# Patient Record
Sex: FEMALE | Race: BLACK OR AFRICAN AMERICAN | Employment: UNEMPLOYED
[De-identification: names, ages, dates, MRNs, and addresses within clinical notes are randomized per-mention and may not be internally consistent; named-entity substitution may affect disease eponyms.]

---

## 2017-01-11 ENCOUNTER — TELEPHONE (OUTPATIENT)
Dept: INTERNAL MEDICINE | Facility: CLINIC | Age: 57
End: 2017-01-11

## 2017-01-12 DIAGNOSIS — E11.42 TYPE 2 DIABETES MELLITUS WITH DIABETIC POLYNEUROPATHY, WITHOUT LONG-TERM CURRENT USE OF INSULIN (HCC): Primary | ICD-10-CM

## 2017-01-12 RX ORDER — ALOGLIPTIN 25 MG/1
25 TABLET, FILM COATED ORAL DAILY
Qty: 30 TABLET | Refills: 2 | Status: SHIPPED | OUTPATIENT
Start: 2017-01-12 | End: 2017-04-24

## 2017-01-18 ENCOUNTER — OFFICE VISIT (OUTPATIENT)
Dept: PODIATRY | Facility: CLINIC | Age: 57
End: 2017-01-18

## 2017-01-18 VITALS
WEIGHT: 198.4 LBS | SYSTOLIC BLOOD PRESSURE: 142 MMHG | BODY MASS INDEX: 31.88 KG/M2 | DIASTOLIC BLOOD PRESSURE: 86 MMHG | HEART RATE: 98 BPM | TEMPERATURE: 98.2 F | HEIGHT: 66 IN

## 2017-01-18 DIAGNOSIS — G62.9 NEUROPATHY: ICD-10-CM

## 2017-01-18 DIAGNOSIS — M21.41 PES PLANUS OF BOTH FEET: ICD-10-CM

## 2017-01-18 DIAGNOSIS — E11.42 TYPE 2 DIABETES MELLITUS WITH DIABETIC POLYNEUROPATHY, UNSPECIFIED LONG TERM INSULIN USE STATUS: ICD-10-CM

## 2017-01-18 DIAGNOSIS — M21.42 PES PLANUS OF BOTH FEET: ICD-10-CM

## 2017-01-18 DIAGNOSIS — B35.1 ONYCHOMYCOSIS: Primary | ICD-10-CM

## 2017-01-18 PROCEDURE — 99203 OFFICE O/P NEW LOW 30 MIN: CPT | Performed by: PODIATRIST

## 2017-01-18 PROCEDURE — 11721 DEBRIDE NAIL 6 OR MORE: CPT | Performed by: PODIATRIST

## 2017-01-26 DIAGNOSIS — I10 ESSENTIAL HYPERTENSION: ICD-10-CM

## 2017-01-26 DIAGNOSIS — B37.2 SKIN YEAST INFECTION: ICD-10-CM

## 2017-01-26 RX ORDER — NYSTATIN 100000 [USP'U]/G
POWDER TOPICAL
Qty: 15 G | Refills: 3 | Status: SHIPPED | OUTPATIENT
Start: 2017-01-26 | End: 2017-04-24 | Stop reason: SDUPTHER

## 2017-01-26 RX ORDER — LISINOPRIL 30 MG/1
TABLET ORAL
Qty: 30 TABLET | Refills: 3 | Status: SHIPPED | OUTPATIENT
Start: 2017-01-26 | End: 2017-03-30 | Stop reason: SINTOL

## 2017-03-30 ENCOUNTER — OFFICE VISIT (OUTPATIENT)
Dept: INTERNAL MEDICINE | Age: 57
End: 2017-03-30
Payer: MEDICAID

## 2017-03-30 VITALS
DIASTOLIC BLOOD PRESSURE: 88 MMHG | HEART RATE: 88 BPM | SYSTOLIC BLOOD PRESSURE: 146 MMHG | WEIGHT: 188 LBS | HEIGHT: 65 IN | BODY MASS INDEX: 31.32 KG/M2

## 2017-03-30 DIAGNOSIS — Z12.31 ENCOUNTER FOR SCREENING MAMMOGRAM FOR BREAST CANCER: ICD-10-CM

## 2017-03-30 DIAGNOSIS — I10 ESSENTIAL HYPERTENSION: ICD-10-CM

## 2017-03-30 DIAGNOSIS — E11.42 TYPE 2 DIABETES MELLITUS WITH DIABETIC POLYNEUROPATHY, WITHOUT LONG-TERM CURRENT USE OF INSULIN (HCC): ICD-10-CM

## 2017-03-30 DIAGNOSIS — Z23 NEED FOR TDAP VACCINATION: ICD-10-CM

## 2017-03-30 DIAGNOSIS — T46.4X5A: ICD-10-CM

## 2017-03-30 DIAGNOSIS — N10 ACUTE PYELONEPHRITIS: Primary | ICD-10-CM

## 2017-03-30 DIAGNOSIS — R39.9 UTI SYMPTOMS: ICD-10-CM

## 2017-03-30 LAB
BILIRUBIN, POC: ABNORMAL
BLOOD URINE, POC: ABNORMAL
CLARITY, POC: ABNORMAL
COLOR, POC: YELLOW
GLUCOSE URINE, POC: ABNORMAL
HBA1C MFR BLD: 8.8 %
KETONES, POC: ABNORMAL
LEUKOCYTE EST, POC: ABNORMAL
NITRITE, POC: POSITIVE
PH, POC: 5.5
PROTEIN, POC: 100
SPECIFIC GRAVITY, POC: 1.02
UROBILINOGEN, POC: 0.2

## 2017-03-30 PROCEDURE — 81002 URINALYSIS NONAUTO W/O SCOPE: CPT | Performed by: INTERNAL MEDICINE

## 2017-03-30 PROCEDURE — 90471 IMMUNIZATION ADMIN: CPT | Performed by: INTERNAL MEDICINE

## 2017-03-30 PROCEDURE — 83036 HEMOGLOBIN GLYCOSYLATED A1C: CPT | Performed by: INTERNAL MEDICINE

## 2017-03-30 PROCEDURE — 99214 OFFICE O/P EST MOD 30 MIN: CPT | Performed by: INTERNAL MEDICINE

## 2017-03-30 PROCEDURE — 90715 TDAP VACCINE 7 YRS/> IM: CPT | Performed by: INTERNAL MEDICINE

## 2017-03-30 RX ORDER — FLUCONAZOLE 150 MG/1
150 TABLET ORAL DAILY
Qty: 3 TABLET | Refills: 0 | Status: SHIPPED | OUTPATIENT
Start: 2017-03-30 | End: 2017-04-02

## 2017-03-30 RX ORDER — LOSARTAN POTASSIUM 50 MG/1
50 TABLET ORAL DAILY
Qty: 30 TABLET | Refills: 3 | Status: SHIPPED | OUTPATIENT
Start: 2017-03-30 | End: 2017-11-10

## 2017-03-30 RX ORDER — CIPROFLOXACIN 500 MG/1
500 TABLET, FILM COATED ORAL 2 TIMES DAILY
Qty: 14 TABLET | Refills: 0 | Status: SHIPPED | OUTPATIENT
Start: 2017-03-30 | End: 2017-04-06

## 2017-03-30 ASSESSMENT — PATIENT HEALTH QUESTIONNAIRE - PHQ9
2. FEELING DOWN, DEPRESSED OR HOPELESS: 3
4. FEELING TIRED OR HAVING LITTLE ENERGY: 3
3. TROUBLE FALLING OR STAYING ASLEEP: 0
SUM OF ALL RESPONSES TO PHQ QUESTIONS 1-9: 19
5. POOR APPETITE OR OVEREATING: 3
1. LITTLE INTEREST OR PLEASURE IN DOING THINGS: 2
8. MOVING OR SPEAKING SO SLOWLY THAT OTHER PEOPLE COULD HAVE NOTICED. OR THE OPPOSITE, BEING SO FIGETY OR RESTLESS THAT YOU HAVE BEEN MOVING AROUND A LOT MORE THAN USUAL: 2
9. THOUGHTS THAT YOU WOULD BE BETTER OFF DEAD, OR OF HURTING YOURSELF: 0
7. TROUBLE CONCENTRATING ON THINGS, SUCH AS READING THE NEWSPAPER OR WATCHING TELEVISION: 3
10. IF YOU CHECKED OFF ANY PROBLEMS, HOW DIFFICULT HAVE THESE PROBLEMS MADE IT FOR YOU TO DO YOUR WORK, TAKE CARE OF THINGS AT HOME, OR GET ALONG WITH OTHER PEOPLE: 0
SUM OF ALL RESPONSES TO PHQ9 QUESTIONS 1 & 2: 5
6. FEELING BAD ABOUT YOURSELF - OR THAT YOU ARE A FAILURE OR HAVE LET YOURSELF OR YOUR FAMILY DOWN: 3

## 2017-04-24 ENCOUNTER — HOSPITAL ENCOUNTER (OUTPATIENT)
Age: 57
Setting detail: SPECIMEN
Discharge: HOME | End: 2017-04-24
Payer: MEDICAID

## 2017-04-24 ENCOUNTER — OFFICE VISIT (OUTPATIENT)
Dept: INTERNAL MEDICINE | Age: 57
End: 2017-04-24
Payer: MEDICAID

## 2017-04-24 VITALS
DIASTOLIC BLOOD PRESSURE: 85 MMHG | BODY MASS INDEX: 31.18 KG/M2 | HEART RATE: 109 BPM | WEIGHT: 194 LBS | SYSTOLIC BLOOD PRESSURE: 156 MMHG | HEIGHT: 66 IN

## 2017-04-24 DIAGNOSIS — M25.511 CHRONIC RIGHT SHOULDER PAIN: ICD-10-CM

## 2017-04-24 DIAGNOSIS — B37.31 VAGINAL YEAST INFECTION: ICD-10-CM

## 2017-04-24 DIAGNOSIS — G89.29 CHRONIC RIGHT SHOULDER PAIN: ICD-10-CM

## 2017-04-24 DIAGNOSIS — Z12.11 COLON CANCER SCREENING: ICD-10-CM

## 2017-04-24 DIAGNOSIS — R45.89 DEPRESSED MOOD: ICD-10-CM

## 2017-04-24 DIAGNOSIS — G56.03 BILATERAL CARPAL TUNNEL SYNDROME: ICD-10-CM

## 2017-04-24 DIAGNOSIS — E11.42 DIABETIC POLYNEUROPATHY ASSOCIATED WITH TYPE 2 DIABETES MELLITUS (HCC): ICD-10-CM

## 2017-04-24 DIAGNOSIS — I10 ESSENTIAL HYPERTENSION: ICD-10-CM

## 2017-04-24 DIAGNOSIS — N30.00 ACUTE CYSTITIS WITHOUT HEMATURIA: Primary | ICD-10-CM

## 2017-04-24 DIAGNOSIS — E11.42 TYPE 2 DIABETES MELLITUS WITH DIABETIC POLYNEUROPATHY, WITHOUT LONG-TERM CURRENT USE OF INSULIN (HCC): ICD-10-CM

## 2017-04-24 PROBLEM — G56.01 CARPAL TUNNEL SYNDROME OF RIGHT WRIST: Status: ACTIVE | Noted: 2017-04-24

## 2017-04-24 LAB
BILIRUBIN, POC: ABNORMAL
BLOOD URINE, POC: ABNORMAL
CLARITY, POC: CLEAR
COLOR, POC: ABNORMAL
GLUCOSE URINE, POC: ABNORMAL
KETONES, POC: ABNORMAL
LEUKOCYTE EST, POC: ABNORMAL
NITRITE, POC: ABNORMAL
PH, POC: 5
PROTEIN, POC: ABNORMAL
SPECIFIC GRAVITY, POC: 1.01
UROBILINOGEN, POC: ABNORMAL

## 2017-04-24 PROCEDURE — 99214 OFFICE O/P EST MOD 30 MIN: CPT | Performed by: INTERNAL MEDICINE

## 2017-04-24 PROCEDURE — 81002 URINALYSIS NONAUTO W/O SCOPE: CPT | Performed by: INTERNAL MEDICINE

## 2017-04-24 RX ORDER — PRAVASTATIN SODIUM 40 MG
40 TABLET ORAL DAILY
Qty: 30 TABLET | Refills: 2 | Status: SHIPPED | OUTPATIENT
Start: 2017-04-24 | End: 2017-07-20 | Stop reason: SDUPTHER

## 2017-04-24 RX ORDER — ASPIRIN 81 MG/1
81 TABLET ORAL DAILY
Qty: 30 TABLET | Refills: 2 | Status: SHIPPED | OUTPATIENT
Start: 2017-04-24 | End: 2017-11-27

## 2017-04-24 RX ORDER — GABAPENTIN 800 MG/1
800 TABLET ORAL 3 TIMES DAILY
COMMUNITY
End: 2017-04-24

## 2017-04-24 RX ORDER — GABAPENTIN 600 MG/1
600 TABLET ORAL 3 TIMES DAILY
Qty: 90 TABLET | Refills: 2 | Status: SHIPPED | OUTPATIENT
Start: 2017-04-24 | End: 2017-07-20 | Stop reason: SDUPTHER

## 2017-04-24 RX ORDER — GLIPIZIDE 10 MG/1
10 TABLET ORAL
Qty: 60 TABLET | Refills: 2 | Status: SHIPPED | OUTPATIENT
Start: 2017-04-24 | End: 2017-07-20 | Stop reason: SDUPTHER

## 2017-04-24 RX ORDER — FLUCONAZOLE 150 MG/1
150 TABLET ORAL ONCE
Qty: 2 TABLET | Refills: 0 | Status: SHIPPED | OUTPATIENT
Start: 2017-04-24 | End: 2017-04-24

## 2017-04-24 RX ORDER — SENNOSIDES 8.6 MG
650 CAPSULE ORAL 2 TIMES DAILY PRN
Qty: 60 TABLET | Refills: 2 | Status: SHIPPED | OUTPATIENT
Start: 2017-04-24 | End: 2017-11-10

## 2017-04-24 RX ORDER — ALOGLIPTIN 25 MG/1
25 TABLET, FILM COATED ORAL DAILY
Qty: 30 TABLET | Refills: 2 | Status: SHIPPED | OUTPATIENT
Start: 2017-04-24 | End: 2017-11-10 | Stop reason: SDUPTHER

## 2017-04-24 RX ORDER — ATENOLOL 25 MG/1
25 TABLET ORAL DAILY
Qty: 30 TABLET | Refills: 2 | Status: SHIPPED | OUTPATIENT
Start: 2017-04-24 | End: 2017-07-20 | Stop reason: SDUPTHER

## 2017-04-24 RX ORDER — NITROFURANTOIN 25; 75 MG/1; MG/1
100 CAPSULE ORAL 2 TIMES DAILY
Qty: 6 CAPSULE | Refills: 0 | Status: SHIPPED | OUTPATIENT
Start: 2017-04-24 | End: 2017-04-27

## 2017-04-25 LAB
CREATININE URINE: 67.5 MG/DL (ref 28–217)
MICROALBUMIN/CREAT 24H UR: 526 MG/L
MICROALBUMIN/CREAT UR-RTO: 779 MCG/MG CREAT

## 2017-04-28 ENCOUNTER — TELEPHONE (OUTPATIENT)
Dept: BEHAVIORAL/MENTAL HEALTH CLINIC | Age: 57
End: 2017-04-28

## 2017-05-09 RX ORDER — LANCETS 33 GAUGE
EACH MISCELLANEOUS
Qty: 100 EACH | Refills: 5 | Status: SHIPPED | OUTPATIENT
Start: 2017-05-09

## 2017-05-19 DIAGNOSIS — E11.42 TYPE 2 DIABETES MELLITUS WITH DIABETIC POLYNEUROPATHY, WITHOUT LONG-TERM CURRENT USE OF INSULIN (HCC): ICD-10-CM

## 2017-05-19 RX ORDER — GLIPIZIDE 10 MG/1
TABLET ORAL
Qty: 60 TABLET | Refills: 0 | Status: SHIPPED | OUTPATIENT
Start: 2017-05-19 | End: 2017-11-10

## 2017-05-24 ENCOUNTER — OFFICE VISIT (OUTPATIENT)
Dept: PODIATRY | Age: 57
End: 2017-05-24
Payer: MEDICAID

## 2017-05-24 VITALS
DIASTOLIC BLOOD PRESSURE: 90 MMHG | SYSTOLIC BLOOD PRESSURE: 153 MMHG | HEART RATE: 79 BPM | BODY MASS INDEX: 31.82 KG/M2 | HEIGHT: 66 IN | WEIGHT: 198 LBS | TEMPERATURE: 97.8 F

## 2017-05-24 DIAGNOSIS — B35.1 ONYCHOMYCOSIS: Primary | ICD-10-CM

## 2017-05-24 DIAGNOSIS — M21.41 PES PLANUS OF BOTH FEET: ICD-10-CM

## 2017-05-24 DIAGNOSIS — E11.42 TYPE 2 DIABETES MELLITUS WITH DIABETIC POLYNEUROPATHY, UNSPECIFIED LONG TERM INSULIN USE STATUS: ICD-10-CM

## 2017-05-24 DIAGNOSIS — G62.9 NEUROPATHY: ICD-10-CM

## 2017-05-24 DIAGNOSIS — M21.42 PES PLANUS OF BOTH FEET: ICD-10-CM

## 2017-05-24 PROCEDURE — 11721 DEBRIDE NAIL 6 OR MORE: CPT | Performed by: PODIATRIST

## 2017-05-24 PROCEDURE — 99213 OFFICE O/P EST LOW 20 MIN: CPT | Performed by: PODIATRIST

## 2017-06-06 DIAGNOSIS — E11.42 TYPE 2 DIABETES MELLITUS WITH DIABETIC POLYNEUROPATHY, WITHOUT LONG-TERM CURRENT USE OF INSULIN (HCC): ICD-10-CM

## 2017-06-06 DIAGNOSIS — B37.2 SKIN YEAST INFECTION: ICD-10-CM

## 2017-06-07 RX ORDER — LISINOPRIL 30 MG/1
TABLET ORAL
Qty: 30 TABLET | Refills: 3 | Status: SHIPPED | OUTPATIENT
Start: 2017-06-07 | End: 2017-11-27

## 2017-06-07 RX ORDER — ASPIRIN 81 MG
TABLET, DELAYED RELEASE (ENTERIC COATED) ORAL
Qty: 30 TABLET | Refills: 3 | Status: SHIPPED | OUTPATIENT
Start: 2017-06-07 | End: 2017-11-27

## 2017-06-07 RX ORDER — NYSTATIN 100000 [USP'U]/G
POWDER TOPICAL
Qty: 15 G | Refills: 3 | Status: SHIPPED | OUTPATIENT
Start: 2017-06-07 | End: 2017-11-10

## 2017-06-24 ENCOUNTER — HOSPITAL ENCOUNTER (EMERGENCY)
Age: 57
Discharge: HOME | End: 2017-06-24
Attending: EMERGENCY MEDICINE
Payer: MEDICAID

## 2017-06-24 VITALS
BODY MASS INDEX: 30.37 KG/M2 | TEMPERATURE: 98.6 F | RESPIRATION RATE: 15 BRPM | OXYGEN SATURATION: 99 % | SYSTOLIC BLOOD PRESSURE: 157 MMHG | DIASTOLIC BLOOD PRESSURE: 77 MMHG | HEIGHT: 66 IN | HEART RATE: 99 BPM | WEIGHT: 189 LBS

## 2017-06-24 DIAGNOSIS — H10.023 MUCOPURULENT CONJUNCTIVITIS, BILATERAL: Primary | ICD-10-CM

## 2017-06-24 PROCEDURE — 96372 THER/PROPH/DIAG INJ SC/IM: CPT

## 2017-06-24 PROCEDURE — 6370000000 HC RX 637 (ALT 250 FOR IP): Performed by: PHYSICIAN ASSISTANT

## 2017-06-24 PROCEDURE — 6360000002 HC RX W HCPCS: Performed by: PHYSICIAN ASSISTANT

## 2017-06-24 PROCEDURE — 99283 EMERGENCY DEPT VISIT LOW MDM: CPT

## 2017-06-24 RX ORDER — KETOROLAC TROMETHAMINE 30 MG/ML
30 INJECTION, SOLUTION INTRAMUSCULAR; INTRAVENOUS ONCE
Status: COMPLETED | OUTPATIENT
Start: 2017-06-24 | End: 2017-06-24

## 2017-06-24 RX ORDER — ERYTHROMYCIN 5 MG/G
OINTMENT OPHTHALMIC
Qty: 1 TUBE | Refills: 0 | Status: SHIPPED | OUTPATIENT
Start: 2017-06-24 | End: 2017-07-04

## 2017-06-24 RX ORDER — TETRACAINE HYDROCHLORIDE 5 MG/ML
1 SOLUTION OPHTHALMIC ONCE
Status: COMPLETED | OUTPATIENT
Start: 2017-06-24 | End: 2017-06-24

## 2017-06-24 RX ADMIN — FLUORESCEIN SODIUM 2 MG: 1 STRIP OPHTHALMIC at 11:28

## 2017-06-24 RX ADMIN — TETRACAINE HYDROCHLORIDE 1 DROP: 5 SOLUTION OPHTHALMIC at 11:29

## 2017-06-24 RX ADMIN — KETOROLAC TROMETHAMINE 30 MG: 30 INJECTION INTRAMUSCULAR; INTRAVENOUS at 11:45

## 2017-06-24 ASSESSMENT — PAIN DESCRIPTION - FREQUENCY: FREQUENCY: CONTINUOUS

## 2017-06-24 ASSESSMENT — VISUAL ACUITY
OU: 20/40
OS: 20/40
OD: 20/40

## 2017-06-24 ASSESSMENT — PAIN DESCRIPTION - DESCRIPTORS: DESCRIPTORS: ACHING

## 2017-06-24 ASSESSMENT — ENCOUNTER SYMPTOMS
PHOTOPHOBIA: 0
EYE REDNESS: 1
VOMITING: 0
EYE ITCHING: 0
EYE PAIN: 0
COUGH: 0
EYE INFLAMMATION: 1
PERI-ORBITAL EDEMA: 0
STRIDOR: 0
EYE DISCHARGE: 1
CRUSTING: 1
WHEEZING: 0

## 2017-06-24 ASSESSMENT — PAIN DESCRIPTION - PAIN TYPE: TYPE: ACUTE PAIN

## 2017-06-24 ASSESSMENT — PAIN DESCRIPTION - LOCATION: LOCATION: BACK

## 2017-06-24 ASSESSMENT — PAIN SCALES - GENERAL: PAINLEVEL_OUTOF10: 6

## 2017-06-30 ENCOUNTER — TELEPHONE (OUTPATIENT)
Dept: INTERNAL MEDICINE | Age: 57
End: 2017-06-30

## 2017-07-20 DIAGNOSIS — E11.42 TYPE 2 DIABETES MELLITUS WITH DIABETIC POLYNEUROPATHY, WITHOUT LONG-TERM CURRENT USE OF INSULIN (HCC): ICD-10-CM

## 2017-07-20 DIAGNOSIS — I10 ESSENTIAL HYPERTENSION: ICD-10-CM

## 2017-07-20 DIAGNOSIS — E11.42 DIABETIC POLYNEUROPATHY ASSOCIATED WITH TYPE 2 DIABETES MELLITUS (HCC): ICD-10-CM

## 2017-07-21 RX ORDER — PRAVASTATIN SODIUM 40 MG
TABLET ORAL
Qty: 30 TABLET | Refills: 1 | Status: SHIPPED | OUTPATIENT
Start: 2017-07-21 | End: 2017-11-27

## 2017-07-21 RX ORDER — ATENOLOL 25 MG/1
TABLET ORAL
Qty: 30 TABLET | Refills: 1 | Status: SHIPPED | OUTPATIENT
Start: 2017-07-21 | End: 2017-11-27

## 2017-07-21 RX ORDER — GLIPIZIDE 10 MG/1
TABLET ORAL
Qty: 60 TABLET | Refills: 1 | Status: SHIPPED | OUTPATIENT
Start: 2017-07-21 | End: 2017-11-27

## 2017-07-21 RX ORDER — GABAPENTIN 600 MG/1
TABLET ORAL
Qty: 90 TABLET | Refills: 1 | Status: SHIPPED | OUTPATIENT
Start: 2017-07-21 | End: 2017-11-27

## 2017-09-27 ENCOUNTER — TELEPHONE (OUTPATIENT)
Dept: INTERNAL MEDICINE | Age: 57
End: 2017-09-27

## 2017-10-16 ENCOUNTER — TELEPHONE (OUTPATIENT)
Dept: INTERNAL MEDICINE | Age: 57
End: 2017-10-16

## 2017-10-16 NOTE — LETTER
[address]  [address] [facility] 29 [facility]  Phone: [phone]  Fax: [phone]    Dheeraj Metcalf MD        October 16, 2017    Rashad Pelayo  [address]  [place] [address]      Dear Marilu Vasquez: We are sending this letter because your PCP ordered [facility] for you to have done at your last visit here and they have not yet been completed. If you can please come to our office on the 2nd floor to  your orders and [facility]. If you do not have a follow-up appointment scheduled you can either contact the office to make an appointment with us or you can make one when you come in to pick-up your orders. If you have any questions or concerns, please don't hesitate to call.     Sincerely,        Dheeraj Metcalf MD

## 2017-11-10 ENCOUNTER — TELEPHONE (OUTPATIENT)
Dept: INTERNAL MEDICINE | Age: 57
End: 2017-11-10

## 2017-11-10 DIAGNOSIS — I10 ESSENTIAL HYPERTENSION: ICD-10-CM

## 2017-11-10 DIAGNOSIS — Z12.39 SCREENING FOR BREAST CANCER: ICD-10-CM

## 2017-11-10 DIAGNOSIS — E78.00 PURE HYPERCHOLESTEROLEMIA: Primary | ICD-10-CM

## 2017-11-10 DIAGNOSIS — E11.42 TYPE 2 DIABETES MELLITUS WITH DIABETIC POLYNEUROPATHY, WITHOUT LONG-TERM CURRENT USE OF INSULIN (HCC): ICD-10-CM

## 2017-11-10 RX ORDER — BLOOD-GLUCOSE METER
KIT MISCELLANEOUS
Qty: 1 KIT | Refills: 0 | Status: SHIPPED | OUTPATIENT
Start: 2017-11-10

## 2017-11-10 RX ORDER — ALOGLIPTIN 25 MG/1
25 TABLET, FILM COATED ORAL DAILY
Qty: 30 TABLET | Refills: 0 | Status: SHIPPED | OUTPATIENT
Start: 2017-11-10 | End: 2017-11-27

## 2017-11-10 RX ORDER — LISINOPRIL 30 MG/1
30 TABLET ORAL DAILY
Qty: 30 TABLET | Refills: 0 | Status: SHIPPED | OUTPATIENT
Start: 2017-11-10 | End: 2017-11-27

## 2017-11-10 RX ORDER — PRAVASTATIN SODIUM 40 MG
40 TABLET ORAL DAILY
Qty: 30 TABLET | Refills: 0 | Status: SHIPPED | OUTPATIENT
Start: 2017-11-10 | End: 2017-11-27

## 2017-11-10 RX ORDER — GLIPIZIDE 10 MG/1
10 TABLET ORAL DAILY
Qty: 60 TABLET | Refills: 0 | Status: SHIPPED | OUTPATIENT
Start: 2017-11-10 | End: 2017-11-27

## 2017-11-10 RX ORDER — LANCETS 30 GAUGE
EACH MISCELLANEOUS
Qty: 100 EACH | Refills: 10 | Status: SHIPPED | OUTPATIENT
Start: 2017-11-10 | End: 2020-07-16 | Stop reason: SDUPTHER

## 2017-11-10 RX ORDER — ATENOLOL 25 MG/1
25 TABLET ORAL DAILY
Qty: 30 TABLET | Refills: 0 | Status: SHIPPED | OUTPATIENT
Start: 2017-11-10 | End: 2017-11-27

## 2017-11-10 RX ORDER — GLUCOSAMINE HCL/CHONDROITIN SU 500-400 MG
CAPSULE ORAL
Qty: 100 STRIP | Refills: 10 | Status: SHIPPED | OUTPATIENT
Start: 2017-11-10 | End: 2020-07-16 | Stop reason: SDUPTHER

## 2017-11-10 NOTE — TELEPHONE ENCOUNTER
PC to patient regarding missed appointment 09/22/17 and need to reschedule. Patient states she was not able to make it due to work. States she has been out of her medications so she does need to make another appointment. Patient scheduled for 11/27/17 with Dr. Vada Cooks.

## 2017-11-10 NOTE — TELEPHONE ENCOUNTER
Scripts for glucometer, strips, and lancets faxed to [facility].   PC to patient, updated regarding refills for medications, need for labs, and mammogram.

## 2017-11-27 ENCOUNTER — OFFICE VISIT (OUTPATIENT)
Dept: INTERNAL MEDICINE | Age: 57
End: 2017-11-27
Payer: MEDICAID

## 2017-11-27 ENCOUNTER — HOSPITAL ENCOUNTER (OUTPATIENT)
Age: 57
Setting detail: SPECIMEN
Discharge: HOME | End: 2017-11-27
Payer: MEDICAID

## 2017-11-27 VITALS
BODY MASS INDEX: 30.63 KG/M2 | SYSTOLIC BLOOD PRESSURE: 138 MMHG | DIASTOLIC BLOOD PRESSURE: 80 MMHG | HEIGHT: 66 IN | WEIGHT: 190.6 LBS | HEART RATE: 89 BPM

## 2017-11-27 DIAGNOSIS — E11.42 TYPE 2 DIABETES MELLITUS WITH DIABETIC POLYNEUROPATHY, WITHOUT LONG-TERM CURRENT USE OF INSULIN (HCC): ICD-10-CM

## 2017-11-27 DIAGNOSIS — R05.9 COUGH: ICD-10-CM

## 2017-11-27 DIAGNOSIS — N30.00 ACUTE CYSTITIS WITHOUT HEMATURIA: ICD-10-CM

## 2017-11-27 DIAGNOSIS — B37.2 SKIN YEAST INFECTION: ICD-10-CM

## 2017-11-27 DIAGNOSIS — I10 ESSENTIAL HYPERTENSION: ICD-10-CM

## 2017-11-27 DIAGNOSIS — E78.00 PURE HYPERCHOLESTEROLEMIA: ICD-10-CM

## 2017-11-27 DIAGNOSIS — E11.42 TYPE 2 DIABETES MELLITUS WITH DIABETIC POLYNEUROPATHY, WITHOUT LONG-TERM CURRENT USE OF INSULIN (HCC): Primary | ICD-10-CM

## 2017-11-27 DIAGNOSIS — E11.42 DIABETIC POLYNEUROPATHY ASSOCIATED WITH TYPE 2 DIABETES MELLITUS (HCC): ICD-10-CM

## 2017-11-27 LAB
BILIRUBIN, POC: ABNORMAL
BLOOD URINE, POC: ABNORMAL
CLARITY, POC: ABNORMAL
COLOR, POC: ABNORMAL
CREATININE URINE: 34.2 MG/DL (ref 28–217)
GLUCOSE URINE, POC: ABNORMAL
HBA1C MFR BLD: 8.6 %
KETONES, POC: ABNORMAL
LEUKOCYTE EST, POC: ABNORMAL
MICROALBUMIN/CREAT 24H UR: 235 MG/L
MICROALBUMIN/CREAT UR-RTO: 687 MCG/MG CREAT
NITRITE, POC: ABNORMAL
PH, POC: 6.5
PROTEIN, POC: ABNORMAL
SPECIFIC GRAVITY, POC: 1.01
UROBILINOGEN, POC: 0.2

## 2017-11-27 PROCEDURE — G8484 FLU IMMUNIZE NO ADMIN: HCPCS | Performed by: INTERNAL MEDICINE

## 2017-11-27 PROCEDURE — 3017F COLORECTAL CA SCREEN DOC REV: CPT | Performed by: INTERNAL MEDICINE

## 2017-11-27 PROCEDURE — 83036 HEMOGLOBIN GLYCOSYLATED A1C: CPT | Performed by: INTERNAL MEDICINE

## 2017-11-27 PROCEDURE — 81003 URINALYSIS AUTO W/O SCOPE: CPT | Performed by: INTERNAL MEDICINE

## 2017-11-27 PROCEDURE — 99214 OFFICE O/P EST MOD 30 MIN: CPT | Performed by: INTERNAL MEDICINE

## 2017-11-27 PROCEDURE — 4004F PT TOBACCO SCREEN RCVD TLK: CPT | Performed by: INTERNAL MEDICINE

## 2017-11-27 PROCEDURE — G8417 CALC BMI ABV UP PARAM F/U: HCPCS | Performed by: INTERNAL MEDICINE

## 2017-11-27 PROCEDURE — 3045F PR MOST RECENT HEMOGLOBIN A1C LEVEL 7.0-9.0%: CPT | Performed by: INTERNAL MEDICINE

## 2017-11-27 PROCEDURE — 3014F SCREEN MAMMO DOC REV: CPT | Performed by: INTERNAL MEDICINE

## 2017-11-27 PROCEDURE — G8427 DOCREV CUR MEDS BY ELIG CLIN: HCPCS | Performed by: INTERNAL MEDICINE

## 2017-11-27 RX ORDER — ASPIRIN 81 MG/1
81 TABLET ORAL DAILY
Qty: 30 TABLET | Refills: 2 | Status: SHIPPED | OUTPATIENT
Start: 2017-11-27 | End: 2017-11-27 | Stop reason: SDUPTHER

## 2017-11-27 RX ORDER — LISINOPRIL 30 MG/1
30 TABLET ORAL DAILY
Qty: 30 TABLET | Refills: 2 | Status: SHIPPED | OUTPATIENT
Start: 2017-11-27 | End: 2018-02-01 | Stop reason: SDUPTHER

## 2017-11-27 RX ORDER — PRAVASTATIN SODIUM 40 MG
40 TABLET ORAL DAILY
Qty: 30 TABLET | Refills: 2 | Status: SHIPPED | OUTPATIENT
Start: 2017-11-27 | End: 2017-11-27 | Stop reason: SDUPTHER

## 2017-11-27 RX ORDER — NYSTATIN 100000 [USP'U]/G
POWDER TOPICAL
Qty: 15 G | Refills: 3 | Status: SHIPPED | OUTPATIENT
Start: 2017-11-27 | End: 2017-11-27 | Stop reason: SDUPTHER

## 2017-11-27 RX ORDER — GABAPENTIN 600 MG/1
600 TABLET ORAL 3 TIMES DAILY
Qty: 90 TABLET | Refills: 2 | Status: SHIPPED | OUTPATIENT
Start: 2017-11-27 | End: 2017-11-27 | Stop reason: SDUPTHER

## 2017-11-27 RX ORDER — ASPIRIN 81 MG/1
81 TABLET ORAL DAILY
Qty: 30 TABLET | Refills: 2 | Status: SHIPPED | OUTPATIENT
Start: 2017-11-27 | End: 2018-02-16

## 2017-11-27 RX ORDER — GLIPIZIDE 10 MG/1
10 TABLET ORAL DAILY
Qty: 60 TABLET | Refills: 2 | Status: SHIPPED | OUTPATIENT
Start: 2017-11-27 | End: 2018-02-16

## 2017-11-27 RX ORDER — LISINOPRIL 30 MG/1
30 TABLET ORAL DAILY
Qty: 30 TABLET | Refills: 2 | Status: SHIPPED | OUTPATIENT
Start: 2017-11-27 | End: 2017-11-27 | Stop reason: SDUPTHER

## 2017-11-27 RX ORDER — ALOGLIPTIN 25 MG/1
25 TABLET, FILM COATED ORAL DAILY
Qty: 30 TABLET | Refills: 2 | Status: SHIPPED | OUTPATIENT
Start: 2017-11-27 | End: 2018-02-01 | Stop reason: SDUPTHER

## 2017-11-27 RX ORDER — NYSTATIN 100000 [USP'U]/G
POWDER TOPICAL
Qty: 15 G | Refills: 0 | Status: SHIPPED | OUTPATIENT
Start: 2017-11-27 | End: 2018-02-16

## 2017-11-27 RX ORDER — FLUCONAZOLE 150 MG/1
150 TABLET ORAL ONCE
Qty: 2 TABLET | Refills: 0 | Status: SHIPPED | OUTPATIENT
Start: 2017-11-27 | End: 2017-11-27

## 2017-11-27 RX ORDER — GLIPIZIDE 10 MG/1
10 TABLET ORAL DAILY
Qty: 60 TABLET | Refills: 2 | Status: SHIPPED | OUTPATIENT
Start: 2017-11-27 | End: 2017-11-27 | Stop reason: SDUPTHER

## 2017-11-27 RX ORDER — NYSTATIN 100000 [USP'U]/G
POWDER TOPICAL
Qty: 15 G | Refills: 0 | Status: SHIPPED | OUTPATIENT
Start: 2017-11-27 | End: 2017-11-27 | Stop reason: SDUPTHER

## 2017-11-27 RX ORDER — NITROFURANTOIN 25; 75 MG/1; MG/1
100 CAPSULE ORAL 2 TIMES DAILY
Qty: 6 CAPSULE | Refills: 0 | Status: SHIPPED | OUTPATIENT
Start: 2017-11-27 | End: 2017-11-27 | Stop reason: SDUPTHER

## 2017-11-27 RX ORDER — GUAIFENESIN 600 MG/1
600 TABLET, EXTENDED RELEASE ORAL 2 TIMES DAILY
Qty: 20 TABLET | Refills: 0 | Status: SHIPPED | OUTPATIENT
Start: 2017-11-27 | End: 2018-02-16

## 2017-11-27 RX ORDER — GUAIFENESIN 600 MG/1
600 TABLET, EXTENDED RELEASE ORAL 2 TIMES DAILY
Qty: 20 TABLET | Refills: 0 | Status: SHIPPED | OUTPATIENT
Start: 2017-11-27 | End: 2017-11-27 | Stop reason: SDUPTHER

## 2017-11-27 RX ORDER — ALOGLIPTIN 25 MG/1
25 TABLET, FILM COATED ORAL DAILY
Qty: 30 TABLET | Refills: 2 | Status: SHIPPED | OUTPATIENT
Start: 2017-11-27 | End: 2017-11-27 | Stop reason: SDUPTHER

## 2017-11-27 RX ORDER — PRAVASTATIN SODIUM 40 MG
40 TABLET ORAL DAILY
Qty: 30 TABLET | Refills: 2 | Status: SHIPPED | OUTPATIENT
Start: 2017-11-27 | End: 2018-02-01 | Stop reason: SDUPTHER

## 2017-11-27 RX ORDER — ATENOLOL 25 MG/1
25 TABLET ORAL DAILY
Qty: 30 TABLET | Refills: 2 | Status: SHIPPED | OUTPATIENT
Start: 2017-11-27 | End: 2018-02-01 | Stop reason: SDUPTHER

## 2017-11-27 RX ORDER — ATENOLOL 25 MG/1
25 TABLET ORAL DAILY
Qty: 30 TABLET | Refills: 2 | Status: SHIPPED | OUTPATIENT
Start: 2017-11-27 | End: 2017-11-27 | Stop reason: SDUPTHER

## 2017-11-27 RX ORDER — GABAPENTIN 600 MG/1
600 TABLET ORAL 3 TIMES DAILY
Qty: 90 TABLET | Refills: 2 | Status: SHIPPED | OUTPATIENT
Start: 2017-11-27 | End: 2018-02-16

## 2017-11-27 RX ORDER — NITROFURANTOIN 25; 75 MG/1; MG/1
100 CAPSULE ORAL 2 TIMES DAILY
Qty: 6 CAPSULE | Refills: 0 | Status: SHIPPED | OUTPATIENT
Start: 2017-11-27 | End: 2017-11-30

## 2017-11-27 ASSESSMENT — ENCOUNTER SYMPTOMS
ABDOMINAL PAIN: 0
HEARTBURN: 0
NAUSEA: 0
COUGH: 0
HEMOPTYSIS: 0
BLURRED VISION: 0
DOUBLE VISION: 0

## 2017-11-27 NOTE — PROGRESS NOTES
retinal exam  05/03/1971    Cervical cancer screen  05/03/1982    Breast cancer screen  05/03/2011    Colon cancer screen colonoscopy  05/03/2011    Lipid screen  04/25/2015    Flu vaccine (1) 09/01/2017       Allergies   Allergen Reactions    Ibuprofen          Current Outpatient Prescriptions   Medication Sig Dispense Refill    alogliptin (NESINA) 25 MG TABS tablet Take 1 tablet by mouth daily 30 tablet 2    glipiZIDE (GLUCOTROL) 10 MG tablet Take 1 tablet by mouth daily 60 tablet 2    pravastatin (PRAVACHOL) 40 MG tablet Take 1 tablet by mouth daily 30 tablet 2    atenolol (TENORMIN) 25 MG tablet Take 1 tablet by mouth daily 30 tablet 2    lisinopril (ZESTRIL) 30 MG tablet Take 1 tablet by mouth daily 30 tablet 2    gabapentin (NEURONTIN) 600 MG tablet Take 1 tablet by mouth 3 times daily 90 tablet 2    aspirin EC 81 MG EC tablet Take 1 tablet by mouth daily 30 tablet 2    nystatin (NYSTATIN) 851932 UNIT/GM powder APPLY TOPICALLY FOUR TIMES DAILY 15 g 0    guaiFENesin (MUCINEX) 600 MG extended release tablet Take 1 tablet by mouth 2 times daily 20 tablet 0    nitrofurantoin, macrocrystal-monohydrate, (MACROBID) 100 MG capsule Take 1 capsule by mouth 2 times daily for 3 days 6 capsule 0    fluconazole (DIFLUCAN) 150 MG tablet Take 1 tablet by mouth once for 1 dose May repeat in 3-5 days, if symptoms persist or recur. 2 tablet 0    glucose monitoring kit (FREESTYLE) monitoring kit Diagnosis: Diabetes type 2, insulin-dependent. Use 3-4 times daily. Ok to give insurance brand 1 kit 0    Lancets MISC Use as directed, Dx Insulin dependent  each 10    Glucose Blood (BLOOD GLUCOSE TEST STRIPS) STRP Use to check Blood sugar 3 times/day , Dx: Insulin dependent DM2 Please substitute for brand compatible with patients glucometer 100 strip 10    ONETOUCH DELICA LANCETS 11U MISC CHECK BLOOD SUGAR ONCE DAILY 100 each 5     No current facility-administered medications for this visit.         Social History   Substance Use Topics    Smoking status: Current Every Day Smoker     Packs/day: 0.25     Years: 30.00     Types: Cigarettes    Smokeless tobacco: Never Used      Comment: 5-6 cigarettes a day     Alcohol use 0.0 oz/week      Comment: 1 x mon       History reviewed. No pertinent family history. REVIEW OF SYSTEMS:  Review of Systems   Constitutional: Negative for chills and fever. HENT: Negative for congestion, ear discharge and nosebleeds. Eyes: Negative for blurred vision and double vision. Respiratory: Negative for cough and hemoptysis. Cardiovascular: Negative for chest pain and palpitations. Gastrointestinal: Negative for abdominal pain, heartburn and nausea. Genitourinary: Negative for dysuria and urgency. Musculoskeletal: Negative for myalgias and neck pain. Neurological: Negative for dizziness and headaches. Endo/Heme/Allergies: Does not bruise/bleed easily. Psychiatric/Behavioral: Negative for depression and suicidal ideas. PHYSICAL EXAM:  Vitals:    11/27/17 0815 11/27/17 0914   BP: (!) 166/96 138/80   Site: Right Arm    Position: Sitting    Cuff Size: Medium Adult    Pulse: 89    Weight: 190 lb 9.6 oz (86.5 kg)    Height: 5' 6\" (1.676 m)      BP Readings from Last 3 Encounters:   11/27/17 138/80   06/24/17 (!) 157/77   05/24/17 (!) 153/90        Physical Exam   Constitutional: She is oriented to person, place, and time and well-developed, well-nourished, and in no distress. No distress. HENT:   Mouth/Throat: No oropharyngeal exudate. Neck: Normal range of motion. Neck supple. No thyromegaly present. Cardiovascular: Normal rate, regular rhythm, normal heart sounds and intact distal pulses. Pulmonary/Chest: Effort normal and breath sounds normal. No respiratory distress. She has no wheezes. Abdominal: Soft. Bowel sounds are normal. She exhibits no distension and no mass. There is no tenderness. Musculoskeletal: Normal range of motion.  She exhibits no edema or tenderness. Neurological: She is alert and oriented to person, place, and time. No cranial nerve deficit. Skin: Skin is warm. No rash noted. She is not diaphoretic. No erythema. Psychiatric: Mood, memory, affect and judgment normal.         DIAGNOSTIC FINDINGS:  CBC:  Lab Results   Component Value Date    WBC 9.0 07/20/2015    HGB 11.8 07/20/2015     07/20/2015     10/05/2011       BMP:    Lab Results   Component Value Date     07/20/2015    K 3.6 07/20/2015     07/20/2015    CO2 26 07/20/2015    BUN 12 07/20/2015    CREATININE 0.64 07/20/2015    GLUCOSE 77 07/20/2015    GLUCOSE 89 09/09/2011       HEMOGLOBIN A1C:   Lab Results   Component Value Date    LABA1C 8.6 11/27/2017       FASTING LIPID PANEL:  Lab Results   Component Value Date    CHOL 225 (H) 04/25/2014    HDL 41 04/25/2014    TRIG 215 (H) 04/25/2014       ASSESSMENT AND PLAN:  Albertina Ma was seen today for diabetes, cough, urinary frequency, health maintenance and other. Diagnoses and all orders for this visit:    Type 2 diabetes mellitus with diabetic polyneuropathy, without long-term current use of insulin (HCC)  -     POCT glycosylated hemoglobin (Hb A1C)  -     Discontinue: alogliptin (NESINA) 25 MG TABS tablet; Take 1 tablet by mouth daily  -     Discontinue: glipiZIDE (GLUCOTROL) 10 MG tablet; Take 1 tablet by mouth daily  -     Discontinue: aspirin EC 81 MG EC tablet; Take 1 tablet by mouth daily  -     alogliptin (NESINA) 25 MG TABS tablet; Take 1 tablet by mouth daily  -     glipiZIDE (GLUCOTROL) 10 MG tablet; Take 1 tablet by mouth daily  -     aspirin EC 81 MG EC tablet; Take 1 tablet by mouth daily    Pure hypercholesterolemia  -     Discontinue: pravastatin (PRAVACHOL) 40 MG tablet; Take 1 tablet by mouth daily  -     pravastatin (PRAVACHOL) 40 MG tablet; Take 1 tablet by mouth daily    Essential hypertension  -     Discontinue: atenolol (TENORMIN) 25 MG tablet;  Take 1 tablet by mouth daily  - Discontinue: lisinopril (ZESTRIL) 30 MG tablet; Take 1 tablet by mouth daily  -     atenolol (TENORMIN) 25 MG tablet; Take 1 tablet by mouth daily  -     lisinopril (ZESTRIL) 30 MG tablet; Take 1 tablet by mouth daily    Diabetic polyneuropathy associated with type 2 diabetes mellitus (HCC)  -     Discontinue: gabapentin (NEURONTIN) 600 MG tablet; Take 1 tablet by mouth 3 times daily  -     gabapentin (NEURONTIN) 600 MG tablet; Take 1 tablet by mouth 3 times daily    Acute cystitis without hematuria  -     Discontinue: nitrofurantoin, macrocrystal-monohydrate, (MACROBID) 100 MG capsule; Take 1 capsule by mouth 2 times daily for 3 days  -     Discontinue: nitrofurantoin, macrocrystal-monohydrate, (MACROBID) 100 MG capsule; Take 1 capsule by mouth 2 times daily for 3 days  -     nitrofurantoin, macrocrystal-monohydrate, (MACROBID) 100 MG capsule; Take 1 capsule by mouth 2 times daily for 3 days  -     fluconazole (DIFLUCAN) 150 MG tablet; Take 1 tablet by mouth once for 1 dose May repeat in 3-5 days, if symptoms persist or recur. Cough    Skin yeast infection  -     Discontinue: nystatin (NYSTATIN) 667741 UNIT/GM powder; APPLY TOPICALLY FOUR TIMES DAILY  -     Discontinue: nystatin (NYSTATIN) 509950 UNIT/GM powder; APPLY TOPICALLY FOUR TIMES DAILY  -     nystatin (NYSTATIN) 365235 UNIT/GM powder; APPLY TOPICALLY FOUR TIMES DAILY    Other orders  -     POCT Urinalysis No Micro (Auto)  -     Discontinue: guaiFENesin (MUCINEX) 600 MG extended release tablet; Take 1 tablet by mouth 2 times daily  -     Discontinue: guaiFENesin (MUCINEX) 600 MG extended release tablet; Take 1 tablet by mouth 2 times daily  -     guaiFENesin (MUCINEX) 600 MG extended release tablet;  Take 1 tablet by mouth 2 times daily      FOLLOW UP AND INSTRUCTIONS:  · Return in about 3 months (around 2/27/2018) for HTN, DM-2.    · Shauna received counseling on the following healthy behaviors: nutrition and exercise    · Discussed use, benefit, and side effects of prescribed medications. Barriers to medication compliance addressed. All patient questions answered. Pt voiced understanding. · Patient given educational materials - see patient instructions    Dheeraj Adam MD, HERBERT, [address] [place] [address] Internal Medicine Associate  11/27/2017, 12:42 PM    This note is created with the assistance of a speech-recognition program. While intending to generate a document that actually reflects the content of the visit, the document can still have some mistakes which may not have been identified and corrected by editing.

## 2017-12-08 DIAGNOSIS — Z12.11 ENCOUNTER FOR SCREENING COLONOSCOPY: Primary | ICD-10-CM

## 2017-12-08 LAB
CONTROL: ABNORMAL
HEMOCCULT STL QL: POSITIVE

## 2017-12-08 PROCEDURE — 82274 ASSAY TEST FOR BLOOD FECAL: CPT | Performed by: INTERNAL MEDICINE

## 2018-01-25 ENCOUNTER — HOSPITAL ENCOUNTER (EMERGENCY)
Age: 58
Discharge: HOME | End: 2018-01-25
Attending: EMERGENCY MEDICINE
Payer: MEDICAID

## 2018-01-25 ENCOUNTER — APPOINTMENT (OUTPATIENT)
Dept: GENERAL RADIOLOGY | Age: 58
End: 2018-01-25
Payer: MEDICAID

## 2018-01-25 VITALS
DIASTOLIC BLOOD PRESSURE: 74 MMHG | TEMPERATURE: 98.2 F | OXYGEN SATURATION: 100 % | WEIGHT: 198 LBS | HEART RATE: 74 BPM | SYSTOLIC BLOOD PRESSURE: 180 MMHG | HEIGHT: 66 IN | RESPIRATION RATE: 16 BRPM | BODY MASS INDEX: 31.82 KG/M2

## 2018-01-25 DIAGNOSIS — S29.019A THORACIC MYOFASCIAL STRAIN, INITIAL ENCOUNTER: ICD-10-CM

## 2018-01-25 DIAGNOSIS — J18.9 PNEUMONIA DUE TO ORGANISM: Primary | ICD-10-CM

## 2018-01-25 LAB
ABSOLUTE EOS #: 0.6 K/UL (ref 0–0.4)
ABSOLUTE IMMATURE GRANULOCYTE: ABNORMAL K/UL (ref 0–0.3)
ABSOLUTE LYMPH #: 2.9 K/UL (ref 1–4.8)
ABSOLUTE MONO #: 0.4 K/UL (ref 0.1–1.3)
ANION GAP SERPL CALCULATED.3IONS-SCNC: 10 MMOL/L (ref 9–17)
BASOPHILS # BLD: 1 % (ref 0–2)
BASOPHILS ABSOLUTE: 0.1 K/UL (ref 0–0.2)
BUN BLDV-MCNC: 11 MG/DL (ref 6–20)
BUN/CREAT BLD: ABNORMAL (ref 9–20)
CALCIUM SERPL-MCNC: 10.7 MG/DL (ref 8.6–10.4)
CHLORIDE BLD-SCNC: 104 MMOL/L (ref 98–107)
CO2: 27 MMOL/L (ref 20–31)
CREAT SERPL-MCNC: 0.81 MG/DL (ref 0.5–0.9)
DIFFERENTIAL TYPE: ABNORMAL
EOSINOPHILS RELATIVE PERCENT: 7 % (ref 0–4)
GFR AFRICAN AMERICAN: >60 ML/MIN
GFR NON-AFRICAN AMERICAN: >60 ML/MIN
GFR SERPL CREATININE-BSD FRML MDRD: ABNORMAL ML/MIN/{1.73_M2}
GFR SERPL CREATININE-BSD FRML MDRD: ABNORMAL ML/MIN/{1.73_M2}
GLUCOSE BLD-MCNC: 104 MG/DL (ref 65–105)
GLUCOSE BLD-MCNC: 113 MG/DL (ref 70–99)
HCT VFR BLD CALC: 33.3 % (ref 36–46)
HEMOGLOBIN: 11.4 G/DL (ref 12–16)
IMMATURE GRANULOCYTES: ABNORMAL %
LACTIC ACID: 0.4 MMOL/L (ref 0.5–2.2)
LYMPHOCYTES # BLD: 31 % (ref 24–44)
MCH RBC QN AUTO: 32 PG (ref 26–34)
MCHC RBC AUTO-ENTMCNC: 34.2 G/DL (ref 31–37)
MCV RBC AUTO: 93.7 FL (ref 80–100)
MONOCYTES # BLD: 4 % (ref 1–7)
NRBC AUTOMATED: ABNORMAL PER 100 WBC
PDW BLD-RTO: 14.4 % (ref 11.5–14.9)
PLATELET # BLD: 356 K/UL (ref 150–450)
PLATELET ESTIMATE: ABNORMAL
PMV BLD AUTO: 7.8 FL (ref 6–12)
POTASSIUM SERPL-SCNC: 3.8 MMOL/L (ref 3.7–5.3)
RBC # BLD: 3.55 M/UL (ref 4–5.2)
RBC # BLD: ABNORMAL 10*6/UL
SEG NEUTROPHILS: 57 % (ref 36–66)
SEGMENTED NEUTROPHILS ABSOLUTE COUNT: 5.4 K/UL (ref 1.3–9.1)
SODIUM BLD-SCNC: 141 MMOL/L (ref 135–144)
WBC # BLD: 9.5 K/UL (ref 3.5–11)
WBC # BLD: ABNORMAL 10*3/UL

## 2018-01-25 PROCEDURE — 80048 BASIC METABOLIC PNL TOTAL CA: CPT

## 2018-01-25 PROCEDURE — 85025 COMPLETE CBC W/AUTO DIFF WBC: CPT

## 2018-01-25 PROCEDURE — 83605 ASSAY OF LACTIC ACID: CPT

## 2018-01-25 PROCEDURE — 6360000002 HC RX W HCPCS: Performed by: PHYSICIAN ASSISTANT

## 2018-01-25 PROCEDURE — 36415 COLL VENOUS BLD VENIPUNCTURE: CPT

## 2018-01-25 PROCEDURE — 94664 DEMO&/EVAL PT USE INHALER: CPT

## 2018-01-25 PROCEDURE — 71046 X-RAY EXAM CHEST 2 VIEWS: CPT

## 2018-01-25 PROCEDURE — 94640 AIRWAY INHALATION TREATMENT: CPT

## 2018-01-25 PROCEDURE — 6370000000 HC RX 637 (ALT 250 FOR IP): Performed by: PHYSICIAN ASSISTANT

## 2018-01-25 PROCEDURE — 82947 ASSAY GLUCOSE BLOOD QUANT: CPT

## 2018-01-25 PROCEDURE — 94760 N-INVAS EAR/PLS OXIMETRY 1: CPT

## 2018-01-25 PROCEDURE — 99284 EMERGENCY DEPT VISIT MOD MDM: CPT

## 2018-01-25 RX ORDER — PREDNISONE 20 MG/1
40 TABLET ORAL DAILY
Qty: 10 TABLET | Refills: 0 | Status: SHIPPED | OUTPATIENT
Start: 2018-01-25 | End: 2018-01-30

## 2018-01-25 RX ORDER — ONDANSETRON 4 MG/1
4 TABLET, ORALLY DISINTEGRATING ORAL ONCE
Status: DISCONTINUED | OUTPATIENT
Start: 2018-01-25 | End: 2018-01-25

## 2018-01-25 RX ORDER — PREDNISONE 20 MG/1
40 TABLET ORAL ONCE
Status: COMPLETED | OUTPATIENT
Start: 2018-01-25 | End: 2018-01-25

## 2018-01-25 RX ORDER — DOXYCYCLINE 100 MG/1
100 TABLET ORAL 2 TIMES DAILY
Qty: 20 TABLET | Refills: 0 | Status: SHIPPED | OUTPATIENT
Start: 2018-01-25 | End: 2018-02-04

## 2018-01-25 RX ORDER — METHYLPREDNISOLONE SODIUM SUCCINATE 125 MG/2ML
125 INJECTION, POWDER, LYOPHILIZED, FOR SOLUTION INTRAMUSCULAR; INTRAVENOUS ONCE
Status: DISCONTINUED | OUTPATIENT
Start: 2018-01-25 | End: 2018-01-25

## 2018-01-25 RX ORDER — ACETAMINOPHEN AND CODEINE PHOSPHATE 300; 30 MG/1; MG/1
1 TABLET ORAL ONCE
Status: COMPLETED | OUTPATIENT
Start: 2018-01-25 | End: 2018-01-25

## 2018-01-25 RX ORDER — ALBUTEROL SULFATE 2.5 MG/3ML
2.5 SOLUTION RESPIRATORY (INHALATION)
Status: DISCONTINUED | OUTPATIENT
Start: 2018-01-25 | End: 2018-01-25 | Stop reason: HOSPADM

## 2018-01-25 RX ORDER — MORPHINE SULFATE 2 MG/ML
4 INJECTION, SOLUTION INTRAMUSCULAR; INTRAVENOUS ONCE
Status: DISCONTINUED | OUTPATIENT
Start: 2018-01-25 | End: 2018-01-25

## 2018-01-25 RX ORDER — ALBUTEROL SULFATE 90 UG/1
2 AEROSOL, METERED RESPIRATORY (INHALATION) EVERY 6 HOURS PRN
Qty: 1 INHALER | Refills: 0 | Status: SHIPPED | OUTPATIENT
Start: 2018-01-25 | End: 2018-02-16

## 2018-01-25 RX ORDER — SODIUM CHLORIDE 9 MG/ML
INJECTION, SOLUTION INTRAVENOUS CONTINUOUS
Status: DISCONTINUED | OUTPATIENT
Start: 2018-01-25 | End: 2018-01-25

## 2018-01-25 RX ORDER — MORPHINE SULFATE 2 MG/ML
2 INJECTION, SOLUTION INTRAMUSCULAR; INTRAVENOUS ONCE
Status: DISCONTINUED | OUTPATIENT
Start: 2018-01-25 | End: 2018-01-25

## 2018-01-25 RX ORDER — DEXAMETHASONE SODIUM PHOSPHATE 4 MG/ML
10 INJECTION, SOLUTION INTRA-ARTICULAR; INTRALESIONAL; INTRAMUSCULAR; INTRAVENOUS; SOFT TISSUE ONCE
Status: DISCONTINUED | OUTPATIENT
Start: 2018-01-25 | End: 2018-01-25

## 2018-01-25 RX ADMIN — ACETAMINOPHEN AND CODEINE PHOSPHATE 1 TABLET: 300; 30 TABLET ORAL at 12:26

## 2018-01-25 RX ADMIN — PREDNISONE 40 MG: 20 TABLET ORAL at 12:26

## 2018-01-25 RX ADMIN — ALBUTEROL SULFATE 2.5 MG: 2.5 SOLUTION RESPIRATORY (INHALATION) at 13:14

## 2018-01-25 ASSESSMENT — PAIN DESCRIPTION - FREQUENCY: FREQUENCY: CONTINUOUS

## 2018-01-25 ASSESSMENT — ENCOUNTER SYMPTOMS
STRIDOR: 0
WHEEZING: 0
BACK PAIN: 1

## 2018-01-25 ASSESSMENT — PAIN SCALES - GENERAL
PAINLEVEL_OUTOF10: 8
PAINLEVEL_OUTOF10: 8

## 2018-01-25 ASSESSMENT — PAIN DESCRIPTION - PAIN TYPE: TYPE: ACUTE PAIN

## 2018-01-25 ASSESSMENT — PAIN DESCRIPTION - LOCATION: LOCATION: BACK

## 2018-01-25 ASSESSMENT — PAIN DESCRIPTION - DESCRIPTORS: DESCRIPTORS: ACHING

## 2018-01-25 NOTE — ED PROVIDER NOTES
[address] ED  eMERGENCY dEPARTMENT eNCOUnter      Pt Name: Bebeto Meraz  MRN: [id number]  [place] 1960  Date of evaluation: 1/25/18      CHIEF COMPLAINT       Chief Complaint   Patient presents with    Back Pain         HISTORY OF PRESENT ILLNESS    Bebeto Meraz is a 65 y.o. female who presents complaining of right side back pain  The history is provided by the patient. Back Pain   Pain location: Right-sided back pain worse with cough and deep breath and movement onset approximately 3 days ago she denies any fever chills   Quality:  Aching  Pain severity:  Moderate  Pain is:  Same all the time  Onset quality:  Sudden  Associated symptoms: no chest pain        REVIEW OF SYSTEMS       Review of Systems   Respiratory: Negative for wheezing and stridor. Cardiovascular: Negative for chest pain, palpitations and leg swelling. Musculoskeletal: Positive for back pain. All other systems reviewed and are negative.       PAST MEDICAL HISTORY     Past Medical History:   Diagnosis Date    Anemia     Chronic back pain     Hyperlipidemia     Hypertension     Type 2 diabetes mellitus with diabetic polyneuropathy, without long-term current use of insulin ([facility] [address] 11/10/2016    Type II or unspecified type diabetes mellitus without mention of complication, not stated as uncontrolled        SURGICAL HISTORY       Past Surgical History:   Procedure Laterality Date    EYE SURGERY      THYROID SURGERY         CURRENT MEDICATIONS       Discharge Medication List as of 1/25/2018  1:31 PM      CONTINUE these medications which have NOT CHANGED    Details   alogliptin (NESINA) 25 MG TABS tablet Take 1 tablet by mouth daily, Disp-30 tablet, R-2Normal      glipiZIDE (GLUCOTROL) 10 MG tablet Take 1 tablet by mouth daily, Disp-60 tablet, R-2Normal      pravastatin (PRAVACHOL) 40 MG tablet Take 1 tablet by mouth daily, Disp-30 tablet, R-2Normal      atenolol (TENORMIN) 25 MG tablet Take 1 tablet by mouth daily, Sig: Take 1 tablet by mouth 2 times daily for 10 days     Dispense:  20 tablet     Refill:  0    predniSONE (DELTASONE) 20 MG tablet     Sig: Take 2 tablets by mouth daily for 5 days     Dispense:  10 tablet     Refill:  0    albuterol sulfate HFA (PROVENTIL HFA) 108 (90 Base) MCG/ACT inhaler     Sig: Inhale 2 puffs into the lungs every 6 hours as needed for Wheezing or Shortness of Breath     Dispense:  1 Inhaler     Refill:  0    traMADol-acetaminophen (ULTRACET) 37.5-325 MG per tablet     Sig: Take 1 tablet by mouth every 8 hours as needed for Pain for up to 2 days. Dispense:  6 tablet     Refill:  0       -------------------------      CRITICAL CARE:    CONSULTS:  None    PROCEDURES:  Procedures    FINAL IMPRESSION      1. Pneumonia due to organism    2.  Thoracic myofascial strain, initial encounter          DISPOSITION/PLAN   DISPOSITION Decision To Discharge 01/25/2018 01:05:27 PM      PATIENT REFERRED TO:  Dheeraj Fox MD  [address] [id number]    Schedule an appointment as soon as possible for a visit in 1 day      [facility] ED  [facility] [address]  [address] [address]  [phone]    If symptoms worsen      DISCHARGE MEDICATIONS:  Discharge Medication List as of 1/25/2018  1:31 PM      START taking these medications    Details   doxycycline monohydrate (ADOXA) 100 MG tablet Take 1 tablet by mouth 2 times daily for 10 days, Disp-20 tablet, R-0Print      predniSONE (DELTASONE) 20 MG tablet Take 2 tablets by mouth daily for 5 days, Disp-10 tablet, R-0Print      albuterol sulfate HFA (PROVENTIL HFA) 108 (90 Base) MCG/ACT inhaler Inhale 2 puffs into the lungs every 6 hours as needed for Wheezing or Shortness of Breath, Disp-1 Inhaler, R-0Print             (Please note that portions of this note were completed with a voice recognition program.  Efforts were made to edit the dictations but occasionally words are mis-transcribed.)    Jose J Vergara,

## 2018-02-01 DIAGNOSIS — I10 ESSENTIAL HYPERTENSION: ICD-10-CM

## 2018-02-01 DIAGNOSIS — E78.00 PURE HYPERCHOLESTEROLEMIA: ICD-10-CM

## 2018-02-01 DIAGNOSIS — E11.42 TYPE 2 DIABETES MELLITUS WITH DIABETIC POLYNEUROPATHY, WITHOUT LONG-TERM CURRENT USE OF INSULIN (HCC): ICD-10-CM

## 2018-02-01 NOTE — TELEPHONE ENCOUNTER
Refill on Lisinopril, Atenolol, Pravastatin, and Alogliptin. Last seen on 11/27/17, medications pending. Next Visit Date:  Future Appointments  Date Time Provider Nida Ayoubi   3/5/2018 8:15 AM Dheeraj Atkinson MD [facility] IM [address] Maintenance   Topic Date Due    Diabetic retinal exam  05/03/1971    Cervical cancer screen  05/03/1982    Breast cancer screen  05/03/2011    Lipid screen  04/25/2015    Flu vaccine (1) 09/01/2017    Diabetic foot exam  03/30/2018    A1C test (Diabetic or Prediabetic)  11/27/2018    Diabetic microalbuminuria test  11/27/2018    Colon Cancer Screen FIT/FOBT  12/08/2018    Potassium monitoring  01/25/2019    Creatinine monitoring  01/25/2019    DTaP/Tdap/Td vaccine (2 - Td) 03/30/2027    Pneumococcal med risk  Completed    Hepatitis C screen  Completed    HIV screen  Completed       Hemoglobin A1C (%)   Date Value   11/27/2017 8.6   03/30/2017 8.8   11/10/2016 9.2             ( goal A1C is < 7)   Microalb/Crt.  Ratio (mcg/mg creat)   Date Value   11/27/2017 687 (H)     LDL Cholesterol (mg/dL)   Date Value   04/25/2014 141 (H)       (goal LDL is <100)   AST (U/L)   Date Value   11/21/2014 12     ALT (U/L)   Date Value   11/21/2014 11     BUN (mg/dL)   Date Value   01/25/2018 11     BP Readings from Last 3 Encounters:   01/25/18 (!) 180/74   11/27/17 138/80   06/24/17 (!) 157/77          (goal 120/80)    All Future Testing planned in [facility]  Lab Frequency Next Occurrence   JACOBO DIGITAL SCREEN W OR WO CAD BILATERAL Once 02/10/2018   Lipid Panel Once 17/39/7734   Basic Metabolic Panel Once 45/32/6253               Patient Active Problem List:     Anemia     Chronic back pain     Essential hypertension     Type 2 diabetes mellitus with diabetic polyneuropathy, without long-term current use of insulin (HCC)     Cigarette nicotine dependence without complication     Astigmatism     Cortical cataract     Eyelid retraction     Nuclear sclerotic cataract     Carpal tunnel syndrome of right wrist

## 2018-02-02 RX ORDER — LISINOPRIL 30 MG/1
TABLET ORAL
Qty: 30 TABLET | Refills: 11 | Status: SHIPPED | OUTPATIENT
Start: 2018-02-02 | End: 2019-01-31 | Stop reason: SDUPTHER

## 2018-02-02 RX ORDER — ALOGLIPTIN 25 MG/1
TABLET, FILM COATED ORAL
Qty: 30 TABLET | Refills: 11 | Status: SHIPPED | OUTPATIENT
Start: 2018-02-02 | End: 2019-01-31 | Stop reason: SDUPTHER

## 2018-02-02 RX ORDER — PRAVASTATIN SODIUM 40 MG
TABLET ORAL
Qty: 30 TABLET | Refills: 11 | Status: SHIPPED | OUTPATIENT
Start: 2018-02-02 | End: 2019-01-31 | Stop reason: SDUPTHER

## 2018-02-02 RX ORDER — ATENOLOL 50 MG/1
TABLET ORAL
Qty: 15 TABLET | Refills: 11 | Status: SHIPPED | OUTPATIENT
Start: 2018-02-02 | End: 2019-01-31 | Stop reason: SDUPTHER

## 2018-02-07 ENCOUNTER — OFFICE VISIT (OUTPATIENT)
Dept: INTERNAL MEDICINE | Age: 58
End: 2018-02-07
Payer: MEDICAID

## 2018-02-07 VITALS
BODY MASS INDEX: 30.53 KG/M2 | WEIGHT: 190 LBS | HEART RATE: 98 BPM | DIASTOLIC BLOOD PRESSURE: 92 MMHG | HEIGHT: 66 IN | SYSTOLIC BLOOD PRESSURE: 180 MMHG

## 2018-02-07 DIAGNOSIS — M54.50 CHRONIC MIDLINE LOW BACK PAIN WITHOUT SCIATICA: Primary | ICD-10-CM

## 2018-02-07 DIAGNOSIS — G89.29 CHRONIC MIDLINE LOW BACK PAIN WITHOUT SCIATICA: Primary | ICD-10-CM

## 2018-02-07 RX ORDER — CITALOPRAM 20 MG/1
TABLET ORAL
COMMUNITY
Start: 2016-05-23 | End: 2019-09-04

## 2018-02-07 RX ORDER — DICYCLOMINE HCL 20 MG
20 TABLET ORAL
Status: ON HOLD | COMMUNITY
Start: 2018-02-04 | End: 2019-12-11

## 2018-02-07 RX ORDER — GABAPENTIN 800 MG/1
600 TABLET ORAL
COMMUNITY
Start: 2016-01-19 | End: 2019-09-04

## 2018-02-07 RX ORDER — GINSENG 100 MG
CAPSULE ORAL
COMMUNITY
Start: 2016-04-22 | End: 2019-09-04

## 2018-02-07 RX ORDER — CYCLOBENZAPRINE HCL 10 MG
10 TABLET ORAL
COMMUNITY
Start: 2018-02-04 | End: 2018-02-07

## 2018-02-07 RX ORDER — GLIPIZIDE 10 MG/1
10 TABLET ORAL
COMMUNITY
Start: 2016-03-23 | End: 2018-02-07 | Stop reason: SDUPTHER

## 2018-02-07 RX ORDER — ACETAMINOPHEN AND CODEINE PHOSPHATE 300; 30 MG/1; MG/1
1-2 TABLET ORAL
COMMUNITY
Start: 2018-02-04 | End: 2018-02-11

## 2018-02-07 NOTE — PROGRESS NOTES
Attending Physician Statement GC  I have discussed the care of Nuria Cobb, including pertinent history and exam findings with the resident. I have reviewed the key elements of all parts of the encounter with the resident. I have seen and examined the patient with the resident and the key elements of all parts of the encounter have been performed by me. At the beginning of the visit, while I was doing review of her records for her last ER visits  Patient said none of the medications that were given to her by the ER work, including muscle relaxants  Patient says she should have gone to the ER and coming here was a waste of time  Hence, we decided not to continue the visit.         Samaria Villalba MD

## 2018-02-16 ENCOUNTER — OFFICE VISIT (OUTPATIENT)
Dept: INTERNAL MEDICINE | Age: 58
End: 2018-02-16
Payer: MEDICAID

## 2018-02-16 VITALS
HEART RATE: 92 BPM | WEIGHT: 193.8 LBS | SYSTOLIC BLOOD PRESSURE: 194 MMHG | DIASTOLIC BLOOD PRESSURE: 86 MMHG | BODY MASS INDEX: 31.15 KG/M2 | HEIGHT: 66 IN

## 2018-02-16 DIAGNOSIS — Z12.31 ENCOUNTER FOR SCREENING MAMMOGRAM FOR BREAST CANCER: ICD-10-CM

## 2018-02-16 DIAGNOSIS — Z13.220 SCREENING FOR HYPERLIPIDEMIA: ICD-10-CM

## 2018-02-16 DIAGNOSIS — E11.42 DIABETIC POLYNEUROPATHY ASSOCIATED WITH TYPE 2 DIABETES MELLITUS (HCC): ICD-10-CM

## 2018-02-16 DIAGNOSIS — E11.42 TYPE 2 DIABETES MELLITUS WITH DIABETIC POLYNEUROPATHY, WITHOUT LONG-TERM CURRENT USE OF INSULIN (HCC): Primary | ICD-10-CM

## 2018-02-16 LAB — HBA1C MFR BLD: 8.6 %

## 2018-02-16 PROCEDURE — 3017F COLORECTAL CA SCREEN DOC REV: CPT | Performed by: INTERNAL MEDICINE

## 2018-02-16 PROCEDURE — 83036 HEMOGLOBIN GLYCOSYLATED A1C: CPT | Performed by: INTERNAL MEDICINE

## 2018-02-16 PROCEDURE — 99214 OFFICE O/P EST MOD 30 MIN: CPT | Performed by: INTERNAL MEDICINE

## 2018-02-16 PROCEDURE — 3045F PR MOST RECENT HEMOGLOBIN A1C LEVEL 7.0-9.0%: CPT | Performed by: INTERNAL MEDICINE

## 2018-02-16 PROCEDURE — G8417 CALC BMI ABV UP PARAM F/U: HCPCS | Performed by: INTERNAL MEDICINE

## 2018-02-16 PROCEDURE — 3014F SCREEN MAMMO DOC REV: CPT | Performed by: INTERNAL MEDICINE

## 2018-02-16 PROCEDURE — 4004F PT TOBACCO SCREEN RCVD TLK: CPT | Performed by: INTERNAL MEDICINE

## 2018-02-16 PROCEDURE — G8427 DOCREV CUR MEDS BY ELIG CLIN: HCPCS | Performed by: INTERNAL MEDICINE

## 2018-02-16 PROCEDURE — G8484 FLU IMMUNIZE NO ADMIN: HCPCS | Performed by: INTERNAL MEDICINE

## 2018-02-16 RX ORDER — ASPIRIN 81 MG/1
81 TABLET ORAL DAILY
Qty: 30 TABLET | Refills: 2 | Status: SHIPPED | OUTPATIENT
Start: 2018-02-16 | End: 2022-10-05 | Stop reason: SDUPTHER

## 2018-02-16 RX ORDER — GLIPIZIDE 10 MG/1
10 TABLET ORAL DAILY
Qty: 60 TABLET | Refills: 2 | Status: SHIPPED | OUTPATIENT
Start: 2018-02-16 | End: 2020-01-29

## 2018-02-16 RX ORDER — GABAPENTIN 600 MG/1
600 TABLET ORAL 3 TIMES DAILY
Qty: 90 TABLET | Refills: 2 | Status: SHIPPED | OUTPATIENT
Start: 2018-02-16 | End: 2019-09-04 | Stop reason: SDUPTHER

## 2018-02-19 ASSESSMENT — ENCOUNTER SYMPTOMS
BACK PAIN: 1
COUGH: 0
ABDOMINAL PAIN: 0
NAUSEA: 0
HEARTBURN: 0
HEMOPTYSIS: 0
BLURRED VISION: 0
DOUBLE VISION: 0

## 2018-02-19 NOTE — PROGRESS NOTES
[facility]  [facility] [address] [facility]  [address] [facility] [address]  Dept: [phone]  Dept Fax: [phone]    Office Progress/Follow Up Note  Date of patient's visit: 2/19/2018  Patient's Name:  Marcos Cerda YOB: 1960            Patient Care Team:  Dheeraj Atkinson MD as PCP - General (Internal Medicine)  Demarco Azevedo MD as Consulting Physician (Obstetrics & Gynecology)  ================================================================    REASON FOR VISIT/CHIEF COMPLAINT:  Follow-Up from Hospital (Pt was seen at [facility] 02/04--back pain, [facility] 02/07--Pneumonia ); Back Pain; and Forms (Disability for Critical access hospital, [facility] Exemption forms--on exam counter )    HISTORY OF PRESENTING ILLNESS:  History was obtained from: patient. Marcos Cerda is a 65 y.o. is here for a follow-up visit for recent hospitalization. Patient was admitted because of pneumonia. She was discharged home on by mouth antibiotics. Today she is doing well. She denies any cough or shortness of breath. She doesn't have any fever. She has history of chronic back pain without any radiculopathy. She brought in a disability form for me to complete for her. She has diabetes which is controlled. BP is not controlled. Patient did not take her BP medications today. BP is not controlled. Problem list, medications and blood work reviewed.        Patient Active Problem List   Diagnosis    Chronic bilateral low back pain without sciatica    Essential hypertension    Type 2 diabetes mellitus with diabetic polyneuropathy, without long-term current use of insulin (HCC)    Cigarette nicotine dependence without complication    Carpal tunnel syndrome of right wrist       Health Maintenance Due   Topic Date Due    Diabetic retinal exam  05/03/1971    Cervical cancer screen  05/03/1982    Breast cancer screen  05/03/2011    Lipid screen  04/25/2015    Flu vaccine chills and fever. HENT: Negative for congestion, ear discharge and nosebleeds. Eyes: Negative for blurred vision and double vision. Respiratory: Negative for cough and hemoptysis. Cardiovascular: Negative for chest pain and palpitations. Gastrointestinal: Negative for abdominal pain, heartburn and nausea. Genitourinary: Negative for dysuria and urgency. Musculoskeletal: Positive for back pain. Negative for myalgias and neck pain. Neurological: Negative for dizziness and headaches. Endo/Heme/Allergies: Does not bruise/bleed easily. Psychiatric/Behavioral: Negative for depression and suicidal ideas. PHYSICAL EXAM:  Vitals:    02/16/18 0952 02/16/18 1033   BP: (!) 195/106 (!) 194/86   Site: Right Arm Right Arm   Position: Sitting Sitting   Cuff Size: Large Adult Medium Adult   Pulse: 92    Weight: 193 lb 12.8 oz (87.9 kg)    Height: 5' 6\" (1.676 m)      BP Readings from Last 3 Encounters:   02/16/18 (!) 194/86   02/07/18 (!) 180/92   01/25/18 (!) 180/74        Physical Exam   Constitutional: She is oriented to person, place, and time and well-developed, well-nourished, and in no distress. No distress. HENT:   Mouth/Throat: No oropharyngeal exudate. Neck: Normal range of motion. Neck supple. No thyromegaly present. Cardiovascular: Normal rate, regular rhythm, normal heart sounds and intact distal pulses. Pulmonary/Chest: Effort normal and breath sounds normal. No respiratory distress. She has no wheezes. Abdominal: Soft. Bowel sounds are normal. She exhibits no distension and no mass. There is no tenderness. Musculoskeletal: Normal range of motion. She exhibits no edema or tenderness. Neurological: She is alert and oriented to person, place, and time. No cranial nerve deficit. Skin: Skin is warm. No rash noted. She is not diaphoretic. No erythema.    Psychiatric: Mood, memory, affect and judgment normal.         DIAGNOSTIC FINDINGS:  CBC:  Lab Results   Component Value Date WBC 9.5 01/25/2018    HGB 11.4 01/25/2018     01/25/2018     10/05/2011       BMP:    Lab Results   Component Value Date     01/25/2018    K 3.8 01/25/2018     01/25/2018    CO2 27 01/25/2018    BUN 11 01/25/2018    CREATININE 0.81 01/25/2018    GLUCOSE 113 01/25/2018    GLUCOSE 89 09/09/2011       HEMOGLOBIN A1C:   Lab Results   Component Value Date    LABA1C 8.6 02/16/2018       FASTING LIPID PANEL:  Lab Results   Component Value Date    CHOL 225 (H) 04/25/2014    HDL 41 04/25/2014    TRIG 215 (H) 04/25/2014       ASSESSMENT AND PLAN:  Debby Mccain was seen today for follow-up from hospital, back pain and forms. Diagnoses and all orders for this visit:    Type 2 diabetes mellitus with diabetic polyneuropathy, without long-term current use of insulin (HCC)  -     POCT glycosylated hemoglobin (Hb A1C)  -     glipiZIDE (GLUCOTROL) 10 MG tablet; Take 1 tablet by mouth daily  -     aspirin EC 81 MG EC tablet; Take 1 tablet by mouth daily    Essential HTN. -     Continue Atenolol 50 mg Lisinopril 30 mg         -     Advised adherence    Screening for hyperlipidemia  -     Lipid Panel; Future    Diabetic polyneuropathy associated with type 2 diabetes mellitus (HCC)  -     gabapentin (NEURONTIN) 600 MG tablet; Take 1 tablet by mouth 3 times daily for 30 days. FOLLOW UP AND INSTRUCTIONS:  · Return in about 2 weeks (around 3/2/2018) for HTN, DM-2.    · Shauna received counseling on the following healthy behaviors: nutrition and exercise    · Discussed use, benefit, and side effects of prescribed medications. Barriers to medication compliance addressed. All patient questions answered. Pt voiced understanding.      · Patient given educational materials - see patient instructions    Dheeraj Parra MD, HERBERT, [address] [facility] Internal Medicine Associate  2/19/2018, 7:32 AM    This note is created with the assistance of a speech-recognition

## 2018-04-03 DIAGNOSIS — E11.42 TYPE 2 DIABETES MELLITUS WITH DIABETIC POLYNEUROPATHY, WITHOUT LONG-TERM CURRENT USE OF INSULIN (HCC): ICD-10-CM

## 2018-04-03 DIAGNOSIS — E11.42 DIABETIC POLYNEUROPATHY ASSOCIATED WITH TYPE 2 DIABETES MELLITUS (HCC): ICD-10-CM

## 2018-04-03 RX ORDER — ASPIRIN 81 MG/1
81 TABLET ORAL DAILY
Qty: 30 TABLET | Refills: 2 | Status: SHIPPED | OUTPATIENT
Start: 2018-04-03 | End: 2018-06-04 | Stop reason: SDUPTHER

## 2018-04-03 RX ORDER — GABAPENTIN 600 MG/1
TABLET ORAL
Qty: 90 TABLET | Refills: 2 | Status: SHIPPED | OUTPATIENT
Start: 2018-04-03 | End: 2018-06-04 | Stop reason: SDUPTHER

## 2018-04-03 RX ORDER — NYSTATIN 100000 [USP'U]/G
POWDER TOPICAL
Qty: 15 G | Refills: 0 | Status: SHIPPED | OUTPATIENT
Start: 2018-04-03 | End: 2018-04-06 | Stop reason: SDUPTHER

## 2018-04-09 RX ORDER — NYSTATIN 100000 [USP'U]/G
POWDER TOPICAL
Qty: 15 G | Refills: 11 | Status: SHIPPED | OUTPATIENT
Start: 2018-04-09 | End: 2019-04-15 | Stop reason: SDUPTHER

## 2018-05-24 ENCOUNTER — TELEPHONE (OUTPATIENT)
Dept: INTERNAL MEDICINE | Age: 58
End: 2018-05-24

## 2018-06-04 DIAGNOSIS — E11.42 TYPE 2 DIABETES MELLITUS WITH DIABETIC POLYNEUROPATHY, WITHOUT LONG-TERM CURRENT USE OF INSULIN (HCC): ICD-10-CM

## 2018-06-05 RX ORDER — GLIPIZIDE 10 MG/1
TABLET ORAL
Qty: 60 TABLET | Refills: 2 | Status: SHIPPED | OUTPATIENT
Start: 2018-06-05 | End: 2018-11-28 | Stop reason: SDUPTHER

## 2018-06-05 RX ORDER — GLIPIZIDE 10 MG/1
TABLET ORAL
Qty: 60 TABLET | Refills: 11 | Status: SHIPPED | OUTPATIENT
Start: 2018-06-05 | End: 2019-09-04 | Stop reason: SDUPTHER

## 2018-06-11 ENCOUNTER — TELEPHONE (OUTPATIENT)
Dept: INTERNAL MEDICINE | Age: 58
End: 2018-06-11

## 2018-06-29 DIAGNOSIS — E11.42 DIABETIC POLYNEUROPATHY ASSOCIATED WITH TYPE 2 DIABETES MELLITUS (HCC): ICD-10-CM

## 2018-06-29 RX ORDER — GABAPENTIN 600 MG/1
TABLET ORAL
Qty: 90 TABLET | Refills: 11 | Status: ON HOLD | OUTPATIENT
Start: 2018-06-29 | End: 2019-12-11 | Stop reason: SDUPTHER

## 2018-07-02 ENCOUNTER — TELEPHONE (OUTPATIENT)
Dept: INTERNAL MEDICINE | Age: 58
End: 2018-07-02

## 2018-07-02 NOTE — LETTER
[address]  [address] [facility] 29 [facility]  Phone: [phone]  Fax: [phone]    Dheeraj Plascencia MD        July 2, 2018    Yamile Abdul  [address] [place] [id number]      Dear Omaira Aguilar: We are sending this letter because your PCP ordered [facility] for you to have done at your last visit here and they have not yet been completed. If you can please come to our office on the 2nd floor to  your orders to have them compelted. If you do not have a follow-up appointment scheduled you can either contact the office to make an appointment with us or you can make one when you come in to pick-up your orders. If you have any questions or concerns, please don't hesitate to call.     Sincerely,        Dheeraj Plascencia MD

## 2018-10-14 ENCOUNTER — HOSPITAL ENCOUNTER (EMERGENCY)
Age: 58
Discharge: HOME | End: 2018-10-14
Attending: EMERGENCY MEDICINE
Payer: MEDICAID

## 2018-10-14 VITALS
WEIGHT: 189 LBS | BODY MASS INDEX: 30.37 KG/M2 | HEIGHT: 66 IN | RESPIRATION RATE: 18 BRPM | HEART RATE: 94 BPM | OXYGEN SATURATION: 99 % | SYSTOLIC BLOOD PRESSURE: 164 MMHG | DIASTOLIC BLOOD PRESSURE: 92 MMHG | TEMPERATURE: 98.1 F

## 2018-10-14 DIAGNOSIS — L02.91 ABSCESS: Primary | ICD-10-CM

## 2018-10-14 PROCEDURE — 99282 EMERGENCY DEPT VISIT SF MDM: CPT

## 2018-10-14 RX ORDER — CEPHALEXIN 500 MG/1
500 CAPSULE ORAL 4 TIMES DAILY
Qty: 28 CAPSULE | Refills: 0 | Status: SHIPPED | OUTPATIENT
Start: 2018-10-14 | End: 2018-10-21

## 2018-10-14 ASSESSMENT — PAIN DESCRIPTION - LOCATION: LOCATION: GROIN

## 2018-10-14 ASSESSMENT — PAIN SCALES - GENERAL: PAINLEVEL_OUTOF10: 10

## 2018-10-14 ASSESSMENT — PAIN DESCRIPTION - DESCRIPTORS: DESCRIPTORS: BURNING

## 2018-10-14 NOTE — ED PROVIDER NOTES
BMI 30.51 kg/m²   Constitutional:  Well developed   Eyes:  Pupils equal and readily reactive to light  HENT:  Atraumatic, external ears normal, nose normal, oropharynx moist. Neck- supple   Respiratory:  Clear to auscultation bilaterally with good air exchange, no W/R/R  Cardiovascular:  RRR with normal S1 and S2  Gastrointestinal/Abdomen:  Soft, NT.  BS present. Musculoskeletal:  No edema, no tenderness, no deformities. Back:  No CVA tenderness. Normal to inspection. Integument:  Small folliculitis versus abscess formation to the mid pubic area. Chaperoned by [facility] PCT  Neurologic:  Alert & oriented x 3, no focal deficits noted       DIAGNOSTIC RESULTS     EKG: All EKG's are interpreted by the Emergency Department Physician who either signs or Co-signs this chart in the absence of a cardiologist.  Not indicated    RADIOLOGY:   Reviewed the radiologist:  No orders to display     Not indicated      LABS:  Labs Reviewed - No data to display      EMERGENCY DEPARTMENT COURSE:   -------------------------  Did the area require incision and drainage? Yes    Area was cleansed alcohol pad, 18-gauge needle was used to drain the abscess, about 1 mL (the fluid was drained. No complications        Patient was given oral antibiotics for bacterial coverage and both verbal and written instructions to follow up to ED or Family Doctor in two days for recheck and/or packing change. Was instructed to return for any worsening of the abscess or surrounding cellulitis. Orders Placed This Encounter   Medications    cephALEXin (KEFLEX) 500 MG capsule     Sig: Take 1 capsule by mouth 4 times daily for 7 days     Dispense:  28 capsule     Refill:  0       CONSULTS:  None      FINAL IMPRESSION      1.  Abscess          DISPOSITION/PLAN:  DISPOSITION          PATIENT REFERRED TO:  Dheeraj Leslie MD  [address]  [address] [facility] [address] [phone]    Schedule an appointment as soon as possible for a visit

## 2018-11-28 DIAGNOSIS — E11.42 TYPE 2 DIABETES MELLITUS WITH DIABETIC POLYNEUROPATHY, WITHOUT LONG-TERM CURRENT USE OF INSULIN (HCC): ICD-10-CM

## 2018-11-28 RX ORDER — GLIPIZIDE 10 MG/1
TABLET ORAL
Qty: 60 TABLET | Refills: 11 | Status: SHIPPED | OUTPATIENT
Start: 2018-11-28 | End: 2019-09-04 | Stop reason: SDUPTHER

## 2018-11-28 NOTE — TELEPHONE ENCOUNTER
escrib request for GLIPIZIDE 10 MG TAB 10 TAB patient does not have scheduled apt contacted pt left message for pt to contact to schedule. Health Maintenance   Topic Date Due    Diabetic retinal exam  05/03/1971    Cervical cancer screen  05/03/1982    Breast cancer screen  05/03/2011    Shingles Vaccine (1 of 2 - 2 Dose Series) 05/03/2011    Lipid screen  04/25/2015    Diabetic foot exam  03/30/2018    Flu vaccine (1) 09/01/2018    Diabetic microalbuminuria test  11/27/2018    Colon Cancer Screen FIT/FOBT  12/08/2018    Potassium monitoring  01/25/2019    Creatinine monitoring  01/25/2019    A1C test (Diabetic or Prediabetic)  02/16/2019    DTaP/Tdap/Td vaccine (2 - Td) 03/30/2027    Pneumococcal med risk  Completed    Hepatitis C screen  Completed    HIV screen  Completed             (applicable per patient's age: Cancer Screenings, Depression Screening, Fall Risk Screening, Immunizations)    Hemoglobin A1C (%)   Date Value   02/16/2018 8.6   11/27/2017 8.6   03/30/2017 8.8     Microalb/Crt. Ratio (mcg/mg creat)   Date Value   11/27/2017 687 (H)     LDL Cholesterol (mg/dL)   Date Value   04/25/2014 141 (H)     AST (U/L)   Date Value   11/21/2014 12     ALT (U/L)   Date Value   11/21/2014 11     BUN (mg/dL)   Date Value   01/25/2018 11      (goal A1C is < 7)   (goal LDL is <100) need 30-50% reduction from baseline     BP Readings from Last 3 Encounters:   10/14/18 (!) 164/92   02/16/18 (!) 194/86   02/07/18 (!) 180/92    (goal /80)      All Future Testing planned in [facility]:  Lab Frequency Next Occurrence       Next Visit Date:  No future appointments.          Patient Active Problem List:     Chronic bilateral low back pain without sciatica     Essential hypertension     Type 2 diabetes mellitus with diabetic polyneuropathy, without long-term current use of insulin (HCC)     Cigarette nicotine dependence without complication     Carpal tunnel syndrome of right wrist

## 2019-01-31 DIAGNOSIS — I10 ESSENTIAL HYPERTENSION: ICD-10-CM

## 2019-01-31 DIAGNOSIS — E78.00 PURE HYPERCHOLESTEROLEMIA: ICD-10-CM

## 2019-01-31 DIAGNOSIS — E11.42 TYPE 2 DIABETES MELLITUS WITH DIABETIC POLYNEUROPATHY, WITHOUT LONG-TERM CURRENT USE OF INSULIN (HCC): ICD-10-CM

## 2019-01-31 RX ORDER — ALOGLIPTIN 25 MG/1
TABLET, FILM COATED ORAL
Qty: 30 TABLET | Refills: 11 | Status: SHIPPED | OUTPATIENT
Start: 2019-01-31 | End: 2019-09-04

## 2019-01-31 RX ORDER — ATENOLOL 50 MG/1
TABLET ORAL
Qty: 15 TABLET | Refills: 11 | Status: SHIPPED | OUTPATIENT
Start: 2019-01-31 | End: 2020-07-16 | Stop reason: SDUPTHER

## 2019-01-31 RX ORDER — LISINOPRIL 30 MG/1
TABLET ORAL
Qty: 30 TABLET | Refills: 11 | Status: SHIPPED | OUTPATIENT
Start: 2019-01-31 | End: 2020-07-16 | Stop reason: SDUPTHER

## 2019-01-31 RX ORDER — PRAVASTATIN SODIUM 40 MG
TABLET ORAL
Qty: 30 TABLET | Refills: 11 | Status: SHIPPED | OUTPATIENT
Start: 2019-01-31 | End: 2020-07-16 | Stop reason: SDUPTHER

## 2019-04-06 ENCOUNTER — APPOINTMENT (OUTPATIENT)
Dept: CT IMAGING | Age: 59
End: 2019-04-06
Payer: MEDICAID

## 2019-04-06 ENCOUNTER — HOSPITAL ENCOUNTER (EMERGENCY)
Age: 59
Discharge: HOME | End: 2019-04-06
Attending: EMERGENCY MEDICINE
Payer: MEDICAID

## 2019-04-06 ENCOUNTER — APPOINTMENT (OUTPATIENT)
Dept: GENERAL RADIOLOGY | Age: 59
End: 2019-04-06
Payer: MEDICAID

## 2019-04-06 VITALS
DIASTOLIC BLOOD PRESSURE: 88 MMHG | RESPIRATION RATE: 16 BRPM | WEIGHT: 189 LBS | OXYGEN SATURATION: 99 % | BODY MASS INDEX: 30.37 KG/M2 | SYSTOLIC BLOOD PRESSURE: 164 MMHG | HEART RATE: 58 BPM | HEIGHT: 66 IN | TEMPERATURE: 98.4 F

## 2019-04-06 DIAGNOSIS — R07.9 CHEST PAIN, UNSPECIFIED TYPE: Primary | ICD-10-CM

## 2019-04-06 DIAGNOSIS — M54.6 ACUTE BILATERAL THORACIC BACK PAIN: ICD-10-CM

## 2019-04-06 LAB
ABSOLUTE EOS #: 0.1 K/UL (ref 0–0.4)
ABSOLUTE IMMATURE GRANULOCYTE: ABNORMAL K/UL (ref 0–0.3)
ABSOLUTE LYMPH #: 3.1 K/UL (ref 1–4.8)
ABSOLUTE MONO #: 0.5 K/UL (ref 0.1–1.3)
ANION GAP SERPL CALCULATED.3IONS-SCNC: 13 MMOL/L (ref 9–17)
BASOPHILS # BLD: 1 % (ref 0–2)
BASOPHILS ABSOLUTE: 0.1 K/UL (ref 0–0.2)
BUN BLDV-MCNC: 15 MG/DL (ref 6–20)
BUN/CREAT BLD: ABNORMAL (ref 9–20)
CALCIUM SERPL-MCNC: 10.1 MG/DL (ref 8.6–10.4)
CHLORIDE BLD-SCNC: 102 MMOL/L (ref 98–107)
CO2: 24 MMOL/L (ref 20–31)
CREAT SERPL-MCNC: 1.01 MG/DL (ref 0.5–0.9)
D-DIMER QUANTITATIVE: 1.21 MG/L FEU (ref 0–0.59)
DIFFERENTIAL TYPE: ABNORMAL
EOSINOPHILS RELATIVE PERCENT: 1 % (ref 0–4)
GFR AFRICAN AMERICAN: >60 ML/MIN
GFR NON-AFRICAN AMERICAN: 56 ML/MIN
GFR SERPL CREATININE-BSD FRML MDRD: ABNORMAL ML/MIN/{1.73_M2}
GFR SERPL CREATININE-BSD FRML MDRD: ABNORMAL ML/MIN/{1.73_M2}
GLUCOSE BLD-MCNC: 240 MG/DL (ref 70–99)
HCT VFR BLD CALC: 36.1 % (ref 36–46)
HEMOGLOBIN: 11.8 G/DL (ref 12–16)
IMMATURE GRANULOCYTES: ABNORMAL %
LYMPHOCYTES # BLD: 31 % (ref 24–44)
MCH RBC QN AUTO: 29.8 PG (ref 26–34)
MCHC RBC AUTO-ENTMCNC: 32.6 G/DL (ref 31–37)
MCV RBC AUTO: 91.4 FL (ref 80–100)
MONOCYTES # BLD: 5 % (ref 1–7)
MYOGLOBIN: 23 NG/ML (ref 25–58)
NRBC AUTOMATED: ABNORMAL PER 100 WBC
PDW BLD-RTO: 14.7 % (ref 11.5–14.9)
PLATELET # BLD: 368 K/UL (ref 150–450)
PLATELET ESTIMATE: ABNORMAL
PMV BLD AUTO: 7.8 FL (ref 6–12)
POTASSIUM SERPL-SCNC: 3.7 MMOL/L (ref 3.7–5.3)
RBC # BLD: 3.94 M/UL (ref 4–5.2)
RBC # BLD: ABNORMAL 10*6/UL
SEG NEUTROPHILS: 62 % (ref 36–66)
SEGMENTED NEUTROPHILS ABSOLUTE COUNT: 6.2 K/UL (ref 1.3–9.1)
SODIUM BLD-SCNC: 139 MMOL/L (ref 135–144)
TROPONIN INTERP: ABNORMAL
TROPONIN T: ABNORMAL NG/ML
TROPONIN, HIGH SENSITIVITY: 8 NG/L (ref 0–14)
WBC # BLD: 10 K/UL (ref 3.5–11)
WBC # BLD: ABNORMAL 10*3/UL

## 2019-04-06 PROCEDURE — 85025 COMPLETE CBC W/AUTO DIFF WBC: CPT

## 2019-04-06 PROCEDURE — 6360000002 HC RX W HCPCS: Performed by: EMERGENCY MEDICINE

## 2019-04-06 PROCEDURE — 83874 ASSAY OF MYOGLOBIN: CPT

## 2019-04-06 PROCEDURE — 80048 BASIC METABOLIC PNL TOTAL CA: CPT

## 2019-04-06 PROCEDURE — 96374 THER/PROPH/DIAG INJ IV PUSH: CPT

## 2019-04-06 PROCEDURE — 96375 TX/PRO/DX INJ NEW DRUG ADDON: CPT

## 2019-04-06 PROCEDURE — 84484 ASSAY OF TROPONIN QUANT: CPT

## 2019-04-06 PROCEDURE — 93005 ELECTROCARDIOGRAM TRACING: CPT

## 2019-04-06 PROCEDURE — 71046 X-RAY EXAM CHEST 2 VIEWS: CPT

## 2019-04-06 PROCEDURE — 36415 COLL VENOUS BLD VENIPUNCTURE: CPT

## 2019-04-06 PROCEDURE — 85379 FIBRIN DEGRADATION QUANT: CPT

## 2019-04-06 PROCEDURE — 99284 EMERGENCY DEPT VISIT MOD MDM: CPT

## 2019-04-06 PROCEDURE — 6360000004 HC RX CONTRAST MEDICATION: Performed by: EMERGENCY MEDICINE

## 2019-04-06 PROCEDURE — 2580000003 HC RX 258: Performed by: EMERGENCY MEDICINE

## 2019-04-06 PROCEDURE — 71275 CT ANGIOGRAPHY CHEST: CPT

## 2019-04-06 RX ORDER — CYCLOBENZAPRINE HCL 10 MG
10 TABLET ORAL 3 TIMES DAILY PRN
Qty: 20 TABLET | Refills: 0 | Status: SHIPPED | OUTPATIENT
Start: 2019-04-06 | End: 2019-04-16

## 2019-04-06 RX ORDER — TRAMADOL HYDROCHLORIDE 50 MG/1
50 TABLET ORAL EVERY 6 HOURS PRN
Qty: 10 TABLET | Refills: 0 | Status: SHIPPED | OUTPATIENT
Start: 2019-04-06 | End: 2019-04-09

## 2019-04-06 RX ORDER — 0.9 % SODIUM CHLORIDE 0.9 %
80 INTRAVENOUS SOLUTION INTRAVENOUS ONCE
Status: COMPLETED | OUTPATIENT
Start: 2019-04-06 | End: 2019-04-06

## 2019-04-06 RX ORDER — SODIUM CHLORIDE 0.9 % (FLUSH) 0.9 %
10 SYRINGE (ML) INJECTION PRN
Status: DISCONTINUED | OUTPATIENT
Start: 2019-04-06 | End: 2019-04-06 | Stop reason: HOSPADM

## 2019-04-06 RX ORDER — ORPHENADRINE CITRATE 30 MG/ML
60 INJECTION INTRAMUSCULAR; INTRAVENOUS ONCE
Status: COMPLETED | OUTPATIENT
Start: 2019-04-06 | End: 2019-04-06

## 2019-04-06 RX ORDER — MORPHINE SULFATE 4 MG/ML
4 INJECTION, SOLUTION INTRAMUSCULAR; INTRAVENOUS ONCE
Status: COMPLETED | OUTPATIENT
Start: 2019-04-06 | End: 2019-04-06

## 2019-04-06 RX ADMIN — MORPHINE SULFATE 4 MG: 4 INJECTION INTRAVENOUS at 14:47

## 2019-04-06 RX ADMIN — SODIUM CHLORIDE 80 ML: 9 INJECTION, SOLUTION INTRAVENOUS at 15:38

## 2019-04-06 RX ADMIN — IOVERSOL 75 ML: 741 INJECTION INTRA-ARTERIAL; INTRAVENOUS at 15:38

## 2019-04-06 RX ADMIN — Medication 10 ML: at 15:38

## 2019-04-06 RX ADMIN — ORPHENADRINE CITRATE 60 MG: 30 INJECTION INTRAMUSCULAR; INTRAVENOUS at 14:49

## 2019-04-06 ASSESSMENT — ENCOUNTER SYMPTOMS
VOMITING: 1
COUGH: 1
RHINORRHEA: 1
WHEEZING: 0
FACIAL SWELLING: 0
EYE REDNESS: 0
BACK PAIN: 1
SORE THROAT: 0
CONSTIPATION: 0
DIARRHEA: 1
SINUS PRESSURE: 0
EYE PAIN: 0
ABDOMINAL PAIN: 0
EYE DISCHARGE: 0
TROUBLE SWALLOWING: 0
CHEST TIGHTNESS: 0
BLOOD IN STOOL: 0
SHORTNESS OF BREATH: 0
COLOR CHANGE: 0
NAUSEA: 1

## 2019-04-06 ASSESSMENT — PAIN SCALES - GENERAL: PAINLEVEL_OUTOF10: 10

## 2019-04-06 NOTE — ED PROVIDER NOTES
swelling, myalgias and neck pain. Skin: Negative for color change, pallor, rash and wound. Neurological: Negative for dizziness, tremors, seizures, syncope, speech difficulty, weakness, numbness and headaches. Psychiatric/Behavioral: Negative for confusion, decreased concentration, hallucinations, self-injury, sleep disturbance and suicidal ideas. PAST MEDICAL HISTORY     Past Medical History:   Diagnosis Date    Anemia     Chronic back pain     Hyperlipidemia     Hypertension     Type 2 diabetes mellitus with diabetic polyneuropathy, without long-term current use of insulin ([facility] 11/10/2016    Type II or unspecified type diabetes mellitus without mention of complication, not stated as uncontrolled        SURGICAL HISTORY       Past Surgical History:   Procedure Laterality Date    EYE SURGERY      THYROID SURGERY         CURRENT MEDICATIONS       Previous Medications    ALOGLIPTIN (NESINA) 25 MG TABS TABLET    TAKE 1 TABLET BY MOUTH ONCE DAILY    ASPIRIN 81 MG EC TABLET    TAKE 1 TABLET BY MOUTH DAILY    ASPIRIN EC 81 MG EC TABLET    Take 1 tablet by mouth daily    ATENOLOL (TENORMIN) 50 MG TABLET    TAKE ONE-HALF TABLET BY MOUTH DAILY    BACITRACIN 500 UNIT/GM OINTMENT    APPLY TOPICALLY 2 TIMES DAILY. CITALOPRAM (CELEXA) 20 MG TABLET    TAKE 1 TAB BY MOUTH DAILY    DICYCLOMINE (BENTYL) 20 MG TABLET    Take 20 mg by mouth    GABAPENTIN (NEURONTIN) 600 MG TABLET    Take 1 tablet by mouth 3 times daily for 30 days. GABAPENTIN (NEURONTIN) 600 MG TABLET    TAKE ONE (1) TABLET BY MOUTH THREE TIMES DAILY    GABAPENTIN (NEURONTIN) 800 MG TABLET    600 mg. GLIPIZIDE (GLUCOTROL) 10 MG TABLET    Take 1 tablet by mouth daily    GLIPIZIDE (GLUCOTROL) 10 MG TABLET    TAKE (1) TABLET BY MOUTH DAILY    GLIPIZIDE (GLUCOTROL) 10 MG TABLET    TAKE (1) TABLET BY MOUTH DAILY    GLUCOSE BLOOD (BLOOD GLUCOSE TEST STRIPS) STRP    Use to check Blood sugar 3 times/day , Dx:   Insulin dependent DM2 Please substitute for brand compatible with patients glucometer    GLUCOSE MONITORING KIT (FREESTYLE) MONITORING KIT    Diagnosis: Diabetes type 2, insulin-dependent. Use 3-4 times daily. Ok to give insurance brand    LANCETS MISC    Use as directed, Dx Insulin dependent DM    LISINOPRIL (PRINIVIL;ZESTRIL) 30 MG TABLET    TAKE 1 TABLET BY MOUTH ONCE DAILY    NYAMYC 821083 UNIT/GM POWDER    APPLY TOPICALLY TO AFFECTED AREA FOUR TIMES A DAY    ONETOUCH DELICA LANCETS 23H MISC    CHECK BLOOD SUGAR ONCE DAILY    PRAVASTATIN (PRAVACHOL) 40 MG TABLET    TAKE 1 TABLET BY MOUTH ONCE DAILY       ALLERGIES     is allergic to ibuprofen. FAMILY HISTORY     [unfilled]     SOCIAL HISTORY      reports that she has been smoking cigarettes. She has a 7.50 pack-year smoking history. She has never used smokeless tobacco. She reports that she does not drink alcohol or use drugs. PHYSICAL EXAM     INITIAL VITALS: BP (!) 167/87   Pulse 86   Temp 98.4 °F (36.9 °C) (Oral)   Resp 18   Ht 5' 6\" (1.676 m)   Wt 189 lb (85.7 kg)   SpO2 99%   BMI 30.51 kg/m²      Physical Exam   Constitutional: She is oriented to person, place, and time. She appears well-developed and well-nourished. No distress. HENT:   Head: Normocephalic and atraumatic. Eyes: Pupils are equal, round, and reactive to light. Conjunctivae and EOM are normal. Right eye exhibits no discharge. Left eye exhibits no discharge. No scleral icterus. Cardiovascular: Normal rate, regular rhythm and normal heart sounds. Exam reveals no gallop and no friction rub. No murmur heard. Pulmonary/Chest: Effort normal and breath sounds normal. No stridor. No respiratory distress. She has no wheezes. She has no rales. She exhibits tenderness. Abdominal: Soft. Bowel sounds are normal. She exhibits no distension and no mass. There is no tenderness. There is no rebound and no guarding. Musculoskeletal: Normal range of motion.  She exhibits tenderness (Mid lower thoracic bilateral musculature no midline). She exhibits no edema. Neurological: She is alert and oriented to person, place, and time. She displays normal reflexes. No cranial nerve deficit or sensory deficit. She exhibits normal muscle tone. Coordination normal.   Skin: Skin is warm and dry. No rash noted. She is not diaphoretic. No erythema. No pallor. Psychiatric: She has a normal mood and affect. Her behavior is normal. Judgment and thought content normal.   Nursing note and vitals reviewed. MEDICAL DECISION MAKING:     Patient is having severe pain that is reproducible on palpation and movement back and into the chest.  He does have tenderness to palpation last bite of the chest.  I think in the end this patient is a musculoskeletal pain but I will do a full cardiac workup including a d-dimer to help rule out PE or dissection. DIAGNOSTIC RESULTS     EKG: All EKG's are interpreted by the Emergency Department Physician who either signs or Co-signs this chart in the absence of a cardiologist.    EKG Interpretation    Interpreted by me    Rhythm: normal sinus   Rate: normal  Axis: normal  Ectopy: none  Conduction: normal  ST Segments: no acute change  T Waves: no acute change  Q Waves: none    Clinical Impression: no acute changes and normal EKG    RADIOLOGY:All plain film, CT, MRI, and formal ultrasound images (except ED bedside ultrasound)are read by the radiologist and interpretations are directly viewed by the emergency physician. Xr Chest Standard (2 Vw)    Result Date: 4/6/2019  EXAMINATION: TWO VIEWS OF THE CHEST 4/6/2019 3:00 pm COMPARISON: Chest radiograph from January 25, 2018 HISTORY: ORDERING SYSTEM PROVIDED HISTORY: Chest Pain TECHNOLOGIST PROVIDED HISTORY: Chest Pain Ordering Physician Provided Reason for Exam: right and left side chest and upper back pain Acuity: Acute Type of Exam: Initial FINDINGS: Shallow inspiration. Heart is enlarged. Streaky bibasilar opacities may represent atelectasis.   Early the following components:       Result Value    Glucose 240 (*)     CREATININE 1.01 (*)     GFR Non- 56 (*)     All other components within normal limits   CBC WITH AUTO DIFFERENTIAL - Abnormal; Notable for the following components:    RBC 3.94 (*)     Hemoglobin 11.8 (*)     All other components within normal limits   D-DIMER, QUANTITATIVE - Abnormal; Notable for the following components:    D-Dimer, Quant 1.21 (*)     All other components within normal limits   TROP/MYOGLOBIN - Abnormal; Notable for the following components:    Myoglobin 23 (*)     All other components within normal limits   RAPID INFLUENZA A/B ANTIGENS   TROP/MYOGLOBIN         EMERGENCY DEPARTMENT COURSE:   Vitals:    Vitals:    04/06/19 1332   BP: (!) 167/87   Pulse: 86   Resp: 18   Temp: 98.4 °F (36.9 °C)   TempSrc: Oral   SpO2: 99%   Weight: 189 lb (85.7 kg)   Height: 5' 6\" (1.676 m)       The patient was given the following medications while in the emergency department:     Orders Placed This Encounter   Medications    morphine sulfate (PF) injection 4 mg    orphenadrine (NORFLEX) injection 60 mg    ioversol (OPTIRAY) 74 % injection 75 mL    0.9 % sodium chloride bolus    sodium chloride flush 0.9 % injection 10 mL    cyclobenzaprine (FLEXERIL) 10 MG tablet     Sig: Take 1 tablet by mouth 3 times daily as needed for Muscle spasms     Dispense:  20 tablet     Refill:  0    traMADol (ULTRAM) 50 MG tablet     Sig: Take 1 tablet by mouth every 6 hours as needed for Pain for up to 3 days. Dispense:  10 tablet     Refill:  0       -------------------------  4:36 PM  Patient was updated with the results. Patient is feeling better at this time. I believe patient's symptoms is all musculoskeletal therefore comfortable discharging patient home at this time without any further workup to follow-up with PCP. CRITICAL CARE:   None    CONSULTS:  None    PROCEDURES:  None    FINAL IMPRESSION      1.  Chest pain, unspecified type    2. Acute bilateral thoracic back pain          DISPOSITION/PLAN   DISPOSITION Decision To Discharge 04/06/2019 04:35:12 PM      PATIENT REFERRED TO:  Dheeraj Wilson MD  [address] [id number]    In 1 week      [facility] ED  [facility] [address]  [phone]    If symptoms worsen      DISCHARGE MEDICATIONS:  New Prescriptions    CYCLOBENZAPRINE (FLEXERIL) 10 MG TABLET    Take 1 tablet by mouth 3 times daily as needed for Muscle spasms    TRAMADOL (ULTRAM) 50 MG TABLET    Take 1 tablet by mouth every 6 hours as needed for Pain for up to 3 days.        (Please note that portions of this note were completed with a voice recognition program.  Efforts were made to edit the dictations but occasionally words are mis-transcribed.)    Florinda Rios MD  Attending Emery Ervin MD  04/06/19 [id number]

## 2019-04-15 RX ORDER — NYSTATIN 100000 [USP'U]/G
POWDER TOPICAL
Qty: 15 G | Refills: 11 | Status: SHIPPED | OUTPATIENT
Start: 2019-04-15 | End: 2020-08-19 | Stop reason: SDUPTHER

## 2019-04-26 DIAGNOSIS — E11.9 DM (DIABETES MELLITUS) (HCC): ICD-10-CM

## 2019-05-14 LAB
EKG ATRIAL RATE: 87 BPM
EKG P AXIS: 50 DEGREES
EKG P-R INTERVAL: 136 MS
EKG Q-T INTERVAL: 386 MS
EKG QRS DURATION: 84 MS
EKG QTC CALCULATION (BAZETT): 464 MS
EKG R AXIS: 3 DEGREES
EKG T AXIS: 70 DEGREES
EKG VENTRICULAR RATE: 87 BPM

## 2019-05-30 DIAGNOSIS — E11.42 TYPE 2 DIABETES MELLITUS WITH DIABETIC POLYNEUROPATHY, WITHOUT LONG-TERM CURRENT USE OF INSULIN (HCC): ICD-10-CM

## 2019-05-30 RX ORDER — ASPIRIN 81 MG/1
TABLET, COATED ORAL
Qty: 30 TABLET | Refills: 10 | Status: SHIPPED | OUTPATIENT
Start: 2019-05-30 | End: 2019-09-04

## 2019-05-30 NOTE — TELEPHONE ENCOUNTER
E-scribe request for Aspirin . Please review and e-scribe if applicable. Next Visit Date:  6/18/2019    Health Maintenance   Topic Date Due    Diabetic retinal exam  05/03/1971    Hepatitis B Vaccine (1 of 3 - Risk 3-dose series) 05/03/1980    Cervical cancer screen  05/03/1982    Breast cancer screen  05/03/2011    Shingles Vaccine (1 of 2) 05/03/2011    Lipid screen  04/25/2015    Diabetic foot exam  03/30/2018    Diabetic microalbuminuria test  11/27/2018    Colon Cancer Screen FIT/FOBT  12/08/2018    A1C test (Diabetic or Prediabetic)  02/16/2019    Flu vaccine (Season Ended) 09/01/2019    Potassium monitoring  04/06/2020    Creatinine monitoring  04/06/2020    DTaP/Tdap/Td vaccine (2 - Td) 03/30/2027    Pneumococcal 0-64 years Vaccine  Completed    Hepatitis C screen  Completed    HIV screen  Completed             (applicable per patient's age: Cancer Screenings, Depression Screening, Fall Risk Screening, Immunizations)    Hemoglobin A1C (%)   Date Value   02/16/2018 8.6   11/27/2017 8.6   03/30/2017 8.8     Microalb/Crt.  Ratio (mcg/mg creat)   Date Value   11/27/2017 687 (H)     LDL Cholesterol (mg/dL)   Date Value   04/25/2014 141 (H)     AST (U/L)   Date Value   11/21/2014 12     ALT (U/L)   Date Value   11/21/2014 11     BUN (mg/dL)   Date Value   04/06/2019 15      (goal A1C is < 7)   (goal LDL is <100) need 30-50% reduction from baseline     BP Readings from Last 3 Encounters:   04/06/19 (!) 164/88   10/14/18 (!) 164/92   02/16/18 (!) 194/86    (goal /80)      All Future Testing planned in [facility]:  Lab Frequency Next Occurrence   Hemoglobin A1c Once 07/26/2019       Next Visit Date:  Future Appointments   Date Time Provider Nida Avalos   6/18/2019 10:30 AM Dheeraj Schuster MD [id number]            Patient Active Problem List:     Chronic bilateral low back pain without sciatica     Essential hypertension     Type 2 diabetes mellitus with diabetic polyneuropathy,

## 2019-09-03 ENCOUNTER — TELEPHONE (OUTPATIENT)
Dept: PRIMARY CARE CLINIC | Age: 59
End: 2019-09-03

## 2019-09-04 ENCOUNTER — OFFICE VISIT (OUTPATIENT)
Dept: PRIMARY CARE CLINIC | Age: 59
End: 2019-09-04
Payer: MEDICAID

## 2019-09-04 VITALS
DIASTOLIC BLOOD PRESSURE: 71 MMHG | BODY MASS INDEX: 29.21 KG/M2 | OXYGEN SATURATION: 97 % | SYSTOLIC BLOOD PRESSURE: 114 MMHG | WEIGHT: 181 LBS | HEART RATE: 83 BPM

## 2019-09-04 DIAGNOSIS — H91.90 HEARING LOSS, UNSPECIFIED HEARING LOSS TYPE, UNSPECIFIED LATERALITY: ICD-10-CM

## 2019-09-04 DIAGNOSIS — Z12.11 SCREENING FOR COLON CANCER: ICD-10-CM

## 2019-09-04 DIAGNOSIS — E11.42 DIABETIC POLYNEUROPATHY ASSOCIATED WITH TYPE 2 DIABETES MELLITUS (HCC): Primary | ICD-10-CM

## 2019-09-04 DIAGNOSIS — Z13.220 SCREENING FOR HYPERLIPIDEMIA: ICD-10-CM

## 2019-09-04 DIAGNOSIS — Z12.31 ENCOUNTER FOR SCREENING MAMMOGRAM FOR BREAST CANCER: ICD-10-CM

## 2019-09-04 DIAGNOSIS — N63.10 LUMP OF RIGHT BREAST: ICD-10-CM

## 2019-09-04 DIAGNOSIS — Z13.31 POSITIVE DEPRESSION SCREENING: ICD-10-CM

## 2019-09-04 LAB — HBA1C MFR BLD: 7.6 %

## 2019-09-04 PROCEDURE — 2022F DILAT RTA XM EVC RTNOPTHY: CPT | Performed by: NURSE PRACTITIONER

## 2019-09-04 PROCEDURE — 99214 OFFICE O/P EST MOD 30 MIN: CPT | Performed by: NURSE PRACTITIONER

## 2019-09-04 PROCEDURE — 4004F PT TOBACCO SCREEN RCVD TLK: CPT | Performed by: NURSE PRACTITIONER

## 2019-09-04 PROCEDURE — 3045F PR MOST RECENT HEMOGLOBIN A1C LEVEL 7.0-9.0%: CPT | Performed by: NURSE PRACTITIONER

## 2019-09-04 PROCEDURE — G8419 CALC BMI OUT NRM PARAM NOF/U: HCPCS | Performed by: NURSE PRACTITIONER

## 2019-09-04 PROCEDURE — 3017F COLORECTAL CA SCREEN DOC REV: CPT | Performed by: NURSE PRACTITIONER

## 2019-09-04 PROCEDURE — 83036 HEMOGLOBIN GLYCOSYLATED A1C: CPT | Performed by: NURSE PRACTITIONER

## 2019-09-04 PROCEDURE — G8431 POS CLIN DEPRES SCRN F/U DOC: HCPCS | Performed by: NURSE PRACTITIONER

## 2019-09-04 PROCEDURE — G8427 DOCREV CUR MEDS BY ELIG CLIN: HCPCS | Performed by: NURSE PRACTITIONER

## 2019-09-04 PROCEDURE — 96160 PT-FOCUSED HLTH RISK ASSMT: CPT | Performed by: NURSE PRACTITIONER

## 2019-09-04 RX ORDER — GUAIFENESIN 600 MG/1
600 TABLET, EXTENDED RELEASE ORAL 2 TIMES DAILY
Qty: 30 TABLET | Refills: 0 | Status: SHIPPED | OUTPATIENT
Start: 2019-09-04 | End: 2019-09-19

## 2019-09-04 RX ORDER — GABAPENTIN 600 MG/1
600 TABLET ORAL 3 TIMES DAILY
Qty: 90 TABLET | Refills: 2 | Status: SHIPPED | OUTPATIENT
Start: 2019-09-04 | End: 2019-11-06 | Stop reason: SDUPTHER

## 2019-09-04 ASSESSMENT — PATIENT HEALTH QUESTIONNAIRE - PHQ9
6. FEELING BAD ABOUT YOURSELF - OR THAT YOU ARE A FAILURE OR HAVE LET YOURSELF OR YOUR FAMILY DOWN: 3
1. LITTLE INTEREST OR PLEASURE IN DOING THINGS: 3
7. TROUBLE CONCENTRATING ON THINGS, SUCH AS READING THE NEWSPAPER OR WATCHING TELEVISION: 1
SUM OF ALL RESPONSES TO PHQ QUESTIONS 1-9: 21
SUM OF ALL RESPONSES TO PHQ9 QUESTIONS 1 & 2: 6
4. FEELING TIRED OR HAVING LITTLE ENERGY: 3
3. TROUBLE FALLING OR STAYING ASLEEP: 3
5. POOR APPETITE OR OVEREATING: 2
SUM OF ALL RESPONSES TO PHQ QUESTIONS 1-9: 21
2. FEELING DOWN, DEPRESSED OR HOPELESS: 3
8. MOVING OR SPEAKING SO SLOWLY THAT OTHER PEOPLE COULD HAVE NOTICED. OR THE OPPOSITE, BEING SO FIGETY OR RESTLESS THAT YOU HAVE BEEN MOVING AROUND A LOT MORE THAN USUAL: 3
10. IF YOU CHECKED OFF ANY PROBLEMS, HOW DIFFICULT HAVE THESE PROBLEMS MADE IT FOR YOU TO DO YOUR WORK, TAKE CARE OF THINGS AT HOME, OR GET ALONG WITH OTHER PEOPLE: 2
9. THOUGHTS THAT YOU WOULD BE BETTER OFF DEAD, OR OF HURTING YOURSELF: 0

## 2019-09-04 ASSESSMENT — ENCOUNTER SYMPTOMS
DIARRHEA: 0
SINUS PRESSURE: 0
EYE DISCHARGE: 0
CHEST TIGHTNESS: 0
RHINORRHEA: 0
CONSTIPATION: 0
ABDOMINAL PAIN: 0
BLOOD IN STOOL: 0
SHORTNESS OF BREATH: 0
COUGH: 0

## 2019-09-17 ENCOUNTER — TELEPHONE (OUTPATIENT)
Dept: PRIMARY CARE CLINIC | Age: 59
End: 2019-09-17

## 2019-09-30 ENCOUNTER — TELEPHONE (OUTPATIENT)
Dept: PODIATRY | Age: 59
End: 2019-09-30

## 2019-10-09 ENCOUNTER — TELEPHONE (OUTPATIENT)
Dept: PRIMARY CARE CLINIC | Age: 59
End: 2019-10-09

## 2019-10-23 ENCOUNTER — HOSPITAL ENCOUNTER (OUTPATIENT)
Dept: WOMENS IMAGING | Age: 59
Discharge: HOME | End: 2019-10-25
Payer: MEDICAID

## 2019-10-23 DIAGNOSIS — N63.0 LUMP IN FEMALE BREAST: ICD-10-CM

## 2019-10-23 DIAGNOSIS — N63.10 LUMP OF RIGHT BREAST: ICD-10-CM

## 2019-10-23 PROCEDURE — 76642 ULTRASOUND BREAST LIMITED: CPT

## 2019-10-23 PROCEDURE — G0279 TOMOSYNTHESIS, MAMMO: HCPCS

## 2019-10-29 DIAGNOSIS — E11.42 DIABETIC POLYNEUROPATHY ASSOCIATED WITH TYPE 2 DIABETES MELLITUS (HCC): ICD-10-CM

## 2019-11-06 RX ORDER — GABAPENTIN 600 MG/1
600 TABLET ORAL 3 TIMES DAILY
Qty: 90 TABLET | Refills: 2 | Status: SHIPPED | OUTPATIENT
Start: 2019-11-06 | End: 2020-08-19 | Stop reason: SDUPTHER

## 2019-12-11 ENCOUNTER — HOSPITAL ENCOUNTER (INPATIENT)
Age: 59
LOS: 2 days | Discharge: HOME | DRG: 720 | End: 2019-12-13
Attending: EMERGENCY MEDICINE | Admitting: INTERNAL MEDICINE
Payer: MEDICAID

## 2019-12-11 ENCOUNTER — APPOINTMENT (OUTPATIENT)
Dept: GENERAL RADIOLOGY | Age: 59
DRG: 720 | End: 2019-12-11
Payer: MEDICAID

## 2019-12-11 DIAGNOSIS — R19.7 NAUSEA VOMITING AND DIARRHEA: ICD-10-CM

## 2019-12-11 DIAGNOSIS — J06.9 UPPER RESPIRATORY TRACT INFECTION, UNSPECIFIED TYPE: ICD-10-CM

## 2019-12-11 DIAGNOSIS — N10 ACUTE PYELONEPHRITIS: Primary | ICD-10-CM

## 2019-12-11 DIAGNOSIS — R11.2 NAUSEA VOMITING AND DIARRHEA: ICD-10-CM

## 2019-12-11 PROBLEM — N30.00 ACUTE CYSTITIS: Status: ACTIVE | Noted: 2019-12-11

## 2019-12-11 PROBLEM — N39.0 UTI (URINARY TRACT INFECTION): Status: ACTIVE | Noted: 2019-12-11

## 2019-12-11 LAB
-: ABNORMAL
ABSOLUTE EOS #: 0 K/UL (ref 0–0.4)
ABSOLUTE IMMATURE GRANULOCYTE: ABNORMAL K/UL (ref 0–0.3)
ABSOLUTE LYMPH #: 2.9 K/UL (ref 1–4.8)
ABSOLUTE MONO #: 0.8 K/UL (ref 0.1–1.3)
ALBUMIN SERPL-MCNC: 4.1 G/DL (ref 3.5–5.2)
ALBUMIN/GLOBULIN RATIO: ABNORMAL (ref 1–2.5)
ALP BLD-CCNC: 112 U/L (ref 35–104)
ALT SERPL-CCNC: 7 U/L (ref 5–33)
AMORPHOUS: ABNORMAL
ANION GAP SERPL CALCULATED.3IONS-SCNC: 13 MMOL/L (ref 9–17)
AST SERPL-CCNC: 8 U/L
BACTERIA: ABNORMAL
BASOPHILS # BLD: 1 % (ref 0–2)
BASOPHILS ABSOLUTE: 0.1 K/UL (ref 0–0.2)
BILIRUB SERPL-MCNC: 0.29 MG/DL (ref 0.3–1.2)
BILIRUBIN URINE: NEGATIVE
BUN BLDV-MCNC: 17 MG/DL (ref 6–20)
BUN/CREAT BLD: ABNORMAL (ref 9–20)
CALCIUM SERPL-MCNC: 10.5 MG/DL (ref 8.6–10.4)
CASTS UA: ABNORMAL /LPF
CHLORIDE BLD-SCNC: 104 MMOL/L (ref 98–107)
CO2: 21 MMOL/L (ref 20–31)
COLOR: YELLOW
COMMENT UA: ABNORMAL
CREAT SERPL-MCNC: 1.18 MG/DL (ref 0.5–0.9)
CRYSTALS, UA: ABNORMAL /HPF
DIFFERENTIAL TYPE: ABNORMAL
DIRECT EXAM: NORMAL
EOSINOPHILS RELATIVE PERCENT: 0 % (ref 0–4)
EPITHELIAL CELLS UA: ABNORMAL /HPF
GFR AFRICAN AMERICAN: 57 ML/MIN
GFR NON-AFRICAN AMERICAN: 47 ML/MIN
GFR SERPL CREATININE-BSD FRML MDRD: ABNORMAL ML/MIN/{1.73_M2}
GFR SERPL CREATININE-BSD FRML MDRD: ABNORMAL ML/MIN/{1.73_M2}
GLUCOSE BLD-MCNC: 191 MG/DL (ref 70–99)
GLUCOSE BLD-MCNC: 225 MG/DL (ref 65–105)
GLUCOSE URINE: ABNORMAL
HCT VFR BLD CALC: 35 % (ref 36–46)
HEMOGLOBIN: 11.6 G/DL (ref 12–16)
IMMATURE GRANULOCYTES: ABNORMAL %
INR BLD: 1.1
KETONES, URINE: NEGATIVE
LACTIC ACID, SEPSIS WHOLE BLOOD: NORMAL MMOL/L (ref 0.5–1.9)
LACTIC ACID, SEPSIS: 0.7 MMOL/L (ref 0.5–1.9)
LEUKOCYTE ESTERASE, URINE: NEGATIVE
LIPASE: 24 U/L (ref 13–60)
LYMPHOCYTES # BLD: 21 % (ref 24–44)
Lab: NORMAL
MCH RBC QN AUTO: 30 PG (ref 26–34)
MCHC RBC AUTO-ENTMCNC: 33.2 G/DL (ref 31–37)
MCV RBC AUTO: 90.4 FL (ref 80–100)
MONOCYTES # BLD: 6 % (ref 1–7)
MUCUS: ABNORMAL
NITRITE, URINE: POSITIVE
NRBC AUTOMATED: ABNORMAL PER 100 WBC
OTHER OBSERVATIONS UA: ABNORMAL
PARTIAL THROMBOPLASTIN TIME: 37.9 SEC (ref 24–36)
PDW BLD-RTO: 14.5 % (ref 11.5–14.9)
PH UA: 5.5 (ref 5–8)
PLATELET # BLD: 336 K/UL (ref 150–450)
PLATELET ESTIMATE: ABNORMAL
PMV BLD AUTO: 7.9 FL (ref 6–12)
POTASSIUM SERPL-SCNC: 3.6 MMOL/L (ref 3.7–5.3)
PROTEIN UA: ABNORMAL
PROTHROMBIN TIME: 14.2 SEC (ref 11.8–14.6)
RBC # BLD: 3.87 M/UL (ref 4–5.2)
RBC # BLD: ABNORMAL 10*6/UL
RBC UA: ABNORMAL /HPF
RENAL EPITHELIAL, UA: ABNORMAL /HPF
SEG NEUTROPHILS: 72 % (ref 36–66)
SEGMENTED NEUTROPHILS ABSOLUTE COUNT: 9.8 K/UL (ref 1.3–9.1)
SODIUM BLD-SCNC: 138 MMOL/L (ref 135–144)
SPECIFIC GRAVITY UA: 1.02 (ref 1–1.03)
SPECIMEN DESCRIPTION: NORMAL
TOTAL PROTEIN: 8.4 G/DL (ref 6.4–8.3)
TRICHOMONAS: ABNORMAL
TURBIDITY: ABNORMAL
URINE HGB: ABNORMAL
UROBILINOGEN, URINE: NORMAL
WBC # BLD: 13.6 K/UL (ref 3.5–11)
WBC # BLD: ABNORMAL 10*3/UL
WBC UA: ABNORMAL /HPF
YEAST: ABNORMAL

## 2019-12-11 PROCEDURE — 85730 THROMBOPLASTIN TIME PARTIAL: CPT

## 2019-12-11 PROCEDURE — 81001 URINALYSIS AUTO W/SCOPE: CPT

## 2019-12-11 PROCEDURE — 83690 ASSAY OF LIPASE: CPT

## 2019-12-11 PROCEDURE — 1200000000 HC SEMI PRIVATE

## 2019-12-11 PROCEDURE — 87804 INFLUENZA ASSAY W/OPTIC: CPT

## 2019-12-11 PROCEDURE — 36415 COLL VENOUS BLD VENIPUNCTURE: CPT

## 2019-12-11 PROCEDURE — 96361 HYDRATE IV INFUSION ADD-ON: CPT

## 2019-12-11 PROCEDURE — 87186 SC STD MICRODIL/AGAR DIL: CPT

## 2019-12-11 PROCEDURE — 80053 COMPREHEN METABOLIC PANEL: CPT

## 2019-12-11 PROCEDURE — 82947 ASSAY GLUCOSE BLOOD QUANT: CPT

## 2019-12-11 PROCEDURE — 99285 EMERGENCY DEPT VISIT HI MDM: CPT

## 2019-12-11 PROCEDURE — 6360000002 HC RX W HCPCS: Performed by: EMERGENCY MEDICINE

## 2019-12-11 PROCEDURE — G0378 HOSPITAL OBSERVATION PER HR: HCPCS

## 2019-12-11 PROCEDURE — 96365 THER/PROPH/DIAG IV INF INIT: CPT

## 2019-12-11 PROCEDURE — 6370000000 HC RX 637 (ALT 250 FOR IP): Performed by: STUDENT IN AN ORGANIZED HEALTH CARE EDUCATION/TRAINING PROGRAM

## 2019-12-11 PROCEDURE — 85610 PROTHROMBIN TIME: CPT

## 2019-12-11 PROCEDURE — 85025 COMPLETE CBC W/AUTO DIFF WBC: CPT

## 2019-12-11 PROCEDURE — 99223 1ST HOSP IP/OBS HIGH 75: CPT | Performed by: INTERNAL MEDICINE

## 2019-12-11 PROCEDURE — 83605 ASSAY OF LACTIC ACID: CPT

## 2019-12-11 PROCEDURE — 2580000003 HC RX 258: Performed by: STUDENT IN AN ORGANIZED HEALTH CARE EDUCATION/TRAINING PROGRAM

## 2019-12-11 PROCEDURE — 87086 URINE CULTURE/COLONY COUNT: CPT

## 2019-12-11 PROCEDURE — 87088 URINE BACTERIA CULTURE: CPT

## 2019-12-11 PROCEDURE — 87040 BLOOD CULTURE FOR BACTERIA: CPT

## 2019-12-11 PROCEDURE — 6370000000 HC RX 637 (ALT 250 FOR IP): Performed by: EMERGENCY MEDICINE

## 2019-12-11 PROCEDURE — 71045 X-RAY EXAM CHEST 1 VIEW: CPT

## 2019-12-11 PROCEDURE — 2580000003 HC RX 258: Performed by: EMERGENCY MEDICINE

## 2019-12-11 RX ORDER — FAMOTIDINE 20 MG/1
20 TABLET, FILM COATED ORAL 2 TIMES DAILY
Status: DISCONTINUED | OUTPATIENT
Start: 2019-12-11 | End: 2019-12-13 | Stop reason: HOSPADM

## 2019-12-11 RX ORDER — ASPIRIN 81 MG/1
81 TABLET ORAL DAILY
Status: DISCONTINUED | OUTPATIENT
Start: 2019-12-11 | End: 2019-12-13 | Stop reason: HOSPADM

## 2019-12-11 RX ORDER — ACETAMINOPHEN 325 MG/1
650 TABLET ORAL EVERY 4 HOURS PRN
Status: DISCONTINUED | OUTPATIENT
Start: 2019-12-11 | End: 2019-12-13 | Stop reason: HOSPADM

## 2019-12-11 RX ORDER — DICYCLOMINE HYDROCHLORIDE 10 MG/1
10 CAPSULE ORAL EVERY 6 HOURS PRN
Qty: 20 CAPSULE | Refills: 0 | Status: SHIPPED | OUTPATIENT
Start: 2019-12-11 | End: 2019-12-13 | Stop reason: SDUPTHER

## 2019-12-11 RX ORDER — POTASSIUM CHLORIDE 20 MEQ/1
40 TABLET, EXTENDED RELEASE ORAL PRN
Status: DISCONTINUED | OUTPATIENT
Start: 2019-12-11 | End: 2019-12-13 | Stop reason: HOSPADM

## 2019-12-11 RX ORDER — ONDANSETRON 2 MG/ML
4 INJECTION INTRAMUSCULAR; INTRAVENOUS EVERY 6 HOURS PRN
Status: DISCONTINUED | OUTPATIENT
Start: 2019-12-11 | End: 2019-12-13 | Stop reason: HOSPADM

## 2019-12-11 RX ORDER — ACETAMINOPHEN 500 MG
1000 TABLET ORAL ONCE
Status: COMPLETED | OUTPATIENT
Start: 2019-12-11 | End: 2019-12-11

## 2019-12-11 RX ORDER — PRAVASTATIN SODIUM 40 MG
40 TABLET ORAL DAILY
Status: DISCONTINUED | OUTPATIENT
Start: 2019-12-11 | End: 2019-12-13

## 2019-12-11 RX ORDER — SODIUM CHLORIDE 0.9 % (FLUSH) 0.9 %
10 SYRINGE (ML) INJECTION EVERY 12 HOURS SCHEDULED
Status: DISCONTINUED | OUTPATIENT
Start: 2019-12-11 | End: 2019-12-13 | Stop reason: HOSPADM

## 2019-12-11 RX ORDER — POTASSIUM CHLORIDE 7.45 MG/ML
10 INJECTION INTRAVENOUS PRN
Status: DISCONTINUED | OUTPATIENT
Start: 2019-12-11 | End: 2019-12-13 | Stop reason: HOSPADM

## 2019-12-11 RX ORDER — ALOGLIPTIN 25 MG/1
25 TABLET, FILM COATED ORAL DAILY
COMMUNITY
End: 2020-11-18 | Stop reason: SDUPTHER

## 2019-12-11 RX ORDER — CEPHALEXIN 500 MG/1
500 CAPSULE ORAL 2 TIMES DAILY
Qty: 14 CAPSULE | Refills: 0 | Status: SHIPPED | OUTPATIENT
Start: 2019-12-11 | End: 2019-12-13 | Stop reason: HOSPADM

## 2019-12-11 RX ORDER — SODIUM CHLORIDE 0.9 % (FLUSH) 0.9 %
10 SYRINGE (ML) INJECTION EVERY 12 HOURS SCHEDULED
Status: DISCONTINUED | OUTPATIENT
Start: 2019-12-11 | End: 2019-12-11 | Stop reason: SDUPTHER

## 2019-12-11 RX ORDER — SODIUM CHLORIDE 0.9 % (FLUSH) 0.9 %
10 SYRINGE (ML) INJECTION PRN
Status: DISCONTINUED | OUTPATIENT
Start: 2019-12-11 | End: 2019-12-11 | Stop reason: SDUPTHER

## 2019-12-11 RX ORDER — GABAPENTIN 600 MG/1
600 TABLET ORAL 3 TIMES DAILY
Status: DISCONTINUED | OUTPATIENT
Start: 2019-12-11 | End: 2019-12-13 | Stop reason: HOSPADM

## 2019-12-11 RX ORDER — ONDANSETRON 4 MG/1
4 TABLET, ORALLY DISINTEGRATING ORAL EVERY 8 HOURS PRN
Qty: 10 TABLET | Refills: 0 | Status: SHIPPED | OUTPATIENT
Start: 2019-12-11 | End: 2019-12-13 | Stop reason: SDUPTHER

## 2019-12-11 RX ORDER — SODIUM CHLORIDE 0.9 % (FLUSH) 0.9 %
10 SYRINGE (ML) INJECTION PRN
Status: DISCONTINUED | OUTPATIENT
Start: 2019-12-11 | End: 2019-12-13 | Stop reason: HOSPADM

## 2019-12-11 RX ORDER — SODIUM CHLORIDE 9 MG/ML
INJECTION, SOLUTION INTRAVENOUS CONTINUOUS
Status: DISCONTINUED | OUTPATIENT
Start: 2019-12-11 | End: 2019-12-13 | Stop reason: HOSPADM

## 2019-12-11 RX ORDER — ATENOLOL 25 MG/1
25 TABLET ORAL DAILY
Status: DISCONTINUED | OUTPATIENT
Start: 2019-12-11 | End: 2019-12-13 | Stop reason: HOSPADM

## 2019-12-11 RX ORDER — DICYCLOMINE HCL 20 MG
20 TABLET ORAL 3 TIMES DAILY PRN
Status: DISCONTINUED | OUTPATIENT
Start: 2019-12-11 | End: 2019-12-13 | Stop reason: HOSPADM

## 2019-12-11 RX ORDER — 0.9 % SODIUM CHLORIDE 0.9 %
30 INTRAVENOUS SOLUTION INTRAVENOUS ONCE
Status: COMPLETED | OUTPATIENT
Start: 2019-12-11 | End: 2019-12-11

## 2019-12-11 RX ORDER — GABAPENTIN 100 MG/1
200 CAPSULE ORAL 3 TIMES DAILY
Status: CANCELLED | OUTPATIENT
Start: 2019-12-11

## 2019-12-11 RX ADMIN — GABAPENTIN 600 MG: 600 TABLET, FILM COATED ORAL at 19:38

## 2019-12-11 RX ADMIN — SODIUM CHLORIDE 2571 ML: 9 INJECTION, SOLUTION INTRAVENOUS at 11:19

## 2019-12-11 RX ADMIN — INSULIN LISPRO 2 UNITS: 100 INJECTION, SOLUTION INTRAVENOUS; SUBCUTANEOUS at 21:35

## 2019-12-11 RX ADMIN — FAMOTIDINE 20 MG: 20 TABLET ORAL at 19:39

## 2019-12-11 RX ADMIN — ACETAMINOPHEN 1000 MG: 500 TABLET, FILM COATED ORAL at 11:20

## 2019-12-11 RX ADMIN — CEFTRIAXONE SODIUM 1 G: 1 INJECTION, POWDER, FOR SOLUTION INTRAMUSCULAR; INTRAVENOUS at 13:35

## 2019-12-11 RX ADMIN — DICYCLOMINE HYDROCHLORIDE 20 MG: 20 TABLET ORAL at 19:38

## 2019-12-11 RX ADMIN — SODIUM CHLORIDE: 9 INJECTION, SOLUTION INTRAVENOUS at 18:02

## 2019-12-11 ASSESSMENT — ENCOUNTER SYMPTOMS
EYE PAIN: 0
ABDOMINAL PAIN: 0
DIARRHEA: 1
ABDOMINAL PAIN: 1
EYE DISCHARGE: 0
NAUSEA: 1
EYE REDNESS: 0
COUGH: 0
BACK PAIN: 0
SHORTNESS OF BREATH: 0
ABDOMINAL DISTENTION: 0
WHEEZING: 0
FACIAL SWELLING: 0
RHINORRHEA: 1
SORE THROAT: 0
TROUBLE SWALLOWING: 0
BLOOD IN STOOL: 0
COLOR CHANGE: 0
CHEST TIGHTNESS: 0
COUGH: 1
SINUS PRESSURE: 0
CONSTIPATION: 0
VOMITING: 1

## 2019-12-11 ASSESSMENT — PAIN SCALES - GENERAL
PAINLEVEL_OUTOF10: 10
PAINLEVEL_OUTOF10: 10

## 2019-12-11 ASSESSMENT — PAIN DESCRIPTION - PAIN TYPE: TYPE: ACUTE PAIN

## 2019-12-12 LAB
ABSOLUTE EOS #: 0 K/UL (ref 0–0.4)
ABSOLUTE IMMATURE GRANULOCYTE: ABNORMAL K/UL (ref 0–0.3)
ABSOLUTE LYMPH #: 2.8 K/UL (ref 1–4.8)
ABSOLUTE MONO #: 0.5 K/UL (ref 0.1–1.3)
ANION GAP SERPL CALCULATED.3IONS-SCNC: 9 MMOL/L (ref 9–17)
BASOPHILS # BLD: 0 % (ref 0–2)
BASOPHILS ABSOLUTE: 0 K/UL (ref 0–0.2)
BUN BLDV-MCNC: 14 MG/DL (ref 6–20)
BUN/CREAT BLD: ABNORMAL (ref 9–20)
CALCIUM SERPL-MCNC: 9.7 MG/DL (ref 8.6–10.4)
CHLORIDE BLD-SCNC: 106 MMOL/L (ref 98–107)
CO2: 22 MMOL/L (ref 20–31)
CREAT SERPL-MCNC: 1.1 MG/DL (ref 0.5–0.9)
DIFFERENTIAL TYPE: ABNORMAL
EOSINOPHILS RELATIVE PERCENT: 0 % (ref 0–4)
GFR AFRICAN AMERICAN: >60 ML/MIN
GFR NON-AFRICAN AMERICAN: 51 ML/MIN
GFR SERPL CREATININE-BSD FRML MDRD: ABNORMAL ML/MIN/{1.73_M2}
GFR SERPL CREATININE-BSD FRML MDRD: ABNORMAL ML/MIN/{1.73_M2}
GLUCOSE BLD-MCNC: 171 MG/DL (ref 65–105)
GLUCOSE BLD-MCNC: 181 MG/DL (ref 70–99)
GLUCOSE BLD-MCNC: 204 MG/DL (ref 65–105)
GLUCOSE BLD-MCNC: 212 MG/DL (ref 65–105)
HCT VFR BLD CALC: 32.2 % (ref 36–46)
HEMOGLOBIN: 10.7 G/DL (ref 12–16)
IMMATURE GRANULOCYTES: ABNORMAL %
LYMPHOCYTES # BLD: 31 % (ref 24–44)
MAGNESIUM: 1.8 MG/DL (ref 1.6–2.6)
MCH RBC QN AUTO: 30.4 PG (ref 26–34)
MCHC RBC AUTO-ENTMCNC: 33.3 G/DL (ref 31–37)
MCV RBC AUTO: 91.1 FL (ref 80–100)
MONOCYTES # BLD: 6 % (ref 1–7)
NRBC AUTOMATED: ABNORMAL PER 100 WBC
PDW BLD-RTO: 14.5 % (ref 11.5–14.9)
PLATELET # BLD: 307 K/UL (ref 150–450)
PLATELET ESTIMATE: ABNORMAL
PMV BLD AUTO: 8.3 FL (ref 6–12)
POTASSIUM SERPL-SCNC: 3.4 MMOL/L (ref 3.7–5.3)
RBC # BLD: 3.53 M/UL (ref 4–5.2)
RBC # BLD: ABNORMAL 10*6/UL
SEG NEUTROPHILS: 63 % (ref 36–66)
SEGMENTED NEUTROPHILS ABSOLUTE COUNT: 5.8 K/UL (ref 1.3–9.1)
SODIUM BLD-SCNC: 137 MMOL/L (ref 135–144)
WBC # BLD: 9.1 K/UL (ref 3.5–11)
WBC # BLD: ABNORMAL 10*3/UL

## 2019-12-12 PROCEDURE — 6370000000 HC RX 637 (ALT 250 FOR IP): Performed by: STUDENT IN AN ORGANIZED HEALTH CARE EDUCATION/TRAINING PROGRAM

## 2019-12-12 PROCEDURE — 2580000003 HC RX 258: Performed by: NURSE PRACTITIONER

## 2019-12-12 PROCEDURE — 83735 ASSAY OF MAGNESIUM: CPT

## 2019-12-12 PROCEDURE — 2580000003 HC RX 258: Performed by: STUDENT IN AN ORGANIZED HEALTH CARE EDUCATION/TRAINING PROGRAM

## 2019-12-12 PROCEDURE — 96376 TX/PRO/DX INJ SAME DRUG ADON: CPT

## 2019-12-12 PROCEDURE — G0378 HOSPITAL OBSERVATION PER HR: HCPCS

## 2019-12-12 PROCEDURE — 6360000002 HC RX W HCPCS: Performed by: STUDENT IN AN ORGANIZED HEALTH CARE EDUCATION/TRAINING PROGRAM

## 2019-12-12 PROCEDURE — 82947 ASSAY GLUCOSE BLOOD QUANT: CPT

## 2019-12-12 PROCEDURE — 99233 SBSQ HOSP IP/OBS HIGH 50: CPT | Performed by: INTERNAL MEDICINE

## 2019-12-12 PROCEDURE — 80048 BASIC METABOLIC PNL TOTAL CA: CPT

## 2019-12-12 PROCEDURE — 36415 COLL VENOUS BLD VENIPUNCTURE: CPT

## 2019-12-12 PROCEDURE — 96372 THER/PROPH/DIAG INJ SC/IM: CPT

## 2019-12-12 PROCEDURE — 1200000000 HC SEMI PRIVATE

## 2019-12-12 PROCEDURE — 96361 HYDRATE IV INFUSION ADD-ON: CPT

## 2019-12-12 PROCEDURE — 85025 COMPLETE CBC W/AUTO DIFF WBC: CPT

## 2019-12-12 RX ORDER — DEXTROSE MONOHYDRATE 25 G/50ML
12.5 INJECTION, SOLUTION INTRAVENOUS PRN
Status: DISCONTINUED | OUTPATIENT
Start: 2019-12-12 | End: 2019-12-13 | Stop reason: HOSPADM

## 2019-12-12 RX ORDER — 0.9 % SODIUM CHLORIDE 0.9 %
500 INTRAVENOUS SOLUTION INTRAVENOUS ONCE
Status: COMPLETED | OUTPATIENT
Start: 2019-12-12 | End: 2019-12-12

## 2019-12-12 RX ORDER — NICOTINE POLACRILEX 4 MG
15 LOZENGE BUCCAL PRN
Status: DISCONTINUED | OUTPATIENT
Start: 2019-12-12 | End: 2019-12-13 | Stop reason: HOSPADM

## 2019-12-12 RX ORDER — DEXTROSE MONOHYDRATE 50 MG/ML
100 INJECTION, SOLUTION INTRAVENOUS PRN
Status: DISCONTINUED | OUTPATIENT
Start: 2019-12-12 | End: 2019-12-13 | Stop reason: HOSPADM

## 2019-12-12 RX ADMIN — ATENOLOL 25 MG: 25 TABLET ORAL at 08:47

## 2019-12-12 RX ADMIN — ASPIRIN 81 MG: 81 TABLET, COATED ORAL at 08:47

## 2019-12-12 RX ADMIN — ACETAMINOPHEN 650 MG: 325 TABLET, FILM COATED ORAL at 08:47

## 2019-12-12 RX ADMIN — INSULIN LISPRO 4 UNITS: 100 INJECTION, SOLUTION INTRAVENOUS; SUBCUTANEOUS at 12:35

## 2019-12-12 RX ADMIN — SODIUM CHLORIDE: 9 INJECTION, SOLUTION INTRAVENOUS at 04:00

## 2019-12-12 RX ADMIN — PRAVASTATIN SODIUM 40 MG: 40 TABLET ORAL at 08:47

## 2019-12-12 RX ADMIN — ACETAMINOPHEN 650 MG: 325 TABLET, FILM COATED ORAL at 01:41

## 2019-12-12 RX ADMIN — GABAPENTIN 600 MG: 600 TABLET, FILM COATED ORAL at 08:47

## 2019-12-12 RX ADMIN — INSULIN LISPRO 2 UNITS: 100 INJECTION, SOLUTION INTRAVENOUS; SUBCUTANEOUS at 20:40

## 2019-12-12 RX ADMIN — SODIUM CHLORIDE 500 ML: 0.9 INJECTION, SOLUTION INTRAVENOUS at 01:58

## 2019-12-12 RX ADMIN — CEFTRIAXONE SODIUM 1 G: 1 INJECTION, POWDER, FOR SOLUTION INTRAMUSCULAR; INTRAVENOUS at 12:34

## 2019-12-12 RX ADMIN — GABAPENTIN 600 MG: 600 TABLET, FILM COATED ORAL at 12:34

## 2019-12-12 RX ADMIN — GABAPENTIN 600 MG: 600 TABLET, FILM COATED ORAL at 20:40

## 2019-12-12 RX ADMIN — ENOXAPARIN SODIUM 40 MG: 40 INJECTION, SOLUTION INTRAVENOUS; SUBCUTANEOUS at 08:50

## 2019-12-12 RX ADMIN — DICYCLOMINE HYDROCHLORIDE 20 MG: 20 TABLET ORAL at 08:47

## 2019-12-12 ASSESSMENT — ENCOUNTER SYMPTOMS
DIARRHEA: 0
CONSTIPATION: 0
VOMITING: 0
NAUSEA: 1
CHEST TIGHTNESS: 0
ABDOMINAL PAIN: 0
SHORTNESS OF BREATH: 0
WHEEZING: 0
BACK PAIN: 1
COUGH: 1
TROUBLE SWALLOWING: 0

## 2019-12-12 ASSESSMENT — PAIN SCALES - GENERAL
PAINLEVEL_OUTOF10: 0
PAINLEVEL_OUTOF10: 0
PAINLEVEL_OUTOF10: 9

## 2019-12-13 VITALS
BODY MASS INDEX: 31.25 KG/M2 | HEART RATE: 75 BPM | DIASTOLIC BLOOD PRESSURE: 63 MMHG | TEMPERATURE: 98.4 F | OXYGEN SATURATION: 99 % | SYSTOLIC BLOOD PRESSURE: 145 MMHG | RESPIRATION RATE: 22 BRPM | WEIGHT: 194.45 LBS | HEIGHT: 66 IN

## 2019-12-13 LAB
ABSOLUTE BANDS #: 0.06 K/UL (ref 0–1)
ABSOLUTE EOS #: 0.18 K/UL (ref 0–0.4)
ABSOLUTE IMMATURE GRANULOCYTE: ABNORMAL K/UL (ref 0–0.3)
ABSOLUTE LYMPH #: 3.06 K/UL (ref 1–4.8)
ABSOLUTE MONO #: 0.24 K/UL (ref 0.1–1.3)
ANION GAP SERPL CALCULATED.3IONS-SCNC: 9 MMOL/L (ref 9–17)
BANDS: 1 % (ref 0–10)
BASOPHILS # BLD: 0 % (ref 0–2)
BASOPHILS ABSOLUTE: 0 K/UL (ref 0–0.2)
BUN BLDV-MCNC: 12 MG/DL (ref 6–20)
BUN/CREAT BLD: ABNORMAL (ref 9–20)
CALCIUM SERPL-MCNC: 10.1 MG/DL (ref 8.6–10.4)
CHLORIDE BLD-SCNC: 107 MMOL/L (ref 98–107)
CO2: 23 MMOL/L (ref 20–31)
CREAT SERPL-MCNC: 0.97 MG/DL (ref 0.5–0.9)
DIFFERENTIAL TYPE: ABNORMAL
EOSINOPHILS RELATIVE PERCENT: 3 % (ref 0–4)
GFR AFRICAN AMERICAN: >60 ML/MIN
GFR NON-AFRICAN AMERICAN: 59 ML/MIN
GFR SERPL CREATININE-BSD FRML MDRD: ABNORMAL ML/MIN/{1.73_M2}
GFR SERPL CREATININE-BSD FRML MDRD: ABNORMAL ML/MIN/{1.73_M2}
GLUCOSE BLD-MCNC: 104 MG/DL (ref 65–105)
GLUCOSE BLD-MCNC: 106 MG/DL (ref 70–99)
GLUCOSE BLD-MCNC: 172 MG/DL (ref 65–105)
HCT VFR BLD CALC: 31 % (ref 36–46)
HEMOGLOBIN: 10.2 G/DL (ref 12–16)
IMMATURE GRANULOCYTES: ABNORMAL %
LYMPHOCYTES # BLD: 50 % (ref 24–44)
MCH RBC QN AUTO: 30 PG (ref 26–34)
MCHC RBC AUTO-ENTMCNC: 32.8 G/DL (ref 31–37)
MCV RBC AUTO: 91.5 FL (ref 80–100)
MONOCYTES # BLD: 4 % (ref 1–7)
MORPHOLOGY: ABNORMAL
MORPHOLOGY: ABNORMAL
NRBC AUTOMATED: ABNORMAL PER 100 WBC
PDW BLD-RTO: 14.7 % (ref 11.5–14.9)
PLATELET # BLD: 316 K/UL (ref 150–450)
PLATELET ESTIMATE: ABNORMAL
PMV BLD AUTO: 8.2 FL (ref 6–12)
POTASSIUM SERPL-SCNC: 4 MMOL/L (ref 3.7–5.3)
RBC # BLD: 3.39 M/UL (ref 4–5.2)
RBC # BLD: ABNORMAL 10*6/UL
SEG NEUTROPHILS: 42 % (ref 36–66)
SEGMENTED NEUTROPHILS ABSOLUTE COUNT: 2.56 K/UL (ref 1.3–9.1)
SODIUM BLD-SCNC: 139 MMOL/L (ref 135–144)
WBC # BLD: 6.1 K/UL (ref 3.5–11)
WBC # BLD: ABNORMAL 10*3/UL

## 2019-12-13 PROCEDURE — 80048 BASIC METABOLIC PNL TOTAL CA: CPT

## 2019-12-13 PROCEDURE — 85025 COMPLETE CBC W/AUTO DIFF WBC: CPT

## 2019-12-13 PROCEDURE — G0378 HOSPITAL OBSERVATION PER HR: HCPCS

## 2019-12-13 PROCEDURE — 99239 HOSP IP/OBS DSCHRG MGMT >30: CPT | Performed by: INTERNAL MEDICINE

## 2019-12-13 PROCEDURE — 36415 COLL VENOUS BLD VENIPUNCTURE: CPT

## 2019-12-13 PROCEDURE — 6370000000 HC RX 637 (ALT 250 FOR IP): Performed by: STUDENT IN AN ORGANIZED HEALTH CARE EDUCATION/TRAINING PROGRAM

## 2019-12-13 PROCEDURE — 82947 ASSAY GLUCOSE BLOOD QUANT: CPT

## 2019-12-13 RX ORDER — GLUCOSAMINE HCL/CHONDROITIN SU 500-400 MG
CAPSULE ORAL
Qty: 120 STRIP | Refills: 3 | Status: SHIPPED | OUTPATIENT
Start: 2019-12-13 | End: 2020-07-20

## 2019-12-13 RX ORDER — ONDANSETRON 4 MG/1
4 TABLET, ORALLY DISINTEGRATING ORAL EVERY 8 HOURS PRN
Qty: 10 TABLET | Refills: 0 | Status: ON HOLD | OUTPATIENT
Start: 2019-12-13 | End: 2021-02-01 | Stop reason: HOSPADM

## 2019-12-13 RX ORDER — FLUCONAZOLE 100 MG/1
150 TABLET ORAL DAILY
Status: DISCONTINUED | OUTPATIENT
Start: 2019-12-13 | End: 2019-12-13 | Stop reason: HOSPADM

## 2019-12-13 RX ORDER — CIPROFLOXACIN 500 MG/1
500 TABLET, FILM COATED ORAL EVERY 12 HOURS SCHEDULED
Qty: 20 TABLET | Refills: 0 | Status: SHIPPED | OUTPATIENT
Start: 2019-12-13 | End: 2019-12-23

## 2019-12-13 RX ORDER — CIPROFLOXACIN 500 MG/1
500 TABLET, FILM COATED ORAL EVERY 12 HOURS SCHEDULED
Status: DISCONTINUED | OUTPATIENT
Start: 2019-12-13 | End: 2019-12-13 | Stop reason: HOSPADM

## 2019-12-13 RX ORDER — PRAVASTATIN SODIUM 40 MG
40 TABLET ORAL NIGHTLY
Status: DISCONTINUED | OUTPATIENT
Start: 2019-12-14 | End: 2019-12-13 | Stop reason: HOSPADM

## 2019-12-13 RX ORDER — DICYCLOMINE HYDROCHLORIDE 10 MG/1
10 CAPSULE ORAL EVERY 6 HOURS PRN
Qty: 20 CAPSULE | Refills: 0 | Status: SHIPPED | OUTPATIENT
Start: 2019-12-13 | End: 2021-05-26

## 2019-12-13 RX ADMIN — DICYCLOMINE HYDROCHLORIDE 20 MG: 20 TABLET ORAL at 08:29

## 2019-12-13 RX ADMIN — GABAPENTIN 600 MG: 600 TABLET, FILM COATED ORAL at 08:26

## 2019-12-13 RX ADMIN — CIPROFLOXACIN HYDROCHLORIDE 500 MG: 500 TABLET, FILM COATED ORAL at 11:32

## 2019-12-13 RX ADMIN — ASPIRIN 81 MG: 81 TABLET, COATED ORAL at 08:26

## 2019-12-13 RX ADMIN — LISINOPRIL 30 MG: 10 TABLET ORAL at 08:33

## 2019-12-13 RX ADMIN — FAMOTIDINE 20 MG: 20 TABLET ORAL at 11:32

## 2019-12-13 RX ADMIN — FLUCONAZOLE 150 MG: 100 TABLET ORAL at 11:32

## 2019-12-13 RX ADMIN — ATENOLOL 25 MG: 25 TABLET ORAL at 08:26

## 2019-12-13 RX ADMIN — PRAVASTATIN SODIUM 40 MG: 40 TABLET ORAL at 08:26

## 2019-12-13 ASSESSMENT — ENCOUNTER SYMPTOMS
WHEEZING: 0
ABDOMINAL PAIN: 0
CHEST TIGHTNESS: 0
TROUBLE SWALLOWING: 0
CONSTIPATION: 0
VOMITING: 0
DIARRHEA: 0
NAUSEA: 0
SHORTNESS OF BREATH: 0
COUGH: 0
BACK PAIN: 1

## 2019-12-14 LAB
CULTURE: ABNORMAL
Lab: ABNORMAL
SPECIMEN DESCRIPTION: ABNORMAL

## 2019-12-17 LAB
CULTURE: NORMAL
CULTURE: NORMAL
Lab: NORMAL
Lab: NORMAL
SPECIMEN DESCRIPTION: NORMAL
SPECIMEN DESCRIPTION: NORMAL

## 2020-01-07 ENCOUNTER — APPOINTMENT (OUTPATIENT)
Dept: CT IMAGING | Age: 60
End: 2020-01-07
Payer: MEDICAID

## 2020-01-07 ENCOUNTER — HOSPITAL ENCOUNTER (EMERGENCY)
Age: 60
Discharge: HOME | End: 2020-01-07
Attending: EMERGENCY MEDICINE
Payer: MEDICAID

## 2020-01-07 VITALS
SYSTOLIC BLOOD PRESSURE: 148 MMHG | BODY MASS INDEX: 30.37 KG/M2 | HEIGHT: 66 IN | OXYGEN SATURATION: 96 % | WEIGHT: 189 LBS | HEART RATE: 79 BPM | DIASTOLIC BLOOD PRESSURE: 70 MMHG | RESPIRATION RATE: 14 BRPM | TEMPERATURE: 98.9 F

## 2020-01-07 PROCEDURE — 2580000003 HC RX 258: Performed by: EMERGENCY MEDICINE

## 2020-01-07 PROCEDURE — 99284 EMERGENCY DEPT VISIT MOD MDM: CPT

## 2020-01-07 PROCEDURE — 6360000002 HC RX W HCPCS: Performed by: EMERGENCY MEDICINE

## 2020-01-07 PROCEDURE — 96374 THER/PROPH/DIAG INJ IV PUSH: CPT

## 2020-01-07 PROCEDURE — 96375 TX/PRO/DX INJ NEW DRUG ADDON: CPT

## 2020-01-07 PROCEDURE — 70450 CT HEAD/BRAIN W/O DYE: CPT

## 2020-01-07 RX ORDER — KETOROLAC TROMETHAMINE 30 MG/ML
30 INJECTION, SOLUTION INTRAMUSCULAR; INTRAVENOUS ONCE
Status: COMPLETED | OUTPATIENT
Start: 2020-01-07 | End: 2020-01-07

## 2020-01-07 RX ORDER — DIPHENHYDRAMINE HCL 25 MG
25 CAPSULE ORAL NIGHTLY PRN
Qty: 10 CAPSULE | Refills: 0 | Status: SHIPPED | OUTPATIENT
Start: 2020-01-07 | End: 2020-01-15 | Stop reason: SDUPTHER

## 2020-01-07 RX ORDER — BUTALBITAL, ASPIRIN, AND CAFFEINE 325; 50; 40 MG/1; MG/1; MG/1
1 CAPSULE ORAL EVERY 4 HOURS PRN
Qty: 15 CAPSULE | Refills: 0 | Status: SHIPPED | OUTPATIENT
Start: 2020-01-07 | End: 2020-01-15 | Stop reason: SDUPTHER

## 2020-01-07 RX ORDER — DIPHENHYDRAMINE HYDROCHLORIDE 50 MG/ML
25 INJECTION INTRAMUSCULAR; INTRAVENOUS ONCE
Status: COMPLETED | OUTPATIENT
Start: 2020-01-07 | End: 2020-01-07

## 2020-01-07 RX ORDER — 0.9 % SODIUM CHLORIDE 0.9 %
500 INTRAVENOUS SOLUTION INTRAVENOUS ONCE
Status: COMPLETED | OUTPATIENT
Start: 2020-01-07 | End: 2020-01-07

## 2020-01-07 RX ADMIN — SODIUM CHLORIDE 500 ML: 9 INJECTION, SOLUTION INTRAVENOUS at 16:54

## 2020-01-07 RX ADMIN — KETOROLAC TROMETHAMINE 30 MG: 30 INJECTION, SOLUTION INTRAMUSCULAR at 16:54

## 2020-01-07 RX ADMIN — DIPHENHYDRAMINE HYDROCHLORIDE 25 MG: 50 INJECTION INTRAMUSCULAR; INTRAVENOUS at 16:55

## 2020-01-07 ASSESSMENT — PAIN DESCRIPTION - ONSET: ONSET: ON-GOING

## 2020-01-07 ASSESSMENT — ENCOUNTER SYMPTOMS
SINUS PAIN: 0
VOMITING: 0
SINUS PRESSURE: 0
COUGH: 0
ABDOMINAL PAIN: 0
NAUSEA: 0

## 2020-01-07 ASSESSMENT — PAIN DESCRIPTION - LOCATION
LOCATION: HEAD
LOCATION: HEAD

## 2020-01-07 ASSESSMENT — PAIN DESCRIPTION - DESCRIPTORS: DESCRIPTORS: ACHING

## 2020-01-07 ASSESSMENT — PAIN DESCRIPTION - PAIN TYPE
TYPE: ACUTE PAIN
TYPE: ACUTE PAIN

## 2020-01-07 ASSESSMENT — PAIN SCALES - GENERAL
PAINLEVEL_OUTOF10: 10
PAINLEVEL_OUTOF10: 4

## 2020-01-07 ASSESSMENT — PAIN DESCRIPTION - FREQUENCY: FREQUENCY: CONTINUOUS

## 2020-01-07 ASSESSMENT — PAIN DESCRIPTION - PROGRESSION: CLINICAL_PROGRESSION: GRADUALLY IMPROVING

## 2020-01-07 NOTE — ED PROVIDER NOTES
[address] ED  eMERGENCY dEPARTMENT eNCOUnter      Pt Name: Hermes Cr  MRN: [id number]  [place] 1960  Date of evaluation: 1/7/20    CHIEF COMPLAINT     Headache  HISTORY OF PRESENT ILLNESS   HPI 63 y.o. female months with complaints of a headache. Patient reports that she has an occipital headache that has been progressively worsening over the last 3-1/2 weeks. She says that she first noted it when she was here in the hospital admitted for a urinary tract infection, and she was discharged on 13 December. She has had severe headaches in the past, but nothing like this in the last year. She cannot get the headache to go away, she has been taking her Neurontin and aspirin without any relief. No vomiting. No weakness. No gait impairment. Headache seems to be worsened when she is lying in bed at night, it is interrupting her sleep, she feels like is better if she is sleeping sitting up more. No vision changes. Headache is rated as severe in intensity, constant in duration, fluctuating in intensity,. REVIEW OF SYSTEMS       Review of Systems   Constitutional: Negative for chills and fever. HENT: Negative for congestion, sinus pressure and sinus pain. Eyes: Negative for visual disturbance. Respiratory: Negative for cough. Cardiovascular: Negative for chest pain. Gastrointestinal: Negative for abdominal pain, nausea and vomiting. Genitourinary: Negative for dysuria. Musculoskeletal: Positive for neck pain. Skin: Negative for rash. Neurological: Positive for headaches. Negative for weakness. Hematological: Negative for adenopathy. Psychiatric/Behavioral: Negative for confusion.        PAST MEDICAL HISTORY     Past Medical History:   Diagnosis Date    Anemia     Chronic back pain     Hyperlipidemia     Hypertension     Type 2 diabetes mellitus with diabetic polyneuropathy, without long-term current use of insulin [facility] 11/10/2016    Type II or unspecified type diabetes mellitus without mention of complication, not stated as uncontrolled        SURGICAL HISTORY       Past Surgical History:   Procedure Laterality Date     SECTION      EXTERNAL EAR SURGERY      EYE SURGERY      THYROID SURGERY         CURRENT MEDICATIONS       Discharge Medication List as of 2020  5:28 PM      CONTINUE these medications which have NOT CHANGED    Details   dicyclomine (BENTYL) 10 MG capsule Take 1 capsule by mouth every 6 hours as needed (cramps), Disp-20 capsule, R-0Print      ondansetron (ZOFRAN ODT) 4 MG disintegrating tablet Take 1 tablet by mouth every 8 hours as needed for Nausea or Vomiting, Disp-10 tablet, R-0Print      blood glucose monitor kit and supplies Test 4 times a day & as needed for symptoms of irregular blood glucose., Disp-1 kit, R-0, Normal      !! blood glucose monitor strips Test 4 times a day & as needed for symptoms of irregular blood glucose., Disp-120 strip, R-3, Normal      alogliptin (NESINA) 25 MG TABS tablet Take 25 mg by mouth dailyHistorical Med      gabapentin (NEURONTIN) 600 MG tablet Take 1 tablet by mouth 3 times daily for 240 days. , Disp-90 tablet, R-2Normal      nystatin (MYCOSTATIN) 000954 UNIT/GM powder APPLY TOPICALLY TO AFFECTED AREAS FOUR TIMES A DAY, Disp-15 g, R-11, Normal      lisinopril (PRINIVIL;ZESTRIL) 30 MG tablet TAKE 1 TABLET BY MOUTH ONCE DAILY, Disp-30 tablet, R-11Normal      atenolol (TENORMIN) 50 MG tablet TAKE ONE-HALF TABLET BY MOUTH DAILY, Disp-15 tablet, R-11Normal      pravastatin (PRAVACHOL) 40 MG tablet TAKE 1 TABLET BY MOUTH ONCE DAILY, Disp-30 tablet, R-11Normal      glipiZIDE (GLUCOTROL) 10 MG tablet Take 1 tablet by mouth daily, Disp-60 tablet, R-2Normal      aspirin EC 81 MG EC tablet Take 1 tablet by mouth daily, Disp-30 tablet, R-2Normal      glucose monitoring kit (FREESTYLE) monitoring kit Disp-1 kit, R-0, PrintDiagnosis: Diabetes type 2, insulin-dependent. Use 3-4 times daily.  Ok to give insurance brand      !! Lancets MISC Disp-100 each, R-10, PrintUse as directed, Dx Insulin dependent DM      ! ! Glucose Blood (BLOOD GLUCOSE TEST STRIPS) STRP Use to check Blood sugar 3 times/day , Dx: Insulin dependent DM2 Please substitute for brand compatible with patients glucometer, Disp-100 strip, R-10Print      !! ONETOUCH DELICA LANCETS 45Q MISC CHECK BLOOD SUGAR ONCE DAILY, Disp-100 each, R-5Normal       !! - Potential duplicate medications found. Please discuss with provider. ALLERGIES     is allergic to ibuprofen. FAMILY HISTORY     She indicated that her mother is alive. She indicated that her father is . SOCIAL HISTORY      reports that she has been smoking cigarettes. She has a 7.50 pack-year smoking history. She has never used smokeless tobacco. She reports previous drug use. Drug: Marijuana. She reports that she does not drink alcohol. PHYSICAL EXAM     INITIAL VITALS: BP (!) 148/70   Pulse 79   Temp 98.9 °F (37.2 °C) (Oral)   Resp 14   Ht 5' 6\" (1.676 m)   Wt 189 lb (85.7 kg)   SpO2 96%   BMI 30.51 kg/m²   General: NAD  Head: Normocephalic, atraumatic  Eye: Pupils equal round reactive to light, no conjunctivitis, no appreciated papilledema  Neck: There is tenderness to bilateral trapezius muscles with gentle palpation. Heart: Regular rate and rhythm no murmurs  Lungs: Clear to auscultation bilaterally, no respiratory distress  Chest wall: No crepitus, no tenderness palpation  Abdomen: Soft, nontender, nondistended, with no peritoneal signs  Neurologic: Patient is alert and oriented x3, motor and sensation is intact in all 4 extremities, cerebellar function is normal to finger-nose testing, there is no nuchal rigidity. Extremities: Full range of motion, no cyanosis, no edema, no signs of trauma, no tenderness to palpation    MEDICAL DECISION MAKING:     MDM  This is a 60-year-old woman presenting with 3 to 4 weeks of a headache.   Will get a CT scan to make sure that there is no brain mass or bleed, though her symptoms are not classic for subarachnoid hemorrhage. There is no sign of meningitis. will provide symptomatic treatment and reassess. Hospital course:  5:26 PM  Pt reassessed, she states that her headache has resolved. CT head shows no bleed or mass. D/w pt the results, treatment plan, warning precautions for prompt ED return and importance of close OP FU, she verbalizes understanding and agrees with the treatment plan. DIAGNOSTIC RESULTS     RADIOLOGY:All plain film, CT, MRI, and formal ultrasound images (except ED bedside ultrasound) are read by the radiologist and the images and interpretations are directly viewed by the emergency physician. CT HEAD WO CONTRAST   Final Result   No acute intracranial abnormality. EMERGENCY DEPARTMENT COURSE:   Vitals:    Vitals:    01/07/20 1625 01/07/20 1716 01/07/20 1812   BP: (!) 155/70 (!) 152/69 (!) 148/70   Pulse: 94 85 79   Resp: 16 16 14   Temp: 98.9 °F (37.2 °C)     TempSrc: Oral     SpO2: 99% 97% 96%   Weight: 189 lb (85.7 kg)     Height: 5' 6\" (1.676 m)         The patient was given the following medications while in the emergency department:  Orders Placed This Encounter   Medications    ketorolac (TORADOL) injection 30 mg    diphenhydrAMINE (BENADRYL) injection 25 mg    0.9 % sodium chloride bolus    butalbital-aspirin-caffeine (FIORINAL) -40 MG capsule     Sig: Take 1 capsule by mouth every 4 hours as needed for Headaches for up to 3 days. Dispense:  15 capsule     Refill:  0    diphenhydrAMINE (BENADRYL) 25 MG capsule     Sig: Take 1 capsule by mouth nightly as needed for Sleep     Dispense:  10 capsule     Refill:  0     -------------------------  CRITICAL CARE:   CONSULTS: None  PROCEDURES: Procedures     FINAL IMPRESSION      1.  Chronic nonintractable headache, unspecified headache type          DISPOSITION/PLAN   DISPOSITION Decision To Discharge 01/07/2020 05:27:22 PM      PATIENT REFERRED TO:  Joselo Young, APRN - CNP  [address]  [address]  [phone]    In 2 days      [facility] ED  [address]  [address] [address]  [phone]    If symptoms worsen      DISCHARGE MEDICATIONS:  Discharge Medication List as of 1/7/2020  5:28 PM      START taking these medications    Details   butalbital-aspirin-caffeine (FIORINAL) -40 MG capsule Take 1 capsule by mouth every 4 hours as needed for Headaches for up to 3 days. , Disp-15 capsule, R-0Print      diphenhydrAMINE (BENADRYL) 25 MG capsule Take 1 capsule by mouth nightly as needed for Sleep, Disp-10 capsule, R-0Print               Ximena Dhillon MD  Attending Emergency Physician                      Ximena Dhillon MD  01/07/20 7964

## 2020-01-09 ENCOUNTER — TELEPHONE (OUTPATIENT)
Dept: PRIMARY CARE CLINIC | Age: 60
End: 2020-01-09

## 2020-01-10 PROBLEM — N39.0 UTI (URINARY TRACT INFECTION): Status: RESOLVED | Noted: 2019-12-11 | Resolved: 2020-01-10

## 2020-01-14 RX ORDER — FLUCONAZOLE 150 MG/1
150 TABLET ORAL ONCE
Qty: 2 TABLET | Refills: 0 | OUTPATIENT
Start: 2020-01-14 | End: 2020-01-14

## 2020-01-14 NOTE — TELEPHONE ENCOUNTER
Pt requesting a rx for diflucan since she has a yeast infection after taking antibiotics. Health Maintenance   Topic Date Due    Diabetic retinal exam  05/03/1971    Hepatitis B vaccine (1 of 3 - Risk 3-dose series) 05/03/1980    Cervical cancer screen  05/03/1982    Shingles Vaccine (1 of 2) 05/03/2011    Lipid screen  04/25/2015    Diabetic foot exam  03/30/2018    Diabetic microalbuminuria test  11/27/2018    Colon Cancer Screen FIT/FOBT  12/08/2018    Flu vaccine (1) 09/01/2019    A1C test (Diabetic or Prediabetic)  09/04/2020    Potassium monitoring  12/13/2020    Creatinine monitoring  12/13/2020    Breast cancer screen  10/23/2021    DTaP/Tdap/Td vaccine (2 - Td) 03/30/2027    Pneumococcal 0-64 years Vaccine  Completed    Hepatitis C screen  Completed    HIV screen  Completed             (applicable per patient's age: Cancer Screenings, Depression Screening, Fall Risk Screening, Immunizations)    Hemoglobin A1C (%)   Date Value   09/04/2019 7.6   02/16/2018 8.6   11/27/2017 8.6     Microalb/Crt.  Ratio (mcg/mg creat)   Date Value   11/27/2017 687 (H)     LDL Cholesterol (mg/dL)   Date Value   04/25/2014 141 (H)     AST (U/L)   Date Value   12/11/2019 8     ALT (U/L)   Date Value   12/11/2019 7     BUN (mg/dL)   Date Value   12/13/2019 12      (goal A1C is < 7)   (goal LDL is <100) need 30-50% reduction from baseline     BP Readings from Last 3 Encounters:   01/07/20 (!) 148/70   12/13/19 (!) 145/63   09/04/19 114/71    (goal /80)      All Future Testing planned in [facility]:  Lab Frequency Next Occurrence   Hemoglobin A1c Once 04/26/2020   POCT FECAL IMMUNOCHEMICAL TEST (FIT) Once 03/01/2020   Microalbumin, Ur Once 01/15/2020       Next Visit Date:  Future Appointments   Date Time Provider Nida Avalos   1/17/2020  9:30 AM CONRADO Vásquez - CNP [facility]            Patient Active Problem List:     Chronic bilateral low back pain without sciatica

## 2020-01-15 ENCOUNTER — HOSPITAL ENCOUNTER (OUTPATIENT)
Age: 60
Setting detail: SPECIMEN
Discharge: HOME | End: 2020-01-15
Payer: MEDICAID

## 2020-01-15 ENCOUNTER — OFFICE VISIT (OUTPATIENT)
Dept: PRIMARY CARE CLINIC | Age: 60
End: 2020-01-15
Payer: MEDICAID

## 2020-01-15 VITALS
WEIGHT: 185 LBS | SYSTOLIC BLOOD PRESSURE: 167 MMHG | DIASTOLIC BLOOD PRESSURE: 93 MMHG | TEMPERATURE: 97.3 F | BODY MASS INDEX: 29.86 KG/M2 | HEART RATE: 93 BPM

## 2020-01-15 LAB
BILIRUBIN, POC: ABNORMAL
BLOOD URINE, POC: ABNORMAL
CLARITY, POC: CLEAR
COLOR, POC: YELLOW
GLUCOSE URINE, POC: ABNORMAL
KETONES, POC: ABNORMAL
LEUKOCYTE EST, POC: ABNORMAL
NITRITE, POC: ABNORMAL
PH, POC: 6
PROTEIN, POC: ABNORMAL
SPECIFIC GRAVITY, POC: 1.02
UROBILINOGEN, POC: ABNORMAL

## 2020-01-15 PROCEDURE — G8419 CALC BMI OUT NRM PARAM NOF/U: HCPCS | Performed by: NURSE PRACTITIONER

## 2020-01-15 PROCEDURE — 99202 OFFICE O/P NEW SF 15 MIN: CPT | Performed by: NURSE PRACTITIONER

## 2020-01-15 PROCEDURE — G8484 FLU IMMUNIZE NO ADMIN: HCPCS | Performed by: NURSE PRACTITIONER

## 2020-01-15 PROCEDURE — 4004F PT TOBACCO SCREEN RCVD TLK: CPT | Performed by: NURSE PRACTITIONER

## 2020-01-15 PROCEDURE — 81002 URINALYSIS NONAUTO W/O SCOPE: CPT | Performed by: NURSE PRACTITIONER

## 2020-01-15 PROCEDURE — G8427 DOCREV CUR MEDS BY ELIG CLIN: HCPCS | Performed by: NURSE PRACTITIONER

## 2020-01-15 PROCEDURE — 3017F COLORECTAL CA SCREEN DOC REV: CPT | Performed by: NURSE PRACTITIONER

## 2020-01-15 RX ORDER — DIPHENHYDRAMINE HCL 25 MG
25 CAPSULE ORAL NIGHTLY PRN
Qty: 10 CAPSULE | Refills: 0 | Status: SHIPPED | OUTPATIENT
Start: 2020-01-15 | End: 2020-11-18 | Stop reason: SDUPTHER

## 2020-01-15 RX ORDER — FLUCONAZOLE 150 MG/1
150 TABLET ORAL ONCE
Qty: 1 TABLET | Refills: 1 | Status: SHIPPED | OUTPATIENT
Start: 2020-01-15 | End: 2020-01-15

## 2020-01-15 RX ORDER — BUTALBITAL, ASPIRIN, AND CAFFEINE 325; 50; 40 MG/1; MG/1; MG/1
1 CAPSULE ORAL EVERY 6 HOURS PRN
Qty: 15 CAPSULE | Refills: 0 | Status: SHIPPED | OUTPATIENT
Start: 2020-01-15 | End: 2020-01-24 | Stop reason: SDUPTHER

## 2020-01-15 RX ORDER — SULFAMETHOXAZOLE AND TRIMETHOPRIM 800; 160 MG/1; MG/1
1 TABLET ORAL 2 TIMES DAILY
Qty: 14 TABLET | Refills: 0 | Status: SHIPPED | OUTPATIENT
Start: 2020-01-15 | End: 2020-01-22

## 2020-01-15 ASSESSMENT — ENCOUNTER SYMPTOMS
VOMITING: 0
CONSTIPATION: 0
DIARRHEA: 0

## 2020-01-15 ASSESSMENT — PATIENT HEALTH QUESTIONNAIRE - PHQ9
SUM OF ALL RESPONSES TO PHQ QUESTIONS 1-9: 0
1. LITTLE INTEREST OR PLEASURE IN DOING THINGS: 0
SUM OF ALL RESPONSES TO PHQ9 QUESTIONS 1 & 2: 0
2. FEELING DOWN, DEPRESSED OR HOPELESS: 0
SUM OF ALL RESPONSES TO PHQ QUESTIONS 1-9: 0

## 2020-01-15 NOTE — PROGRESS NOTES
[address] [facility]  [address] [facility] [address]  Dept: [phone]  Dept Fax: [phone]    Mikhail Molina is a 63 y.o. female who presents to the urgent care today for her medicalconditions/complaints as noted below. Mikhail Molina is c/o of Vaginitis      HPI:         81-year-old female patient presents with multiple complaints. Patient has concerns over vaginal discharge, concern for yeast infection as she has itching, white, thick discharge. Additionally patient has urinary symptoms such as frequency, urgency, dysuria. Denies nausea, vomiting, flank pain, fevers, chills. Additionally patient has concerns or her chronic headaches, treats with Fioricet is out of this medication. Patient patient has a history of insomnia, uses Benadryl as needed for sleep. Is out of this medication      Past Medical History:   Diagnosis Date    Anemia     Chronic back pain     Hyperlipidemia     Hypertension     Type 2 diabetes mellitus with diabetic polyneuropathy, without long-term current use of insulin ([facility] [address] 11/10/2016    Type II or unspecified type diabetes mellitus without mention of complication, not stated as uncontrolled         Current Outpatient Medications   Medication Sig Dispense Refill    fluconazole (DIFLUCAN) 150 MG tablet Take 1 tablet by mouth once for 1 dose 1 tablet 1    sulfamethoxazole-trimethoprim (BACTRIM DS;SEPTRA DS) 800-160 MG per tablet Take 1 tablet by mouth 2 times daily for 7 days 14 tablet 0    clotrimazole (CLOTRIMAZOLE 3) 2 % CREA vaginal cream Place 850 applicators vaginally daily for 5 days 21 g 0    diphenhydrAMINE (BENADRYL) 25 MG capsule Take 1 capsule by mouth nightly as needed for Sleep 10 capsule 0    butalbital-aspirin-caffeine (FIORINAL) -40 MG capsule Take 1 capsule by mouth every 6 hours as needed for Headaches for up to 3 days.  15 capsule 0    dicyclomine (BENTYL) 10 MG capsule Take Absolute Mono # 12/11/2019 0.80     Absolute Eos # 12/11/2019 0.00     Basophils Absolute 12/11/2019 0.10     Absolute Immature Granul* 12/11/2019 NOT REPORTED     WBC Morphology 12/11/2019 NOT REPORTED     RBC Morphology 12/11/2019 NOT REPORTED     Platelet Estimate 77/89/7834 NOT REPORTED     Glucose 12/11/2019 191*    BUN 12/11/2019 17     CREATININE 12/11/2019 1.18*    Bun/Cre Ratio 12/11/2019 NOT REPORTED     Calcium 12/11/2019 10.5*    Sodium 12/11/2019 138     Potassium 12/11/2019 3.6*    Chloride 12/11/2019 104     CO2 12/11/2019 21     Anion Gap 12/11/2019 13     Alkaline Phosphatase 12/11/2019 112*    ALT 12/11/2019 7     AST 12/11/2019 8     Total Bilirubin 12/11/2019 0.29*    Total Protein 12/11/2019 8.4*    Alb 12/11/2019 4.1     Albumin/Globulin Ratio 12/11/2019 NOT REPORTED     GFR Non- 12/11/2019 47*    GFR  12/11/2019 57*    GFR Comment 12/11/2019          GFR Staging 12/11/2019 NOT REPORTED     Color, UA 12/11/2019 YELLOW     Turbidity UA 12/11/2019 CLOUDY*    Glucose, Ur 12/11/2019 3+*    Bilirubin Urine 12/11/2019 NEGATIVE     Ketones, Urine 12/11/2019 NEGATIVE     Specific [place], UA 12/11/2019 1.017     Urine Hgb 12/11/2019 SMALL*    pH, UA 12/11/2019 5.5     Protein, UA 12/11/2019 2+*    Urobilinogen, Urine 12/11/2019 Normal     Nitrite, Urine 12/11/2019 POSITIVE*    Leukocyte Esterase, Urine 12/11/2019 NEGATIVE     Urinalysis Comments 12/11/2019 NOT REPORTED     Specimen Description 12/11/2019 . CLEAN CATCH URINE     Special Requests 12/11/2019 NOT REPORTED     Culture 12/11/2019 ESCHERICHIA COLI >[id number] CFU/ML*    PTT 12/11/2019 37.9*    Protime 12/11/2019 14.2     INR 12/11/2019 1.1     Lipase 12/11/2019 24     Specimen Description 12/11/2019 . NASOPHARYNGEAL SWAB     Special Requests 12/11/2019 NOT REPORTED     Direct Exam 12/11/2019 PRESUMPTIVE NEGATIVE for Influenza A + B antigens. treatment plan. Follow up if symptoms do not improve/worsen. Vaginitis swab obtained, will treat presumptively for yeast with topical clotrimazole, oral Diflucan. Refill provided on patient's Fioricet. Refill provided on patient's Benadryl    Return with any new or worsening symptoms. Follow-up with PCP. Patient given educational materials - see patient instructions. Discussed use, benefit, and side effects of prescribed medications. All patientquestions answered. Pt voiced understanding. This note was transcribed using dictation with Dragon services. Efforts were made to correct any errors but some words may be misinterpreted.      Electronically signed by CONRADO Burns CNP on 1/15/2020at 3:56 PM

## 2020-01-16 LAB
CULTURE: NORMAL
DIRECT EXAM: ABNORMAL
Lab: ABNORMAL
Lab: NORMAL
SPECIMEN DESCRIPTION: ABNORMAL
SPECIMEN DESCRIPTION: NORMAL

## 2020-01-17 RX ORDER — METRONIDAZOLE 500 MG/1
500 TABLET ORAL 2 TIMES DAILY
Qty: 14 TABLET | Refills: 0 | Status: SHIPPED | OUTPATIENT
Start: 2020-01-17 | End: 2020-01-24

## 2020-01-24 ENCOUNTER — HOSPITAL ENCOUNTER (OUTPATIENT)
Age: 60
Discharge: HOME | End: 2020-01-24
Payer: MEDICAID

## 2020-01-24 ENCOUNTER — TELEPHONE (OUTPATIENT)
Dept: PRIMARY CARE CLINIC | Age: 60
End: 2020-01-24

## 2020-01-24 ENCOUNTER — OFFICE VISIT (OUTPATIENT)
Dept: PRIMARY CARE CLINIC | Age: 60
End: 2020-01-24
Payer: MEDICAID

## 2020-01-24 VITALS
TEMPERATURE: 98.4 F | SYSTOLIC BLOOD PRESSURE: 177 MMHG | HEART RATE: 67 BPM | WEIGHT: 193 LBS | BODY MASS INDEX: 31.15 KG/M2 | DIASTOLIC BLOOD PRESSURE: 87 MMHG | OXYGEN SATURATION: 100 %

## 2020-01-24 LAB
ANION GAP SERPL CALCULATED.3IONS-SCNC: 11 MMOL/L (ref 9–17)
BUN BLDV-MCNC: 23 MG/DL (ref 6–20)
BUN/CREAT BLD: ABNORMAL (ref 9–20)
CALCIUM SERPL-MCNC: 10.3 MG/DL (ref 8.6–10.4)
CHLORIDE BLD-SCNC: 108 MMOL/L (ref 98–107)
CHOLESTEROL/HDL RATIO: 2.6
CHOLESTEROL: 159 MG/DL
CO2: 20 MMOL/L (ref 20–31)
CREAT SERPL-MCNC: 0.96 MG/DL (ref 0.5–0.9)
ESTIMATED AVERAGE GLUCOSE: 183 MG/DL
GFR AFRICAN AMERICAN: >60 ML/MIN
GFR NON-AFRICAN AMERICAN: 60 ML/MIN
GFR SERPL CREATININE-BSD FRML MDRD: ABNORMAL ML/MIN/{1.73_M2}
GFR SERPL CREATININE-BSD FRML MDRD: ABNORMAL ML/MIN/{1.73_M2}
GLUCOSE BLD-MCNC: 146 MG/DL (ref 70–99)
HBA1C MFR BLD: 8 % (ref 4–6)
HDLC SERPL-MCNC: 61 MG/DL
LDL CHOLESTEROL: 79 MG/DL (ref 0–130)
POTASSIUM SERPL-SCNC: 4.4 MMOL/L (ref 3.7–5.3)
SODIUM BLD-SCNC: 139 MMOL/L (ref 135–144)
TRIGL SERPL-MCNC: 97 MG/DL
VLDLC SERPL CALC-MCNC: NORMAL MG/DL (ref 1–30)

## 2020-01-24 PROCEDURE — 36415 COLL VENOUS BLD VENIPUNCTURE: CPT

## 2020-01-24 PROCEDURE — G8427 DOCREV CUR MEDS BY ELIG CLIN: HCPCS | Performed by: NURSE PRACTITIONER

## 2020-01-24 PROCEDURE — 83036 HEMOGLOBIN GLYCOSYLATED A1C: CPT

## 2020-01-24 PROCEDURE — 80048 BASIC METABOLIC PNL TOTAL CA: CPT

## 2020-01-24 PROCEDURE — 3017F COLORECTAL CA SCREEN DOC REV: CPT | Performed by: NURSE PRACTITIONER

## 2020-01-24 PROCEDURE — G8417 CALC BMI ABV UP PARAM F/U: HCPCS | Performed by: NURSE PRACTITIONER

## 2020-01-24 PROCEDURE — 2022F DILAT RTA XM EVC RTNOPTHY: CPT | Performed by: NURSE PRACTITIONER

## 2020-01-24 PROCEDURE — 4004F PT TOBACCO SCREEN RCVD TLK: CPT | Performed by: NURSE PRACTITIONER

## 2020-01-24 PROCEDURE — G8484 FLU IMMUNIZE NO ADMIN: HCPCS | Performed by: NURSE PRACTITIONER

## 2020-01-24 PROCEDURE — 99214 OFFICE O/P EST MOD 30 MIN: CPT | Performed by: NURSE PRACTITIONER

## 2020-01-24 PROCEDURE — 80061 LIPID PANEL: CPT

## 2020-01-24 RX ORDER — BUPROPION HYDROCHLORIDE 150 MG/1
150 TABLET ORAL EVERY MORNING
Qty: 30 TABLET | Refills: 3 | Status: SHIPPED | OUTPATIENT
Start: 2020-01-24 | End: 2020-07-16 | Stop reason: SDUPTHER

## 2020-01-24 RX ORDER — ADHESIVE BANDAGE 3/4"
1 BANDAGE TOPICAL ONCE
Qty: 1 EACH | Refills: 0 | Status: SHIPPED | OUTPATIENT
Start: 2020-01-24 | End: 2020-01-27 | Stop reason: SDUPTHER

## 2020-01-24 RX ORDER — BUTALBITAL, ASPIRIN, AND CAFFEINE 325; 50; 40 MG/1; MG/1; MG/1
1 CAPSULE ORAL EVERY 6 HOURS PRN
Qty: 30 CAPSULE | Refills: 0 | Status: SHIPPED | OUTPATIENT
Start: 2020-01-24 | End: 2021-05-26

## 2020-01-24 RX ORDER — FLUCONAZOLE 150 MG/1
150 TABLET ORAL DAILY
Qty: 3 TABLET | Refills: 0 | Status: SHIPPED | OUTPATIENT
Start: 2020-01-24 | End: 2020-01-27

## 2020-01-24 ASSESSMENT — PATIENT HEALTH QUESTIONNAIRE - PHQ9
SUM OF ALL RESPONSES TO PHQ QUESTIONS 1-9: 11
8. MOVING OR SPEAKING SO SLOWLY THAT OTHER PEOPLE COULD HAVE NOTICED. OR THE OPPOSITE, BEING SO FIGETY OR RESTLESS THAT YOU HAVE BEEN MOVING AROUND A LOT MORE THAN USUAL: 0
2. FEELING DOWN, DEPRESSED OR HOPELESS: 1
5. POOR APPETITE OR OVEREATING: 1
SUM OF ALL RESPONSES TO PHQ QUESTIONS 1-9: 11
SUM OF ALL RESPONSES TO PHQ9 QUESTIONS 1 & 2: 4
6. FEELING BAD ABOUT YOURSELF - OR THAT YOU ARE A FAILURE OR HAVE LET YOURSELF OR YOUR FAMILY DOWN: 1
9. THOUGHTS THAT YOU WOULD BE BETTER OFF DEAD, OR OF HURTING YOURSELF: 0
10. IF YOU CHECKED OFF ANY PROBLEMS, HOW DIFFICULT HAVE THESE PROBLEMS MADE IT FOR YOU TO DO YOUR WORK, TAKE CARE OF THINGS AT HOME, OR GET ALONG WITH OTHER PEOPLE: 2
1. LITTLE INTEREST OR PLEASURE IN DOING THINGS: 3
4. FEELING TIRED OR HAVING LITTLE ENERGY: 1
7. TROUBLE CONCENTRATING ON THINGS, SUCH AS READING THE NEWSPAPER OR WATCHING TELEVISION: 2
3. TROUBLE FALLING OR STAYING ASLEEP: 2

## 2020-01-24 NOTE — PROGRESS NOTES
symptoms of irregular blood glucose. 120 strip 3    alogliptin (NESINA) 25 MG TABS tablet Take 25 mg by mouth daily      gabapentin (NEURONTIN) 600 MG tablet Take 1 tablet by mouth 3 times daily for 240 days. 90 tablet 2    nystatin (MYCOSTATIN) 866679 UNIT/GM powder APPLY TOPICALLY TO AFFECTED AREAS FOUR TIMES A DAY 15 g 11    lisinopril (PRINIVIL;ZESTRIL) 30 MG tablet TAKE 1 TABLET BY MOUTH ONCE DAILY 30 tablet 11    atenolol (TENORMIN) 50 MG tablet TAKE ONE-HALF TABLET BY MOUTH DAILY 15 tablet 11    pravastatin (PRAVACHOL) 40 MG tablet TAKE 1 TABLET BY MOUTH ONCE DAILY 30 tablet 11    aspirin EC 81 MG EC tablet Take 1 tablet by mouth daily 30 tablet 2    glucose monitoring kit (FREESTYLE) monitoring kit Diagnosis: Diabetes type 2, insulin-dependent. Use 3-4 times daily. Ok to give insurance brand 1 kit 0    Lancets MISC Use as directed, Dx Insulin dependent  each 10    Glucose Blood (BLOOD GLUCOSE TEST STRIPS) STRP Use to check Blood sugar 3 times/day , Dx: Insulin dependent DM2 Please substitute for brand compatible with patients glucometer 100 strip 10    ONETOUCH DELICA LANCETS 69E MISC CHECK BLOOD SUGAR ONCE DAILY 100 each 5     No current facility-administered medications on file prior to visit. Past Medical History:   Diagnosis Date    Anemia     Chronic back pain     Hyperlipidemia     Hypertension     Type 2 diabetes mellitus with diabetic polyneuropathy, without long-term current use of insulin [facility] 11/10/2016    Type II or unspecified type diabetes mellitus without mention of complication, not stated as uncontrolled       Past Surgical History:   Procedure Laterality Date     SECTION      EXTERNAL EAR SURGERY      EYE SURGERY      THYROID SURGERY       No family history on file.   Social History     Tobacco Use    Smoking status: Current Every Day Smoker     Packs/day: 0.25     Years: 30.00     Pack years: 7.50     Types: Cigarettes    Smokeless tobacco: Never Used    Tobacco comment: 5-6 cigarettes a day    Substance Use Topics    Alcohol use: No     Alcohol/week: 0.0 standard drinks     Comment: 1 x mon      Allergies   Allergen Reactions    Ibuprofen Itching       Subjective:     Review of Systems   Constitutional: Negative for chills and fever. Respiratory: Negative for cough and shortness of breath. Cardiovascular: Negative for chest pain, palpitations and leg swelling. Objective:     BP (!) 177/87 (Site: Right Upper Arm, Position: Sitting, Cuff Size: Medium Adult)   Pulse 67   Temp 98.4 °F (36.9 °C) (Oral)   Wt 193 lb (87.5 kg)   SpO2 100%   BMI 31.15 kg/m²    Physical Exam  Constitutional:       Appearance: She is well-developed. HENT:      Right Ear: External ear normal.      Left Ear: External ear normal.      Nose: Nose normal.   Neck:      Musculoskeletal: Full passive range of motion without pain and normal range of motion. Cardiovascular:      Rate and Rhythm: Normal rate and regular rhythm. Heart sounds: Normal heart sounds, S1 normal and S2 normal.   Pulmonary:      Effort: Pulmonary effort is normal. No respiratory distress. Breath sounds: Normal breath sounds. Musculoskeletal: Normal range of motion. General: No deformity. Skin:     General: Skin is warm and dry. Neurological:      Mental Status: She is alert and oriented to person, place, and time. Assessment/Plan         1. Vaginal discharge    - fluconazole (DIFLUCAN) 150 MG tablet; Take 1 tablet by mouth daily for 3 doses  Dispense: 3 tablet; Refill: 0  - clotrimazole (CLOTRIMAZOLE 3) 2 % CREA vaginal cream; Place 1 Applicatorful vaginally daily for 7 days  Dispense: 1 Tube; Refill: 0    2. Chronic nonintractable headache, unspecified headache type    - butalbital-aspirin-caffeine (FIORINAL) -40 MG capsule; Take 1 capsule by mouth every 6 hours as needed for Headaches for up to 3 days. Dispense: 30 capsule; Refill: 0    3.  Diabetic polyneuropathy associated with type 2 diabetes mellitus ([facility]  Stop steglatro  Check labs  rto in 1 month   - Hemoglobin A1C; Future  - Lipid Panel; Future  - Basic Metabolic Panel; Future    4. Episode of moderate major depression ([facility]  Start wellbutrin  rto in 1 month   - buPROPion (WELLBUTRIN XL) 150 MG extended release tablet; Take 1 tablet by mouth every morning  Dispense: 30 tablet; Refill: 3    RTO if symptoms worsen or fail to improve  Pt agreeable with plan      Patient given educationalmaterials - see patient instructions. Discussed use, benefit, and side effectsof prescribed medications. All patient questions answered. Pt voiced understanding. Reviewed health maintenance. Instructed to continue current medications, diet andexercise. 1.  Shauna received counseling on the following healthy behaviors: nutrition, exercise and medication adherence  2. Patient given educational materials when available - see patient instructionswhen applicable  3. Discussed use, benefit, and side effects of prescribed medications. Barriersto medication compliance addressed. All patient questions answered. Pt voicedunderstanding. 4.  Reviewed prior labs and health maintenance  5. Continuecurrent medications, diet and exercise. Completed Refills   Requested Prescriptions     Signed Prescriptions Disp Refills    fluconazole (DIFLUCAN) 150 MG tablet 3 tablet 0     Sig: Take 1 tablet by mouth daily for 3 doses    butalbital-aspirin-caffeine (FIORINAL) -40 MG capsule 30 capsule 0     Sig: Take 1 capsule by mouth every 6 hours as needed for Headaches for up to 3 days.     buPROPion (WELLBUTRIN XL) 150 MG extended release tablet 30 tablet 3     Sig: Take 1 tablet by mouth every morning    clotrimazole (CLOTRIMAZOLE 3) 2 % CREA vaginal cream 1 Tube 0     Sig: Place 1 Applicatorful vaginally daily for 7 days         Electronically signed by Isaias Zurita CNP on 2/2/2020 at 4:44 PM

## 2020-01-24 NOTE — PROGRESS NOTES
Visit Information    Have you changed or started any medications since your last visit including any over-the-counter medicines, vitamins, or herbal medicines? no   Are you having any side effects from any of your medications? -  no  Have you stopped taking any of your medications? Is so, why? -  no    Have you seen any other physician or provider since your last visit? Yes - Records Requested  Have you had any other diagnostic tests since your last visit? Yes - Records Requested  Have you been seen in the emergency room and/or had an admission to a hospital since we last saw you? Yes - Records Requested  Have you had your routine dental cleaning in the past 6 months? no    Have you activated your [employer] account? If not, what are your barriers?  Yes     Patient Care Team:  CONRADO Larsen CNP as PCP - General (Family Nurse Practitioner)  CONRADO Larsen CNP as PCP - [facility]  Sunny Diamond MD as Consulting Physician (Obstetrics & Gynecology)    Medical History Review  Past Medical, Family, and Social History reviewed and does not contribute to the patient presenting condition    Health Maintenance   Topic Date Due    Diabetic retinal exam  05/03/1971    Hepatitis B vaccine (1 of 3 - Risk 3-dose series) 05/03/1980    Cervical cancer screen  05/03/1982    Shingles Vaccine (1 of 2) 05/03/2011    Lipid screen  04/25/2015    Diabetic foot exam  03/30/2018    Diabetic microalbuminuria test  11/27/2018    Colon Cancer Screen FIT/FOBT  12/08/2018    Flu vaccine (1) 09/01/2019    A1C test (Diabetic or Prediabetic)  09/04/2020    Potassium monitoring  12/13/2020    Creatinine monitoring  12/13/2020    Breast cancer screen  10/23/2021    DTaP/Tdap/Td vaccine (2 - Td) 03/30/2027    Pneumococcal 0-64 years Vaccine  Completed    Hepatitis C screen  Completed    HIV screen  Completed

## 2020-01-24 NOTE — TELEPHONE ENCOUNTER
Pt came in to clinic and stated that an Rx for a blood pressure cuff was written. Pt took it to the pharmacy here at [facility] and they could not fill it and instructed her to come and have the provider re-print the Rx so she is able to take it to [facility]. Can you please re-print the Rx so it can be mailed to pt?

## 2020-01-27 RX ORDER — ADHESIVE BANDAGE 3/4"
1 BANDAGE TOPICAL ONCE
Qty: 1 EACH | Refills: 0 | Status: SHIPPED | OUTPATIENT
Start: 2020-01-27 | End: 2021-05-26 | Stop reason: SDUPTHER

## 2020-01-29 RX ORDER — GLIPIZIDE 10 MG/1
10 TABLET ORAL
Qty: 60 TABLET | Refills: 5 | Status: SHIPPED | OUTPATIENT
Start: 2020-01-29 | End: 2020-07-16 | Stop reason: SDUPTHER

## 2020-02-02 ASSESSMENT — ENCOUNTER SYMPTOMS
COUGH: 0
SHORTNESS OF BREATH: 0

## 2020-07-07 ENCOUNTER — APPOINTMENT (OUTPATIENT)
Dept: GENERAL RADIOLOGY | Age: 60
End: 2020-07-07
Payer: MEDICAID

## 2020-07-07 ENCOUNTER — HOSPITAL ENCOUNTER (EMERGENCY)
Age: 60
Discharge: HOME | End: 2020-07-07
Attending: EMERGENCY MEDICINE
Payer: MEDICAID

## 2020-07-07 VITALS
DIASTOLIC BLOOD PRESSURE: 71 MMHG | HEART RATE: 91 BPM | HEIGHT: 66 IN | TEMPERATURE: 97.5 F | RESPIRATION RATE: 12 BRPM | WEIGHT: 189 LBS | BODY MASS INDEX: 30.37 KG/M2 | OXYGEN SATURATION: 96 % | SYSTOLIC BLOOD PRESSURE: 159 MMHG

## 2020-07-07 LAB
-: ABNORMAL
ABSOLUTE EOS #: 0.2 K/UL (ref 0–0.4)
ABSOLUTE IMMATURE GRANULOCYTE: ABNORMAL K/UL (ref 0–0.3)
ABSOLUTE LYMPH #: 3 K/UL (ref 1–4.8)
ABSOLUTE MONO #: 0.6 K/UL (ref 0.1–1.3)
ALBUMIN SERPL-MCNC: 3.7 G/DL (ref 3.5–5.2)
ALBUMIN/GLOBULIN RATIO: ABNORMAL (ref 1–2.5)
ALP BLD-CCNC: 91 U/L (ref 35–104)
ALT SERPL-CCNC: 6 U/L (ref 5–33)
AMORPHOUS: ABNORMAL
ANION GAP SERPL CALCULATED.3IONS-SCNC: 9 MMOL/L (ref 9–17)
AST SERPL-CCNC: 7 U/L
BACTERIA: ABNORMAL
BASOPHILS # BLD: 1 % (ref 0–2)
BASOPHILS ABSOLUTE: 0.1 K/UL (ref 0–0.2)
BILIRUB SERPL-MCNC: 0.23 MG/DL (ref 0.3–1.2)
BILIRUBIN URINE: NEGATIVE
BUN BLDV-MCNC: 17 MG/DL (ref 6–20)
BUN/CREAT BLD: ABNORMAL (ref 9–20)
CALCIUM SERPL-MCNC: 10.7 MG/DL (ref 8.6–10.4)
CASTS UA: ABNORMAL /LPF
CHLORIDE BLD-SCNC: 107 MMOL/L (ref 98–107)
CO2: 21 MMOL/L (ref 20–31)
COLOR: YELLOW
COMMENT UA: ABNORMAL
CREAT SERPL-MCNC: 1.23 MG/DL (ref 0.5–0.9)
CRYSTALS, UA: ABNORMAL /HPF
DIFFERENTIAL TYPE: ABNORMAL
EOSINOPHILS RELATIVE PERCENT: 2 % (ref 0–4)
EPITHELIAL CELLS UA: ABNORMAL /HPF
GFR AFRICAN AMERICAN: 54 ML/MIN
GFR NON-AFRICAN AMERICAN: 45 ML/MIN
GFR SERPL CREATININE-BSD FRML MDRD: ABNORMAL ML/MIN/{1.73_M2}
GFR SERPL CREATININE-BSD FRML MDRD: ABNORMAL ML/MIN/{1.73_M2}
GLUCOSE BLD-MCNC: 181 MG/DL (ref 70–99)
GLUCOSE URINE: ABNORMAL
HCT VFR BLD CALC: 33.6 % (ref 36–46)
HEMOGLOBIN: 11 G/DL (ref 12–16)
IMMATURE GRANULOCYTES: ABNORMAL %
KETONES, URINE: NEGATIVE
LEUKOCYTE ESTERASE, URINE: NEGATIVE
LIPASE: 34 U/L (ref 13–60)
LYMPHOCYTES # BLD: 31 % (ref 24–44)
MCH RBC QN AUTO: 29.9 PG (ref 26–34)
MCHC RBC AUTO-ENTMCNC: 32.7 G/DL (ref 31–37)
MCV RBC AUTO: 91.6 FL (ref 80–100)
MONOCYTES # BLD: 6 % (ref 1–7)
MUCUS: ABNORMAL
NITRITE, URINE: NEGATIVE
NRBC AUTOMATED: ABNORMAL PER 100 WBC
OTHER OBSERVATIONS UA: ABNORMAL
PDW BLD-RTO: 14.7 % (ref 11.5–14.9)
PH UA: 5.5 (ref 5–8)
PLATELET # BLD: 401 K/UL (ref 150–450)
PLATELET ESTIMATE: ABNORMAL
PMV BLD AUTO: 7.6 FL (ref 6–12)
POTASSIUM SERPL-SCNC: 4.3 MMOL/L (ref 3.7–5.3)
PROTEIN UA: ABNORMAL
RBC # BLD: 3.67 M/UL (ref 4–5.2)
RBC # BLD: ABNORMAL 10*6/UL
RBC UA: ABNORMAL /HPF
RENAL EPITHELIAL, UA: ABNORMAL /HPF
SEG NEUTROPHILS: 60 % (ref 36–66)
SEGMENTED NEUTROPHILS ABSOLUTE COUNT: 5.8 K/UL (ref 1.3–9.1)
SODIUM BLD-SCNC: 137 MMOL/L (ref 135–144)
SPECIFIC GRAVITY UA: 1.01 (ref 1–1.03)
TOTAL PROTEIN: 8.1 G/DL (ref 6.4–8.3)
TRICHOMONAS: ABNORMAL
TURBIDITY: CLEAR
URINE HGB: ABNORMAL
UROBILINOGEN, URINE: NORMAL
WBC # BLD: 9.7 K/UL (ref 3.5–11)
WBC # BLD: ABNORMAL 10*3/UL
WBC UA: ABNORMAL /HPF
YEAST: ABNORMAL

## 2020-07-07 PROCEDURE — 80053 COMPREHEN METABOLIC PANEL: CPT

## 2020-07-07 PROCEDURE — 36415 COLL VENOUS BLD VENIPUNCTURE: CPT

## 2020-07-07 PROCEDURE — 96361 HYDRATE IV INFUSION ADD-ON: CPT

## 2020-07-07 PROCEDURE — 83690 ASSAY OF LIPASE: CPT

## 2020-07-07 PROCEDURE — 72072 X-RAY EXAM THORAC SPINE 3VWS: CPT

## 2020-07-07 PROCEDURE — 96375 TX/PRO/DX INJ NEW DRUG ADDON: CPT

## 2020-07-07 PROCEDURE — 99283 EMERGENCY DEPT VISIT LOW MDM: CPT

## 2020-07-07 PROCEDURE — 81001 URINALYSIS AUTO W/SCOPE: CPT

## 2020-07-07 PROCEDURE — 96374 THER/PROPH/DIAG INJ IV PUSH: CPT

## 2020-07-07 PROCEDURE — 6360000002 HC RX W HCPCS: Performed by: EMERGENCY MEDICINE

## 2020-07-07 PROCEDURE — 85025 COMPLETE CBC W/AUTO DIFF WBC: CPT

## 2020-07-07 PROCEDURE — 2580000003 HC RX 258: Performed by: EMERGENCY MEDICINE

## 2020-07-07 RX ORDER — FENTANYL CITRATE 50 UG/ML
50 INJECTION, SOLUTION INTRAMUSCULAR; INTRAVENOUS ONCE
Status: COMPLETED | OUTPATIENT
Start: 2020-07-07 | End: 2020-07-07

## 2020-07-07 RX ORDER — 0.9 % SODIUM CHLORIDE 0.9 %
1000 INTRAVENOUS SOLUTION INTRAVENOUS ONCE
Status: COMPLETED | OUTPATIENT
Start: 2020-07-07 | End: 2020-07-07

## 2020-07-07 RX ORDER — HYDROCODONE BITARTRATE AND ACETAMINOPHEN 5; 325 MG/1; MG/1
1 TABLET ORAL EVERY 6 HOURS PRN
Qty: 10 TABLET | Refills: 0 | Status: SHIPPED | OUTPATIENT
Start: 2020-07-07 | End: 2020-07-10

## 2020-07-07 RX ORDER — CYCLOBENZAPRINE HCL 10 MG
10 TABLET ORAL 3 TIMES DAILY PRN
Qty: 20 TABLET | Refills: 0 | Status: SHIPPED | OUTPATIENT
Start: 2020-07-07 | End: 2020-07-17

## 2020-07-07 RX ORDER — ORPHENADRINE CITRATE 30 MG/ML
60 INJECTION INTRAMUSCULAR; INTRAVENOUS ONCE
Status: COMPLETED | OUTPATIENT
Start: 2020-07-07 | End: 2020-07-07

## 2020-07-07 RX ADMIN — FENTANYL CITRATE 50 MCG: 50 INJECTION, SOLUTION INTRAMUSCULAR; INTRAVENOUS at 10:15

## 2020-07-07 RX ADMIN — ORPHENADRINE CITRATE 60 MG: 30 INJECTION INTRAMUSCULAR; INTRAVENOUS at 10:16

## 2020-07-07 RX ADMIN — SODIUM CHLORIDE 1000 ML: 9 INJECTION, SOLUTION INTRAVENOUS at 10:15

## 2020-07-07 ASSESSMENT — ENCOUNTER SYMPTOMS
EYE PAIN: 0
COUGH: 0
BACK PAIN: 1
VOMITING: 0
TROUBLE SWALLOWING: 0
CONSTIPATION: 0
EYE REDNESS: 0
SORE THROAT: 0
COLOR CHANGE: 0
SHORTNESS OF BREATH: 0
WHEEZING: 0
FACIAL SWELLING: 0
RHINORRHEA: 0
EYE DISCHARGE: 0
DIARRHEA: 0
NAUSEA: 0
ABDOMINAL PAIN: 0
CHEST TIGHTNESS: 0
BLOOD IN STOOL: 0
SINUS PRESSURE: 0

## 2020-07-07 ASSESSMENT — PAIN SCALES - GENERAL
PAINLEVEL_OUTOF10: 7
PAINLEVEL_OUTOF10: 7

## 2020-07-07 ASSESSMENT — PAIN DESCRIPTION - LOCATION: LOCATION: BACK

## 2020-07-07 ASSESSMENT — PAIN DESCRIPTION - FREQUENCY: FREQUENCY: CONTINUOUS

## 2020-07-07 ASSESSMENT — PAIN DESCRIPTION - PAIN TYPE: TYPE: ACUTE PAIN

## 2020-07-07 ASSESSMENT — PAIN DESCRIPTION - DESCRIPTORS: DESCRIPTORS: ACHING

## 2020-07-07 NOTE — ED PROVIDER NOTES
MONITORING KIT ([employer]) MONITORING KIT    Diagnosis: Diabetes type 2, insulin-dependent. Use 3-4 times daily. Ok to give insurance brand    LANCETS MISC    Use as directed, Dx Insulin dependent DM    LISINOPRIL (PRINIVIL;ZESTRIL) 30 MG TABLET    TAKE 1 TABLET BY MOUTH ONCE DAILY    NYSTATIN (MYCOSTATIN) 996344 UNIT/GM POWDER    APPLY TOPICALLY TO AFFECTED AREAS FOUR TIMES A DAY    ONDANSETRON (ZOFRAN ODT) 4 MG DISINTEGRATING TABLET    Take 1 tablet by mouth every 8 hours as needed for Nausea or Vomiting    ONETOUCH DELICA LANCETS 84F MISC    CHECK BLOOD SUGAR ONCE DAILY    PRAVASTATIN (PRAVACHOL) 40 MG TABLET    TAKE 1 TABLET BY MOUTH ONCE DAILY       ALLERGIES     is allergic to ibuprofen. SOCIAL HISTORY      reports that she has been smoking cigarettes. She has a 7.50 pack-year smoking history. She has never used smokeless tobacco. She reports previous drug use. Drug: Marijuana. She reports that she does not drink alcohol. PHYSICAL EXAM     INITIAL VITALS: BP (!) 159/71   Pulse 91   Temp 97.5 °F (36.4 °C) (Temporal)   Resp 12   Ht 5' 6\" (1.676 m)   Wt 189 lb (85.7 kg)   SpO2 96%   BMI 30.51 kg/m²      Physical Exam  Vitals signs and nursing note reviewed. Constitutional:       General: She is not in acute distress. Appearance: She is well-developed. She is not diaphoretic. HENT:      Head: Normocephalic and atraumatic. Eyes:      General: No scleral icterus. Right eye: No discharge. Left eye: No discharge. Conjunctiva/sclera: Conjunctivae normal.      Pupils: Pupils are equal, round, and reactive to light. Cardiovascular:      Rate and Rhythm: Normal rate and regular rhythm. Heart sounds: Normal heart sounds. No murmur. No friction rub. No gallop. Pulmonary:      Effort: Pulmonary effort is normal. No respiratory distress. Breath sounds: Normal breath sounds. No wheezing or rales. Chest:      Chest wall: No tenderness.    Abdominal:      General: Bowel sounds are normal. There is no distension. Palpations: Abdomen is soft. There is no mass. Tenderness: There is no abdominal tenderness. There is no guarding or rebound. Musculoskeletal: Normal range of motion. General: Tenderness (Diffuse thoracic bilateral) present. Skin:     General: Skin is warm and dry. Coloration: Skin is not pale. Findings: No erythema or rash. Neurological:      Mental Status: She is alert and oriented to person, place, and time. Cranial Nerves: No cranial nerve deficit. Sensory: No sensory deficit. Motor: No abnormal muscle tone. Coordination: Coordination normal.      Deep Tendon Reflexes: Reflexes normal.   Psychiatric:         Behavior: Behavior normal.         Thought Content: Thought content normal.         Judgment: Judgment normal.         DIAGNOSTIC RESULTS     RADIOLOGY:All plain film, CT,MRI, and formal ultrasound images (except ED bedside ultrasound) are read by the radiologist and the interpretations are directly viewed by the emergency physician. Xr Thoracic Spine (3 Views)    Result Date: 7/7/2020  EXAMINATION: THREE XRAY VIEWS OF THE THORACIC SPINE 7/7/2020 10:18 am COMPARISON: CT of the chest 04/06/2019. HISTORY: ORDERING SYSTEM PROVIDED HISTORY: Pain TECHNOLOGIST PROVIDED HISTORY: Pain Reason for Exam: Chronic back pain Acuity: Acute Type of Exam: Ongoing Additional signs and symptoms: Chronic back pain FINDINGS: Bone density is preserved with no acute fracture. Vertebral alignment is maintained. No significant arthritic changes. Mild dextroscoliosis of the lower thoracic and lumbar spine. Paravertebral soft tissues are unremarkable. Linear dependent atelectasis or scarring at the lung bases without significant interval change. No acute osseous abnormality of the thoracic spine. Mild scoliosis. Partially visualized atelectasis or scarring at the lung bases, similar to CT 04/06/2019.          LABS: All lab results were reviewed by myself, and all abnormals are listed below. Labs Reviewed   CBC WITH AUTO DIFFERENTIAL - Abnormal; Notable for the following components:       Result Value    RBC 3.67 (*)     Hemoglobin 11.0 (*)     Hematocrit 33.6 (*)     All other components within normal limits   COMPREHENSIVE METABOLIC PANEL - Abnormal; Notable for the following components:    Glucose 181 (*)     CREATININE 1.23 (*)     Calcium 10.7 (*)     Total Bilirubin 0.23 (*)     GFR Non- 45 (*)     GFR  54 (*)     All other components within normal limits   URINE RT REFLEX TO CULTURE - Abnormal; Notable for the following components:    Glucose, Ur 2+ (*)     Urine Hgb TRACE (*)     Protein, UA 2+ (*)     All other components within normal limits   MICROSCOPIC URINALYSIS - Abnormal; Notable for the following components:    Bacteria, UA FEW (*)     All other components within normal limits   LIPASE         MEDICAL DECISION MAKING:     The patient presents with low back pain without signs of spinal cord compression, cauda equina syndrome, infection, aneurysm or other serious etiology. The patient is neurologically intact. Given the extremely low risk of these diagnoses further testing and evaluation for these possibilities does not appear to be indicated at this time. The patient has been instructed to return if the symptoms worsen or change in any way.             EMERGENCY DEPARTMENT COURSE:   Vitals:    Vitals:    07/07/20 0941   BP: (!) 159/71   Pulse: 91   Resp: 12   Temp: 97.5 °F (36.4 °C)   TempSrc: Temporal   SpO2: 96%   Weight: 189 lb (85.7 kg)   Height: 5' 6\" (1.676 m)       The patient was given the following medications while in the emergency department:  Orders Placed This Encounter   Medications    0.9 % sodium chloride bolus    orphenadrine (NORFLEX) injection 60 mg    fentaNYL (SUBLIMAZE) injection 50 mcg    cyclobenzaprine (FLEXERIL) 10 MG tablet     Sig: Take 1 tablet by mouth 3 times daily as needed for Muscle spasms     Dispense:  20 tablet     Refill:  0    HYDROcodone-acetaminophen (NORCO) 5-325 MG per tablet     Sig: Take 1 tablet by mouth every 6 hours as needed for Pain for up to 3 days. Dispense:  10 tablet     Refill:  0       -------------------------  11:12 AM EDT  Patient was updated on results. We will give her some more fluids and discharge home. CONSULTS:  None    PROCEDURES:  None    FINAL IMPRESSION      1. Contusion of back, unspecified laterality, initial encounter    2. Dehydration          DISPOSITION/PLAN   DISPOSITION Decision To Discharge 07/07/2020 11:11:26 AM      PATIENT REFERREDTO:  Kurt Brown, APRN - CNP  [address]    In 1 week      [facility] ED  [facility] [address]  [phone]    If symptoms worsen      DISCHARGEMEDICATIONS:  New Prescriptions    CYCLOBENZAPRINE (FLEXERIL) 10 MG TABLET    Take 1 tablet by mouth 3 times daily as needed for Muscle spasms    HYDROCODONE-ACETAMINOPHEN (NORCO) 5-325 MG PER TABLET    Take 1 tablet by mouth every 6 hours as needed for Pain for up to 3 days.        (Please note that portions of this note were completed with a voice recognition program.  Efforts were made to edit thedictations but occasionally words are mis-transcribed.)    Khadijah Beach MD  Attending Emergency Physician                        Khadijah Beach MD  07/07/20 [id number]

## 2020-08-17 RX ORDER — PRAVASTATIN SODIUM 40 MG
TABLET ORAL
Qty: 30 TABLET | Refills: 0 | OUTPATIENT
Start: 2020-08-17

## 2020-08-17 RX ORDER — BUPROPION HYDROCHLORIDE 150 MG/1
150 TABLET ORAL EVERY MORNING
Qty: 30 TABLET | Refills: 0 | OUTPATIENT
Start: 2020-08-17

## 2020-08-17 RX ORDER — ATENOLOL 50 MG/1
TABLET ORAL
Qty: 15 TABLET | Refills: 0 | OUTPATIENT
Start: 2020-08-17

## 2020-08-17 RX ORDER — GLIPIZIDE 10 MG/1
10 TABLET ORAL
Qty: 60 TABLET | Refills: 0 | OUTPATIENT
Start: 2020-08-17

## 2020-08-19 ENCOUNTER — OFFICE VISIT (OUTPATIENT)
Dept: PRIMARY CARE CLINIC | Age: 60
End: 2020-08-19
Payer: MEDICAID

## 2020-08-19 ENCOUNTER — HOSPITAL ENCOUNTER (OUTPATIENT)
Age: 60
Setting detail: SPECIMEN
Discharge: HOME | End: 2020-08-19
Payer: MEDICAID

## 2020-08-19 VITALS
SYSTOLIC BLOOD PRESSURE: 137 MMHG | TEMPERATURE: 97.3 F | BODY MASS INDEX: 29.86 KG/M2 | DIASTOLIC BLOOD PRESSURE: 77 MMHG | HEART RATE: 82 BPM | OXYGEN SATURATION: 100 % | WEIGHT: 185 LBS

## 2020-08-19 LAB — HBA1C MFR BLD: 7.8 %

## 2020-08-19 PROCEDURE — 2022F DILAT RTA XM EVC RTNOPTHY: CPT | Performed by: NURSE PRACTITIONER

## 2020-08-19 PROCEDURE — 3051F HG A1C>EQUAL 7.0%<8.0%: CPT | Performed by: NURSE PRACTITIONER

## 2020-08-19 PROCEDURE — G8427 DOCREV CUR MEDS BY ELIG CLIN: HCPCS | Performed by: NURSE PRACTITIONER

## 2020-08-19 PROCEDURE — G8417 CALC BMI ABV UP PARAM F/U: HCPCS | Performed by: NURSE PRACTITIONER

## 2020-08-19 PROCEDURE — 4004F PT TOBACCO SCREEN RCVD TLK: CPT | Performed by: NURSE PRACTITIONER

## 2020-08-19 PROCEDURE — 99214 OFFICE O/P EST MOD 30 MIN: CPT | Performed by: NURSE PRACTITIONER

## 2020-08-19 PROCEDURE — 83036 HEMOGLOBIN GLYCOSYLATED A1C: CPT | Performed by: NURSE PRACTITIONER

## 2020-08-19 PROCEDURE — 3017F COLORECTAL CA SCREEN DOC REV: CPT | Performed by: NURSE PRACTITIONER

## 2020-08-19 RX ORDER — FLUCONAZOLE 100 MG/1
100 TABLET ORAL DAILY
Qty: 3 TABLET | Refills: 0 | Status: SHIPPED | OUTPATIENT
Start: 2020-08-19 | End: 2020-08-22

## 2020-08-19 RX ORDER — PRAVASTATIN SODIUM 40 MG
TABLET ORAL
Qty: 30 TABLET | Refills: 5 | Status: SHIPPED | OUTPATIENT
Start: 2020-08-19 | End: 2021-02-05

## 2020-08-19 RX ORDER — LANCETS 30 GAUGE
EACH MISCELLANEOUS
Qty: 100 EACH | Refills: 5 | Status: SHIPPED | OUTPATIENT
Start: 2020-08-19 | End: 2021-03-12

## 2020-08-19 RX ORDER — GABAPENTIN 600 MG/1
600 TABLET ORAL 3 TIMES DAILY
Qty: 90 TABLET | Refills: 2 | Status: SHIPPED | OUTPATIENT
Start: 2020-08-19 | End: 2020-11-12

## 2020-08-19 RX ORDER — GLUCOSAMINE HCL/CHONDROITIN SU 500-400 MG
CAPSULE ORAL
Qty: 100 STRIP | Refills: 0 | Status: SHIPPED | OUTPATIENT
Start: 2020-08-19 | End: 2020-12-11

## 2020-08-19 RX ORDER — BUPROPION HYDROCHLORIDE 150 MG/1
150 TABLET ORAL EVERY MORNING
Qty: 30 TABLET | Refills: 5 | Status: SHIPPED | OUTPATIENT
Start: 2020-08-19 | End: 2021-02-05

## 2020-08-19 RX ORDER — LISINOPRIL 30 MG/1
TABLET ORAL
Qty: 30 TABLET | Refills: 5 | Status: SHIPPED | OUTPATIENT
Start: 2020-08-19 | End: 2021-05-24

## 2020-08-19 RX ORDER — NYSTATIN 100000 [USP'U]/G
POWDER TOPICAL
Qty: 15 G | Refills: 11 | Status: SHIPPED | OUTPATIENT
Start: 2020-08-19 | End: 2021-07-27

## 2020-08-19 RX ORDER — GLIPIZIDE 10 MG/1
10 TABLET ORAL
Qty: 60 TABLET | Refills: 5 | Status: SHIPPED | OUTPATIENT
Start: 2020-08-19 | End: 2021-02-05

## 2020-08-19 RX ORDER — ATENOLOL 50 MG/1
TABLET ORAL
Qty: 15 TABLET | Refills: 5 | Status: SHIPPED | OUTPATIENT
Start: 2020-08-19 | End: 2021-02-05

## 2020-08-19 ASSESSMENT — ENCOUNTER SYMPTOMS
SHORTNESS OF BREATH: 0
COUGH: 0

## 2020-08-19 NOTE — PROGRESS NOTES
[facility]  [address], [address]  [phone]    Gilford Reap is a 62 y.o. female who presents today for her  medical conditions/complaintsas noted below. Gilford Reap is c/o of Medication Refill    HPI:     HPI  Dm- last a1c was in January, 8. We increased her glipizide. She has not been seen since then. He was advised to return in February. She states she has been out of her glipzide for a few months. She does see a podiatrist- she would like to see a new one.  a1c today is 7.8  She has been back on her meds for only a few weeks. She has lost 8 lbs. She is checking sugars regularly at home. She has cut back on sweets. Hypertension: Patient here for follow-up of elevated blood pressure. She is not exercising and is adherent to low salt diet. Blood pressure is well controlled at home. Cardiac symptoms none. Patient denies chest pain, chest pressure/discomfort, exertional chest pressure/discomfort, fatigue, irregular heart beat and lower extremity edema. Cardiovascular risk factors: diabetes mellitus, hypertension, obesity (BMI >= 30 kg/m2) and sedentary lifestyle. Use of agents associated with hypertension: NSAIDS. History of target organ damage: none. She would like to quit smoking. Has tried patch and gum. Wt Readings from Last 3 Encounters:   08/19/20 185 lb (83.9 kg)   07/07/20 189 lb (85.7 kg)   01/24/20 193 lb (87.5 kg)     Nursing note reviewedand discussed with patient. Patient'smedications, allergies, past medical, surgical, social and family histories werereviewed and updated as appropriate.   Current Outpatient Medications on File Prior to Visit   Medication Sig Dispense Refill    dicyclomine (BENTYL) 10 MG capsule Take 1 capsule by mouth every 6 hours as needed (cramps) 20 capsule 0    ondansetron (ZOFRAN ODT) 4 MG disintegrating tablet Take 1 tablet by mouth every 8 hours as needed for Nausea or Vomiting 10 of moderate major depression (HCC)    - buPROPion (WELLBUTRIN XL) 150 MG extended release tablet; Take 1 tablet by mouth every morning  Dispense: 30 tablet; Refill: 5    5. Essential hypertension    - lisinopril (PRINIVIL;ZESTRIL) 30 MG tablet; TAKE 1 TABLET BY MOUTH ONCE DAILY  Dispense: 30 tablet; Refill: 5  - atenolol (TENORMIN) 50 MG tablet; TAKE ONE-HALF TABLET BY MOUTH DAILY  Dispense: 15 tablet; Refill: 5    6. Pure hypercholesterolemia    - pravastatin (PRAVACHOL) 40 MG tablet; TAKE 1 TABLET BY MOUTH ONCE DAILY  Dispense: 30 tablet; Refill: 5    RTO if symptoms worsen or fail to improve  Pt agreeable with plan      Patient given educationalmaterials - see patient instructions. Discussed use, benefit, and side effectsof prescribed medications. All patient questions answered. Pt voiced understanding. Reviewed health maintenance. Instructed to continue current medications, diet andexercise. 1.  Shauna received counseling on the following healthy behaviors: nutrition, exercise and medication adherence  2. Patient given educational materials when available - see patient instructionswhen applicable  3. Discussed use, benefit, and side effects of prescribed medications. Barriersto medication compliance addressed. All patient questions answered. Pt voicedunderstanding. 4.  Reviewed prior labs and health maintenance  5. Continuecurrent medications, diet and exercise. Completed Refills   Requested Prescriptions     Signed Prescriptions Disp Refills    gabapentin (NEURONTIN) 600 MG tablet 90 tablet 2     Sig: Take 1 tablet by mouth 3 times daily for 240 days.     nystatin (MYCOSTATIN) 278473 UNIT/GM powder 15 g 11     Sig: APPLY TOPICALLY TO AFFECTED AREAS FOUR TIMES A DAY    glipiZIDE (GLUCOTROL) 10 MG tablet 60 tablet 5     Sig: Take 1 tablet by mouth 2 times daily (before meals)    buPROPion (WELLBUTRIN XL) 150 MG extended release tablet 30 tablet 5     Sig: Take 1 tablet by mouth every morning  lisinopril (PRINIVIL;ZESTRIL) 30 MG tablet 30 tablet 5     Sig: TAKE 1 TABLET BY MOUTH ONCE DAILY    blood glucose monitor strips 100 strip 0     Sig: Use to check Blood sugar 3 times/day , Dx: Insulin dependent DM2 Please substitute for brand compatible with patients glucometer    Lancets MISC 100 each 5     Sig: Use as directed, Dx Insulin dependent DM    pravastatin (PRAVACHOL) 40 MG tablet 30 tablet 5     Sig: TAKE 1 TABLET BY MOUTH ONCE DAILY    atenolol (TENORMIN) 50 MG tablet 15 tablet 5     Sig: TAKE ONE-HALF TABLET BY MOUTH DAILY    fluconazole (DIFLUCAN) 100 MG tablet 3 tablet 0     Sig: Take 1 tablet by mouth daily for 3 days         This note was transcribed using dictation with Dragon services. Efforts were made to correct any errors but some words may be misinterpreted.     Electronically signed by Kwabena Zamudio CNP on 8/19/2020 at 3:11 PM

## 2020-08-19 NOTE — PROGRESS NOTES
Visit Information    Have you changed or started any medications since your last visit including any over-the-counter medicines, vitamins, or herbal medicines? no   Are you having any side effects from any of your medications? -  no  Have you stopped taking any of your medications? Is so, why? -  no    Have you seen any other physician or provider since your last visit? No  Have you had any other diagnostic tests since your last visit? Yes  Have you been seen in the emergency room and/or had an admission to a hospital since we last saw you? Yes  Have you had your routine dental cleaning in the past 6 months? no    Have you activated your [email or web address] account? If not, what are your barriers?  Yes     Patient Care Team:  CONRADO Du CNP as PCP - General (Family Nurse Practitioner)  CONRADO Du CNP as PCP - [facility] Provider  Citlaly Servin MD as Consulting Physician (Obstetrics & Gynecology)    Medical History Review  Past Medical, Family, and Social History reviewed and does contribute to the patient presenting condition    Health Maintenance   Topic Date Due    Diabetic retinal exam  05/03/1971    Hepatitis B vaccine (1 of 3 - Risk 3-dose series) 05/03/1980    Cervical cancer screen  05/03/1982    Shingles Vaccine (1 of 2) 05/03/2011    Diabetic foot exam  03/30/2018    Diabetic microalbuminuria test  11/27/2018    Colon Cancer Screen FIT/FOBT  12/08/2018    Flu vaccine (1) 09/01/2020    A1C test (Diabetic or Prediabetic)  01/24/2021    Lipid screen  01/24/2021    Potassium monitoring  07/07/2021    Creatinine monitoring  07/07/2021    Breast cancer screen  10/23/2021    DTaP/Tdap/Td vaccine (2 - Td) 03/30/2027    Pneumococcal 0-64 years Vaccine  Completed    Hepatitis C screen  Completed    HIV screen  Completed    Hepatitis A vaccine  Aged Out    Hib vaccine  Aged Out    Meningococcal (ACWY) vaccine  Aged Out

## 2020-08-20 LAB
CREATININE URINE: 138.1 MG/DL (ref 28–217)
MICROALBUMIN/CREAT 24H UR: 1036 MG/L
MICROALBUMIN/CREAT UR-RTO: 750 MCG/MG CREAT

## 2020-10-08 ENCOUNTER — NURSE TRIAGE (OUTPATIENT)
Dept: OTHER | Facility: CLINIC | Age: 60
End: 2020-10-08

## 2020-10-08 NOTE — TELEPHONE ENCOUNTER
Reason for Disposition   SEVERE back pain (e.g., excruciating, unable to do any normal activities) and not improved after pain medicine and CARE ADVICE    Answer Assessment - Initial Assessment Questions  1. ONSET: \"When did the pain begin? \"       Couple days ago     2. LOCATION: \"Where does it hurt? \" (upper, mid or lower back)      Lower back    3. SEVERITY: \"How bad is the pain? \"  (e.g., Scale 1-10; mild, moderate, or severe)    - MILD (1-3): doesn't interfere with normal activities     - MODERATE (4-7): interferes with normal activities or awakens from sleep     - SEVERE (8-10): excruciating pain, unable to do any normal activities       8/10 kept up all night    4. PATTERN: \"Is the pain constant? \" (e.g., yes, no; constant, intermittent)       Constant     5. RADIATION: \"Does the pain shoot into your legs or elsewhere? \"      No    6. CAUSE:  \"What do you think is causing the back pain? \"       UTI    7. BACK OVERUSE:  Terrilee Cam recent lifting of heavy objects, strenuous work or exercise? \"      No    8. MEDICATIONS: \"What have you taken so far for the pain? \" (e.g., nothing, acetaminophen, NSAIDS)      Gabapentin    9. NEUROLOGIC SYMPTOMS: \"Do you have any weakness, numbness, or problems with bowel/bladder control? \"      No    10. OTHER SYMPTOMS: \"Do you have any other symptoms? \" (e.g., fever, abdominal pain, burning with urination, blood in urine)        No    11. PREGNANCY: \"Is there any chance you are pregnant? \" (e.g., yes, no; LMP)        n/a    Protocols used: BACK PAIN-ADULT-[place]    Pt very disgruntled asking for appt thinks pain is related to UTI     Pt advised to go to walk in clinic if no appt available     Warm transfer to [facility]

## 2020-11-18 ENCOUNTER — OFFICE VISIT (OUTPATIENT)
Dept: PRIMARY CARE CLINIC | Age: 60
End: 2020-11-18
Payer: MEDICAID

## 2020-11-18 VITALS
TEMPERATURE: 97.3 F | BODY MASS INDEX: 30.51 KG/M2 | HEART RATE: 93 BPM | WEIGHT: 189 LBS | SYSTOLIC BLOOD PRESSURE: 149 MMHG | OXYGEN SATURATION: 100 % | DIASTOLIC BLOOD PRESSURE: 76 MMHG

## 2020-11-18 LAB
BILIRUBIN, POC: NEGATIVE
BLOOD URINE, POC: ABNORMAL
CLARITY, POC: CLEAR
COLOR, POC: YELLOW
GLUCOSE URINE, POC: NEGATIVE
HBA1C MFR BLD: 8.2 %
KETONES, POC: NEGATIVE
LEUKOCYTE EST, POC: NEGATIVE
NITRITE, POC: NEGATIVE
PH, POC: 6
PROTEIN, POC: ABNORMAL
SPECIFIC GRAVITY, POC: 1.02
UROBILINOGEN, POC: 0.2

## 2020-11-18 PROCEDURE — G8427 DOCREV CUR MEDS BY ELIG CLIN: HCPCS | Performed by: NURSE PRACTITIONER

## 2020-11-18 PROCEDURE — G8484 FLU IMMUNIZE NO ADMIN: HCPCS | Performed by: NURSE PRACTITIONER

## 2020-11-18 PROCEDURE — 3052F HG A1C>EQUAL 8.0%<EQUAL 9.0%: CPT | Performed by: NURSE PRACTITIONER

## 2020-11-18 PROCEDURE — 81003 URINALYSIS AUTO W/O SCOPE: CPT | Performed by: NURSE PRACTITIONER

## 2020-11-18 PROCEDURE — 4004F PT TOBACCO SCREEN RCVD TLK: CPT | Performed by: NURSE PRACTITIONER

## 2020-11-18 PROCEDURE — 99214 OFFICE O/P EST MOD 30 MIN: CPT | Performed by: NURSE PRACTITIONER

## 2020-11-18 PROCEDURE — 83036 HEMOGLOBIN GLYCOSYLATED A1C: CPT | Performed by: NURSE PRACTITIONER

## 2020-11-18 PROCEDURE — 3017F COLORECTAL CA SCREEN DOC REV: CPT | Performed by: NURSE PRACTITIONER

## 2020-11-18 PROCEDURE — G8417 CALC BMI ABV UP PARAM F/U: HCPCS | Performed by: NURSE PRACTITIONER

## 2020-11-18 PROCEDURE — 2022F DILAT RTA XM EVC RTNOPTHY: CPT | Performed by: NURSE PRACTITIONER

## 2020-11-18 RX ORDER — BUTALBITAL, ASPIRIN, AND CAFFEINE 325; 50; 40 MG/1; MG/1; MG/1
1 CAPSULE ORAL EVERY 8 HOURS PRN
Qty: 20 CAPSULE | Refills: 1 | Status: ON HOLD | OUTPATIENT
Start: 2020-11-18 | End: 2021-02-01 | Stop reason: HOSPADM

## 2020-11-18 RX ORDER — HYDROCODONE BITARTRATE AND ACETAMINOPHEN 5; 325 MG/1; MG/1
1 TABLET ORAL EVERY 8 HOURS PRN
Qty: 15 TABLET | Refills: 0 | Status: SHIPPED | OUTPATIENT
Start: 2020-11-18 | End: 2020-11-23

## 2020-11-18 RX ORDER — DIPHENHYDRAMINE HCL 25 MG
25 CAPSULE ORAL NIGHTLY PRN
Qty: 20 CAPSULE | Refills: 0 | Status: SHIPPED | OUTPATIENT
Start: 2020-11-18 | End: 2020-11-28

## 2020-11-18 RX ORDER — CIPROFLOXACIN/HYDROCORTISONE 0.2 %-1 %
4 SUSPENSION, DROPS(FINAL DOSAGE FORM)(ML) OTIC (EAR) 2 TIMES DAILY
Qty: 1 BOTTLE | Refills: 0 | Status: SHIPPED | OUTPATIENT
Start: 2020-11-18 | End: 2020-11-25

## 2020-11-18 RX ORDER — ALOGLIPTIN 25 MG/1
25 TABLET, FILM COATED ORAL DAILY
Qty: 30 TABLET | Refills: 2 | Status: SHIPPED | OUTPATIENT
Start: 2020-11-18 | End: 2022-10-05 | Stop reason: SDUPTHER

## 2020-11-18 ASSESSMENT — ENCOUNTER SYMPTOMS
SHORTNESS OF BREATH: 0
COUGH: 0

## 2020-11-18 NOTE — PROGRESS NOTES
[facility]  [address], [address]  [phone]    Nunu Leroy is a 62 y.o. female who presents today for her  medical conditions/complaintsas noted below. Nunu Leroy is c/o of 3 Month Follow-Up    HPI:     HPI  Diabetes Mellitus Type 2: Current symptoms/problems include none. Medication compliance:  compliant most of the time  Diabetic diet compliance:  noncompliant: drinking a lot of pop,  Weight trend: stable  Current exercise: no regular exercise  Barriers: none    Home blood sugar records: trend: increasing steadily  Any episodes of hypoglycemia? no  Eye exam current (within one year): yes   reports that she has been smoking cigarettes. She has a 7.50 pack-year smoking history. She has never used smokeless tobacco.   Daily Aspirin? Yes  Her a1c is up, she is drinking a lot of pop. She understands this is not good for her sugars. She takes glipizide 10 mg. She has not had her alogliptin since 2017, pharmacy states need pa- dont show we ever got one. Doesn't tolerate sglt2 class. Lab Results   Component Value Date    LABA1C 8.2 11/18/2020    LABA1C 7.8 08/19/2020    LABA1C 8.0 (H) 01/24/2020     Lab Results   Component Value Date    LABMICR 750 (H) 08/19/2020    CREATININE 1.23 (H) 07/07/2020     Lab Results   Component Value Date    ALT 6 07/07/2020    AST 7 07/07/2020     Lab Results   Component Value Date    CHOL 159 01/24/2020    TRIG 97 01/24/2020    HDL 61 01/24/2020        Back pain- denies injury. Low back. Bilateral pain. Radicular symptoms down left leg. Has not taken anything otc. She has had pain like this before when \"my sciatic nerve was acting up\". Pain meds helped in the past. No urinary concerns other than slight frequency. This has been occurring a few weeks     Headaches- thinks her blood pressure pill or gabapentin are causing this. fiorcet helps. This is a chronic problem.   She would like to see a neurologist for this problem. Crust in her eyes when she wakes up. Crust is yellow. Her eyes itch. They burn, no other pain. They leak during the day. this has been occurring for a month. Nursing note reviewedand discussed with patient. Wt Readings from Last 3 Encounters:   11/18/20 189 lb (85.7 kg)   08/19/20 185 lb (83.9 kg)   07/07/20 189 lb (85.7 kg)       Patient'smedications, allergies, past medical, surgical, social and family histories werereviewed and updated as appropriate. Current Outpatient Medications on File Prior to Visit   Medication Sig Dispense Refill    gabapentin (NEURONTIN) 600 MG tablet TAKE 1 TABLET BY MOUTH 3 TIMES DAILY 90 tablet 2    nystatin (MYCOSTATIN) 446595 UNIT/GM powder APPLY TOPICALLY TO AFFECTED AREAS FOUR TIMES A DAY 15 g 11    glipiZIDE (GLUCOTROL) 10 MG tablet Take 1 tablet by mouth 2 times daily (before meals) 60 tablet 5    buPROPion (WELLBUTRIN XL) 150 MG extended release tablet Take 1 tablet by mouth every morning 30 tablet 5    lisinopril (PRINIVIL;ZESTRIL) 30 MG tablet TAKE 1 TABLET BY MOUTH ONCE DAILY 30 tablet 5    blood glucose monitor strips Use to check Blood sugar 3 times/day , Dx: Insulin dependent DM2 Please substitute for brand compatible with patients glucometer 100 strip 0    Lancets MISC Use as directed, Dx Insulin dependent  each 5    pravastatin (PRAVACHOL) 40 MG tablet TAKE 1 TABLET BY MOUTH ONCE DAILY 30 tablet 5    atenolol (TENORMIN) 50 MG tablet TAKE ONE-HALF TABLET BY MOUTH DAILY 15 tablet 5    Blood Pressure Monitoring (BLOOD PRESSURE CUFF) MISC 1 Dose by Does not apply route once for 1 dose 1 each 0    butalbital-aspirin-caffeine (FIORINAL) -40 MG capsule Take 1 capsule by mouth every 6 hours as needed for Headaches for up to 3 days.  30 capsule 0    dicyclomine (BENTYL) 10 MG capsule Take 1 capsule by mouth every 6 hours as needed (cramps) 20 capsule 0    ondansetron (ZOFRAN ODT) 4 MG disintegrating tablet Take 1 tablet by mouth every 8 hours as needed for Nausea or Vomiting 10 tablet 0    blood glucose monitor kit and supplies Test 4 times a day & as needed for symptoms of irregular blood glucose. 1 kit 0    aspirin EC 81 MG EC tablet Take 1 tablet by mouth daily 30 tablet 2    glucose monitoring kit (FREESTYLE) monitoring kit Diagnosis: Diabetes type 2, insulin-dependent. Use 3-4 times daily. Ok to give insurance brand 1 kit 0    ONETOUCH DELICA LANCETS 75C MISC CHECK BLOOD SUGAR ONCE DAILY 100 each 5     No current facility-administered medications on file prior to visit. Past Medical History:   Diagnosis Date    Anemia     Chronic back pain     Hyperlipidemia     Hypertension     Type 2 diabetes mellitus with diabetic polyneuropathy, without long-term current use of insulin [facility] 11/10/2016    Type II or unspecified type diabetes mellitus without mention of complication, not stated as uncontrolled       Past Surgical History:   Procedure Laterality Date     SECTION      EXTERNAL EAR SURGERY      EYE SURGERY      THYROID SURGERY       No family history on file. Social History     Tobacco Use    Smoking status: Current Every Day Smoker     Packs/day: 0.25     Years: 30.00     Pack years: 7.50     Types: Cigarettes    Smokeless tobacco: Never Used    Tobacco comment: 5-6 cigarettes a day    Substance Use Topics    Alcohol use: No     Alcohol/week: 0.0 standard drinks     Comment: 1 x mon      Allergies   Allergen Reactions    Ibuprofen Itching       Subjective:     Review of Systems   Constitutional: Negative for chills and fever. Respiratory: Negative for cough and shortness of breath. Cardiovascular: Negative for chest pain, palpitations and leg swelling. Objective:     BP (!) 149/76   Pulse 93   Temp 97.3 °F (36.3 °C) (Temporal)   Wt 189 lb (85.7 kg)   SpO2 100%   BMI 30.51 kg/m²    Physical Exam  Constitutional:       Appearance: She is well-developed.    HENT: Right Ear: External ear normal.      Left Ear: External ear normal.      Nose: Nose normal.   Eyes:      General: Lids are normal.      Conjunctiva/sclera:      Right eye: Right conjunctiva is injected. Left eye: Left conjunctiva is injected. Neck:      Musculoskeletal: Full passive range of motion without pain and normal range of motion. Cardiovascular:      Rate and Rhythm: Normal rate and regular rhythm. Heart sounds: Normal heart sounds, S1 normal and S2 normal.   Pulmonary:      Effort: Pulmonary effort is normal. No respiratory distress. Breath sounds: Normal breath sounds. Musculoskeletal: Normal range of motion. General: No deformity. Arms:    Skin:     General: Skin is warm and dry. Neurological:      Mental Status: She is alert and oriented to person, place, and time. Assessment/Plan         1. Insomnia, unspecified type  Diphenhydramine works for her would like a refill.   - diphenhydrAMINE (BENADRYL) 25 MG capsule; Take 1 capsule by mouth nightly as needed for Sleep  Dispense: 20 capsule; Refill: 0    2. Chronic tension-type headache, intractable  Pt would like to see a neurologist.   - Martha Wharton, CNP, Neurology, [facility]    3. Lumbar sprain, initial encounter  Musculoskeletal in nature, ice,stretch.   - HYDROcodone-acetaminophen (NORCO) 5-325 MG per tablet; Take 1 tablet by mouth every 8 hours as needed for Pain for up to 5 days. 1-2 tabs every 4 hours prn  Dispense: 15 tablet; Refill: 0    4. Diabetic polyneuropathy associated with type 2 diabetes mellitus ([facility]  Upon discussion the patient doesn't think she is taking her meds the right way  She is drinking at least a 2 litre of rc cola a day  She promises that she will stop drinking pop and improve her diet. - DME Order for Jb Gu as OP  - POCT glycosylated hemoglobin (Hb A1C)    5. Chronic nonintractable headache, unspecified headache type  Would like to see neuro.    - butalbital-aspirin-caffeine (FIORINAL) -40 MG capsule; Take 1 capsule by mouth every 8 hours as needed for Headaches for up to 3 days. Dispense: 20 capsule; Refill: 1    6. Acute bacterial conjunctivitis of both eyes  cipro eye drops. 7. Dysuria    - POCT Urinalysis No Micro (Auto)    RTO if symptoms worsen or fail to improve  Pt agreeable with plan      Patient given educationalmaterials - see patient instructions. Discussed use, benefit, and side effectsof prescribed medications. All patient questions answered. Pt voiced understanding. Reviewed health maintenance. Instructed to continue current medications, diet andexercise. 1.  Shauna received counseling on the following healthy behaviors: nutrition, exercise and medication adherence  2. Patient given educational materials when available - see patient instructionswhen applicable  3. Discussed use, benefit, and side effects of prescribed medications. Barriersto medication compliance addressed. All patient questions answered. Pt voicedunderstanding. 4.  Reviewed prior labs and health maintenance  5. Continuecurrent medications, diet and exercise. Completed Refills   Requested Prescriptions     Signed Prescriptions Disp Refills    HYDROcodone-acetaminophen (NORCO) 5-325 MG per tablet 15 tablet 0     Sig: Take 1 tablet by mouth every 8 hours as needed for Pain for up to 5 days. 1-2 tabs every 4 hours prn    diphenhydrAMINE (BENADRYL) 25 MG capsule 20 capsule 0     Sig: Take 1 capsule by mouth nightly as needed for Sleep    ciprofloxacin-hydrocortisone (CIPRO HC) 0.2-1 % otic suspension 1 Bottle 0     Sig: Place 4 drops in ear(s) 2 times daily for 7 days    butalbital-aspirin-caffeine (FIORINAL) -40 MG capsule 20 capsule 1     Sig: Take 1 capsule by mouth every 8 hours as needed for Headaches for up to 3 days.     alogliptin (NESINA) 25 MG TABS tablet 30 tablet 2     Sig: Take 1 tablet by mouth daily         This note was transcribed using dictation with Dragon services. Efforts were made to correct any errors but some words may be misinterpreted.     Electronically signed by Yahir Ibanez CNP on 11/18/2020 at 2:46 PM

## 2020-11-18 NOTE — PROGRESS NOTES
Visit Information    Have you changed or started any medications since your last visit including any over-the-counter medicines, vitamins, or herbal medicines? no   Are you having any side effects from any of your medications? -  no  Have you stopped taking any of your medications? Is so, why? -  no    Have you seen any other physician or provider since your last visit? No  Have you had any other diagnostic tests since your last visit? No  Have you been seen in the emergency room and/or had an admission to a hospital since we last saw you? No  Have you had your routine dental cleaning in the past 6 months? no    Have you activated your [employer] account? If not, what are your barriers?  No: Pt Declined     Patient Care Team:  CONRADO Pack CNP as PCP - General (Family Nurse Practitioner)  CONRADO Pack CNP as PCP - [facility] Provider  Elizabeth Anderson MD as Consulting Physician (Obstetrics & Gynecology)    Medical History Review  Past Medical, Family, and Social History reviewed and does not contribute to the patient presenting condition    Health Maintenance   Topic Date Due    Diabetic retinal exam  05/03/1971    Hepatitis B vaccine (1 of 3 - Risk 3-dose series) 05/03/1980    Cervical cancer screen  05/03/1982    Shingles Vaccine (1 of 2) 05/03/2011    Diabetic foot exam  03/30/2018    Colon Cancer Screen FIT/FOBT  12/08/2018    Flu vaccine (1) 09/01/2020    Lipid screen  01/24/2021    Potassium monitoring  07/07/2021    Creatinine monitoring  07/07/2021    A1C test (Diabetic or Prediabetic)  08/19/2021    Diabetic microalbuminuria test  08/19/2021    Breast cancer screen  10/23/2021    DTaP/Tdap/Td vaccine (2 - Td) 03/30/2027    Pneumococcal 0-64 years Vaccine  Completed    Hepatitis C screen  Completed    HIV screen  Completed    Hepatitis A vaccine  Aged Out    Hib vaccine  Aged Out    Meningococcal (ACWY) vaccine  Aged Out

## 2020-12-11 RX ORDER — BLOOD SUGAR DIAGNOSTIC
STRIP MISCELLANEOUS
Qty: 120 EACH | Refills: 5 | Status: SHIPPED | OUTPATIENT
Start: 2020-12-11 | End: 2021-04-28

## 2020-12-11 NOTE — TELEPHONE ENCOUNTER
Last visit: 11/18/20  Last Med refill: 12/11/20  Does patient have enough medication for 72 hours: Yes    Next Visit Date:01/26/21  Future Appointments   Date Time Provider Nida Avalos   1/26/2021 11:00 AM CONRADO Avitia CNP Neuro Spec [id number]   3/24/2021  1:00 PM CONRADO Guerrero CNP [facility] [address] Maintenance   Topic Date Due    Diabetic retinal exam  05/03/1971    Hepatitis B vaccine (1 of 3 - Risk 3-dose series) 05/03/1980    Cervical cancer screen  05/03/1982    Shingles Vaccine (1 of 2) 05/03/2011    Diabetic foot exam  03/30/2018    Colon Cancer Screen FIT/FOBT  12/08/2018    Flu vaccine (1) 11/18/2021 (Originally 9/1/2020)    Lipid screen  01/24/2021    Potassium monitoring  07/07/2021    Creatinine monitoring  07/07/2021    Diabetic microalbuminuria test  08/19/2021    Breast cancer screen  10/23/2021    A1C test (Diabetic or Prediabetic)  11/18/2021    DTaP/Tdap/Td vaccine (2 - Td) 03/30/2027    Pneumococcal 0-64 years Vaccine  Completed    Hepatitis C screen  Completed    HIV screen  Completed    Hepatitis A vaccine  Aged Out    Hib vaccine  Aged Out    Meningococcal (ACWY) vaccine  Aged Out       Hemoglobin A1C (%)   Date Value   11/18/2020 8.2   08/19/2020 7.8   01/24/2020 8.0 (H)             ( goal A1C is < 7)   Microalb/Crt.  Ratio (mcg/mg creat)   Date Value   08/19/2020 750 (H)     LDL Cholesterol (mg/dL)   Date Value   01/24/2020 79   04/25/2014 141 (H)       (goal LDL is <100)   AST (U/L)   Date Value   07/07/2020 7     ALT (U/L)   Date Value   07/07/2020 6     BUN (mg/dL)   Date Value   07/07/2020 17     BP Readings from Last 3 Encounters:   11/18/20 (!) 149/76   08/19/20 137/77   07/07/20 (!) 159/71          (goal 120/80)    All Future Testing planned in [facility]  Lab Frequency Next Occurrence   POCT FECAL IMMUNOCHEMICAL TEST (FIT) Once 12/22/2020               Patient Active Problem List:     Chronic bilateral low back pain without sciatica     Essential hypertension     Type 2 diabetes mellitus with diabetic polyneuropathy, without long-term current use of insulin (HCC)     Cigarette nicotine dependence without complication     Carpal tunnel syndrome of right wrist     Acute cystitis

## 2021-01-30 ENCOUNTER — APPOINTMENT (OUTPATIENT)
Dept: CT IMAGING | Age: 61
End: 2021-01-30
Payer: MEDICAID

## 2021-01-30 ENCOUNTER — HOSPITAL ENCOUNTER (OUTPATIENT)
Age: 61
Setting detail: OBSERVATION
Discharge: HOME | End: 2021-02-01
Attending: EMERGENCY MEDICINE | Admitting: INTERNAL MEDICINE
Payer: MEDICAID

## 2021-01-30 ENCOUNTER — APPOINTMENT (OUTPATIENT)
Dept: GENERAL RADIOLOGY | Age: 61
End: 2021-01-30
Payer: MEDICAID

## 2021-01-30 DIAGNOSIS — E11.43 DIABETIC AUTONOMIC NEUROPATHY ASSOCIATED WITH TYPE 2 DIABETES MELLITUS (HCC): ICD-10-CM

## 2021-01-30 DIAGNOSIS — M25.561 ACUTE PAIN OF RIGHT KNEE: ICD-10-CM

## 2021-01-30 DIAGNOSIS — K61.1 PERIRECTAL ABSCESS: Primary | ICD-10-CM

## 2021-01-30 PROBLEM — K62.89 PERIRECTAL SKIN IRRITATION: Status: ACTIVE | Noted: 2021-01-30

## 2021-01-30 PROBLEM — R79.89 ELEVATED SERUM CREATININE: Status: ACTIVE | Noted: 2021-01-30

## 2021-01-30 PROBLEM — R94.4 DECREASED GFR: Status: ACTIVE | Noted: 2021-01-30

## 2021-01-30 PROBLEM — F32.A DEPRESSION: Status: ACTIVE | Noted: 2021-01-30

## 2021-01-30 PROBLEM — E78.5 HYPERLIPIDEMIA: Status: ACTIVE | Noted: 2021-01-30

## 2021-01-30 LAB
ABSOLUTE EOS #: 0.1 K/UL (ref 0–0.4)
ABSOLUTE IMMATURE GRANULOCYTE: ABNORMAL K/UL (ref 0–0.3)
ABSOLUTE LYMPH #: 2.3 K/UL (ref 1–4.8)
ABSOLUTE MONO #: 0.4 K/UL (ref 0.1–1.3)
ALBUMIN SERPL-MCNC: 4 G/DL (ref 3.5–5.2)
ALBUMIN/GLOBULIN RATIO: ABNORMAL (ref 1–2.5)
ALP BLD-CCNC: 118 U/L (ref 35–104)
ALT SERPL-CCNC: 5 U/L (ref 5–33)
ANION GAP SERPL CALCULATED.3IONS-SCNC: 9 MMOL/L (ref 9–17)
AST SERPL-CCNC: 6 U/L
BASOPHILS # BLD: 1 % (ref 0–2)
BASOPHILS ABSOLUTE: 0.1 K/UL (ref 0–0.2)
BILIRUB SERPL-MCNC: 0.18 MG/DL (ref 0.3–1.2)
BUN BLDV-MCNC: 20 MG/DL (ref 6–20)
BUN/CREAT BLD: ABNORMAL (ref 9–20)
CALCIUM SERPL-MCNC: 10.6 MG/DL (ref 8.6–10.4)
CHLORIDE BLD-SCNC: 99 MMOL/L (ref 98–107)
CO2: 27 MMOL/L (ref 20–31)
CREAT SERPL-MCNC: 1.3 MG/DL (ref 0.5–0.9)
DIFFERENTIAL TYPE: ABNORMAL
EOSINOPHILS RELATIVE PERCENT: 1 % (ref 0–4)
GFR AFRICAN AMERICAN: 51 ML/MIN
GFR NON-AFRICAN AMERICAN: 42 ML/MIN
GFR SERPL CREATININE-BSD FRML MDRD: ABNORMAL ML/MIN/{1.73_M2}
GFR SERPL CREATININE-BSD FRML MDRD: ABNORMAL ML/MIN/{1.73_M2}
GLUCOSE BLD-MCNC: 278 MG/DL (ref 70–99)
GLUCOSE BLD-MCNC: 378 MG/DL (ref 65–105)
HCT VFR BLD CALC: 32.1 % (ref 36–46)
HEMOGLOBIN: 10.9 G/DL (ref 12–16)
IMMATURE GRANULOCYTES: ABNORMAL %
LYMPHOCYTES # BLD: 25 % (ref 24–44)
MCH RBC QN AUTO: 31.8 PG (ref 26–34)
MCHC RBC AUTO-ENTMCNC: 34 G/DL (ref 31–37)
MCV RBC AUTO: 93.7 FL (ref 80–100)
MONOCYTES # BLD: 5 % (ref 1–7)
NRBC AUTOMATED: ABNORMAL PER 100 WBC
PDW BLD-RTO: 15.7 % (ref 11.5–14.9)
PLATELET # BLD: 379 K/UL (ref 150–450)
PLATELET ESTIMATE: ABNORMAL
PMV BLD AUTO: 7.7 FL (ref 6–12)
POTASSIUM SERPL-SCNC: 4.2 MMOL/L (ref 3.7–5.3)
RBC # BLD: 3.43 M/UL (ref 4–5.2)
RBC # BLD: ABNORMAL 10*6/UL
SARS-COV-2, RAPID: NOT DETECTED
SARS-COV-2: NORMAL
SARS-COV-2: NORMAL
SEG NEUTROPHILS: 68 % (ref 36–66)
SEGMENTED NEUTROPHILS ABSOLUTE COUNT: 6.5 K/UL (ref 1.3–9.1)
SODIUM BLD-SCNC: 135 MMOL/L (ref 135–144)
SOURCE: NORMAL
TOTAL PROTEIN: 8.2 G/DL (ref 6.4–8.3)
WBC # BLD: 9.4 K/UL (ref 3.5–11)
WBC # BLD: ABNORMAL 10*3/UL

## 2021-01-30 PROCEDURE — 6370000000 HC RX 637 (ALT 250 FOR IP): Performed by: SURGERY

## 2021-01-30 PROCEDURE — G0378 HOSPITAL OBSERVATION PER HR: HCPCS

## 2021-01-30 PROCEDURE — 6360000004 HC RX CONTRAST MEDICATION: Performed by: EMERGENCY MEDICINE

## 2021-01-30 PROCEDURE — 6360000002 HC RX W HCPCS: Performed by: EMERGENCY MEDICINE

## 2021-01-30 PROCEDURE — 99284 EMERGENCY DEPT VISIT MOD MDM: CPT

## 2021-01-30 PROCEDURE — 80053 COMPREHEN METABOLIC PANEL: CPT

## 2021-01-30 PROCEDURE — 2580000003 HC RX 258: Performed by: EMERGENCY MEDICINE

## 2021-01-30 PROCEDURE — 82947 ASSAY GLUCOSE BLOOD QUANT: CPT

## 2021-01-30 PROCEDURE — 2500000003 HC RX 250 WO HCPCS: Performed by: SURGERY

## 2021-01-30 PROCEDURE — 99223 1ST HOSP IP/OBS HIGH 75: CPT | Performed by: INTERNAL MEDICINE

## 2021-01-30 PROCEDURE — 85025 COMPLETE CBC W/AUTO DIFF WBC: CPT

## 2021-01-30 PROCEDURE — 1200000000 HC SEMI PRIVATE

## 2021-01-30 PROCEDURE — 96365 THER/PROPH/DIAG IV INF INIT: CPT

## 2021-01-30 PROCEDURE — 72193 CT PELVIS W/DYE: CPT

## 2021-01-30 PROCEDURE — 6370000000 HC RX 637 (ALT 250 FOR IP): Performed by: STUDENT IN AN ORGANIZED HEALTH CARE EDUCATION/TRAINING PROGRAM

## 2021-01-30 PROCEDURE — 2580000003 HC RX 258: Performed by: SURGERY

## 2021-01-30 PROCEDURE — U0002 COVID-19 LAB TEST NON-CDC: HCPCS

## 2021-01-30 PROCEDURE — 73562 X-RAY EXAM OF KNEE 3: CPT

## 2021-01-30 PROCEDURE — 6370000000 HC RX 637 (ALT 250 FOR IP): Performed by: EMERGENCY MEDICINE

## 2021-01-30 PROCEDURE — 36415 COLL VENOUS BLD VENIPUNCTURE: CPT

## 2021-01-30 RX ORDER — ATENOLOL 25 MG/1
25 TABLET ORAL DAILY
Status: DISCONTINUED | OUTPATIENT
Start: 2021-01-31 | End: 2021-02-01 | Stop reason: HOSPADM

## 2021-01-30 RX ORDER — ACETAMINOPHEN 650 MG/1
650 SUPPOSITORY RECTAL EVERY 6 HOURS PRN
Status: DISCONTINUED | OUTPATIENT
Start: 2021-01-30 | End: 2021-01-31

## 2021-01-30 RX ORDER — SODIUM CHLORIDE 9 MG/ML
INJECTION, SOLUTION INTRAVENOUS CONTINUOUS
Status: DISCONTINUED | OUTPATIENT
Start: 2021-01-30 | End: 2021-01-31

## 2021-01-30 RX ORDER — ONDANSETRON 2 MG/ML
4 INJECTION INTRAMUSCULAR; INTRAVENOUS EVERY 6 HOURS PRN
Status: DISCONTINUED | OUTPATIENT
Start: 2021-01-30 | End: 2021-01-31

## 2021-01-30 RX ORDER — FENTANYL CITRATE 50 UG/ML
50 INJECTION, SOLUTION INTRAMUSCULAR; INTRAVENOUS
Status: DISCONTINUED | OUTPATIENT
Start: 2021-01-30 | End: 2021-01-31

## 2021-01-30 RX ORDER — PRAVASTATIN SODIUM 40 MG
40 TABLET ORAL DAILY
Status: DISCONTINUED | OUTPATIENT
Start: 2021-01-31 | End: 2021-02-01 | Stop reason: HOSPADM

## 2021-01-30 RX ORDER — HYDROCODONE BITARTRATE AND ACETAMINOPHEN 10; 325 MG/1; MG/1
1 TABLET ORAL ONCE
Status: DISCONTINUED | OUTPATIENT
Start: 2021-01-30 | End: 2021-01-30

## 2021-01-30 RX ORDER — 0.9 % SODIUM CHLORIDE 0.9 %
80 INTRAVENOUS SOLUTION INTRAVENOUS ONCE
Status: COMPLETED | OUTPATIENT
Start: 2021-01-30 | End: 2021-01-30

## 2021-01-30 RX ORDER — FENTANYL CITRATE 50 UG/ML
100 INJECTION, SOLUTION INTRAMUSCULAR; INTRAVENOUS
Status: DISCONTINUED | OUTPATIENT
Start: 2021-01-30 | End: 2021-01-31

## 2021-01-30 RX ORDER — ACETAMINOPHEN 325 MG/1
650 TABLET ORAL EVERY 6 HOURS PRN
Status: DISCONTINUED | OUTPATIENT
Start: 2021-01-30 | End: 2021-01-31

## 2021-01-30 RX ORDER — HYDROCODONE BITARTRATE AND ACETAMINOPHEN 5; 325 MG/1; MG/1
1 TABLET ORAL ONCE
Status: COMPLETED | OUTPATIENT
Start: 2021-01-30 | End: 2021-01-30

## 2021-01-30 RX ORDER — LISINOPRIL 10 MG/1
30 TABLET ORAL DAILY
Status: DISCONTINUED | OUTPATIENT
Start: 2021-01-31 | End: 2021-02-01 | Stop reason: HOSPADM

## 2021-01-30 RX ORDER — OXYCODONE HYDROCHLORIDE AND ACETAMINOPHEN 5; 325 MG/1; MG/1
1 TABLET ORAL EVERY 4 HOURS PRN
Status: DISCONTINUED | OUTPATIENT
Start: 2021-01-30 | End: 2021-01-31

## 2021-01-30 RX ORDER — PROMETHAZINE HYDROCHLORIDE 25 MG/1
12.5 TABLET ORAL EVERY 6 HOURS PRN
Status: DISCONTINUED | OUTPATIENT
Start: 2021-01-30 | End: 2021-02-01 | Stop reason: HOSPADM

## 2021-01-30 RX ORDER — SODIUM CHLORIDE 0.9 % (FLUSH) 0.9 %
10 SYRINGE (ML) INJECTION PRN
Status: DISCONTINUED | OUTPATIENT
Start: 2021-01-30 | End: 2021-02-01 | Stop reason: HOSPADM

## 2021-01-30 RX ADMIN — SODIUM CHLORIDE 80 ML: 9 INJECTION, SOLUTION INTRAVENOUS at 15:43

## 2021-01-30 RX ADMIN — IOPAMIDOL 75 ML: 755 INJECTION, SOLUTION INTRAVENOUS at 15:43

## 2021-01-30 RX ADMIN — OXYCODONE HYDROCHLORIDE AND ACETAMINOPHEN 1 TABLET: 5; 325 TABLET ORAL at 22:05

## 2021-01-30 RX ADMIN — Medication 10 ML: at 15:43

## 2021-01-30 RX ADMIN — PIPERACILLIN AND TAZOBACTAM 4500 MG: 4; .5 INJECTION, POWDER, LYOPHILIZED, FOR SOLUTION INTRAVENOUS; PARENTERAL at 17:18

## 2021-01-30 RX ADMIN — FAMOTIDINE 20 MG: 10 INJECTION, SOLUTION INTRAVENOUS at 22:05

## 2021-01-30 RX ADMIN — INSULIN LISPRO 3 UNITS: 100 INJECTION, SOLUTION INTRAVENOUS; SUBCUTANEOUS at 22:36

## 2021-01-30 RX ADMIN — HYDROCODONE BITARTRATE AND ACETAMINOPHEN 1 TABLET: 5; 325 TABLET ORAL at 14:01

## 2021-01-30 RX ADMIN — SODIUM CHLORIDE: 9 INJECTION, SOLUTION INTRAVENOUS at 22:03

## 2021-01-30 ASSESSMENT — ENCOUNTER SYMPTOMS
EYE PAIN: 0
SHORTNESS OF BREATH: 0
COLOR CHANGE: 0
ABDOMINAL PAIN: 0
BACK PAIN: 1

## 2021-01-30 ASSESSMENT — PAIN SCALES - GENERAL
PAINLEVEL_OUTOF10: 7
PAINLEVEL_OUTOF10: 10

## 2021-01-30 NOTE — H&P
[facility]     HISTORY AND PHYSICAL EXAMINATION            Date:   1/30/2021  Patient name:  Rylan Dias  Date of admission:  1/30/2021  1:07 PM  MRN:   [id number]  Account:  [de-identified]  YOB: 1960  PCP:    CONRADO Carlos CNP  Room:   15/15  Code Status:    Prior    Chief Complaint:     Chief Complaint   Patient presents with    Abscess     Rt buttock abscess    Leg Pain     Rt lge pain from hip down       History Obtained From:     patient    History of Present Illness: The patient is a 32-year-old female with h/o type 2 DM with polyneuropathy, HTN, HLD, and chronic back pain who entered to the ED today complaining of right leg pain. She states that about 1 week ago she woke up from sleep with soreness/pain in the right knee/thigh provoked by ambulating. No paresthesias. No trauma. Additionally, she also mentioned to the ED physician that she has had soreness at her right buttock for a few days and that a \"boil\" in that area \"popped open\" last night; she tells me it drained a mixture of red and yellow fluid. She reports having a few similar episodes of boils around the right buttock area throughout her life, including one as an adolescent which was \"lanced\". Denies presence of boils anywhere else. Denies personal or family history of inflammatory bowel disease. Denies any skin rash. Denies fevers, chills, chest pain, shortness of breath, abdominal pain, nausea, vomiting, diarrhea, constipation, leg pain or swelling, or any other complaints this time. In the ED, CT pelvis demonstrated \"4 cm x 1.5 cm right perirectal abscess\". She is admitted for the management of right perirectal abscess with surgery consulted for possible OR tomorrow.   Principal Problem:    Perirectal abscess  Active Problems: Normocytic anemia    Essential hypertension    Type 2 diabetes mellitus with diabetic polyneuropathy, without long-term current use of insulin ([facility])    Perirectal skin irritation    Right knee pain    Depression    Hyperlipidemia    Elevated serum creatinine    Decreased GFR  Resolved Problems:    * No resolved hospital problems. *         Past Medical History:     Past Medical History:   Diagnosis Date    Anemia     Chronic back pain     Depression     Hyperlipidemia     Hypertension     Type 2 diabetes mellitus with diabetic polyneuropathy, without long-term current use [facility] 11/10/2016    Type II or unspecified type diabetes mellitus without mention of complication, not stated as uncontrolled         Past SurgicalHistory:     Past Surgical History:   Procedure Laterality Date     SECTION      EXTERNAL EAR SURGERY      EYE SURGERY      THYROID SURGERY          Medications Prior to Admission:        Prior to Admission medications    Medication Sig Start Date End Date Taking? Authorizing Provider   blood glucose test strips (ONETOUCH ULTRA) strip TEST 4 TIMES A DAY & AS NEEDED FOR SYMPTOMS OF IRREGULAR BLOOD GLUCOSE. 20   Lewis Looney, APRN - CNP   butalbital-aspirin-caffeine [facility]) -40 MG capsule Take 1 capsule by mouth every 8 hours as needed for Headaches for up to 3 days.  20  Lewis Looney, APRN - CNP   alogliptin (NESINA) 25 MG TABS tablet Take 1 tablet by mouth daily 20   Lewis Looney, APRN - CNP   gabapentin (NEURONTIN) 600 MG tablet TAKE 1 TABLET BY MOUTH 3 TIMES DAILY 20  Lewis Looney, APRN - CNP   nystatin (MYCOSTATIN) 715132 UNIT/GM powder APPLY TOPICALLY TO AFFECTED AREAS FOUR TIMES A DAY 20   Lewis Looney, APRN - CNP   glipiZIDE (GLUCOTROL) 10 MG tablet Take 1 tablet by mouth 2 times daily (before meals) 20   Lewis Looney, APRN - CNP   buPROPion (WELLBUTRIN XL) 150 MG extended release tablet Take 1 tablet by mouth every morning 8/19/20   SherwinCONRADO White CNP   lisinopril (PRINIVIL;ZESTRIL) 30 MG tablet TAKE 1 TABLET BY MOUTH ONCE DAILY 8/19/20   CONRADO Paz CNP   Lancets MISC Use as directed, Dx Insulin dependent DM 8/19/20   SherwinCONRADO White CNP   pravastatin (PRAVACHOL) 40 MG tablet TAKE 1 TABLET BY MOUTH ONCE DAILY 8/19/20   CONRADO Paz CNP   atenolol (TENORMIN) 50 MG tablet TAKE ONE-HALF TABLET BY MOUTH DAILY 8/19/20   [place]win CONRADO Gonzalez CNP   Blood Pressure Monitoring (BLOOD PRESSURE CUFF) MISC 1 Dose by Does not apply route once for 1 dose 1/27/20 11/18/20  CONRADO Paz CNP   butalbital-aspirin-caffeine [facility]) -40 MG capsule Take 1 capsule by mouth every 6 hours as needed for Headaches for up to 3 days. 1/24/20 11/18/20  CONRADO Paz CNP   dicyclomine (BENTYL) 10 MG capsule Take 1 capsule by mouth every 6 hours as needed (cramps) 12/13/19   Lm Sampson MD   ondansetron (ZOFRAN ODT) 4 MG disintegrating tablet Take 1 tablet by mouth every 8 hours as needed for Nausea or Vomiting 12/13/19   Lm Sampson MD   blood glucose monitor kit and supplies Test 4 times a day & as needed for symptoms of irregular blood glucose. 12/13/19   Patty West MD   aspirin EC 81 MG EC tablet Take 1 tablet by mouth daily 2/16/18   Dheeraj Gee MD   glucose monitoring kit (FREESTYLE) monitoring kit Diagnosis: Diabetes type 2, insulin-dependent. Use 3-4 times daily. Ok to give insurance brand 11/10/17   Dheeraj Gee MD   [address] [place] LANCETS 33R MISC CHECK BLOOD SUGAR ONCE DAILY 5/9/17   Barron Gloria MD        Allergies:     Ibuprofen    Social History:     Tobacco:    reports that she has been smoking cigarettes. She has a 19.50 pack-year smoking history. She has never used smokeless tobacco.  Alcohol:      reports no history of alcohol use.   Drug Use: reports previous drug use. Drug: Marijuana. Family History:     History reviewed. No pertinent family history. Review of Systems:     Positive and Negative as described in HPI. CONSTITUTIONAL:  no fevers, no headcahes  EYES: negative for blury vision  HEENT: No headaches, No nasal congestion, no difficulty swallowing  RESPIRATORY:negative for dyspnea, no wheezing, no Cough  CARDIOVASCULAR: negative for chest pain, no palpitations  GASTROINTESTINAL: no nausea, no vomiting, no change in bowel habits, no abdominal pain . Positive for right buttock boil. Positive for right buttock pain. GENITOURINARY: negative for dysuria, no hematuria   MUSCULOSKELETAL: Positive for right thigh and right knee pain, no muscle aches, no swelling of joints or extremities  NEUROLOGICAL: No  Weakness or numbness  INTEGUMENTARY: No rash      Physical Exam:   BP (!) 162/67   Pulse 92   Temp 98.4 °F (36.9 °C) (Oral)   Resp 18   Ht 5' 5\" (1.651 m)   Wt 189 lb (85.7 kg)   SpO2 99%   BMI 31.45 kg/m²   Temp (24hrs), Av.2 °F (37.3 °C), Min:98.4 °F (36.9 °C), Max:99.9 °F (37.7 °C)        No results for input(s): POCGLU in the last 72 hours. No intake or output data in the 24 hours ending 21 1813    PHYSICAL EXAM:  General Appearance  Alert , awake , not in acute distress. She is resting comfortably on the exam bed eating a bag of chips and a sandwich. HEENT - Head is normocephalic, atraumatic. Neck - Supple  Lungs - Bilateral equal air entry , no wheezes, rales or rhonchi, aeration good  Cardiovascular - Heart sounds are normal.  Regular rhythm, normal rate without murmur, gallop or rub. Abdomen - Soft, nontender, obese but nondistended, no masses or organomegaly  Neurologic - There are no new focal motor or sensory deficits  Skin - No bruising or bleeding on exposed skin area.   With a female chaperone, I examined the perirectal area: Lateral to the gluteal cleft on the right side is an approximately 1 cm tender lesion without obvious drainage or fluctuance or surrounding skin erythema. Remainder of the exam appears normal.  Extremities - No cyanosis, clubbing or edema. Full but painful active and passive range of motion of the right knee. No obvious right knee effusion or warmth.   Psychiatric - Normal mood and affect      Investigations:     Laboratory Testing:  Recent Results (from the past 24 hour(s))   CBC Auto Differential    Collection Time: 01/30/21  2:25 PM   Result Value Ref Range    WBC 9.4 3.5 - 11.0 k/uL    RBC 3.43 (L) 4.0 - 5.2 m/uL    Hemoglobin 10.9 (L) 12.0 - 16.0 g/dL    Hematocrit 32.1 (L) 36 - 46 %    MCV 93.7 80 - 100 fL    MCH 31.8 26 - 34 pg    MCHC 34.0 31 - 37 g/dL    RDW 15.7 (H) 11.5 - 14.9 %    Platelets 241 944 - 885 k/uL    MPV 7.7 6.0 - 12.0 fL    NRBC Automated NOT REPORTED per 100 WBC    Differential Type NOT REPORTED     Seg Neutrophils 68 (H) 36 - 66 %    Lymphocytes 25 24 - 44 %    Monocytes 5 1 - 7 %    Eosinophils % 1 0 - 4 %    Basophils 1 0 - 2 %    Immature Granulocytes NOT REPORTED 0 %    Segs Absolute 6.50 1.3 - 9.1 k/uL    Absolute Lymph # 2.30 1.0 - 4.8 k/uL    Absolute Mono # 0.40 0.1 - 1.3 k/uL    Absolute Eos # 0.10 0.0 - 0.4 k/uL    Basophils Absolute 0.10 0.0 - 0.2 k/uL    Absolute Immature Granulocyte NOT REPORTED 0.00 - 0.30 k/uL    WBC Morphology NOT REPORTED     RBC Morphology NOT REPORTED     Platelet Estimate NOT REPORTED    Comprehensive Metabolic Panel w/ Reflex to MG    Collection Time: 01/30/21  2:25 PM   Result Value Ref Range    Glucose 278 (H) 70 - 99 mg/dL    BUN 20 6 - 20 mg/dL    CREATININE 1.30 (H) 0.50 - 0.90 mg/dL    Bun/Cre Ratio NOT REPORTED 9 - 20    Calcium 10.6 (H) 8.6 - 10.4 mg/dL    Sodium 135 135 - 144 mmol/L    Potassium 4.2 3.7 - 5.3 mmol/L    Chloride 99 98 - 107 mmol/L    CO2 27 20 - 31 mmol/L    Anion Gap 9 9 - 17 mmol/L    Alkaline Phosphatase 118 (H) 35 - 104 U/L    ALT 5 5 - 33 U/L    AST 6 <32 U/L    Total Bilirubin 0.18 (L) 0.3 - 1.2 mg/dL    Total Protein 8.2 6.4 - 8.3 g/dL    Albumin 4.0 3.5 - 5.2 g/dL    Albumin/Globulin Ratio NOT REPORTED 1.0 - 2.5    GFR Non-African American 42 (L) >60 mL/min    GFR  51 (L) >60 mL/min    GFR Comment          GFR Staging NOT REPORTED    COVID-19    Collection Time: 01/30/21  4:49 PM    Specimen: Other   Result Value Ref Range    SARS-CoV-2          SARS-CoV-2, Rapid Not Detected Not Detected    Source . NASOPHARYNGEAL SWAB     SARS-CoV-2               Current Facility-Administered Medications   Medication Dose Route Frequency Provider Last Rate Last Admin    sodium chloride flush 0.9 % injection 10 mL  10 mL Intravenous PRN Cheyenne Jian Piedra, DO   10 mL at 01/30/21 1542     Current Outpatient Medications   Medication Sig Dispense Refill    blood glucose test strips (ONETOUCH ULTRA) strip TEST 4 TIMES A DAY & AS NEEDED FOR SYMPTOMS OF IRREGULAR BLOOD GLUCOSE. 120 each 5    butalbital-aspirin-caffeine (FIORINAL) -40 MG capsule Take 1 capsule by mouth every 8 hours as needed for Headaches for up to 3 days.  20 capsule 1    alogliptin (NESINA) 25 MG TABS tablet Take 1 tablet by mouth daily 30 tablet 2    gabapentin (NEURONTIN) 600 MG tablet TAKE 1 TABLET BY MOUTH 3 TIMES DAILY 90 tablet 2    nystatin (MYCOSTATIN) 230567 UNIT/GM powder APPLY TOPICALLY TO AFFECTED AREAS FOUR TIMES A DAY 15 g 11    glipiZIDE (GLUCOTROL) 10 MG tablet Take 1 tablet by mouth 2 times daily (before meals) 60 tablet 5    buPROPion (WELLBUTRIN XL) 150 MG extended release tablet Take 1 tablet by mouth every morning 30 tablet 5    lisinopril (PRINIVIL;ZESTRIL) 30 MG tablet TAKE 1 TABLET BY MOUTH ONCE DAILY 30 tablet 5    Lancets MISC Use as directed, Dx Insulin dependent  each 5    pravastatin (PRAVACHOL) 40 MG tablet TAKE 1 TABLET BY MOUTH ONCE DAILY 30 tablet 5    atenolol (TENORMIN) 50 MG tablet TAKE ONE-HALF TABLET BY MOUTH DAILY 15 tablet 5    Blood Pressure Monitoring (BLOOD PRESSURE CUFF) MISC 1 Dose by Does not apply route once for 1 dose 1 each 0    butalbital-aspirin-caffeine (FIORINAL) -40 MG capsule Take 1 capsule by mouth every 6 hours as needed for Headaches for up to 3 days. 30 capsule 0    dicyclomine (BENTYL) 10 MG capsule Take 1 capsule by mouth every 6 hours as needed (cramps) 20 capsule 0    ondansetron (ZOFRAN ODT) 4 MG disintegrating tablet Take 1 tablet by mouth every 8 hours as needed for Nausea or Vomiting 10 tablet 0    blood glucose monitor kit and supplies Test 4 times a day & as needed for symptoms of irregular blood glucose. 1 kit 0    aspirin EC 81 MG EC tablet Take 1 tablet by mouth daily 30 tablet 2    glucose monitoring kit (FREESTYLE) monitoring kit Diagnosis: Diabetes type 2, insulin-dependent. Use 3-4 times daily. Ok to give insurance brand 1 kit 0    ONETOUCH DELICA LANCETS 12J MISC CHECK BLOOD SUGAR ONCE DAILY 100 each 5       Imaging/Diagnostics:    Xr Knee Right (3 Views)    Result Date: 1/30/2021  EXAMINATION: THREE XRAY VIEWS OF THE RIGHT KNEE 1/30/2021 10:35 am COMPARISON: None. HISTORY: ORDERING SYSTEM PROVIDED HISTORY: pain TECHNOLOGIST PROVIDED HISTORY: pain Reason for Exam: right knee pain Acuity: Unknown Type of Exam: Unknown FINDINGS: There is tricompartmental narrowing of the knee with associated spurring of the condyles. No fractures or dislocations are seen. Soft tissues appear within normal limits. There is chondrocalcinosis. Vascular calcifications are seen compatible with atherosclerotic disease. Mild osteoarthritis of the knee. No acute bony abnormalities are noted     Ct Pelvis W Contrast Additional Contrast? None    Result Date: 1/30/2021  EXAMINATION: CT OF THE PELVIS WITH CONTRAST 1/30/2021 12:36 pm TECHNIQUE: CT of the pelvis was performed with the administration of intravenous contrast. Multiplanar reformatted images are provided for review.  Dose modulation, iterative reconstruction, and/or weight based adjustment of the mA/kV was utilized to reduce the radiation dose to as low as reasonably achievable. COMPARISON: None. HISTORY: ORDERING SYSTEM PROVIDED HISTORY: r/o abscess TECHNOLOGIST PROVIDED HISTORY: r/o abscess Decision Support Exception->Emergency Medical Condition (MA) Reason for Exam: boil; r/o abscess Acuity: Unknown Type of Exam: Unknown Additional signs and symptoms: Pt c/o boil on her buttocks and right leg pain, unable to walk Relevant Medical/Surgical History: Diabetic; hx  FINDINGS: Pelvis: There is a 4 cm by 1.5 cm inflammatory soft tissue lesion long the posterior aspect of the right perirectal region with suggestion of small central fluid collection. No intrapelvic mass is identified. Uterus is anteverted and tilted to the right. Bladder and rectum are otherwise intact. Peritoneum/Retroperitoneum: No free fluid. No lymphadenopathy. No evidence of pneumoperitoneum. Bones/Soft Tissues: Degenerative changes seen in the visualized spine . No acute bony abnormalities. Vascular calcifications are seen compatible with atherosclerotic disease. Incidentally noted are gallstones. Small fat containing ventral wall hernia. 4 cm x 1.5 cm right perirectal abscess.  Cholelithiasis       Assessment :      Primary Problem  Perirectal abscess    Active Hospital Problems    Diagnosis Date Noted    Right knee pain [M25.561] 2021    Perirectal abscess [K61.1] 2021    Perirectal skin irritation [K62.89] 2021    Depression [F32.9] 2021    Hyperlipidemia [E78.5] 2021    Elevated serum creatinine [R79.89] 2021    Decreased GFR [R94.4] 2021    Essential hypertension [I10] 11/10/2016    Type 2 diabetes mellitus with diabetic polyneuropathy, without long-term current use of insulin ([facility] [E11.42] 11/10/2016    Normocytic anemia [D64.9]        Plan:     Patient status Admit as inpatient in the  Med/Surge    Consultations:   IP CONSULT TO GENERAL SURGERY  IP CONSULT TO INTERNAL MEDICINE  IP CONSULT TO SOCIAL WORK    DVT prophylaxis: reason for no prophylaxis: hold for surgery SCDs for mechanical VTE prophylaxis  GI prophylaxis: not indicated    Patient is admitted as inpatient status because of co-morbiditieslisted above, severity of signs and symptoms as outlined, requirement for current medical therapies and most importantly because of direct risk to patient if care not provided in a hospital setting. Perirectal abscess  -CT pelvis w/ contrast: \"4 cm x 1.5 cm right perirectal abscess\"  -Surgery consult; NPO at midnight for possible OR tomorrow  -PT/OT  -Tylenol PO/suppository PRN orders placed    Elevated serum creatinine, decreased GFR  -Creatinine is 1.30 on admission; it was elevated at 1.23 in July 2020 and the last value within normal reference range in our EMR was 01/25/2018  -GFR: 51  -Patient will need follow-up with PCP as outpatient for microalbuminuria testing to investigate for diabetic nephropathy     Normocytic anemia  -10.9 on admission, appears at baseline  -iron/TIBC, ferritin, B12/folate pending    Right knee pain  -XR: \"Mild osteoarthritis of the knee. No acute bony abnormalities are noted\"  -Tylenol PRN  -PT/OT    Type 2 diabetes mellitus with polyneuropathy, without long-term current use of insulin  -Low-dose sliding scale  -Hypoglycemia protocol  -Carb control diet, NPO at midnight    Essential hypertension  -Atenolol 25 mg p.o. daily  -Lisinopril 30 mg p.o. daily    Hyperlipidemia  -Pravastatin 40 mg p.o. daily    Depression  -Bupropion 150 mg p.o. daily    Parish Lynch MD  1/30/2021  6:13 PM    Copy sent to Dr. Donna Yoon, APRN - CNP    Attestation and add on       I have discussed the care of Isaac Greenfield , including pertinent history and exam findings,      1/31/21    with the resident. I have seen and examined the patient and the key elements of all parts of the encounter have been performed by me .    I agree with the assessment, plan and orders as documented by the resident. Principal Problem:    Perirectal abscess  Active Problems:    Normocytic anemia    Essential hypertension    Type 2 diabetes mellitus with diabetic polyneuropathy, without long-term current use of insulin (HCC)    Perirectal skin irritation    Right knee pain    Depression    Hyperlipidemia    Elevated serum creatinine    Decreased GFR  Resolved Problems:    * No resolved hospital problems. *       Recent Labs     01/30/21  2218   POCGLU 378*     Recent Labs     01/30/21  2218   POCGLU 378*     Lab Results   Component Value Date    LABA1C 8.2 11/18/2020    LABA1C 7.8 08/19/2020     Lab Results   Component Value Date    CREATININE 1.22 01/31/2021    CREATININE 1.30 01/30/2021     Lab Results   Component Value Date    LABMICR 750 (H) 08/19/2020     Lab Results   Component Value Date    LDLCHOLESTEROL 79 01/24/2020              --on zosyn-- ;  Admitted with perirectal abscess  Known to have diabetes hypertension and multiple other comorbidities in the including history of CKD and arthritis etc. depression patient has been started on Zosyn will need surgical intervention Dr. Ophelia Briones is on the case  His blood sugars are elevated  He does have diabetic nephropathy with microalbuminuria  His last LDL cholesterol was 79 and hemoglobin A1c was 8.2 the dose of insulin has been adjusted patient was on glipizide will resume that  Home meds resumed  Patient has diabetic polyneuropathy is on gabapentin gabapentin does not        Medications: Allergies:     Allergies   Allergen Reactions    Ibuprofen Itching       Current Meds:   Scheduled Meds:    atenolol  25 mg Oral Daily    lisinopril  30 mg Oral Daily    pravastatin  40 mg Oral Daily    insulin lispro  0-6 Units Subcutaneous TID WC    insulin lispro  0-3 Units Subcutaneous Nightly    famotidine (PEPCID) injection  20 mg Intravenous BID    piperacillin-tazobactam  3,375 mg Intravenous Q8H     Continuous Infusions:    sodium chloride Stopped (01/31/21 0741)     PRN Meds: sodium chloride flush, promethazine **OR** ondansetron, acetaminophen **OR** acetaminophen, ondansetron, fentanNYL, fentanNYL, oxyCODONE-acetaminophen        [address], [address] [place] [address].    Phone [phone]   Fax: [phone]  Answering Service: [phone]

## 2021-01-30 NOTE — ED PROVIDER NOTES
EMERGENCY DEPARTMENT ENCOUNTER    Pt Name: Rojas Stover  MRN: [id number]  [place] 1960  Date of evaluation: 1/30/21  CHIEF COMPLAINT       Chief Complaint   Patient presents with    Abscess     Rt buttock abscess    Leg Pain     Rt lge pain from hip down     HISTORY OF PRESENT ILLNESS   78-year-old female presents for complaint of right leg pain and possible abscess in her buttock. Patient states she been having pain in the right leg for the last week. Patient states pain starts in her right hip and goes to her right knee patient denies any recent injury or trauma, denies any numbness, tingling or weakness in the extremity, patient also complaining of boil on her buttock, patient states that this has been present for the last week as well, states she has a history of these, states that she felt a pop yesterday and wanted to be evaluated for it as well. Denies any bowel or bladder incontinence, saddle paresthesias, recent steroid use or history of malignancy. The history is provided by the patient. REVIEW OF SYSTEMS     Review of Systems   Constitutional: Negative for fever. HENT: Negative for congestion and ear pain. Eyes: Negative for pain. Respiratory: Negative for shortness of breath. Cardiovascular: Negative for chest pain, palpitations and leg swelling. Gastrointestinal: Negative for abdominal pain. Genitourinary: Negative for dysuria and flank pain. Musculoskeletal: Positive for arthralgias and back pain. Skin: Negative for color change. Neurological: Negative for numbness and headaches. Psychiatric/Behavioral: Negative for confusion. All other systems reviewed and are negative.     PASTMEDICAL HISTORY     Past Medical History:   Diagnosis Date    Anemia     Chronic back pain     Depression     Hyperlipidemia     Hypertension     Type 2 diabetes mellitus with diabetic polyneuropathy, without long-term current use of insulin ([facility] [address] 11/10/2016    Type II or CAPSULE    Take 1 capsule by mouth every 6 hours as needed (cramps)    GABAPENTIN (NEURONTIN) 600 MG TABLET    TAKE 1 TABLET BY MOUTH 3 TIMES DAILY    GLIPIZIDE (GLUCOTROL) 10 MG TABLET    Take 1 tablet by mouth 2 times daily (before meals)    GLUCOSE MONITORING KIT (FREESTYLE) MONITORING KIT    Diagnosis: Diabetes type 2, insulin-dependent. Use 3-4 times daily. Ok to give insurance brand    LANCETS MISC    Use as directed, Dx Insulin dependent DM    LISINOPRIL (PRINIVIL;ZESTRIL) 30 MG TABLET    TAKE 1 TABLET BY MOUTH ONCE DAILY    NYSTATIN (MYCOSTATIN) 417562 UNIT/GM POWDER    APPLY TOPICALLY TO AFFECTED AREAS FOUR TIMES A DAY    ONDANSETRON (ZOFRAN ODT) 4 MG DISINTEGRATING TABLET    Take 1 tablet by mouth every 8 hours as needed for Nausea or Vomiting    ONETOUCH DELICA LANCETS 59N MISC    CHECK BLOOD SUGAR ONCE DAILY    PRAVASTATIN (PRAVACHOL) 40 MG TABLET    TAKE 1 TABLET BY MOUTH ONCE DAILY     ALLERGIES     is allergic to ibuprofen. FAMILY HISTORY     She indicated that her mother is alive. She indicated that her father is . SOCIAL HISTORY       Social History     Tobacco Use    Smoking status: Current Every Day Smoker     Packs/day: 0.50     Years: 39.00     Pack years: 19.50     Types: Cigarettes    Smokeless tobacco: Never Used   Substance Use Topics    Alcohol use: No     Alcohol/week: 0.0 standard drinks     Comment: 1 x mon    Drug use: Not Currently     Types: Marijuana     PHYSICAL EXAM     INITIAL VITALS: BP (!) 162/67   Pulse 92   Temp 98.4 °F (36.9 °C) (Oral)   Resp 18   Ht 5' 5\" (1.651 m)   Wt 189 lb (85.7 kg)   SpO2 99%   BMI 31.45 kg/m²    Physical Exam  Vitals signs and nursing note reviewed. Constitutional:       General: She is not in acute distress. Appearance: Normal appearance. She is obese. She is not toxic-appearing. HENT:      Head: Normocephalic and atraumatic.       Nose: Nose normal.      Mouth/Throat:      Mouth: Mucous membranes are moist. Pharynx: Oropharynx is clear. Eyes:      Extraocular Movements: Extraocular movements intact. Conjunctiva/sclera: Conjunctivae normal.   Neck:      Musculoskeletal: Normal range of motion. Cardiovascular:      Rate and Rhythm: Normal rate and regular rhythm. Pulses: Normal pulses. Heart sounds: Normal heart sounds. Pulmonary:      Effort: Pulmonary effort is normal.      Breath sounds: Normal breath sounds. Abdominal:      General: Bowel sounds are normal. There is no distension. Palpations: Abdomen is soft. Tenderness: There is no abdominal tenderness. Musculoskeletal: Normal range of motion. Right knee: She exhibits swelling. Tenderness found. Skin:     General: Skin is warm and dry. Capillary Refill: Capillary refill takes less than 2 seconds. Neurological:      General: No focal deficit present. Mental Status: She is alert. Psychiatric:         Mood and Affect: Mood normal.         MEDICAL DECISION MAKIN-year-old female presents with complaint of right leg pain and abscess. On initial exam patient no acute distress, patient with tenderness palpation in the right knee, patient also noted to have a wound in the perirectal area, concerning for possible sinus tract formation or abscess, will obtain labs and CT    Reviewed and unremarkable, CT was showing a 4 cm x 1.5 cm perirectal abscess    Discussed with Dr. Phuong Holder, no surgery, would like patient started on Zosyn and will evaluate    Results were discussed with the patient, she is agreeable to admission    Spoke with Dr. Oh Hayden who accepts admission with General Surgery consult. Patient demonstrates understanding and agreement with the plan, was given the opportunity to ask questions, and these questions were answered to the best of the provided information at this time. VS stable for transfer. This dictation was prepared using [employer] voice recognition software.  As a result, errors may have occurred. When identified, these errors have been corrected. While every attempt is made to correct errors in dictation, errors may still exist.            CRITICAL CARE:       PROCEDURES:    Procedures    DIAGNOSTIC RESULTS   EKG:All EKG's are interpreted by the Emergency Department Physician who either signs or Co-signs this chart in the absence of a cardiologist.        RADIOLOGY:All plain film, CT, MRI, and formal ultrasound images (except ED bedside ultrasound) are read by the radiologist, see reports below, unless otherwisenoted in MDM or here. CT PELVIS W CONTRAST Additional Contrast? None   Final Result   4 cm x 1.5 cm right perirectal abscess. Cholelithiasis         XR KNEE RIGHT (3 VIEWS)   Final Result   Mild osteoarthritis of the knee. No acute bony abnormalities are noted           LABS: All lab results were reviewed by myself, and all abnormals are listed below.   Labs Reviewed   CBC WITH AUTO DIFFERENTIAL - Abnormal; Notable for the following components:       Result Value    RBC 3.43 (*)     Hemoglobin 10.9 (*)     Hematocrit 32.1 (*)     RDW 15.7 (*)     Seg Neutrophils 68 (*)     All other components within normal limits   COMPREHENSIVE METABOLIC PANEL W/ REFLEX TO MG FOR LOW K - Abnormal; Notable for the following components:    Glucose 278 (*)     CREATININE 1.30 (*)     Calcium 10.6 (*)     Alkaline Phosphatase 118 (*)     Total Bilirubin 0.18 (*)     GFR Non- 42 (*)     GFR  51 (*)     All other components within normal limits   COVID-19       EMERGENCY DEPARTMENTCOURSE:         Vitals:    Vitals:    01/30/21 1303 01/30/21 1615 01/30/21 1630 01/30/21 1732   BP: (!) 161/76 (!) 143/96  (!) 162/67   Pulse: 107   92   Resp: 18   18   Temp: 99.9 °F (37.7 °C)   98.4 °F (36.9 °C)   TempSrc: Oral   Oral   SpO2: 98% 99% (!) 78% 99%   Weight: 189 lb (85.7 kg)      Height: 5' 5\" (1.651 m)          The patient was given the following medications while in the emergency department:  Orders Placed This Encounter   Medications    DISCONTD: HYDROcodone-acetaminophen (NORCO)  MG per tablet 1 tablet    HYDROcodone-acetaminophen (NORCO) 5-325 MG per tablet 1 tablet    sodium chloride flush 0.9 % injection 10 mL    iopamidol (ISOVUE-370) 76 % injection 75 mL    0.9 % sodium chloride bolus    piperacillin-tazobactam (ZOSYN) 4,500 mg in dextrose 5 % 100 mL IVPB (mini-bag)     Order Specific Question:   Antimicrobial Indications     Answer:   Skin and Soft Tissue Infection     CONSULTS:  IP CONSULT TO GENERAL SURGERY  IP CONSULT TO INTERNAL MEDICINE  IP CONSULT TO SOCIAL WORK    FINAL IMPRESSION      1. Perirectal abscess          DISPOSITION/PLAN   DISPOSITION Admitted 01/30/2021 05:57:39 PM      PATIENT REFERRED TO:  No follow-up provider specified.   DISCHARGE MEDICATIONS:  New Prescriptions    No medications on file     Latisha Nino DO  Attending Emergency Physician                  Latisha Nino DO  01/30/21 9614

## 2021-01-31 ENCOUNTER — ANESTHESIA (OUTPATIENT)
Dept: OPERATING ROOM | Age: 61
End: 2021-01-31
Payer: MEDICAID

## 2021-01-31 ENCOUNTER — ANESTHESIA EVENT (OUTPATIENT)
Dept: OPERATING ROOM | Age: 61
End: 2021-01-31
Payer: MEDICAID

## 2021-01-31 VITALS
RESPIRATION RATE: 8 BRPM | TEMPERATURE: 95.4 F | OXYGEN SATURATION: 100 % | DIASTOLIC BLOOD PRESSURE: 74 MMHG | SYSTOLIC BLOOD PRESSURE: 158 MMHG

## 2021-01-31 PROBLEM — N18.31 CHRONIC KIDNEY DISEASE, STAGE 3A (HCC): Status: ACTIVE | Noted: 2021-01-31

## 2021-01-31 PROBLEM — E11.21 DIABETIC NEPHROPATHY (HCC): Status: ACTIVE | Noted: 2021-01-31

## 2021-01-31 LAB
ABSOLUTE EOS #: 0.2 K/UL (ref 0–0.4)
ABSOLUTE IMMATURE GRANULOCYTE: ABNORMAL K/UL (ref 0–0.3)
ABSOLUTE LYMPH #: 3.4 K/UL (ref 1–4.8)
ABSOLUTE MONO #: 0.5 K/UL (ref 0.1–1.3)
ANION GAP SERPL CALCULATED.3IONS-SCNC: 6 MMOL/L (ref 9–17)
BASOPHILS # BLD: 1 % (ref 0–2)
BASOPHILS ABSOLUTE: 0.1 K/UL (ref 0–0.2)
BUN BLDV-MCNC: 19 MG/DL (ref 6–20)
BUN/CREAT BLD: ABNORMAL (ref 9–20)
CALCIUM SERPL-MCNC: 9.8 MG/DL (ref 8.6–10.4)
CHLORIDE BLD-SCNC: 105 MMOL/L (ref 98–107)
CO2: 26 MMOL/L (ref 20–31)
CREAT SERPL-MCNC: 1.22 MG/DL (ref 0.5–0.9)
DIFFERENTIAL TYPE: ABNORMAL
EOSINOPHILS RELATIVE PERCENT: 2 % (ref 0–4)
FERRITIN: 125 UG/L (ref 13–150)
GFR AFRICAN AMERICAN: 55 ML/MIN
GFR NON-AFRICAN AMERICAN: 45 ML/MIN
GFR SERPL CREATININE-BSD FRML MDRD: ABNORMAL ML/MIN/{1.73_M2}
GFR SERPL CREATININE-BSD FRML MDRD: ABNORMAL ML/MIN/{1.73_M2}
GLUCOSE BLD-MCNC: 121 MG/DL (ref 70–99)
GLUCOSE BLD-MCNC: 124 MG/DL (ref 65–105)
GLUCOSE BLD-MCNC: 141 MG/DL (ref 65–105)
GLUCOSE BLD-MCNC: 156 MG/DL (ref 65–105)
GLUCOSE BLD-MCNC: 262 MG/DL (ref 65–105)
GLUCOSE BLD-MCNC: 353 MG/DL (ref 65–105)
HCT VFR BLD CALC: 29.7 % (ref 36–46)
HEMOGLOBIN: 9.6 G/DL (ref 12–16)
IMMATURE GRANULOCYTES: ABNORMAL %
IRON SATURATION: 16 % (ref 20–55)
IRON: 28 UG/DL (ref 37–145)
LYMPHOCYTES # BLD: 43 % (ref 24–44)
MCH RBC QN AUTO: 30.6 PG (ref 26–34)
MCHC RBC AUTO-ENTMCNC: 32.4 G/DL (ref 31–37)
MCV RBC AUTO: 94.4 FL (ref 80–100)
MONOCYTES # BLD: 6 % (ref 1–7)
NRBC AUTOMATED: ABNORMAL PER 100 WBC
PDW BLD-RTO: 15.7 % (ref 11.5–14.9)
PLATELET # BLD: 342 K/UL (ref 150–450)
PLATELET ESTIMATE: ABNORMAL
PMV BLD AUTO: 7.5 FL (ref 6–12)
POTASSIUM SERPL-SCNC: 3.9 MMOL/L (ref 3.7–5.3)
RBC # BLD: 3.15 M/UL (ref 4–5.2)
RBC # BLD: ABNORMAL 10*6/UL
SEG NEUTROPHILS: 48 % (ref 36–66)
SEGMENTED NEUTROPHILS ABSOLUTE COUNT: 3.8 K/UL (ref 1.3–9.1)
SODIUM BLD-SCNC: 137 MMOL/L (ref 135–144)
TOTAL IRON BINDING CAPACITY: 179 UG/DL (ref 250–450)
UNSATURATED IRON BINDING CAPACITY: 151 UG/DL (ref 112–347)
WBC # BLD: 7.9 K/UL (ref 3.5–11)
WBC # BLD: ABNORMAL 10*3/UL

## 2021-01-31 PROCEDURE — 2580000003 HC RX 258: Performed by: ANESTHESIOLOGY

## 2021-01-31 PROCEDURE — 82728 ASSAY OF FERRITIN: CPT

## 2021-01-31 PROCEDURE — 83540 ASSAY OF IRON: CPT

## 2021-01-31 PROCEDURE — 2580000003 HC RX 258: Performed by: SURGERY

## 2021-01-31 PROCEDURE — 3600000002 HC SURGERY LEVEL 2 BASE: Performed by: SURGERY

## 2021-01-31 PROCEDURE — 85025 COMPLETE CBC W/AUTO DIFF WBC: CPT

## 2021-01-31 PROCEDURE — 6360000002 HC RX W HCPCS: Performed by: SURGERY

## 2021-01-31 PROCEDURE — 83550 IRON BINDING TEST: CPT

## 2021-01-31 PROCEDURE — 3700000001 HC ADD 15 MINUTES (ANESTHESIA): Performed by: SURGERY

## 2021-01-31 PROCEDURE — 1200000000 HC SEMI PRIVATE

## 2021-01-31 PROCEDURE — 87070 CULTURE OTHR SPECIMN AEROBIC: CPT

## 2021-01-31 PROCEDURE — 99232 SBSQ HOSP IP/OBS MODERATE 35: CPT | Performed by: INTERNAL MEDICINE

## 2021-01-31 PROCEDURE — 7100000001 HC PACU RECOVERY - ADDTL 15 MIN: Performed by: SURGERY

## 2021-01-31 PROCEDURE — 2500000003 HC RX 250 WO HCPCS: Performed by: ANESTHESIOLOGY

## 2021-01-31 PROCEDURE — G0378 HOSPITAL OBSERVATION PER HR: HCPCS

## 2021-01-31 PROCEDURE — 87075 CULTR BACTERIA EXCEPT BLOOD: CPT

## 2021-01-31 PROCEDURE — 3700000000 HC ANESTHESIA ATTENDED CARE: Performed by: SURGERY

## 2021-01-31 PROCEDURE — 82947 ASSAY GLUCOSE BLOOD QUANT: CPT

## 2021-01-31 PROCEDURE — 3600000012 HC SURGERY LEVEL 2 ADDTL 15MIN: Performed by: SURGERY

## 2021-01-31 PROCEDURE — 6370000000 HC RX 637 (ALT 250 FOR IP): Performed by: STUDENT IN AN ORGANIZED HEALTH CARE EDUCATION/TRAINING PROGRAM

## 2021-01-31 PROCEDURE — 87205 SMEAR GRAM STAIN: CPT

## 2021-01-31 PROCEDURE — 6360000002 HC RX W HCPCS: Performed by: ANESTHESIOLOGY

## 2021-01-31 PROCEDURE — 7100000000 HC PACU RECOVERY - FIRST 15 MIN: Performed by: SURGERY

## 2021-01-31 PROCEDURE — 80048 BASIC METABOLIC PNL TOTAL CA: CPT

## 2021-01-31 PROCEDURE — 2709999900 HC NON-CHARGEABLE SUPPLY: Performed by: SURGERY

## 2021-01-31 PROCEDURE — 2580000003 HC RX 258: Performed by: EMERGENCY MEDICINE

## 2021-01-31 PROCEDURE — 6370000000 HC RX 637 (ALT 250 FOR IP): Performed by: NURSE PRACTITIONER

## 2021-01-31 PROCEDURE — 6370000000 HC RX 637 (ALT 250 FOR IP): Performed by: SURGERY

## 2021-01-31 PROCEDURE — 36415 COLL VENOUS BLD VENIPUNCTURE: CPT

## 2021-01-31 PROCEDURE — 6360000002 HC RX W HCPCS: Performed by: EMERGENCY MEDICINE

## 2021-01-31 RX ORDER — GABAPENTIN 600 MG/1
600 TABLET ORAL 3 TIMES DAILY
Status: DISCONTINUED | OUTPATIENT
Start: 2021-01-31 | End: 2021-02-01 | Stop reason: HOSPADM

## 2021-01-31 RX ORDER — SODIUM CHLORIDE 0.9 % (FLUSH) 0.9 %
10 SYRINGE (ML) INJECTION EVERY 12 HOURS SCHEDULED
Status: DISCONTINUED | OUTPATIENT
Start: 2021-01-31 | End: 2021-02-01 | Stop reason: HOSPADM

## 2021-01-31 RX ORDER — CIPROFLOXACIN 500 MG/1
500 TABLET, FILM COATED ORAL EVERY 12 HOURS SCHEDULED
Status: DISCONTINUED | OUTPATIENT
Start: 2021-01-31 | End: 2021-02-01 | Stop reason: HOSPADM

## 2021-01-31 RX ORDER — ALOGLIPTIN 25 MG/1
25 TABLET, FILM COATED ORAL DAILY
Status: DISCONTINUED | OUTPATIENT
Start: 2021-01-31 | End: 2021-02-01 | Stop reason: HOSPADM

## 2021-01-31 RX ORDER — FAMOTIDINE 20 MG/1
20 TABLET, FILM COATED ORAL 2 TIMES DAILY
Status: DISCONTINUED | OUTPATIENT
Start: 2021-01-31 | End: 2021-02-01 | Stop reason: HOSPADM

## 2021-01-31 RX ORDER — PHENYLEPHRINE HYDROCHLORIDE 10 MG/ML
INJECTION INTRAVENOUS PRN
Status: DISCONTINUED | OUTPATIENT
Start: 2021-01-31 | End: 2021-01-31 | Stop reason: SDUPTHER

## 2021-01-31 RX ORDER — SUCCINYLCHOLINE CHLORIDE 20 MG/ML
INJECTION INTRAMUSCULAR; INTRAVENOUS PRN
Status: DISCONTINUED | OUTPATIENT
Start: 2021-01-31 | End: 2021-01-31 | Stop reason: SDUPTHER

## 2021-01-31 RX ORDER — POLYETHYLENE GLYCOL 3350 17 G/17G
17 POWDER, FOR SOLUTION ORAL DAILY PRN
Status: DISCONTINUED | OUTPATIENT
Start: 2021-01-31 | End: 2021-02-01 | Stop reason: HOSPADM

## 2021-01-31 RX ORDER — SODIUM CHLORIDE 0.9 % (FLUSH) 0.9 %
10 SYRINGE (ML) INJECTION PRN
Status: DISCONTINUED | OUTPATIENT
Start: 2021-01-31 | End: 2021-02-01 | Stop reason: HOSPADM

## 2021-01-31 RX ORDER — ACETAMINOPHEN 325 MG/1
650 TABLET ORAL EVERY 4 HOURS PRN
Status: DISCONTINUED | OUTPATIENT
Start: 2021-01-31 | End: 2021-01-31

## 2021-01-31 RX ORDER — METRONIDAZOLE 500 MG/1
500 TABLET ORAL EVERY 8 HOURS SCHEDULED
Status: DISCONTINUED | OUTPATIENT
Start: 2021-01-31 | End: 2021-02-01 | Stop reason: HOSPADM

## 2021-01-31 RX ORDER — MEPERIDINE HYDROCHLORIDE 25 MG/ML
12.5 INJECTION INTRAMUSCULAR; INTRAVENOUS; SUBCUTANEOUS EVERY 5 MIN PRN
Status: DISCONTINUED | OUTPATIENT
Start: 2021-01-31 | End: 2021-01-31 | Stop reason: HOSPADM

## 2021-01-31 RX ORDER — LIDOCAINE HYDROCHLORIDE 10 MG/ML
INJECTION, SOLUTION EPIDURAL; INFILTRATION; INTRACAUDAL; PERINEURAL PRN
Status: DISCONTINUED | OUTPATIENT
Start: 2021-01-31 | End: 2021-01-31 | Stop reason: SDUPTHER

## 2021-01-31 RX ORDER — ONDANSETRON 2 MG/ML
INJECTION INTRAMUSCULAR; INTRAVENOUS PRN
Status: DISCONTINUED | OUTPATIENT
Start: 2021-01-31 | End: 2021-01-31 | Stop reason: SDUPTHER

## 2021-01-31 RX ORDER — ACETAMINOPHEN 325 MG/1
650 TABLET ORAL EVERY 4 HOURS PRN
Status: DISCONTINUED | OUTPATIENT
Start: 2021-01-31 | End: 2021-02-01 | Stop reason: HOSPADM

## 2021-01-31 RX ORDER — FENTANYL CITRATE 50 UG/ML
INJECTION, SOLUTION INTRAMUSCULAR; INTRAVENOUS PRN
Status: DISCONTINUED | OUTPATIENT
Start: 2021-01-31 | End: 2021-01-31 | Stop reason: SDUPTHER

## 2021-01-31 RX ORDER — DEXAMETHASONE SODIUM PHOSPHATE 4 MG/ML
INJECTION, SOLUTION INTRA-ARTICULAR; INTRALESIONAL; INTRAMUSCULAR; INTRAVENOUS; SOFT TISSUE PRN
Status: DISCONTINUED | OUTPATIENT
Start: 2021-01-31 | End: 2021-01-31 | Stop reason: SDUPTHER

## 2021-01-31 RX ORDER — DEXTROSE MONOHYDRATE 50 MG/ML
100 INJECTION, SOLUTION INTRAVENOUS PRN
Status: DISCONTINUED | OUTPATIENT
Start: 2021-01-31 | End: 2021-02-01 | Stop reason: HOSPADM

## 2021-01-31 RX ORDER — PROMETHAZINE HYDROCHLORIDE 25 MG/ML
6.25 INJECTION, SOLUTION INTRAMUSCULAR; INTRAVENOUS
Status: DISCONTINUED | OUTPATIENT
Start: 2021-01-31 | End: 2021-01-31 | Stop reason: HOSPADM

## 2021-01-31 RX ORDER — METOCLOPRAMIDE HYDROCHLORIDE 5 MG/ML
10 INJECTION INTRAMUSCULAR; INTRAVENOUS
Status: DISCONTINUED | OUTPATIENT
Start: 2021-01-31 | End: 2021-01-31 | Stop reason: HOSPADM

## 2021-01-31 RX ORDER — POTASSIUM CHLORIDE 20 MEQ/1
40 TABLET, EXTENDED RELEASE ORAL PRN
Status: DISCONTINUED | OUTPATIENT
Start: 2021-01-31 | End: 2021-02-01 | Stop reason: HOSPADM

## 2021-01-31 RX ORDER — POTASSIUM CHLORIDE 7.45 MG/ML
10 INJECTION INTRAVENOUS PRN
Status: DISCONTINUED | OUTPATIENT
Start: 2021-01-31 | End: 2021-02-01 | Stop reason: HOSPADM

## 2021-01-31 RX ORDER — DIPHENHYDRAMINE HYDROCHLORIDE 50 MG/ML
12.5 INJECTION INTRAMUSCULAR; INTRAVENOUS
Status: DISCONTINUED | OUTPATIENT
Start: 2021-01-31 | End: 2021-01-31 | Stop reason: HOSPADM

## 2021-01-31 RX ORDER — HYDRALAZINE HYDROCHLORIDE 20 MG/ML
5 INJECTION INTRAMUSCULAR; INTRAVENOUS EVERY 10 MIN PRN
Status: DISCONTINUED | OUTPATIENT
Start: 2021-01-31 | End: 2021-01-31 | Stop reason: HOSPADM

## 2021-01-31 RX ORDER — OXYCODONE HYDROCHLORIDE AND ACETAMINOPHEN 5; 325 MG/1; MG/1
1 TABLET ORAL EVERY 4 HOURS PRN
Status: DISCONTINUED | OUTPATIENT
Start: 2021-01-31 | End: 2021-02-01 | Stop reason: HOSPADM

## 2021-01-31 RX ORDER — ONDANSETRON 2 MG/ML
4 INJECTION INTRAMUSCULAR; INTRAVENOUS EVERY 6 HOURS PRN
Status: DISCONTINUED | OUTPATIENT
Start: 2021-01-31 | End: 2021-01-31

## 2021-01-31 RX ORDER — GLIPIZIDE 5 MG/1
10 TABLET ORAL
Status: DISCONTINUED | OUTPATIENT
Start: 2021-01-31 | End: 2021-02-01 | Stop reason: HOSPADM

## 2021-01-31 RX ORDER — FENTANYL CITRATE 50 UG/ML
50 INJECTION, SOLUTION INTRAMUSCULAR; INTRAVENOUS
Status: DISCONTINUED | OUTPATIENT
Start: 2021-01-31 | End: 2021-01-31

## 2021-01-31 RX ORDER — SODIUM CHLORIDE 9 MG/ML
INJECTION, SOLUTION INTRAVENOUS CONTINUOUS PRN
Status: DISCONTINUED | OUTPATIENT
Start: 2021-01-31 | End: 2021-01-31 | Stop reason: SDUPTHER

## 2021-01-31 RX ORDER — MAGNESIUM SULFATE 1 G/100ML
1000 INJECTION INTRAVENOUS PRN
Status: DISCONTINUED | OUTPATIENT
Start: 2021-01-31 | End: 2021-02-01 | Stop reason: HOSPADM

## 2021-01-31 RX ORDER — DEXTROSE MONOHYDRATE 25 G/50ML
12.5 INJECTION, SOLUTION INTRAVENOUS PRN
Status: DISCONTINUED | OUTPATIENT
Start: 2021-01-31 | End: 2021-02-01 | Stop reason: HOSPADM

## 2021-01-31 RX ORDER — ROCURONIUM BROMIDE 10 MG/ML
INJECTION, SOLUTION INTRAVENOUS PRN
Status: DISCONTINUED | OUTPATIENT
Start: 2021-01-31 | End: 2021-01-31 | Stop reason: SDUPTHER

## 2021-01-31 RX ORDER — MIDAZOLAM HYDROCHLORIDE 1 MG/ML
INJECTION INTRAMUSCULAR; INTRAVENOUS PRN
Status: DISCONTINUED | OUTPATIENT
Start: 2021-01-31 | End: 2021-01-31 | Stop reason: SDUPTHER

## 2021-01-31 RX ORDER — FENTANYL CITRATE 50 UG/ML
100 INJECTION, SOLUTION INTRAMUSCULAR; INTRAVENOUS
Status: DISCONTINUED | OUTPATIENT
Start: 2021-01-31 | End: 2021-01-31

## 2021-01-31 RX ORDER — NICOTINE POLACRILEX 4 MG
15 LOZENGE BUCCAL PRN
Status: DISCONTINUED | OUTPATIENT
Start: 2021-01-31 | End: 2021-01-31

## 2021-01-31 RX ORDER — NICOTINE POLACRILEX 4 MG
15 LOZENGE BUCCAL PRN
Status: DISCONTINUED | OUTPATIENT
Start: 2021-01-31 | End: 2021-02-01 | Stop reason: HOSPADM

## 2021-01-31 RX ORDER — PROPOFOL 10 MG/ML
INJECTION, EMULSION INTRAVENOUS PRN
Status: DISCONTINUED | OUTPATIENT
Start: 2021-01-31 | End: 2021-01-31 | Stop reason: SDUPTHER

## 2021-01-31 RX ORDER — HYDROCODONE BITARTRATE AND ACETAMINOPHEN 5; 325 MG/1; MG/1
1 TABLET ORAL
Status: DISCONTINUED | OUTPATIENT
Start: 2021-01-31 | End: 2021-01-31 | Stop reason: HOSPADM

## 2021-01-31 RX ORDER — BISACODYL 10 MG
10 SUPPOSITORY, RECTAL RECTAL DAILY PRN
Status: DISCONTINUED | OUTPATIENT
Start: 2021-01-31 | End: 2021-02-01 | Stop reason: HOSPADM

## 2021-01-31 RX ORDER — SENNA PLUS 8.6 MG/1
1 TABLET ORAL DAILY PRN
Status: DISCONTINUED | OUTPATIENT
Start: 2021-01-31 | End: 2021-02-01 | Stop reason: HOSPADM

## 2021-01-31 RX ORDER — BUPROPION HYDROCHLORIDE 150 MG/1
150 TABLET ORAL EVERY MORNING
Status: DISCONTINUED | OUTPATIENT
Start: 2021-01-31 | End: 2021-02-01 | Stop reason: HOSPADM

## 2021-01-31 RX ADMIN — LISINOPRIL 30 MG: 10 TABLET ORAL at 15:43

## 2021-01-31 RX ADMIN — GLIPIZIDE 10 MG: 5 TABLET ORAL at 17:50

## 2021-01-31 RX ADMIN — ATENOLOL 25 MG: 25 TABLET ORAL at 15:43

## 2021-01-31 RX ADMIN — FENTANYL CITRATE 50 MCG: 50 INJECTION INTRAMUSCULAR; INTRAVENOUS at 06:52

## 2021-01-31 RX ADMIN — ROCURONIUM BROMIDE 30 MG: 10 INJECTION, SOLUTION INTRAVENOUS at 12:17

## 2021-01-31 RX ADMIN — GABAPENTIN 600 MG: 600 TABLET, FILM COATED ORAL at 15:43

## 2021-01-31 RX ADMIN — INSULIN LISPRO 3 UNITS: 100 INJECTION, SOLUTION INTRAVENOUS; SUBCUTANEOUS at 22:06

## 2021-01-31 RX ADMIN — ROCURONIUM BROMIDE 5 MG: 10 INJECTION, SOLUTION INTRAVENOUS at 12:08

## 2021-01-31 RX ADMIN — DEXAMETHASONE SODIUM PHOSPHATE 4 MG: 4 INJECTION, SOLUTION INTRAMUSCULAR; INTRAVENOUS at 12:08

## 2021-01-31 RX ADMIN — LIDOCAINE HYDROCHLORIDE 50 MG: 10 INJECTION, SOLUTION EPIDURAL; INFILTRATION; INTRACAUDAL; PERINEURAL at 12:08

## 2021-01-31 RX ADMIN — PIPERACILLIN SODIUM AND TAZOBACTAM SODIUM 3.38 G: 3; .375 INJECTION, POWDER, LYOPHILIZED, FOR SOLUTION INTRAVENOUS at 12:17

## 2021-01-31 RX ADMIN — SUGAMMADEX 175 MG: 100 INJECTION, SOLUTION INTRAVENOUS at 12:40

## 2021-01-31 RX ADMIN — PROPOFOL 150 MG: 10 INJECTION, EMULSION INTRAVENOUS at 12:08

## 2021-01-31 RX ADMIN — SODIUM CHLORIDE: 9 INJECTION, SOLUTION INTRAVENOUS at 08:17

## 2021-01-31 RX ADMIN — ONDANSETRON 4 MG: 2 INJECTION INTRAMUSCULAR; INTRAVENOUS at 12:31

## 2021-01-31 RX ADMIN — FAMOTIDINE 20 MG: 20 TABLET ORAL at 22:06

## 2021-01-31 RX ADMIN — PRAVASTATIN SODIUM 40 MG: 40 TABLET ORAL at 15:45

## 2021-01-31 RX ADMIN — INSULIN LISPRO 3 UNITS: 100 INJECTION, SOLUTION INTRAVENOUS; SUBCUTANEOUS at 17:50

## 2021-01-31 RX ADMIN — FENTANYL CITRATE 100 MCG: 50 INJECTION, SOLUTION INTRAMUSCULAR; INTRAVENOUS at 12:18

## 2021-01-31 RX ADMIN — CIPROFLOXACIN HYDROCHLORIDE 500 MG: 500 TABLET, FILM COATED ORAL at 22:06

## 2021-01-31 RX ADMIN — OXYCODONE HYDROCHLORIDE AND ACETAMINOPHEN 1 TABLET: 5; 325 TABLET ORAL at 22:12

## 2021-01-31 RX ADMIN — ALOGLIPTIN 25 MG: 25 TABLET, FILM COATED ORAL at 15:42

## 2021-01-31 RX ADMIN — METRONIDAZOLE 500 MG: 500 TABLET ORAL at 22:06

## 2021-01-31 RX ADMIN — PHENYLEPHRINE HYDROCHLORIDE 100 MCG: 10 INJECTION INTRAVENOUS at 12:17

## 2021-01-31 RX ADMIN — SODIUM CHLORIDE: 9 INJECTION, SOLUTION INTRAVENOUS at 12:00

## 2021-01-31 RX ADMIN — MIDAZOLAM 1 MG: 1 INJECTION INTRAMUSCULAR; INTRAVENOUS at 12:03

## 2021-01-31 RX ADMIN — BUPROPION HYDROCHLORIDE 150 MG: 150 TABLET, EXTENDED RELEASE ORAL at 15:42

## 2021-01-31 RX ADMIN — FENTANYL CITRATE 50 MCG: 50 INJECTION INTRAMUSCULAR; INTRAVENOUS at 00:09

## 2021-01-31 RX ADMIN — SUCCINYLCHOLINE CHLORIDE 120 MG: 20 INJECTION, SOLUTION INTRAMUSCULAR; INTRAVENOUS at 12:08

## 2021-01-31 RX ADMIN — PIPERACILLIN SODIUM AND TAZOBACTAM SODIUM 3375 MG: 3; .375 INJECTION, POWDER, LYOPHILIZED, FOR SOLUTION INTRAVENOUS at 02:25

## 2021-01-31 RX ADMIN — GABAPENTIN 600 MG: 600 TABLET, FILM COATED ORAL at 22:06

## 2021-01-31 ASSESSMENT — PULMONARY FUNCTION TESTS
PIF_VALUE: 12
PIF_VALUE: 1
PIF_VALUE: 23
PIF_VALUE: 24
PIF_VALUE: 24
PIF_VALUE: 1
PIF_VALUE: 24
PIF_VALUE: 23
PIF_VALUE: 24
PIF_VALUE: 25
PIF_VALUE: 24
PIF_VALUE: 0
PIF_VALUE: 23
PIF_VALUE: 22
PIF_VALUE: 24
PIF_VALUE: 36
PIF_VALUE: 24
PIF_VALUE: 25
PIF_VALUE: 24
PIF_VALUE: 12
PIF_VALUE: 33

## 2021-01-31 ASSESSMENT — PAIN DESCRIPTION - PAIN TYPE
TYPE: SURGICAL PAIN
TYPE: SURGICAL PAIN

## 2021-01-31 ASSESSMENT — PAIN SCALES - GENERAL
PAINLEVEL_OUTOF10: 8
PAINLEVEL_OUTOF10: 0
PAINLEVEL_OUTOF10: 8
PAINLEVEL_OUTOF10: 6
PAINLEVEL_OUTOF10: 8

## 2021-01-31 ASSESSMENT — PAIN DESCRIPTION - LOCATION: LOCATION: BUTTOCKS

## 2021-01-31 ASSESSMENT — PAIN DESCRIPTION - ORIENTATION: ORIENTATION: RIGHT;INNER

## 2021-01-31 ASSESSMENT — PAIN DESCRIPTION - DESCRIPTORS: DESCRIPTORS: DISCOMFORT;TENDER;SORE

## 2021-01-31 NOTE — ED NOTES
Pt transported up to room 2054 by hospital bed at 0754. Handoff given to Tasia the unit clerk at 0800.      [employer]  01/31/21 0809

## 2021-01-31 NOTE — ANESTHESIA POSTPROCEDURE EVALUATION
Department of Anesthesiology  Postprocedure Note    Patient: Sara Sorenson  MRN: [id number]  YOB: 1960  Date of evaluation: 1/31/2021  Time:  1:36 PM     Procedure Summary     Date: 01/31/21 Room / Location: [address] [place] [address] Chio Arredondo 03 / [facility]: JENNIE AMBRIZ    Anesthesia Start: 4673 Anesthesia Stop: 1837    Procedure: RECTAL PERIRECTAL INCISION AND DRAINAGE (Right ) Diagnosis:       Perirectal abscess      (perirectal abscess)    Surgeons: Lani Salgado MD Responsible Provider: Wandy Osorio MD    Anesthesia Type: general ASA Status: 3          Anesthesia Type: general    Eric Phase I: Eric Score: 9    Eric Phase II:      Last vitals: Reviewed and per EMR flowsheets.        Anesthesia Post Evaluation    Patient location during evaluation: bedside  Patient participation: complete - patient participated  Level of consciousness: awake and alert  Airway patency: patent  Nausea & Vomiting: no nausea and no vomiting  Complications: no  Cardiovascular status: hemodynamically stable  Respiratory status: acceptable  Hydration status: stable

## 2021-01-31 NOTE — PLAN OF CARE
Problem: Falls - Risk of:  Goal: Will remain free from falls  Description: Will remain free from falls  Outcome: Ongoing  Goal: Absence of physical injury  Description: Absence of physical injury  Outcome: Ongoing     Problem: Pain:  Description: Pain management should include both nonpharmacologic and pharmacologic interventions.   Goal: Pain level will decrease  Description: Pain level will decrease  Outcome: Ongoing  Goal: Control of acute pain  Description: Control of acute pain  Outcome: Ongoing  Goal: Control of chronic pain  Description: Control of chronic pain  Outcome: Ongoing     Problem: Skin Integrity:  Goal: Will show no infection signs and symptoms  Description: Will show no infection signs and symptoms  Outcome: Ongoing  Goal: Absence of new skin breakdown  Description: Absence of new skin breakdown  Outcome: Ongoing

## 2021-01-31 NOTE — CARE COORDINATION
CASE MANAGEMENT NOTE:    Admission Date:  1/30/2021 Donell Salmon is a 62 y.o.  female    Admitted for : Perirectal skin irritation [K62.89]    Met with:  Patient and Son, Angelia Acevedo    PCP:  Maral Angel                                Insurance:  [facility]      Current Residence/ Living Arrangements:  independently at home alone, however plans to stay with her son, Angelia Acevedo upon discharge. Current Services PTA:  No    Is patient agreeable to VNS: Yes    Freedom of choice provided:  Yes    List of [address] provided: Yes    VNS chosen: [address] Referral faxed. Anticipate that pt will have dressing changes. Son, Angelia Acevedo, in the room, and confirms that he can assist her. Pt states she also has friends who are STNA's and they will help her as well. DME:  glucometer    Home Oxygen: No    Nebulizer: No    CPAP/BIPAP: No    Supplier: N/A    Potential Assistance Needed: [facility] for wound care. Pt to have I&D perirectal abscess. SNF needed: No    Freedom of choice and list provided: NA    [facility]  [address] [facility]       Does Patient want to use MEDS to BEDS? No    Is patient currently receiving oral anticoagulation therapy? No    Is the Patient an [facility] with Readmission Risk Score greater than 14%? No  If yes, pt needs a follow up appointment made within 7 days. Family Members/Caregivers that pt would like involved in their care:    Yes    If yes, list name here:  Tamia Orantes and Angelia Curtis    Transportation Provider:  Patient and Family                  Discharge Plan:  Home with [facility]. Waiting to see if patient can be accepted for V[facility].                  Electronically signed by: Tj Suh RN on 1/31/2021 at 11:54 AM

## 2021-01-31 NOTE — DISCHARGE INSTR - COC
Continuity of Care Form    Pt will be staying at her son Irvin Finley's home. Address is   [address] [place] [address], [facility]    Patient Name: Donnell Olivo   :  1960  MRN:  [id number]    Admit date:  2021  Discharge date:  ***    Code Status Order: Full Code   Advance Directives:   Advance Care Flowsheet Documentation       Date/Time Healthcare Directive Type of Healthcare Directive Copy in [address] [place] [address] [address] Agent's Name Healthcare Agent's Phone Number    21 1317  No, patient does not have an advance directive for healthcare treatment -- -- -- -- --            Admitting Physician:  Luz Rick MD  PCP: CONRADO Reyna CNP    Discharging Nurse: [facility] Unit/Room#: 8155/3722-60  Discharging Unit Phone Number: ***    Emergency Contact:   Extended Emergency Contact Information  Primary Emergency Contact: Canal Winchester  Address: [address] [place] [address] Phone: [phone]  Work Phone: [phone]  Mobile Phone: [phone]  Relation: Parent  Hearing or visual needs: None  Other needs: None  Preferred language: English   needed?  No    Past Surgical History:  Past Surgical History:   Procedure Laterality Date     SECTION      EXTERNAL EAR SURGERY      EYE SURGERY      THYROID SURGERY         Immunization History:   Immunization History   Administered Date(s) Administered    Influenza, Quadv, IM, PF (6 mo and older Fluzone, Flulaval, Fluarix, and 3 yrs and older Afluria) 11/10/2016    Pneumococcal Polysaccharide (Rcsdcxaed23) 11/10/2016    Tdap (Boostrix, Adacel) 2017       Active Problems:  Patient Active Problem List   Diagnosis Code    Normocytic anemia D64.9    Chronic bilateral low back pain without sciatica M54.5, G89.29    Essential hypertension I10    Type 2 diabetes mellitus with diabetic polyneuropathy, without long-term current use of insulin (HCC) E11.42 Cigarette nicotine dependence without complication O92.543    Carpal tunnel syndrome of right wrist G56.01    Acute cystitis N30.00    Perirectal skin irritation K62.89    Right knee pain M25.561    Perirectal abscess K61.1    Depression F32.9    Hyperlipidemia E78.5    Elevated serum creatinine R79.89    Decreased GFR R94.4    Chronic kidney disease, stage 3a N18.31    Diabetic nephropathy (HCC) E11.21       Isolation/Infection:   Isolation            No Isolation          Patient Infection Status       None to display            Nurse Assessment:  Last Vital Signs: BP (!) 143/71   Pulse 89   Temp 98.7 °F (37.1 °C) (Oral)   Resp 18   Ht 5' 5\" (1.651 m)   Wt 192 lb 7.4 oz (87.3 kg)   SpO2 99%   BMI 32.03 kg/m²     Last documented pain score (0-10 scale): Pain Level: 8  Last Weight:   Wt Readings from Last 1 Encounters:   01/31/21 192 lb 7.4 oz (87.3 kg)     Mental Status:  {IP PT MENTAL STATUS:92135}    IV Access:  { ONESIMO IV ACCESS:713705397}    Nursing Mobility/ADLs:  Walking   {CHP DME ZQAW:645881333}  Transfer  {CHP DME PFHO:274700093}  Bathing  {CHP DME HCCH:118318498}  Dressing  {CHP DME EINH:493502006}  Toileting  {CHP DME QXNU:493946471}  Feeding  {CHP DME YPQL:312206966}  Med Admin  {P DME NPWT:968921662}  Med Delivery   { ONESIMO MED Delivery:596336397}    Wound Care Documentation and Therapy:        Elimination:  Continence: Bowel: {YES / NG:03501}  Bladder: {YES / QC:33318}  Urinary Catheter: {Urinary Catheter:222592574}   Colostomy/Ileostomy/Ileal Conduit: {YES / UU:01103}       Date of Last BM: ***    Intake/Output Summary (Last 24 hours) at 1/31/2021 1200  Last data filed at 1/31/2021 0741  Gross per 24 hour   Intake 1000 ml   Output --   Net 1000 ml     No intake/output data recorded.     Safety Concerns:     [address] Safety Concerns:135813129}    Impairments/Disabilities:      [address] ECHOL[employer] Impairments/Disabilities:097666804}    Nutrition Therapy:  Current Nutrition Therapy:   [address] E[address] Diet VKPT:137802619}    Routes of Feeding: {CHP DME Other Feedings:358284514}  Liquids: {Slp liquid thickness:05463}  Daily Fluid Restriction: {CHP DME Yes amt example:079741063}  Last Modified Barium Swallow with Video (Video Swallowing Test): {Done Not Done XWHO:082235235}    Treatments at the Time of Hospital Discharge:   Respiratory Treatments: ***  Oxygen Therapy:  {Therapy; copd oxygen:17288}  Ventilator:    { CC Vent OULB:497273724}    Rehab Therapies: {THERAPEUTIC INTERVENTION:6533304269}  Weight Bearing Status/Restrictions: { CC Weight Bearin}  Other Medical Equipment (for information only, NOT a DME order):  {EQUIPMENT:518032916}  Other Treatments: ***    Patient's personal belongings (please select all that are sent with patient):  {P DME Belongings:298891415}    RN SIGNATURE:  {Esignature:664142823}    CASE MANAGEMENT/SOCIAL WORK SECTION    Inpatient Status Date: ***    Readmission Risk Assessment Score:  Readmission Risk              Risk of Unplanned Readmission:        12           Discharging to Facility/ Agency         / signature: Electronically signed by Favian Mo RN on 2021 at 11:10 AM    PHYSICIAN SECTION    Prognosis: Good    Condition at Discharge: Stable    Rehab Potential (if transferring to Rehab): Good    Recommended Labs or Other Treatments After Discharge: Wet-to-dry dressing changes perirectal wound daily. Patient may shower. Wash the area with water every time she has a bowel movement. Physician Certification: I certify the above information and transfer of Karen Cardona  is necessary for the continuing treatment of the diagnosis listed and that she requires [address] for less 30 days.      Update Admission H&P: No change in H&P    PHYSICIAN SIGNATURE:  Electronically signed by Cameron Kendall MD on 21 at 11:37 AM EST

## 2021-01-31 NOTE — PROGRESS NOTES
[facility] Internal Medicine    Progress Note     1/31/2021    2:19 PM    Name:   Saúl Montague  MRN:     [id number]     Acct:      [de-identified]   Room:   2054/2054-01  IP Day:  1  Admit Date:  1/30/2021  1:07 PM    PCP:   CONRADO Sierra CNP  Code Status:  Full Code    Subjective:     C/C:   Chief Complaint   Patient presents with    Abscess     Rt buttock abscess    Leg Pain     Rt lge pain from hip down     Principal Problem:    Perirectal abscess  Active Problems:    Normocytic anemia    Essential hypertension    Type 2 diabetes mellitus with diabetic polyneuropathy, without long-term current use of insulin ([address]    Perirectal skin irritation    Right knee pain    Depression    Hyperlipidemia    Elevated serum creatinine    Decreased GFR    Chronic kidney disease, stage 3a    Diabetic nephropathy ([address]  Resolved Problems:    * No resolved hospital problems. *      Interval History Status: improved.        Marbella Smith MD   Physician   Internal Medicine   Plan of Care   Addendum   Date of Service:  1/31/2021  2:16 PM                    Show:Clear all  [x]Manual[]Template[x]Copied    Added by:  [x]Rory Rosales MD    []Julien for details  Patient admitted with perirectal abscess  On IV antibiotics  See history and physical  Antibiotic treatment adjusted  To have debridement and surgical intervention        Preoperative diagnosis: Right-sided perirectal abscess     Postoperative diagnosis: Same     Procedure: Incision and drainage right sided perirectal abscess down to the subcutaneous tissue     Surgeon: Dr. Payam Roland last 24 hr data reviewed ;   Vitals:    01/31/21 1300 01/31/21 1310 01/31/21 1320 01/31/21 1330   BP: (!) 179/77 (!) 174/75 (!) 175/75 (!) 161/71   Pulse: 98 97 96 89   Resp: 16 19 21 22   Temp:   97.5 °F (36.4 °C)    TempSrc:   Infrared    SpO2: 95% 96% 100% 99%   Weight:       Height:          Recent Results (from the past 24 hour(s))   CBC Auto Differential    Collection Time: 01/30/21  2:25 PM   Result Value Ref Range    WBC 9.4 3.5 - 11.0 k/uL    RBC 3.43 (L) 4.0 - 5.2 m/uL    Hemoglobin 10.9 (L) 12.0 - 16.0 g/dL    Hematocrit 32.1 (L) 36 - 46 %    MCV 93.7 80 - 100 fL    MCH 31.8 26 - 34 pg    MCHC 34.0 31 - 37 g/dL    RDW 15.7 (H) 11.5 - 14.9 %    Platelets 694 696 - 053 k/uL    MPV 7.7 6.0 - 12.0 fL    NRBC Automated NOT REPORTED per 100 WBC    Differential Type NOT REPORTED     Seg Neutrophils 68 (H) 36 - 66 %    Lymphocytes 25 24 - 44 %    Monocytes 5 1 - 7 %    Eosinophils % 1 0 - 4 %    Basophils 1 0 - 2 %    Immature Granulocytes NOT REPORTED 0 %    Segs Absolute 6.50 1.3 - 9.1 k/uL    Absolute Lymph # 2.30 1.0 - 4.8 k/uL    Absolute Mono # 0.40 0.1 - 1.3 k/uL    Absolute Eos # 0.10 0.0 - 0.4 k/uL    Basophils Absolute 0.10 0.0 - 0.2 k/uL    Absolute Immature Granulocyte NOT REPORTED 0.00 - 0.30 k/uL    WBC Morphology NOT REPORTED     RBC Morphology NOT REPORTED     Platelet Estimate NOT REPORTED    Comprehensive Metabolic Panel w/ Reflex to MG    Collection Time: 01/30/21  2:25 PM   Result Value Ref Range    Glucose 278 (H) 70 - 99 mg/dL    BUN 20 6 - 20 mg/dL    CREATININE 1.30 (H) 0.50 - 0.90 mg/dL    Bun/Cre Ratio NOT REPORTED 9 - 20    Calcium 10.6 (H) 8.6 - 10.4 mg/dL    Sodium 135 135 - 144 mmol/L    Potassium 4.2 3.7 - 5.3 mmol/L    Chloride 99 98 - 107 mmol/L    CO2 27 20 - 31 mmol/L    Anion Gap 9 9 - 17 mmol/L    Alkaline Phosphatase 118 (H) 35 - 104 U/L    ALT 5 5 - 33 U/L    AST 6 <32 U/L    Total Bilirubin 0.18 (L) 0.3 - 1.2 mg/dL    Total Protein 8.2 6.4 - 8.3 g/dL    Albumin 4.0 3.5 - 5.2 g/dL    Albumin/Globulin Ratio NOT REPORTED 1.0 - 2.5    GFR Non-African American 42 (L) >60 mL/min    GFR  51 (L) >60 mL/min    GFR Comment          GFR Staging NOT REPORTED    COVID-19    Collection Time: 01/30/21  4:49 PM    Specimen: Other   Result Value Ref Range    SARS-CoV-2          SARS-CoV-2, Rapid Not Detected Not Detected    Source . NASOPHARYNGEAL SWAB     SARS-CoV-2         POC Glucose Fingerstick    Collection Time: 01/30/21 10:18 PM   Result Value Ref Range    POC Glucose 378 (H) 65 - 105 mg/dL   CBC Auto Differential    Collection Time: 01/31/21  6:00 AM   Result Value Ref Range    WBC 7.9 3.5 - 11.0 k/uL    RBC 3.15 (L) 4.0 - 5.2 m/uL    Hemoglobin 9.6 (L) 12.0 - 16.0 g/dL    Hematocrit 29.7 (L) 36 - 46 %    MCV 94.4 80 - 100 fL    MCH 30.6 26 - 34 pg    MCHC 32.4 31 - 37 g/dL    RDW 15.7 (H) 11.5 - 14.9 %    Platelets 679 110 - 221 k/uL    MPV 7.5 6.0 - 12.0 fL    NRBC Automated NOT REPORTED per 100 WBC    Differential Type NOT REPORTED     Seg Neutrophils 48 36 - 66 %    Lymphocytes 43 24 - 44 %    Monocytes 6 1 - 7 %    Eosinophils % 2 0 - 4 %    Basophils 1 0 - 2 %    Immature Granulocytes NOT REPORTED 0 %    Segs Absolute 3.80 1.3 - 9.1 k/uL    Absolute Lymph # 3.40 1.0 - 4.8 k/uL    Absolute Mono # 0.50 0.1 - 1.3 k/uL    Absolute Eos # 0.20 0.0 - 0.4 k/uL    Basophils Absolute 0.10 0.0 - 0.2 k/uL    Absolute Immature Granulocyte NOT REPORTED 0.00 - 0.30 k/uL    WBC Morphology NOT REPORTED     RBC Morphology NOT REPORTED     Platelet Estimate NOT REPORTED    Basic Metabolic Panel    Collection Time: 01/31/21  6:00 AM   Result Value Ref Range    Glucose 121 (H) 70 - 99 mg/dL    BUN 19 6 - 20 mg/dL    CREATININE 1.22 (H) 0.50 - 0.90 mg/dL    Bun/Cre Ratio NOT REPORTED 9 - 20    Calcium 9.8 8.6 - 10.4 mg/dL    Sodium 137 135 - 144 mmol/L    Potassium 3.9 3.7 - 5.3 mmol/L    Chloride 105 98 - 107 mmol/L    CO2 26 20 - 31 mmol/L    Anion Gap 6 (L) 9 - 17 mmol/L    GFR Non-African American 45 (L) >60 mL/min    GFR  55 (L) >60 mL/min    GFR Comment          GFR Staging NOT REPORTED    POC Glucose Fingerstick    Collection Time: 01/31/21  9:42 AM   Result Value Ref Range    POC Glucose 124 (H) 65 - 105 mg/dL   POC Glucose Fingerstick    Collection Time: 01/31/21 11:09 AM   Result Value Ref Range    POC Glucose 141 (H) 65 - 105 mg/dL   POC Glucose Fingerstick    Collection Time: 21 12:59 PM   Result Value Ref Range    POC Glucose 156 (H) 65 - 105 mg/dL     Recent Labs     21  2218 21  0942 21  1109 21  1259   POCGLU 378* 124* 141* 156*        Xr Knee Right (3 Views)    Result Date: 2021  EXAMINATION: THREE XRAY VIEWS OF THE RIGHT KNEE 2021 10:35 am COMPARISON: None. HISTORY: ORDERING SYSTEM PROVIDED HISTORY: pain TECHNOLOGIST PROVIDED HISTORY: pain Reason for Exam: right knee pain Acuity: Unknown Type of Exam: Unknown FINDINGS: There is tricompartmental narrowing of the knee with associated spurring of the condyles. No fractures or dislocations are seen. Soft tissues appear within normal limits. There is chondrocalcinosis. Vascular calcifications are seen compatible with atherosclerotic disease. Mild osteoarthritis of the knee. No acute bony abnormalities are noted     Ct Pelvis W Contrast Additional Contrast? None    Result Date: 2021  EXAMINATION: CT OF THE PELVIS WITH CONTRAST 2021 12:36 pm TECHNIQUE: CT of the pelvis was performed with the administration of intravenous contrast. Multiplanar reformatted images are provided for review. Dose modulation, iterative reconstruction, and/or weight based adjustment of the mA/kV was utilized to reduce the radiation dose to as low as reasonably achievable. COMPARISON: None. HISTORY: ORDERING SYSTEM PROVIDED HISTORY: r/o abscess TECHNOLOGIST PROVIDED HISTORY: r/o abscess Decision Support Exception->Emergency Medical Condition (MA) Reason for Exam: boil; r/o abscess Acuity: Unknown Type of Exam: Unknown Additional signs and symptoms: Pt c/o boil on her buttocks and right leg pain, unable to walk Relevant Medical/Surgical History: Diabetic; hx  FINDINGS: Pelvis:  There is a 4 cm by 1.5 cm inflammatory soft tissue lesion long the posterior aspect of the right perirectal region with LABALBU 4.0 2021    LABALBU 3.5 2011               Radiology:  Medications: Allergies:      Current Meds:   Scheduled Meds:    buPROPion  150 mg Oral QAM    sodium chloride flush  10 mL Intravenous 2 times per day    alogliptin  25 mg Oral Daily    gabapentin  600 mg Oral TID    glipiZIDE  10 mg Oral BID AC    insulin lispro  0-12 Units Subcutaneous TID WC    atenolol  25 mg Oral Daily    lisinopril  30 mg Oral Daily    pravastatin  40 mg Oral Daily    famotidine (PEPCID) injection  20 mg Intravenous BID    piperacillin-tazobactam  3,375 mg Intravenous Q8H     Continuous Infusions:    dextrose      sodium chloride 100 mL/hr at 21 0817     PRN Meds: sodium chloride flush, polyethylene glycol, potassium chloride **OR** potassium alternative oral replacement **OR** potassium chloride, glucose, dextrose, glucagon (rDNA), dextrose, potassium chloride, magnesium sulfate, acetaminophen, senna, bisacodyl, oxyCODONE-acetaminophen, sodium chloride flush, promethazine **OR** ondansetron, ondansetron, fentanNYL, fentanNYL      Physical Examination:        BP (!) 161/71   Pulse 89   Temp 97.5 °F (36.4 °C) (Infrared)   Resp 22   Ht 5' 5\" (1.651 m)   Wt 192 lb 7.4 oz (87.3 kg)   SpO2 99%   BMI 32.03 kg/m²   Temp (24hrs), Av.2 °F (35.7 °C), Min:91.6 °F (33.1 °C), Max:98.7 °F (37.1 °C)    Recent Labs     21  2218 21  0942 21  1109 21  1259   POCGLU 378* 124* 141* 156*       Intake/Output Summary (Last 24 hours) at 2021 1419  Last data filed at 2021 1330  Gross per 24 hour   Intake 1200 ml   Output 500 ml   Net 700 ml       General Appearance:  alert, well appearing, and in no acute distress  Mental status: oriented to person, place, and time with normal affect  Head:  normocephalic, atraumatic.   Eye: no icterus, redness, pupils equal and reactive, extraocular eye movements intact, conjunctiva clear  Ear: normal external ear, no discharge, hearing intact  Nose:  no drainage noted  Mouth: mucous membranes moist  Neck: supple, no carotid bruits, thyroid not palpable  Lungs: Bilateral equal air entry, clear to ausculation, no wheezing, rales or rhonchi, normal effort  Cardiovascular: normal rate, regular rhythm, no murmur, gallop, rub. Abdomen: Soft, nontender, nondistended, normal bowel sounds, no hepatomegaly or splenomegaly  Neurologic: There are no new focal motor or sensory deficits, normal muscle tone and bulk, no abnormal sensation, normal speech, cranial nerves II through XII grossly intact  Skin: No gross lesions, rashes, bruising or bleeding on exposed skin area  Extremities:  peripheral pulses palpable, no pedal edema or calf pain with palpation  Psych:       Assessment:        Primary Problem  Perirectal abscess    Active Hospital Problems    Diagnosis Date Noted    Chronic kidney disease, stage 3a [N18.31] 01/31/2021    Diabetic nephropathy ([facility] [address] [E11.21] 01/31/2021    Right knee pain [M25.561] 01/30/2021    Perirectal abscess [K61.1] 01/30/2021    Perirectal skin irritation [K62.89] 01/30/2021    Depression [F32.9] 01/30/2021    Hyperlipidemia [E78.5] 01/30/2021    Elevated serum creatinine [R79.89] 01/30/2021    Decreased GFR [R94.4] 01/30/2021    Essential hypertension [I10] 11/10/2016    Type 2 diabetes mellitus with diabetic polyneuropathy, without long-term current use of insulin ([facility] [address] [E11.42] 11/10/2016    Normocytic anemia [D64.9]      Plan:        S/p surgery . Discussed with dr Vic Rooney . Surgery went well . Continue rx . Medications: Allergies:     Allergies   Allergen Reactions    Ibuprofen Itching       Current Meds:   Scheduled Meds:    buPROPion  150 mg Oral QAM    sodium chloride flush  10 mL Intravenous 2 times per day    alogliptin  25 mg Oral Daily    gabapentin  600 mg Oral TID    glipiZIDE  10 mg Oral BID AC    insulin lispro  0-12 Units Subcutaneous TID WC    atenolol  25 mg Oral Daily    lisinopril  30 mg Oral Daily    pravastatin  40 mg Oral Daily    famotidine (PEPCID) injection  20 mg Intravenous BID    piperacillin-tazobactam  3,375 mg Intravenous Q8H     Continuous Infusions:    dextrose      sodium chloride 100 mL/hr at 01/31/21 0817     PRN Meds: sodium chloride flush, polyethylene glycol, potassium chloride **OR** potassium alternative oral replacement **OR** potassium chloride, glucose, dextrose, glucagon (rDNA), dextrose, potassium chloride, magnesium sulfate, acetaminophen, senna, bisacodyl, oxyCODONE-acetaminophen, sodium chloride flush, promethazine **OR** ondansetron, ondansetron, fentanNYL, fentanNYL      Max Tejeda MD  1/31/2021  2:19 PM

## 2021-01-31 NOTE — ED NOTES
Admission Dx: Gin rectal abscess    Pts Chief Complaints on Arrival: rectal  pain, right leg pain    ADL's - Partial assistance    Pending Diagnostics:  Npo for surgery at 10:00 am    Residence PTA: single story home    Special Considerations/Circumstances:  n/a    Vitals: Current vital signs:  BP (!) 143/87   Pulse 83   Temp 98 °F (36.7 °C) (Axillary)   Resp 16   Ht 5' 5\" (1.651 m)   Wt 189 lb (85.7 kg)   SpO2 99%   BMI 31.45 kg/m²                MEWS Score: [id number] Topher Frost RN  01/31/21 5333

## 2021-01-31 NOTE — ED NOTES
Pt has made several comments that the food she has been given (2 turkey sandwiches and 2 bags of chips) are not sufficient. Pt provided a freezer meal and another can of pop. Pt denies any other needs. No distress noted at this time.        Julienne Villalobos RN  01/30/21 2040

## 2021-01-31 NOTE — PROGRESS NOTES
Resident team was paged by nursing staff informing us the patient is refusing IV fluids and antibiotics and would like us to come reexamine her knee. The senior resident and myself immediately went to the floor and asked the patient for permission to enter her room. She granted permission and I introduced myself once again. She states that her postoperative right buttock pain is well controlled however she is still having ongoing right knee pain. I reexamined both of her knees and found no change from this morning's examination: The right knee has no obvious fluid/effusion or overlying warmth. She has full active and passive range of motion of the knee joint but this movement does cause her pain. Explained to her that she has no elevated white blood count and has not had any fevers, these findings and her nonacute, chronic findings seen on x-ray imaging in combination with her physical exam results in us having very low suspicion for a severe acute process such as septic arthritis. We explained to her that we have PT consulted who will try to see her tomorrow, they were not able to see her today because she was in surgery. We discussed topical medicine such as Voltaren and lidocaine, however she has a documented allergy to ibuprofen and when inquiring about this she told me that she does not want to receive the topical medicines at this time. We asked her about refusal of IV fluids and antibiotics - she states that she just wants to be left alone and does not want anyone else to attempt IV access on her. We held a risks VS benefits discussion and in simple language we informed her that with IV access we can offer more efficacious antibiotic and analgesic medications and better fluid resuscitation; she verbalizes understanding and still opts to refuse further IV attempts. We told her that we will honor her wishes  and change all IV medications to PO.     Plan:  -switch from Zosyn IV to Cipro PO and Flagyl PO  -d/c Fentanyl IV; leave Percocet PO as scheduled  -switch Pepcid IV to Pepcid PO    Electronically signed by Javan Brice MD on 1/31/2021 at 6:41 PM

## 2021-01-31 NOTE — PROGRESS NOTES
Physical Therapy  DATE: 2021    NAME: Amy Mao  MRN: [id number]   : 1960    Patient not seen this date for Physical Therapy due to:  [] Blood transfusion in progress  [] Hemodialysis  [] Patient Declined  [] Spine Precautions   [] Strict Bedrest  [x] Surgery/ Procedure  [] Testing      [x] Other Off the floor for RECTAL PERIRECTAL INCISION AND DRAINAGE, check tomorrow. [] PT is being discontinued at this time. Patient independent. No further needs. [] PT is being discontinued at this time due to declining physical/ medical status. Therapy is not appropriate at this time.     Cyrus German, PT

## 2021-01-31 NOTE — PLAN OF CARE
Patient admitted with perirectal abscess  On IV antibiotics  See history and physical  Antibiotic treatment adjusted  To have debridement and surgical intervention       Preoperative diagnosis: Right-sided perirectal abscess     Postoperative diagnosis: Same     Procedure:  Incision and drainage right sided perirectal abscess down to the subcutaneous tissue     Surgeon: Dr. Jessica Delgadillo

## 2021-01-31 NOTE — PROGRESS NOTES
Writer attempted twice to place an IV. The pt told writer to get the [facility] away from her and to leave her alone. Pt stated she needs some sleep and no one will allow it. Writer explained why she needs the IV. Pt closed her eyes and and turn her head.  Writer left the pt alone

## 2021-01-31 NOTE — OP NOTE
Operative Note      Patient: Rylan Dias  YOB: 1960  MRN: [id number]    Date of Procedure: 1/31/2021                Preoperative diagnosis: Right-sided perirectal abscess    Postoperative diagnosis: Same    Procedure: Incision and drainage right sided perirectal abscess down to the subcutaneous tissue    Surgeon: Dr. Alina Kelly    Asst.: None    Anesthesia: General    Preparation: Hibiclens    EBL: Less than 10 mL    Specimen: Cultures obtained    Procedure: Informed consent was obtained. Preoperative antibiotics were given. Patient was taken to the operating room. General anesthesia was given. She was placed in the lithotomy position. Operative site was prepped and draped in usual sterile fashion. Timeout was done. Incision and drainage of right-sided perirectal abscess was performed. Cultures were obtained. Drainage was performed down to subcutaneous tissue. Wound was irrigated. Hemostasis was confirmed. Sponge needle instrument count was found to be correct. Wound was packed with half-inch iodoform gauze. Clean dressing was applied. Patient tolerated procedure well and was transferred to the recovery room in a stable condition.     -  Recommendations: Continue wet-to-dry dressing changes every shift. Resume diet. IV antibiotics. Pain control.   Medical management per admitting team.

## 2021-01-31 NOTE — ANESTHESIA PRE PROCEDURE
Department of Anesthesiology  Preprocedure Note       Name:  Kinsey Byrne   Age:  62 y.o.  :  1960                                          MRN:  [id number]         Date:  2021      Surgeon: Myles Estevez):  Nathalia Lehman MD    Procedure: Procedure(s):  RECTAL PERIRECTAL INCISION AND DRAINAGE    Medications prior to admission:   Prior to Admission medications    Medication Sig Start Date End Date Taking? Authorizing Provider   blood glucose test strips (ONETOUCH ULTRA) strip TEST 4 TIMES A DAY & AS NEEDED FOR SYMPTOMS OF IRREGULAR BLOOD GLUCOSE. 20   CONRADO Alfonso - CNP   butalbital-aspirin-caffeine [facility]) -40 MG capsule Take 1 capsule by mouth every 8 hours as needed for Headaches for up to 3 days.  20  CONRADO Alfonso CNP   alogliptin (NESINA) 25 MG TABS tablet Take 1 tablet by mouth daily 20   CONRADO Alfonso CNP   gabapentin (NEURONTIN) 600 MG tablet TAKE 1 TABLET BY MOUTH 3 TIMES DAILY 20  CONRADO Alfonso CNP   nystatin (MYCOSTATIN) 282796 UNIT/GM powder APPLY TOPICALLY TO AFFECTED AREAS FOUR TIMES A DAY 20   CONRADO Alfonso CNP   glipiZIDE (GLUCOTROL) 10 MG tablet Take 1 tablet by mouth 2 times daily (before meals) 20   CONRADO Alfonso CNP   buPROPion (WELLBUTRIN XL) 150 MG extended release tablet Take 1 tablet by mouth every morning 20   CONRADO Alfonso CNP   lisinopril (PRINIVIL;ZESTRIL) 30 MG tablet TAKE 1 TABLET BY MOUTH ONCE DAILY 20   CONRADO Alfonso CNP   Lancets MISC Use as directed, Dx Insulin dependent DM 20   CONRADO Alfonso CNP   pravastatin (PRAVACHOL) 40 MG tablet TAKE 1 TABLET BY MOUTH ONCE DAILY 20   CONRADO Alfonso CNP   atenolol (TENORMIN) 50 MG tablet TAKE ONE-HALF TABLET BY MOUTH DAILY 20   CONRADO Alfonso - CNP   Blood Pressure Monitoring (BLOOD PRESSURE CUFF) MISC 1 Dose by Does not apply route once for 1 dose 1/27/20 11/18/20  CONRADO Corbin CNP   butalbital-aspirin-caffeine [facility]) -40 MG capsule Take 1 capsule by mouth every 6 hours as needed for Headaches for up to 3 days. 1/24/20 11/18/20  CONRADO Corbin CNP   dicyclomine (BENTYL) 10 MG capsule Take 1 capsule by mouth every 6 hours as needed (cramps) 12/13/19   Madan Oliver MD   ondansetron (ZOFRAN ODT) 4 MG disintegrating tablet Take 1 tablet by mouth every 8 hours as needed for Nausea or Vomiting 12/13/19   Madan Oliver MD   blood glucose monitor kit and supplies Test 4 times a day & as needed for symptoms of irregular blood glucose. 12/13/19   Nupur Goins MD   aspirin EC 81 MG EC tablet Take 1 tablet by mouth daily 2/16/18   Dheeraj Patten MD   glucose monitoring kit (FREESTYLE) monitoring kit Diagnosis: Diabetes type 2, insulin-dependent. Use 3-4 times daily.  Ok to give insurance brand 11/10/17   Dheeraj Patten MD   [facility] 60M MISC CHECK BLOOD SUGAR ONCE DAILY 5/9/17   Kamille Hong MD       Current medications:    Current Facility-Administered Medications   Medication Dose Route Frequency Provider Last Rate Last Admin    [MAR Hold] buPROPion (WELLBUTRIN XL) extended release tablet 150 mg  150 mg Oral QAM MD Swathi Lorenzo Gift Hold] sodium chloride flush 0.9 % injection 10 mL  10 mL Intravenous 2 times per day MD Cely Lorenzo Gift Hold] sodium chloride flush 0.9 % injection 10 mL  10 mL Intravenous PRN MD Swathi Lorenzo Gift Hold] polyethylene glycol (GLYCOLAX) packet 17 g  17 g Oral Daily PRN MD Swathi Lorenzo Gift Hold] potassium chloride (KLOR-CON M) extended release tablet 40 mEq  40 mEq Oral PRN Aamir Gomez MD        Or   Cely Ordaz Gift Hold] potassium bicarb-citric acid (EFFER-K) effervescent tablet 40 mEq  40 mEq Oral PRN Aamir Gomez MD        Or   Cely Ordaz Gift Hold] potassium chloride 10 mEq/100 mL IVPB (Peripheral Line)  10 mEq Intravenous PRN Deyvi Izaguirre MD        [facility] Hold] glucose (GLUTOSE) 40 % oral gel 15 g  15 g Oral PRN Deyvi Izaguirre MD        [facility] Hold] dextrose 50 % IV solution  12.5 g Intravenous PRN Deyvi Izaguirre MD        [facility] Hold] glucagon (rDNA) injection 1 mg  1 mg Intramuscular PRN Deyvi Izaguirre MD        [facility] Hold] dextrose 5 % solution  100 mL/hr Intravenous PRN Deyvi Izaguirre MD        [facility] Hold] alogliptin (NESINA) tablet 25 mg  25 mg Oral Daily Weston Terry MD        [facility] Hold] gabapentin (NEURONTIN) tablet 600 mg  600 mg Oral TID Weston Terry MD        [facility] Hold] glipiZIDE (GLUCOTROL) tablet 10 mg  10 mg Oral BID Yonas Barrow MD        [facility] Hold] insulin lispro (HUMALOG) injection vial 0-12 Units  0-12 Units Subcutaneous TID WC Weston Terry MD        [facility] Hold] glucose (GLUTOSE) 40 % oral gel 15 g  15 g Oral PRN Weston Terry MD        [facility] Hold] glucagon (rDNA) injection 1 mg  1 mg Intramuscular PRN MD Raman Guzman [facility] Hold] potassium chloride 10 mEq/100 mL IVPB (Peripheral Line)  10 mEq Intravenous PRN Weston Terry MD        [facility] Hold] magnesium sulfate 1000 mg in dextrose 5% 100 mL IVPB  1,000 mg Intravenous PRN Weston Terry MD        [facility] Hold] acetaminophen (TYLENOL) tablet 650 mg  650 mg Oral Q4H PRN Weston Terry MD        [facility] Hold] senna (SENOKOT) tablet 8.6 mg  1 tablet Oral Daily PRN Weston Terry MD        [facility] Hold] bisacodyl (DULCOLAX) suppository 10 mg  10 mg Rectal Daily PRN Weston Terry MD        [facility] Hold] sodium chloride flush 0.9 % injection 10 mL  10 mL Intravenous PRN Tamia Piedra DO   10 mL at 01/30/21 1543    [MAR Hold] atenolol (TENORMIN) tablet 25 mg  25 mg Oral Daily Deyvi Izaguirre MD        [facility] Hold] lisinopril (PRINIVIL;ZESTRIL) tablet 30 mg  30 mg Oral Daily Deyvi Izaguirre MD        [facility] Hold] pravastatin (PRAVACHOL) tablet 40 mg  40 mg Oral Daily Dewayne Schaffer MD        [facility] Hold] promethazine (PHENERGAN) tablet 12.5 mg  12.5 mg Oral Q6H PRN Dewayne Schaffer MD        Or   Carlos A Kenzie [facility] Hold] [facility]ansetron [facility]) injection 4 mg  4 mg Intravenous Q6H PRN Dewayne Schaffer MD        [facility] Hold] famotidine (PEPCID) injection 20 mg  20 mg Intravenous BID Mel Blankenship MD   20 mg at 01/30/21 2205    [MAR Hold] ondansetron (ZOFRAN) injection 4 mg  4 mg Intravenous Q6H PRN Mel Blankenship MD        [facility] Hold] 0.9 % sodium chloride infusion   Intravenous Continuous Mel Blankenship  mL/hr at 01/31/21 0817 New Bag at 01/31/21 0817    [MAR Hold] fentaNYL (SUBLIMAZE) injection 50 mcg  50 mcg Intravenous Q2H PRN Mel Blankenship MD   50 mcg at 01/31/21 0652    [MAR Hold] fentaNYL (SUBLIMAZE) injection 100 mcg  100 mcg Intravenous Q2H PRN Mel Blankenship MD        [facility] Hold] piperacillin-tazobactam (ZOSYN) 3,375 mg in dextrose 5 % 50 mL IVPB extended infusion (mini-bag)  3,375 mg Intravenous Q8H Clay Piedra DO   Stopped at 01/31/21 0195       Allergies:     Allergies   Allergen Reactions    Ibuprofen Itching       Problem List:    Patient Active Problem List   Diagnosis Code    Normocytic anemia D64.9    Chronic bilateral low back pain without sciatica M54.5, G89.29    Essential hypertension I10    Type 2 diabetes mellitus with diabetic polyneuropathy, without long-term current use of insulin (HCC) E11.42    Cigarette nicotine dependence without complication Y85.981    Carpal tunnel syndrome of right wrist G56.01    Acute cystitis N30.00    Perirectal skin irritation K62.89    Right knee pain M25.561    Perirectal abscess K61.1    Depression F32.9    Hyperlipidemia E78.5    Elevated serum creatinine R79.89    Decreased GFR R94.4       Past Medical History:        Diagnosis Date    Anemia     Chronic back pain     Depression     Hyperlipidemia     Hypertension     Type 2 diabetes  01/31/2021    K 3.9 01/31/2021     01/31/2021    CO2 26 01/31/2021    BUN 19 01/31/2021    CREATININE 1.22 01/31/2021    GFRAA 55 01/31/2021    LABGLOM 45 01/31/2021    GLUCOSE 121 01/31/2021    GLUCOSE 89 09/09/2011    PROT 8.2 01/30/2021    CALCIUM 9.8 01/31/2021    BILITOT 0.18 01/30/2021    ALKPHOS 118 01/30/2021    AST 6 01/30/2021    ALT 5 01/30/2021       POC Tests:   Recent Labs     01/30/21  2218   POCGLU 378*       Coags:   Lab Results   Component Value Date    PROTIME 14.2 12/11/2019    PROTIME 9.8 10/05/2011    INR 1.1 12/11/2019    APTT 37.9 12/11/2019       HCG (If Applicable): No results found for: PREGTESTUR, PREGSERUM, HCG, HCGQUANT     ABGs: No results found for: PHART, PO2ART, RWN4STJ, XBV9PBX, BEART, J7OLGPYP     Type & Screen (If Applicable):  No results found for: LABABO, LABRH    Drug/Infectious Status (If Applicable):  Lab Results   Component Value Date    HEPCAB NONREACTIVE 09/07/2011       COVID-19 Screening (If Applicable):   Lab Results   Component Value Date    COVID19 Not Detected 01/30/2021         Anesthesia Evaluation  Patient summary reviewed and Nursing notes reviewed no history of anesthetic complications:   Airway: Mallampati: III  TM distance: >3 FB   Neck ROM: full  Mouth opening: > = 3 FB Dental: normal exam         Pulmonary:Negative Pulmonary ROS and normal exam                               Cardiovascular:    (+) hypertension:,                   Neuro/Psych:   (+) neuromuscular disease:, depression/anxiety              ROS comment: Diabetic neuropathy GI/Hepatic/Renal: Neg GI/Hepatic/Renal ROS            Endo/Other:    (+) DiabetesType II DM, , .                 Abdominal:           Vascular:                                        Anesthesia Plan      general     ASA 3       Induction: intravenous. MIPS: Postoperative opioids intended and Prophylactic antiemetics administered. Anesthetic plan and risks discussed with patient.                       Roby Gaines Ashley Kemp MD   1/31/2021

## 2021-01-31 NOTE — PROGRESS NOTES
[address]    PROGRESS NOTE             1/31/2021    7:38 AM    Name:   Kalia Gordon  MRN:     [id number]     Acct:      [de-identified]   Room:   15/15   Day:  1  Admit Date:  1/30/2021  1:07 PM    PCP:  CONRADO Mike CNP  Code Status:  Prior    Subjective:     C/C:   Chief Complaint   Patient presents with    Abscess     Rt buttock abscess    Leg Pain     Rt lge pain from hip down     Interval History Status: not changed. Patient seen and examined at the bed side. She remained afebrile overnight without tachypnea, tachycardia, or desaturations. BP is mildly elevated at 143/87 this morning. Per chart review, in the ER last night patient was able to stand and walk a few steps toward the bathroom but was complaining of pain and weakness in the right leg so the RN did provide assistance for transfers. This morning, I knocked on the patient's doorway and introduced myself as Dr. Bradly Hyde as I entered her room. Upon entering her room, she then requested privacy and said she was waiting for her nurse. I informed her that I would leave and get her nurse. I exited the room and spoke with her nurse Sushila Reyes who told me that Aleida Parekh was heading into the room to do a Hibiclens wash in preparation for this morning's surgery. Once Sofi Hernandez was inside the patient's room, I again knocked on the door and introduced myself as Dr. Bradly Hyde. From the hallway, I informed her that I was here to interview and re-examine her prior to surgery and that I can do so in the presence of Sofi Hernandez as a chaperone. I also told her that she has the option to decline right now. She agreed to have me enter the room to interview and examine her. I then entered the room and the interview and examination was conducted with the presence of Michelle GRIJALVA as chaperone.      She expressed concern to me that her right knee is still painful and she has perceived increased swelling overnight. We discussed her negative X-ray results. I informed her that I would re-examine her right knee and discuss it with the attending physician. She endorses ongoing right buttock pain. Denies fevers or chills. No other complaints at this time. At the conclusion of our interview, patient became tearful. I asked her if there is anything else bothering her that I can address, and she said no. She said she became tearful because of her pain \"and other stuff. \" She endorses feeling safe at home and denies that anyone is hurting her outside of the hospital. I offered to her that she is free to bring up any concerns to me or her nursing team at any time. Patient denies any thoughts or plans of self-harm or suicide or hurting others. Notes from nursing staff and Consults had been reviewed, and the overnight progress had been checked with the nursing staff as well. Brief History:     61-year-old female with h/o type 2 DM with polyneuropathy, HTN, HLD, and chronic back pain admitted on 01/30/2021 for right perirectal abscess with general surgery consulted for drainage the morning of 01/31/2021    Review of Systems:      CONSTITUTIONAL:  no fevers, no headcahes  EYES: negative for blury vision  HEENT: No headaches, No nasal congestion, no difficulty swallowing  RESPIRATORY:negative for dyspnea, no wheezing, no Cough  CARDIOVASCULAR: negative for chest pain, no palpitations  GASTROINTESTINAL: no nausea, no vomiting, no change in bowel habits, no abdominal pain . Positive for right buttock boil. Positive for right buttock pain. GENITOURINARY: negative for dysuria, no hematuria   MUSCULOSKELETAL: Positive for right thigh and right knee pain, otherwise no other muscle aches, no swelling of joints or extremities  NEUROLOGICAL: No  Weakness or numbness  INTEGUMENTARY: No rash  PSYCHIATRIC: No thoughts or plans of suicide, self-harm, or harming others       Medications:      Allergies: Allergies   Allergen Reactions    Ibuprofen Itching       Current Meds:   Scheduled Meds:    atenolol  25 mg Oral Daily    lisinopril  30 mg Oral Daily    pravastatin  40 mg Oral Daily    insulin lispro  0-6 Units Subcutaneous TID     insulin lispro  0-3 Units Subcutaneous Nightly    famotidine (PEPCID) injection  20 mg Intravenous BID    piperacillin-tazobactam  3,375 mg Intravenous Q8H     Continuous Infusions:    sodium chloride 100 mL/hr at 21 2203     PRN Meds: sodium chloride flush, promethazine **OR** ondansetron, acetaminophen **OR** acetaminophen, ondansetron, fentanNYL, fentanNYL, oxyCODONE-acetaminophen    Data:     Past Medical History:   has a past medical history of Anemia, Chronic back pain, Depression, Hyperlipidemia, Hypertension, Type 2 diabetes mellitus with diabetic polyneuropathy, without long-term current use of insulin ([facility] [address] and Type II or unspecified type diabetes mellitus without mention of complication, not stated as uncontrolled. Social History:   reports that she has been smoking cigarettes. She has a 19.50 pack-year smoking history. She has never used smokeless tobacco. She reports previous drug use. Drug: Marijuana. She reports that she does not drink alcohol. Family History:   History reviewed. No pertinent family history. Vitals:  BP (!) 143/87   Pulse 83   Temp 98 °F (36.7 °C) (Axillary)   Resp 16   Ht 5' 5\" (1.651 m)   Wt 189 lb (85.7 kg)   SpO2 99%   BMI 31.45 kg/m²   Temp (24hrs), Av.6 °F (37 °C), Min:98 °F (36.7 °C), Max:99.9 °F (37.7 °C)      Recent Labs     21  2218   POCGLU 378*       I/O(24Hr):   No intake or output data in the 24 hours ending 21 0738    Labs:    CBC with Differential:    Lab Results   Component Value Date    WBC 7.9 2021    RBC 3.15 2021    RBC 2.85 2011    HGB 9.6 2021    HCT 29.7 2021     2021     10/05/2011    MCV 94.4 2021    MCH 30.6 2021  Depression [F32.9] 01/30/2021    Hyperlipidemia [E78.5] 01/30/2021    Elevated serum creatinine [R79.89] 01/30/2021    Decreased GFR [R94.4] 01/30/2021    Essential hypertension [I10] 11/10/2016    Type 2 diabetes mellitus with diabetic polyneuropathy, without long-term current use of insulin ([facility] [E11.42] 11/10/2016    Normocytic anemia [D64.9]        Plan:        Perirectal abscess  -CT pelvis w/ contrast: \"4 cm x 1.5 cm right perirectal abscess\"  -Surgery consult; NPO with IV NS at 100 mL/hr  -PT/OT  -Tylenol PO/suppository PRN orders placed     Elevated serum creatinine, decreased GFR; consistent with CKD stage 3a and diabetic nephropathy   -Creatinine 1.22 today, down from 1.30 on admission; it was elevated at 1.23 in July 2020 and the last value within normal reference range in our EMR was 01/25/2018  -GFR: 55 today, up from 51 on admission  -Prior records show elevated microalbuminuria consistent with diabetic nephropathy     Normocytic anemia   -9.6 today; was 10.9 on admission which appeared at baseline. Will continue to follow  -iron/TIBC, ferritin, B12/folate pending     Right knee pain  -Clinically, I do not appreciate any significant warmth or fluid around the right knee and she remains afebrile without leukocytosis; therefore, concern for septic arthritis or need for emergent inpatient evaluation is very low at this time but will continue to reassess  -CT pelvis addemdum: \"Both hip joints are intact.  Mild degenerative changes with subcortical cysts seen in the femoral heads more notably on the left.  No hip joint   effusions are identified.  No fractures, osteolytic or blastic changes are seen. \"    -XR: \"Mild osteoarthritis of the knee.  No acute bony abnormalities are noted\"  -Tylenol PRN  -PT/OT     Type 2 diabetes mellitus with polyneuropathy, without long-term current use of insulin  -Low-dose sliding scale  -Hypoglycemia protocol  -Carb control diet, NPO at midnight     Essential

## 2021-01-31 NOTE — ED NOTES
Pt placed in an inpatient hospital bed. Pt transported to the bathroom in a wheelchair. Pt able to stand and walk a few steps, yet c/o pain and weakness in R leg. RN did provide assistance for transfers. Pt given ice water and pain medication when she returned to her room. Call light in reach and bed in lowest, locked position. No distress noted at this time.        Génesis Vidal RN  01/30/21 6211

## 2021-02-01 VITALS
DIASTOLIC BLOOD PRESSURE: 78 MMHG | HEIGHT: 65 IN | TEMPERATURE: 98.1 F | RESPIRATION RATE: 16 BRPM | SYSTOLIC BLOOD PRESSURE: 118 MMHG | BODY MASS INDEX: 32.07 KG/M2 | HEART RATE: 73 BPM | WEIGHT: 192.46 LBS | OXYGEN SATURATION: 99 %

## 2021-02-01 LAB
ABSOLUTE EOS #: 0 K/UL (ref 0–0.4)
ABSOLUTE IMMATURE GRANULOCYTE: ABNORMAL K/UL (ref 0–0.3)
ABSOLUTE LYMPH #: 2.7 K/UL (ref 1–4.8)
ABSOLUTE MONO #: 0.5 K/UL (ref 0.1–1.3)
ANION GAP SERPL CALCULATED.3IONS-SCNC: 8 MMOL/L (ref 9–17)
BASOPHILS # BLD: 1 % (ref 0–2)
BASOPHILS ABSOLUTE: 0.1 K/UL (ref 0–0.2)
BUN BLDV-MCNC: 23 MG/DL (ref 6–20)
BUN/CREAT BLD: ABNORMAL (ref 9–20)
CALCIUM SERPL-MCNC: 10.3 MG/DL (ref 8.6–10.4)
CHLORIDE BLD-SCNC: 103 MMOL/L (ref 98–107)
CO2: 26 MMOL/L (ref 20–31)
CREAT SERPL-MCNC: 1.39 MG/DL (ref 0.5–0.9)
DIFFERENTIAL TYPE: ABNORMAL
EOSINOPHILS RELATIVE PERCENT: 0 % (ref 0–4)
ESTIMATED AVERAGE GLUCOSE: 174 MG/DL
GFR AFRICAN AMERICAN: 47 ML/MIN
GFR NON-AFRICAN AMERICAN: 39 ML/MIN
GFR SERPL CREATININE-BSD FRML MDRD: ABNORMAL ML/MIN/{1.73_M2}
GFR SERPL CREATININE-BSD FRML MDRD: ABNORMAL ML/MIN/{1.73_M2}
GLUCOSE BLD-MCNC: 117 MG/DL (ref 65–105)
GLUCOSE BLD-MCNC: 84 MG/DL (ref 70–99)
GLUCOSE BLD-MCNC: 85 MG/DL (ref 65–105)
HBA1C MFR BLD: 7.7 % (ref 4–6)
HCT VFR BLD CALC: 30.3 % (ref 36–46)
HEMOGLOBIN: 9.8 G/DL (ref 12–16)
IMMATURE GRANULOCYTES: ABNORMAL %
LYMPHOCYTES # BLD: 30 % (ref 24–44)
MCH RBC QN AUTO: 30.7 PG (ref 26–34)
MCHC RBC AUTO-ENTMCNC: 32.5 G/DL (ref 31–37)
MCV RBC AUTO: 94.5 FL (ref 80–100)
MONOCYTES # BLD: 6 % (ref 1–7)
NRBC AUTOMATED: ABNORMAL PER 100 WBC
PDW BLD-RTO: 16.1 % (ref 11.5–14.9)
PLATELET # BLD: 353 K/UL (ref 150–450)
PLATELET ESTIMATE: ABNORMAL
PMV BLD AUTO: 7.5 FL (ref 6–12)
POTASSIUM SERPL-SCNC: 4.3 MMOL/L (ref 3.7–5.3)
RBC # BLD: 3.21 M/UL (ref 4–5.2)
RBC # BLD: ABNORMAL 10*6/UL
SEG NEUTROPHILS: 63 % (ref 36–66)
SEGMENTED NEUTROPHILS ABSOLUTE COUNT: 5.7 K/UL (ref 1.3–9.1)
SODIUM BLD-SCNC: 137 MMOL/L (ref 135–144)
WBC # BLD: 9 K/UL (ref 3.5–11)
WBC # BLD: ABNORMAL 10*3/UL

## 2021-02-01 PROCEDURE — 85025 COMPLETE CBC W/AUTO DIFF WBC: CPT

## 2021-02-01 PROCEDURE — 36415 COLL VENOUS BLD VENIPUNCTURE: CPT

## 2021-02-01 PROCEDURE — 97166 OT EVAL MOD COMPLEX 45 MIN: CPT

## 2021-02-01 PROCEDURE — G0378 HOSPITAL OBSERVATION PER HR: HCPCS

## 2021-02-01 PROCEDURE — 6370000000 HC RX 637 (ALT 250 FOR IP): Performed by: STUDENT IN AN ORGANIZED HEALTH CARE EDUCATION/TRAINING PROGRAM

## 2021-02-01 PROCEDURE — 80048 BASIC METABOLIC PNL TOTAL CA: CPT

## 2021-02-01 PROCEDURE — 97162 PT EVAL MOD COMPLEX 30 MIN: CPT

## 2021-02-01 PROCEDURE — 83036 HEMOGLOBIN GLYCOSYLATED A1C: CPT

## 2021-02-01 PROCEDURE — 82947 ASSAY GLUCOSE BLOOD QUANT: CPT

## 2021-02-01 PROCEDURE — 97116 GAIT TRAINING THERAPY: CPT

## 2021-02-01 PROCEDURE — 99222 1ST HOSP IP/OBS MODERATE 55: CPT | Performed by: ORTHOPAEDIC SURGERY

## 2021-02-01 PROCEDURE — 97535 SELF CARE MNGMENT TRAINING: CPT

## 2021-02-01 PROCEDURE — 6370000000 HC RX 637 (ALT 250 FOR IP): Performed by: SURGERY

## 2021-02-01 PROCEDURE — 99239 HOSP IP/OBS DSCHRG MGMT >30: CPT | Performed by: INTERNAL MEDICINE

## 2021-02-01 RX ORDER — LANOLIN ALCOHOL/MO/W.PET/CERES
CREAM (GRAM) TOPICAL 2 TIMES DAILY PRN
Qty: 1 CONTAINER | Refills: 0 | Status: SHIPPED | OUTPATIENT
Start: 2021-02-01 | End: 2021-02-02 | Stop reason: HOSPADM

## 2021-02-01 RX ORDER — SULFAMETHOXAZOLE AND TRIMETHOPRIM 800; 160 MG/1; MG/1
1 TABLET ORAL 2 TIMES DAILY
Qty: 28 TABLET | Refills: 0 | Status: SHIPPED | OUTPATIENT
Start: 2021-02-01 | End: 2021-02-15

## 2021-02-01 RX ORDER — LABETALOL HYDROCHLORIDE 5 MG/ML
10 INJECTION, SOLUTION INTRAVENOUS EVERY 6 HOURS PRN
Status: DISCONTINUED | OUTPATIENT
Start: 2021-02-01 | End: 2021-02-01

## 2021-02-01 RX ORDER — SODIUM CHLORIDE 9 MG/ML
INJECTION, SOLUTION INTRAVENOUS CONTINUOUS
Status: DISCONTINUED | OUTPATIENT
Start: 2021-02-01 | End: 2021-02-01

## 2021-02-01 RX ORDER — ONDANSETRON 4 MG/1
TABLET, FILM COATED ORAL
Qty: 20 TABLET | Refills: 0 | Status: SHIPPED | OUTPATIENT
Start: 2021-02-01 | End: 2021-05-26

## 2021-02-01 RX ORDER — METRONIDAZOLE 500 MG/1
500 TABLET ORAL EVERY 8 HOURS SCHEDULED
Qty: 15 TABLET | Refills: 0 | Status: SHIPPED | OUTPATIENT
Start: 2021-02-01 | End: 2021-02-02 | Stop reason: HOSPADM

## 2021-02-01 RX ORDER — CIPROFLOXACIN 500 MG/1
500 TABLET, FILM COATED ORAL EVERY 12 HOURS SCHEDULED
Qty: 10 TABLET | Refills: 0 | Status: SHIPPED | OUTPATIENT
Start: 2021-02-01 | End: 2021-02-02 | Stop reason: HOSPADM

## 2021-02-01 RX ORDER — CYCLOBENZAPRINE HCL 5 MG
5 TABLET ORAL 2 TIMES DAILY PRN
Qty: 10 TABLET | Refills: 0 | Status: SHIPPED | OUTPATIENT
Start: 2021-02-01 | End: 2021-02-02 | Stop reason: HOSPADM

## 2021-02-01 RX ORDER — POLYETHYLENE GLYCOL 3350 17 G/17G
17 POWDER, FOR SOLUTION ORAL DAILY PRN
Qty: 527 G | Refills: 1 | Status: SHIPPED | OUTPATIENT
Start: 2021-02-01 | End: 2021-02-02 | Stop reason: HOSPADM

## 2021-02-01 RX ORDER — MINERAL OIL/I-PROP MYR/WATER
LOTION (ML) TOPICAL 2 TIMES DAILY PRN
Status: DISCONTINUED | OUTPATIENT
Start: 2021-02-01 | End: 2021-02-01 | Stop reason: CLARIF

## 2021-02-01 RX ORDER — OXYCODONE HYDROCHLORIDE AND ACETAMINOPHEN 5; 325 MG/1; MG/1
1 TABLET ORAL EVERY 6 HOURS PRN
Qty: 28 TABLET | Refills: 0 | Status: SHIPPED | OUTPATIENT
Start: 2021-02-01 | End: 2021-02-08

## 2021-02-01 RX ORDER — LANOLIN ALCOHOL/MO/W.PET/CERES
CREAM (GRAM) TOPICAL 2 TIMES DAILY PRN
Status: DISCONTINUED | OUTPATIENT
Start: 2021-02-01 | End: 2021-02-01 | Stop reason: HOSPADM

## 2021-02-01 RX ADMIN — BUPROPION HYDROCHLORIDE 150 MG: 150 TABLET, EXTENDED RELEASE ORAL at 09:24

## 2021-02-01 RX ADMIN — ATENOLOL 25 MG: 25 TABLET ORAL at 09:25

## 2021-02-01 RX ADMIN — CIPROFLOXACIN HYDROCHLORIDE 500 MG: 500 TABLET, FILM COATED ORAL at 09:25

## 2021-02-01 RX ADMIN — Medication: at 16:47

## 2021-02-01 RX ADMIN — OXYCODONE HYDROCHLORIDE AND ACETAMINOPHEN 1 TABLET: 5; 325 TABLET ORAL at 16:28

## 2021-02-01 RX ADMIN — OXYCODONE HYDROCHLORIDE AND ACETAMINOPHEN 1 TABLET: 5; 325 TABLET ORAL at 02:13

## 2021-02-01 RX ADMIN — GLIPIZIDE 10 MG: 5 TABLET ORAL at 16:28

## 2021-02-01 RX ADMIN — ALOGLIPTIN 25 MG: 25 TABLET, FILM COATED ORAL at 09:25

## 2021-02-01 RX ADMIN — METRONIDAZOLE 500 MG: 500 TABLET ORAL at 06:21

## 2021-02-01 RX ADMIN — PRAVASTATIN SODIUM 40 MG: 40 TABLET ORAL at 09:25

## 2021-02-01 RX ADMIN — FAMOTIDINE 20 MG: 20 TABLET ORAL at 09:24

## 2021-02-01 RX ADMIN — OXYCODONE HYDROCHLORIDE AND ACETAMINOPHEN 1 TABLET: 5; 325 TABLET ORAL at 06:21

## 2021-02-01 RX ADMIN — LISINOPRIL 30 MG: 10 TABLET ORAL at 09:25

## 2021-02-01 RX ADMIN — GABAPENTIN 600 MG: 600 TABLET, FILM COATED ORAL at 16:32

## 2021-02-01 RX ADMIN — GABAPENTIN 600 MG: 600 TABLET, FILM COATED ORAL at 09:24

## 2021-02-01 RX ADMIN — GLIPIZIDE 10 MG: 5 TABLET ORAL at 06:21

## 2021-02-01 RX ADMIN — OXYCODONE HYDROCHLORIDE AND ACETAMINOPHEN 1 TABLET: 5; 325 TABLET ORAL at 10:53

## 2021-02-01 ASSESSMENT — ENCOUNTER SYMPTOMS
COUGH: 0
VOMITING: 0
NAUSEA: 1
SHORTNESS OF BREATH: 0
WHEEZING: 0
ABDOMINAL DISTENTION: 0
CHEST TIGHTNESS: 0
ABDOMINAL PAIN: 0
DIARRHEA: 0

## 2021-02-01 ASSESSMENT — PAIN DESCRIPTION - PAIN TYPE
TYPE: ACUTE PAIN
TYPE: ACUTE PAIN

## 2021-02-01 ASSESSMENT — PAIN - FUNCTIONAL ASSESSMENT: PAIN_FUNCTIONAL_ASSESSMENT: PREVENTS OR INTERFERES SOME ACTIVE ACTIVITIES AND ADLS

## 2021-02-01 ASSESSMENT — PAIN DESCRIPTION - LOCATION: LOCATION: HIP;KNEE

## 2021-02-01 ASSESSMENT — PAIN SCALES - GENERAL
PAINLEVEL_OUTOF10: 4
PAINLEVEL_OUTOF10: 5
PAINLEVEL_OUTOF10: 5
PAINLEVEL_OUTOF10: 8

## 2021-02-01 ASSESSMENT — PAIN DESCRIPTION - ORIENTATION
ORIENTATION: RIGHT
ORIENTATION: RIGHT

## 2021-02-01 ASSESSMENT — PAIN DESCRIPTION - DESCRIPTORS: DESCRIPTORS: ACHING;CONSTANT

## 2021-02-01 NOTE — PROGRESS NOTES
I spoke with Saadia Posadas who answered Dr. Martine Garcia cell phone. This was done at 11:31 am on 2/1/21. Saadia Posadas stated that she would let Dr. Martine Garcia know of the consult. [facility]

## 2021-02-01 NOTE — ANESTHESIA POSTPROCEDURE EVALUATION
Department of Anesthesiology  Postprocedure Note    Patient: Saritha Sanchez  MRN: [id number]  YOB: 1960  Date of evaluation: 2/1/2021  Time:  11:17 AM     Procedure Summary     Date: 01/31/21 Room / Location: [address] [place] [address]: JENNIE AMBRIZ    Anesthesia Start: 7434 Anesthesia Stop: 8828    Procedure: RECTAL PERIRECTAL INCISION AND DRAINAGE (Right Perineum) Diagnosis:       Perirectal abscess      (perirectal abscess)    Surgeons: Hamida Veras MD Responsible Provider: Stephanie Grover MD    Anesthesia Type: general ASA Status: 3          Anesthesia Type: general    Eric Phase I: Eric Score: 9    Eric Phase II:      Last vitals: Reviewed and per EMR flowsheets. Anesthesia Post Evaluation    Comments: POD #1 Patient seen lying in bed.  Denies any anesthesia related issues

## 2021-02-01 NOTE — CARE COORDINATION
[facility] Encounter Date/Time: 2021 [address] Account: [de-identified]    MRN: [id number]    Patient: Isaac Greenfield    Contact Serial #: [id number]      ENCOUNTER          Patient Class: Observation Private Enc? No Unit RM BD: John 15    Hospital Service: Med/Surg   Encounter DX: Perirectal skin irritati*   ADM Provider: Max Tejeda MD   Procedure:     ATT Provider: Max Tejeda MD   REF Provider:        Admission DX: Perirectal skin irritation, Perirectal abscess and codes: 569.49, 566      PATIENT                 Name: Isaac Greenfield : 1960 (60 yrs)   Address: [address] [place] [address] Sex: Female   [address] [address]         Marital Status: Single   Employer: NOT EMPLOYED         Adventism: Darron   Primary Care Provider: ELIANE Paz*         Primary Phone: [phone]   EMERGENCY CONTACT   Contact Name Legal Guardian? Relationship to Patient Home Phone Work Phone   1. Stella Reynoso  2. *No Contact Specified* No    Parent    [phone]            GUARANTOR            Guarantor: Isaac Greenfield     : 1960   Address: [address] [place] [address] Sex: Female     [place] [address]     Relation to Patient: Self       Home Phone: [phone]   Guarantor ID: [id number]       Work Phone:     Guarantor Employer: NOT EMPLOYED         Status: NOT EMPLO*      COVERAGE        PRIMARY INSURANCE   Payor: [employer] [facility] C* Plan: Mobile COMMUN*   Payor Address: [facility], [address] [place] [address],[address] [facility]       Group Number: [facility] Insurance Type: INDEMNITY   Subscriber Name: Nilda Camerond : 1960   Subscriber ID: [id number] Pat. Rel. to Sub: Self   SECONDARY INSURANCE   Payor:   Plan:     Payor Address:  ,           Group Number:   Insurance Type:     Subscriber Name:   Subscriber :     Subscriber ID:   Pat.  Rel. to Sub:

## 2021-02-01 NOTE — DISCHARGE SUMMARY
[facility]    Discharge Summary     Patient ID: Chelsey Saha  :  1960   MRN: [id number]     ACCOUNT:  [de-identified]   Patient's PCP: Dorena Gilford, APRN - CNP  Admit Date: 2021   Discharge Date: 2021     Length of Stay: 2  Code Status:  Full Code  Admitting Physician: Nidia Hunter MD  Discharge Physician: Zuleyka Chavarria MD     Active Discharge Diagnoses:       Primary Problem  Perirectal abscess      [place] Problems    Diagnosis Date Noted    Chronic kidney disease, stage 3a [N18.31] 2021    Diabetic nephropathy ([facility] [address] [E11.21] 2021    Right knee pain [M25.561] 2021    Perirectal abscess [K61.1] 2021    Perirectal skin irritation [K62.89] 2021    Depression [F32.9] 2021    Hyperlipidemia [E78.5] 2021    Elevated serum creatinine [R79.89] 2021    Decreased GFR [R94.4] 2021    Essential hypertension [I10] 11/10/2016    Type 2 diabetes mellitus with diabetic polyneuropathy, without long-term current use of insulin ([facility] [address] [E11.42] 11/10/2016    Normocytic anemia [D64.9]        Admission Condition:  poor     Discharged Condition: good    Hospital Stay:       Hospital Course: Chelsey Saha is a 62 y.o. female who was admitted for the management of   Perirectal abscess , presented to ER with Abscess (Rt buttock abscess) and Leg Pain (Rt lge pain from hip down)  CT abdomen and pelvis showed 4 x 1.5 cm right perirectal abscess. Patient was admitted and had I&D performed by surgery and started on broad-spectrum antibiotics including Cipro and Flagyl. Wound cultures negative. Patient evaluated by orthopedics for leg pain and was advised to follow-up outpatient. Imaging of hip showed intact hip joints, mild degenerative changes with subcortical cyst seen in the femoral heads more notable on the left.   No hip joint effusions, no fractures, osteolytic or blastic changes. X-ray right knee showed only mild osteoarthritis. Patient was also noticed to have elevated blood glucose and will follow up with her PCP for outpatient management. Will be discharged on Bactrim p.o. twice daily for 14 days.      Significant therapeutic interventions: I&D    Significant Diagnostic Studies:   Labs / Micro:  CBC:   Lab Results   Component Value Date    WBC 9.0 02/01/2021    RBC 3.21 02/01/2021    RBC 2.85 09/09/2011    HGB 9.8 02/01/2021    HCT 30.3 02/01/2021    MCV 94.5 02/01/2021    MCH 30.7 02/01/2021    MCHC 32.5 02/01/2021    RDW 16.1 02/01/2021     02/01/2021     10/05/2011     BMP:    Lab Results   Component Value Date    GLUCOSE 84 02/01/2021    GLUCOSE 89 09/09/2011     02/01/2021    K 4.3 02/01/2021     02/01/2021    CO2 26 02/01/2021    ANIONGAP 8 02/01/2021    BUN 23 02/01/2021    CREATININE 1.39 02/01/2021    BUNCRER NOT REPORTED 02/01/2021    CALCIUM 10.3 02/01/2021    LABGLOM 39 02/01/2021    GFRAA 47 02/01/2021    GFR      02/01/2021    GFR NOT REPORTED 02/01/2021     CMP:    Lab Results   Component Value Date    GLUCOSE 84 02/01/2021    GLUCOSE 89 09/09/2011     02/01/2021    K 4.3 02/01/2021     02/01/2021    CO2 26 02/01/2021    BUN 23 02/01/2021    CREATININE 1.39 02/01/2021    ANIONGAP 8 02/01/2021    ALKPHOS 118 01/30/2021    ALT 5 01/30/2021    AST 6 01/30/2021    BILITOT 0.18 01/30/2021    LABALBU 4.0 01/30/2021    LABALBU 3.5 09/09/2011    ALBUMIN NOT REPORTED 01/30/2021    LABGLOM 39 02/01/2021    GFRAA 47 02/01/2021    GFR      02/01/2021    GFR NOT REPORTED 02/01/2021    PROT 8.2 01/30/2021    CALCIUM 10.3 02/01/2021     PT/INR:    Lab Results   Component Value Date    PROTIME 14.2 12/11/2019    PROTIME 9.8 10/05/2011    INR 1.1 12/11/2019     PTT:   Lab Results   Component Value Date    APTT 37.9 12/11/2019     U/A:    Lab Results   Component Value Date    COLORU Yellow 11/18/2020    COLORU YELLOW 07/07/2020    TURBIDITY CLEAR 07/07/2020    SPECGRAV 1.020 11/18/2020    SPECGRAV 1.012 07/07/2020    HGBUR TRACE 07/07/2020    PHUR 6.0 11/18/2020    PHUR 5.5 07/07/2020    PROTEINU 2+ 11/18/2020    PROTEINU 2+ 07/07/2020    GLUCOSEU Negative 11/18/2020    GLUCOSEU 2+ 07/07/2020    GLUCOSEU 3+ 09/04/2011    KETUA Negative 11/18/2020    KETUA NEGATIVE 07/07/2020    BILIRUBINUR Negative 11/18/2020    BILIRUBINUR NEGATIVE 09/04/2011    UROBILINOGEN Normal 07/07/2020    NITRU NEGATIVE 07/07/2020    LEUKOCYTESUR Negative 11/18/2020    LEUKOCYTESUR NEGATIVE 07/07/2020       ,   wound culture: negative ,     Radiology:    Xr Knee Right (3 Views)    Result Date: 1/30/2021  EXAMINATION: THREE XRAY VIEWS OF THE RIGHT KNEE 1/30/2021 10:35 am COMPARISON: None. HISTORY: ORDERING SYSTEM PROVIDED HISTORY: pain TECHNOLOGIST PROVIDED HISTORY: pain Reason for Exam: right knee pain Acuity: Unknown Type of Exam: Unknown FINDINGS: There is tricompartmental narrowing of the knee with associated spurring of the condyles. No fractures or dislocations are seen. Soft tissues appear within normal limits. There is chondrocalcinosis. Vascular calcifications are seen compatible with atherosclerotic disease. Mild osteoarthritis of the knee. No acute bony abnormalities are noted     Ct Pelvis W Contrast Additional Contrast? None    Addendum Date: 1/31/2021    ADDENDUM: Both hip joints are intact. Mild degenerative changes with subcortical cysts seen in the femoral heads more notably on the left. No hip joint effusions are identified. No fractures, osteolytic or blastic changes are seen. Result Date: 1/31/2021  EXAMINATION: CT OF THE PELVIS WITH CONTRAST 1/30/2021 12:36 pm TECHNIQUE: CT of the pelvis was performed with the administration of intravenous contrast. Multiplanar reformatted images are provided for review.  Dose modulation, iterative reconstruction, and/or weight based adjustment of the mA/kV was utilized to reduce the daily (before meals)     * glucose monitoring kit monitoring kit  Diagnosis: Diabetes type 2, insulin-dependent. Use 3-4 times daily. Ok to give insurance brand     * blood glucose monitor kit and supplies  Test 4 times a day & as needed for symptoms of irregular blood glucose. lisinopril 30 MG tablet  Commonly known as: PRINIVIL;ZESTRIL  TAKE 1 TABLET BY MOUTH ONCE DAILY     nystatin 012302 UNIT/GM powder  Commonly known as: MYCOSTATIN  APPLY TOPICALLY TO AFFECTED AREAS FOUR TIMES A DAY     ondansetron 4 MG disintegrating tablet  Commonly known as: Zofran ODT  Take 1 tablet by mouth every 8 hours as needed for Nausea or Vomiting     * OneTouch Delica Lancets 86A Misc  CHECK BLOOD SUGAR ONCE DAILY     * Lancets Misc  Use as directed, Dx Insulin dependent DM     OneTouch Ultra strip  Generic drug: blood glucose test strips  TEST 4 TIMES A DAY & AS NEEDED FOR SYMPTOMS OF IRREGULAR BLOOD GLUCOSE.     pravastatin 40 MG tablet  Commonly known as: PRAVACHOL  TAKE 1 TABLET BY MOUTH ONCE DAILY         * This list has 6 medication(s) that are the same as other medications prescribed for you. Read the directions carefully, and ask your doctor or other care provider to review them with you. Where to Get Your Medications      These medications were sent to [facility], [address], [address] [address]    Phone: [phone]   · ondansetron 4 MG tablet  · oxyCODONE-acetaminophen 5-325 MG per tablet  · sulfamethoxazole-trimethoprim 800-160 MG per tablet         Time Spent on discharge is  31 mins in patient examination, evaluation, counseling as well as medication reconciliation, prescriptions for required medications, discharge plan and follow up.     Electronically signed by   Val Bassett MD  2/1/2021  3:10 PM      Thank you Dr. Nayely Hassan, APRN - CNP for the opportunity to be involved in this patient's care.

## 2021-02-01 NOTE — CARE COORDINATION
Follow up appointment made for patient with Cesar Busch on 2/10/21 at 1:30. Notified patient of date and time. Patient verbalizes understanding. Appointment entered into discharge navigator.     Electronically signed by Jase Schaffer RN on 2/1/2021 at 12:59 PM

## 2021-02-01 NOTE — PROGRESS NOTES
Dressing change on right buttock wound completed per orders. Wound packed with half inch iodoform and dry ABD to cover wound. Pt tolerated well.

## 2021-02-01 NOTE — PROGRESS NOTES
CLINICAL PHARMACY NOTE: MEDS TO [address] Select Patient?: Yes  Total # of Prescriptions Filled: 3   The following medications were delivered to the patient:  · Bactrim DS  · Percocet  · Zofran  ·   Total # of Interventions Completed: 0  Time Spent (min): 30    Additional Documentation:

## 2021-02-01 NOTE — PROGRESS NOTES
[address]   Occupational Therapy Evaluation  Date: 21  Patient Name: Sara Sorenson       Room: 7792/2621-80  MRN: [id number]  Account: [de-identified]   : 1960  (62 y.o.) Gender: female     Discharge Recommendations: The patient may need non-skilled ADL assistance after discharge. OT Equipment Recommendations  Equipment Needed: Yes  Other: bedside commode, walker       Diagnosis: admit RLE pain especially hip to knee, xray negative for acute fracture, R mike rectal abscess, I + D procedure 21  Additional Pertinent Hx: xray R knee shows OA, per Dr. Meghan Chappell:  Orthopedic consult for knee pain as outpatient      Per MD note: Patient seen and examined at the bedside. No acute events overnight. Patient was post incision and drainage day 1, tolerating well. Patient afebrile, vitals stable. Patient receiving Cipro and Flagyl p.o. Patient's creatinine increased to 1.22 yesterday to 1.39. Labs discussed with patient and she is agreeable to restarting IV fluids, however is a little bit fearful of being poked. Patient is also complaining of diffuse itchiness. On examination of back, there is dry skin, skin excoriation from scratching and multiple small bumps over back with areas of scarring. Patient advised to use topical emollients and nonsedating antihistamines if necessary. Patient is also complaining of right leg pain from groin to knee joint. Patient will work with physical therapy today. Discharge likely today with VNS. Past Medical History:  has a past medical history of Anemia, Chronic back pain, Depression, Hyperlipidemia, Hypertension, Type 2 diabetes mellitus with diabetic polyneuropathy, without long-term current use of insulin ([facility] [address] and Type II or unspecified type diabetes mellitus without mention of complication, not stated as uncontrolled. Past Surgical History:   has a past surgical history that includes eye surgery; Thyroid surgery;  External ear surgery;  section; and Rectal surgery (Right, 2021). Restrictions  Restrictions/Precautions: General Precautions, Fall Risk  Implants present? : (pt denies)  Other position/activity restrictions: up with assistance  Required Braces or Orthoses?: No     Vitals  Temp: 98.1 °F (36.7 °C)  Pulse: 73  Resp: 16  BP: 118/78  Height: 5' 5\" (165.1 cm)  Weight: 192 lb 7.4 oz (87.3 kg)  BMI (Calculated): 32.1  Oxygen Therapy  SpO2: 99 %  Pulse Oximeter Device Mode: Continuous  O2 Device: None (Room air)  Level of Consciousness: Alert (0)    Subjective  Subjective: \"I came here for help with my knee and foot. \"  pt frustrated that attention shifted to her abscess and feeling like issues with her leg was not addressed. she states \" I've had boils before and this one popped the night before I came in.\"  OT allowed pt to fully express her thoughts and feelings and provided emotional support. after this pt was able to note her RLE does not feel as \"heavy\" as it did prompting her to seek medical help. Process of I and D and course of antibiotics were explained to pt. Ed pt that if RLE still problematic after course of antibiotics completed to seek medical care at that time. pt acknowledged understanding and also of need for walker to ambulate and continue to complete RLE exercises. Comments: \"I can take care of myself, I'll crawl again if I have to. \"  pt agrees to need for walker at this time as well as bedside commode over toilet post ed by OT,  OT let discharge RN manager know re recommendation of BSC.      Vision  Vision: Within Functional Limits  Hearing  Hearing: Within functional limits  Social/Functional History  Lives With: Son  Type of Home: House(lower level of duplex)  Home Layout: One level  Home Access: Stairs to enter with rails  Entrance Stairs - Number of Steps: 2-3 curb steps, 4 stairs with B HR  Entrance Stairs - Rails: Both  Bathroom Shower/Tub: Tub/Shower unit, Curtain  Bathroom Toilet: Standard  Bathroom Accessibility: Accessible, Walker accessible  Home Equipment: Reacher  ADL Assistance: Independent  Homemaking Assistance: Independent  Homemaking Responsibilities: Yes  Ambulation Assistance: Independent(not using DME - was crawling per RN)  Transfer Assistance: Independent  Active : Yes(son or friend can drive - has not been driving)  Mode of Transportation: SUV  Occupation: Unemployed  IADL Comments: sleeping in flat bed  Additional Comments: Pt was living alone in two story set up. Plans to stay with son upon d/c. Son does not work - pt not sure if she will have 24/7. Objective      Cognition  Overall Cognitive Status: WFL   Sensation  Overall Sensation Status: Impaired  Additional Comments: pt with numb B hands and B feet, R worse than L, notes doing AROM,  and heat to address in R hand and AROM for remainder extremitites   ADL  Feeding: Independent  Grooming: Setup  UE Bathing: Setup  LE Bathing: Setup, Stand by assistance  UE Dressing: Setup  LE Dressing: Setup, Stand by assistance  Toileting: Stand by assistance  Additional Comments: pt notes she was able to independently don B socks today. UE Function           LUE Strength  LUE Strength Comment: not formally tested, no deficits noted, observed full AROM throughout BUE. LUE AROM (degrees)  LUE AROM : WFL        RUE Strength  RUE Strength Comment: not formally tested, no deficits noted, observed full AROM throughout BUE. RUE AROM (degrees)  RUE AROM : WFL          Fine Motor Skills  Coordination  Movements Are Fluid And Coordinated: Yes                           Mobility             Standing Balance  Comment: pt ambulated 15 feet with CGA and RW with reduced weight tolerated on RLE  PT eval today, pt declined mobility after PT eval earlier.      Bed mobility  Rolling to Left: Modified independent  Rolling to Right: Modified independent  Comment: per PT pt supine to sit and reverse with sup today Transfers  Stand Step Transfers: Contact guard assistance(walker)  Sit to stand: Contact guard assistance  Stand to sit: Contact guard assistance  Transfer Comments: above info is from PT eval today, pt declined mobility after PT eval earlier. Assessment  Performance deficits / Impairments: Decreased functional mobility , Decreased high-level IADLs, Decreased ADL status, Decreased endurance, Decreased sensation  Prognosis: Good  Decision Making: Medium Complexity  History: admit RLE pain, xray negative for acute fracture, R mike rectal abscess, I + D procedure 1-31-21  Exam: 5 performance deficits  Assistance / Modification: moderate due to pt diagnoses and inexperience with medical issues and hospital system, benefitted from extra ed  REQUIRES OT FOLLOW UP: Yes  Activity Tolerance: Patient Tolerated treatment well, Treatment limited secondary to agitation  Activity Tolerance: style of OT eval modified due to RN report of pt agitation with professionals asking questions. OT eval today was patient directed and so some aspects such as UE strength (not a problem area) were not formally assessed. Goals  Patient Goals   Patient goals : \"take care of myself\"  Short term goals  Time Frame for Short term goals: 1 week  Short term goal 1: pt to tolerate 10-13 min stand, ambulate with walker as needed mod indep  Short term goal 2: pt to ambulate to obtain set up for adls mod indep demo good safety and walker technique  Short term goal 3: mod indep self care and adl transfers  Short term goal 4: pt to state safe plan re showering upon discharge based on current balance assessment.     Plan        Plan  Times per week: 4-5  Times per day: Daily  Current Treatment Recommendations: Safety Education & Training, Patient/Caregiver Education & Training, Self-Care / ADL, Functional Mobility Training, Home Management Training, Equipment Evaluation, Education, & procurement, Endurance Training  OT Education  OT Education: OT Role, Plan of Care, Equipment  Patient Education: ed re use of BSC and showed pictures of tub seat and extended tub bench and ed re the difference. pt plans to sponge bathe until she can shower standing safely. pt notes she has reachers.     OT Equipment Recommendations  Equipment Needed: Yes  Other: bedside commode, walker  OT Individual Minutes  Time In: 2137  Time Out: 7242  Minutes: 49    Electronically signed by Ariadna Mao OT on 2/1/21 at 10:03 AM EST

## 2021-02-01 NOTE — CARE COORDINATION
[facility] [address] Encounter Date/Time: 2021 [address] Account: [de-identified]    MRN: [id number]    Patient: Corey Matthews    Contact Serial #: [id number]      ENCOUNTER          Patient Class: Observation Private Enc? No Unit RM BD: John 15    Hospital Service: Med/Surg   Encounter DX: Perirectal skin irritati*   ADM Provider: Lorraine Reaves MD   Procedure:     ATT Provider: Lorraine Reaves MD   REF Provider:        Admission DX: Perirectal skin irritation, Perirectal abscess and codes: 569.49, 566      PATIENT                 Name: Corey Matthews : 1960 (60 yrs)   Address: [address] [place] [address] Sex: Female   [facility] [address]         Marital Status: Single   Employer: NOT EMPLOYED         Taoism: [facility]   Primary Care Provider: DEBBIE Mason         Primary Phone: [phone]   EMERGENCY CONTACT   Contact Name Legal Guardian? Relationship to Patient Home Phone Work Phone   1. Stella Reynoso  2. *No Contact Specified* No    Parent    [phone]            GUARANTOR            Guarantor: Corey Matthews     : 1960   Address: [address] [place] [address] Sex: Female     [place] [address]     Relation to Patient: Self       Home Phone: [phone]   Guarantor ID: [id number]       Work Phone:     Guarantor Employer: NOT EMPLOYED         Status: NOT EMPLO*      COVERAGE        PRIMARY INSURANCE   Payor: [employer] C* Plan: Mobile [employer]*   Payor Address: [address] [facility]       Group Number: [facility] Insurance Type: INDEMNITY   Subscriber Name: Ele Jones : 1960   Subscriber ID: [id number] Pat. Rel. to Sub: Self   SECONDARY INSURANCE   Payor:   Plan:     Payor Address:  ,           Group Number:   Insurance Type:     Subscriber Name:   Subscriber :     Subscriber ID:   Pat.  Rel. to Sub:       Patient c/o sob when walking and bilateral foot pain with swelling for 2 weeks . Denies any chest pain  . History of cardiac stent .

## 2021-02-01 NOTE — PROGRESS NOTES
Kristine Dickson was evaluated today and a DME order was entered for a wheeled walker with seat because she requires this to successfully complete daily living tasks of eating, bathing, toileting, personal cares, ambulating, grooming, dressing upper body and dressing lower body. A wheeled walker with seat is necessary due to the patient's unsteady gait, upper body weakness, inability to  and ambulation device, ambulating only short distances by pushing a walker, and the need to sit for a short time before resuming ambulation. These tasks cannot be completed with a lesser ambulation device such as a cane, crutch, or standard walker. The need for this equipment was discussed with the patient and she understands and is in agreement.

## 2021-02-01 NOTE — CARE COORDINATION
ONGOING DISCHARGE PLAN:    Spoke with patient regarding discharge plan and patient confirms that plan is still to DC to her Son's Home. Pt. Is POD #1, I & D of perirectal Abscess. Therapy is rec. A 2 wheeled walker & BSC, writer is awaiting orders from Primary. [facility] is following for daily wound care. Pt states Michelnie Ron has a friend that can help as well, she is confirming that today\". New consult for Ortho to see for Knee pain. Will continue to follow for additional discharge needs. Electronically signed by Johann Cox RN on 2/1/2021 at 1:21 PM    ADD: Writer was notified, by Bassam, from [facility], that they can't accept for services. Writer has made Multiple, Multiple attempts to get [facility] for pt.at home. Either they do not accept her Insurance or they can not accommodate her needs.

## 2021-02-01 NOTE — PROGRESS NOTES
Vito Roman requires a bedside commode due to being confined to a single room, and is physically incapable of utilizing regular toilet facilities. Current body weight is Weight: 192 lb 7.4 oz (87.3 kg).

## 2021-02-01 NOTE — CARE COORDINATION
Notified, by Rema Snyder, from [facility], , that they can't accept pt for \"they can't meet her needs at this time\". Writer made referral to [facility], spoke to Gold Simpson, they will see if they can accept pt's Insurance & follow. ADD: [facility], does not accept pt's Insurance. Writer spoke to [employer] at [facility], they do [employer], but are not accepting new pt's at this time. Writer Made referral to [facility] will review & let writer know. Writer faxed Face Sheet to office. Writer spoke to Thanh Finley, from [facility], [place] she can accept her Insurance & she will follow.

## 2021-02-01 NOTE — PROGRESS NOTES
Physical Therapy    Facility/Department: [facility] MED SURG  Initial Assessment    NAME: Julia Castro  : 1960  MRN: [id number]    Date of Service: 2021    Discharge Recommendations:  Patient would benefit from continued therapy after discharge   PT Equipment Recommendations  Equipment Needed: Yes  Mobility Devices: Heidy Sterling: Rolling  Other: Pt needs RW for d/c due to high levels of discomfort in R LE and to be able to mobilize around home independently with improved quality of life. Assessment   Body structures, Functions, Activity limitations: Decreased functional mobility ; Decreased ADL status; Decreased ROM; Decreased strength;Decreased balance; Increased pain;Decreased endurance  Assessment: Pt requiring 1 assist for mobility using RW. Pt has R knee discomfort - no fx per imaging. pt would benefit from continued PT and device for d/c. Pt will need son's support iniitially until pt able to tolerate more weight on R LE. Treatment Diagnosis: Impaired functional mobility 2* increased knee pain  Specific instructions for Next Treatment: progress gait/stairs, adjust RW, family training, HEP  Prognosis: Fair  Decision Making: Medium Complexity  History: 81-year-old female presents for complaint of right leg pain and possible abscess in her buttock. Patient states she been having pain in the right leg for the last week. Patient states pain starts in her right hip and goes to her right knee patient denies any recent injury or trauma, denies any numbness, tingling or weakness in the extremity, patient also complaining of boil on her buttock, patient states that this has been present for the last week as well, states she has a history of these, states that she felt a pop yesterday and wanted to be evaluated for it as well. S/P Rectal I&D 21  Exam: ROM, MMT, bed mobility, transfers, amb, balance, stairs  Clinical Presentation: Pt alert, agreeable to PT in room with encouragement.   Barriers to Learning: pain  REQUIRES PT FOLLOW UP: Yes  Activity Tolerance  Activity Tolerance: Patient limited by pain; Patient limited by endurance       Patient Diagnosis(es): The encounter diagnosis was Perirectal abscess. has a past medical history of Anemia, Chronic back pain, Depression, Hyperlipidemia, Hypertension, Type 2 diabetes mellitus with diabetic polyneuropathy, without long-term current use of insulin ([facility] [address] and Type II or unspecified type diabetes mellitus without mention of complication, not stated as uncontrolled. has a past surgical history that includes eye surgery; Thyroid surgery; External ear surgery;  section; and Rectal surgery (Right, 2021). Restrictions  Restrictions/Precautions  Restrictions/Precautions: General Precautions, Fall Risk  Required Braces or Orthoses?: No  Implants present? : (pt denies)  Position Activity Restriction  Other position/activity restrictions: up with assistance  Vision/Hearing  Vision: Within Functional Limits  Hearing: Within functional limits     Subjective  General  Chart Reviewed: Yes  Patient assessed for rehabilitation services?: Yes  Additional Pertinent Hx: HTN, T2DM, rectal I&D 21, R knee xray (-) for fx  Family / Caregiver Present: No  Referring Practitioner: Karyn Retana MD  Referral Date : 21  Diagnosis: perirectal skin irritation  Follows Commands: Within Functional Limits  Subjective  Subjective: Pt in bed, laying in diagonal formation in bed. Pt is agreeable to PT with education and encouragement. RN Oswaldo Gusman. Pain Screening  Patient Currently in Pain: Yes  Pain Assessment  Pain Assessment: 0-10  Pain Level: 8  Pain Type: Acute pain  Pain Location: Hip;Knee  Pain Orientation: Right  Pain Descriptors: Aching; Sharp  Pain Frequency: Continuous  Pain Onset: On-going  Clinical Progression: Not changed  Functional Pain Assessment: Prevents or interferes some active activities and ADLs  Non-Pharmaceutical Pain Intervention(s): Ambulation/Increased Activity;Cold applied;Distraction;Elevation;Repositioned; Rest  Response to Pain Intervention: Patient Satisfied  Vital Signs  Patient Currently in Pain: Yes  Patient Observation  Observations: mild edema R knee       Orientation  Orientation  Overall Orientation Status: Within Functional Limits  Social/Functional History  Social/Functional History  Lives With: Son  Type of Home: House(lower level of duplex)  Home Layout: One level  Home Access: Stairs to enter with rails  Entrance Stairs - Number of Steps: 2-3 curb steps, 4 stairs with B HR  Entrance Stairs - Rails: Both  Bathroom Shower/Tub: Tub/Shower unit, Curtain  Bathroom Toilet: Standard  Bathroom Accessibility: Accessible, Walker accessible  Home Equipment: (no DME)  ADL Assistance: Independent  Homemaking Assistance: Independent  Homemaking Responsibilities: Yes  Ambulation Assistance: Independent(not using DME - was crawling per RN)  Transfer Assistance: Independent  Active : Yes(son or friend can drive - has not been driving)  Mode of Transportation: SUV  Occupation: Unemployed  IADL Comments: sleeping in flat bed  Additional Comments: Pt was living alone in two story set up. Plans to stay with son upon d/c. Son does not work - pt not sure if she will have 24/7. Cognition        Objective          AROM RLE (degrees)  RLE AROM: WFL  AROM LLE (degrees)  LLE AROM : WNL  AROM RUE (degrees)  RUE General AROM: See OT  AROM LUE (degrees)  LUE General AROM: See OT  Strength RLE  Strength RLE: WFL  Comment: Grossly 3+/5  Strength LLE  Strength LLE: WFL  Comment: Grossly 4-/5  Strength RUE  Comment: See OT  Strength LUE  Comment: See OT     Sensation  Overall Sensation Status: Impaired(B feet/hands numbness/tingling)  Bed mobility  Rolling to Left: Supervision  Supine to Sit: Supervision  Sit to Supine: Supervision  Scooting: Supervision  Comment: HOB elevated. Pt advances R LE with UE prn. No dizziness reported.  Pt back in bed at end of session with ice pack on anterior knee and elevated R LE. Transfers  Sit to Stand: Contact guard assistance  Stand to sit: Contact guard assistance  Bed to Chair: Stand by assistance(bed <-->BSC)  Squat Pivot Transfers: Stand by assistance(bed<-->BSC)  Comment: Pt defers sitting up in chair with R LE elevated. Pt back to bed at end of session. Ambulation  Ambulation?: Yes  Ambulation 1  Surface: level tile  Device: Rolling Walker  Assistance: Contact guard assistance  Quality of Gait: antalgic gait, decreased stance time/step length on R, no LOB  Gait Deviations: Decreased step length;Decreased step height;Slow Paz  Distance: 15'  Comments: Pt requires increased time to amb. Demo given on RW. Pt educated on RW vs rollator as pt inquiring about rollator. Pt educated on gait belt. Stairs/Curb  Stairs?: Yes  Stairs  # Steps : 2  Device: Rolling walker  Assistance: Minimal assistance;Contact guard assistance  Comment: Demo'd step to pattern \"up with the good, down with the bad\" cues to follow. Good compliance. Pt educated on use of gait belt with son. Balance  Posture: Good  Sitting - Static: Good  Sitting - Dynamic: Good  Standing - Static: Good;-;Fair;+  Standing - Dynamic: Fair  Comments: Fall risk, standing balance with RW. Pt relies on RW to stand/ambulate due to R knee pain. Plan   Plan  Times per week: 5-7x/week  Specific instructions for Next Treatment: progress gait/stairs, adjust RW, family training, HEP  Current Treatment Recommendations: Strengthening, ROM, Balance Training, Transfer Training, Functional Mobility Training, Endurance Training, Gait Training, Stair training, Equipment Evaluation, Education, & procurement, Patient/Caregiver Education & Training, Pain Management, Home Exercise Program, Safety Education & Training, Positioning  Safety Devices  Type of devices:  All fall risk precautions in place, Call light within reach, Gait belt, Patient at risk for falls, Left in bed, Nurse notified(MALGORZATA Nassar)    G-Code       OutComes Score                                                  AM-PAC Score  AM-PAC Inpatient Mobility Raw Score : 18 (02/01/21 0902)  AM-PAC Inpatient T-Scale Score : 43.63 (02/01/21 0902)  Mobility Inpatient CMS 0-100% Score: 46.58 (02/01/21 0902)  Mobility Inpatient CMS G-Code Modifier : CK (02/01/21 0902)          Goals  Short term goals  Time Frame for Short term goals: 3 days  Short term goal 1: Pt to demo bed mobility Mod I. Short term goal 2: Pt to perform transfers SBA with device. Short term goal 3: Pt to amb 48' with SBA, using RW. Short term goal 4: Pt to ascend/descend 3 platform steps and 4 regular steps, CGA with family training. Short term goal 5: Pt to reduce pain to 2-3/10 to improve functional mobility and independence. Short term goal 6: Pt to demo good technique for HEP for improved BLE strengthening  Patient Goals   Patient goals :  To go home       Therapy Time   Individual Concurrent Group Co-treatment   Time In 0902         Time Out 0935         Minutes 33         Timed Code Treatment Minutes: Yosi Connor, PT

## 2021-02-01 NOTE — CARE COORDINATION
Leelee Imre [facility] [facility]. Encounter Date/Time: 2021 [address] Account: [de-identified]    MRN: [id number]    Patient: Rylan Dias    Contact Serial #: [id number]      ENCOUNTER          Patient Class: I Private Enc? No Unit RM BD: John 15    Hospital Service: Med/Surg   Encounter DX: Perirectal skin irritati*   ADM Provider: Nelia Pranell MD   Procedure:     ATT Provider: Nelia Parnell MD   REF Provider:        Admission DX: Perirectal skin irritation and codes: 569.49      PATIENT                 Name: Rylan Dias : 1960 (60 yrs)   Address: [address] [place] [address] Sex: Female   [facility] [id number]         Marital Status: Single   Employer: NOT EMPLOYED         Oriental orthodox: [employer]   Primary Care Provider: DEBBIE Serrano         Primary Phone: [phone]   EMERGENCY CONTACT   Contact Name Legal Guardian? Relationship to Patient Home Phone Work Phone   1. Stella Reynoso  2. *No Contact Specified* No    Parent    [phone]            GUARANTOR            Guarantor: Rylan Dias     : 1960   Address: [address] [place] [address] Sex: [place]     [place] [address]     Relation to Patient: Self       Home Phone: [phone]   Guarantor ID: [id number]       Work Phone:     Guarantor Employer: NOT EMPLOYED         Status: NOT EMPLO*      COVERAGE        PRIMARY INSURANCE   Payor: [employer] C* Plan: Mobile COMMUN*   Payor Address: [facility], [address] [place] [address],[address] [facility]       Group Number: [facility] Insurance Type: INDEMNITY   Subscriber Name: Avonne Favre : 1960   Subscriber ID: [id number] Pat. Rel. to Sub: Self   SECONDARY INSURANCE   Payor:   Plan:     Payor Address:  ,           Group Number:   Insurance Type:     Subscriber Name:   Subscriber :     Subscriber ID:   Pat.  Rel. to Sub:

## 2021-02-01 NOTE — PLAN OF CARE
Problem: Falls - Risk of:  Goal: Will remain free from falls  Outcome: Ongoing  Note: Pt remains free from falls this shift. Bed in lowest position with wheels locked and 2/4 siderails up. Nonskid socks on. Call light and bedside table within reach. Will continue to monitor. Goal: Absence of physical injury  Outcome: Ongoing     Problem: Pain:  Goal: Pain level will decrease  Outcome: Ongoing  Goal: Control of acute pain  Outcome: Ongoing  Goal: Control of chronic pain  Outcome: Ongoing     Problem: Skin Integrity:  Goal: Will show no infection signs and symptoms  Outcome: Ongoing  Goal: Absence of new skin breakdown  Outcome: Ongoing  Note: Skin assessment as charted. No new breakdown noted this shift. Dressing changes as ordered. Will continue to monitor.

## 2021-02-01 NOTE — CARE COORDINATION
Leelee Imre [facility]. 12. Encounter Date/Time: 2021 [address] Account: [de-identified]    MRN: [id number]    Patient: Kristine Dickson    Contact Serial #: [id number]      ENCOUNTER          Patient Class: I Private Enc? No Unit RM BD: John 15    Hospital Service: Med/Surg   Encounter DX: Perirectal skin irritati*   ADM Provider: Saritha Ervin MD   Procedure:     ATT Provider: Saritha Ervin MD   REF Provider:        Admission DX: Perirectal skin irritation and codes: 569.49      PATIENT                 Name: Kristine Dickson : 1960 (60 yrs)   Address: [address] [place] [address] Sex: Female   [facility] [address]         Marital Status: Single   Employer: NOT EMPLOYED         Zoroastrian: [facility]   Primary Care Provider: DEBBIE Stanley         Primary Phone: [phone]   EMERGENCY CONTACT   Contact Name Legal Guardian? Relationship to Patient Home Phone Work Phone   1. Stella Reynoso  2. *No Contact Specified* No    Parent    [phone]            GUARANTOR            Guarantor: Kristine Dickson     : 1960   Address: [address] [place] [address] Sex: [place]     [place] [address]     Relation to Patient: Self       Home Phone: [phone]   Guarantor ID: [id number]       Work Phone:     Guarantor Employer: NOT EMPLOYED         Status: NOT EMPLO*      COVERAGE        PRIMARY INSURANCE   Payor: [employer] [facility] C* Plan: Mobile COMMUN*   Payor Address: [facility]  [place], [facility] [employer] [facility],[address] [facility]       Group Number: [facility] Insurance Type: INDEMNITY   Subscriber Name: Jayden Tobar : 1960   Subscriber ID: [id number] Pat. Rel. to Sub: Self   SECONDARY INSURANCE   Payor:   Plan:     Payor Address:  ,           Group Number:   Insurance Type:     Subscriber Name:   Subscriber :     Subscriber ID:   Pat.  Rel. to Sub:

## 2021-02-01 NOTE — CARE COORDINATION
Writer faxed DME orders for walker & BSC, to [facility], informed that pt. Will be staying at her Son's. Updated address was placed on Pt's Face sheet for them to follow. ADD: writer spoke to [facility] [employer], from [facility], they do have the above orders & she was again made aware, that she will be staying at her Son's. Writer notified, Pt. Of above & she states understanding. Writer informed her that I could not find a VNS, to accept/follow for her. Pt. States \"never mind, the area, was the size of the tip of her Little finger\". Nursing confirms this & there is no need for dressing changes at this time.

## 2021-02-01 NOTE — CONSULTS
Consults  Patient: Snow Tejada  Unit/Bed: 6683/7948-63  YOB: 1960  MRN: [id number]  Acct: [de-identified]   Admitting Diagnosis: Perirectal skin irritation [K62.89]  Perirectal abscess [K61.1]  Admit Date:  2021  Hospital Day: 2    Subjective:    Patient is having problems with presented with groin pain radiating to right knee knee pain was severe found to have mike-rectal abscess now doing much better    C/o swelling of knee  HPI  Patient Seen, Chart, Labs, Radiology studies, and Consults reviewed. Past Medical History:   Diagnosis Date    Anemia     Chronic back pain     Depression     Hyperlipidemia     Hypertension     Type 2 diabetes mellitus with diabetic polyneuropathy, without long-term current use of insulin ([facility] 11/10/2016    Type II or unspecified type diabetes mellitus without mention of complication, not stated as uncontrolled      Past Surgical History:   Procedure Laterality Date     SECTION      EXTERNAL EAR SURGERY      EYE SURGERY      RECTAL SURGERY Right 2021    RECTAL PERIRECTAL INCISION AND DRAINAGE performed by Derrell Lefort, MD at [address] [place] [address]       History reviewed. No pertinent family history.   Social History     Occupational History    Not on file   Tobacco Use    Smoking status: Current Every Day Smoker     Packs/day: 0.50     Years: 39.00     Pack years: 19.50     Types: Cigarettes    Smokeless tobacco: Never Used   Substance and Sexual Activity    Alcohol use: No     Alcohol/week: 0.0 standard drinks     Comment: 1 x mon    Drug use: Not Currently     Types: Marijuana    Sexual activity: Never        Objective:   /78   Pulse 73   Temp 98.1 °F (36.7 °C) (Oral)   Resp 16   Ht 5' 5\" (1.651 m)   Wt 192 lb 7.4 oz (87.3 kg)   SpO2 99%   BMI 32.03 kg/m²       Intake/Output Summary (Last 24 hours) at 2021 1706  Last data filed at 2021 0723  Gross per 24 hour   Intake 1400 ml   Output 400 ml   Net 1000 ml Review of Systems   All other systems reviewed and are negative. Physical Exam  Vitals signs and nursing note reviewed. Constitutional:       Appearance: She is well-developed. HENT:      Head: Normocephalic and atraumatic. Nose: Nose normal.   Eyes:      Conjunctiva/sclera: Conjunctivae normal.   Neck:      Musculoskeletal: Normal range of motion and neck supple. Pulmonary:      Effort: Pulmonary effort is normal. No respiratory distress. Musculoskeletal:      Comments: Normal gait     Skin:     General: Skin is warm and dry. Neurological:      Mental Status: She is alert and oriented to person, place, and time. Sensory: No sensory deficit. Psychiatric:         Behavior: Behavior normal.         Thought Content:  Thought content normal.         Moving right hip and knee full rom with minimal discomfort    Knee swelling mild right wrt left    Drains:No  Imaging:Yes knee mild tricompartmental arthritis  CT hips ok      Medications:    enoxaparin  40 mg Subcutaneous Daily    buPROPion  150 mg Oral QAM    sodium chloride flush  10 mL Intravenous 2 times per day    alogliptin  25 mg Oral Daily    gabapentin  600 mg Oral TID    glipiZIDE  10 mg Oral BID AC    insulin lispro  0-12 Units Subcutaneous TID WC    famotidine  20 mg Oral BID    metroNIDAZOLE  500 mg Oral 3 times per day    ciprofloxacin  500 mg Oral 2 times per day    insulin lispro  0-6 Units Subcutaneous Nightly    atenolol  25 mg Oral Daily    lisinopril  30 mg Oral Daily    pravastatin  40 mg Oral Daily     PRN Meds:Hydrocerin, sodium chloride flush, polyethylene glycol, potassium chloride **OR** potassium alternative oral replacement **OR** potassium chloride, glucose, dextrose, glucagon (rDNA), dextrose, potassium chloride, magnesium sulfate, acetaminophen, senna, bisacodyl, oxyCODONE-acetaminophen, sodium chloride flush, promethazine **OR** [DISCONTINUED] ondansetron      Data:  CBC:   Recent Labs 01/30/21  1425 01/31/21  0600 02/01/21  0636   WBC 9.4 7.9 9.0   RBC 3.43* 3.15* 3.21*   HGB 10.9* 9.6* 9.8*   HCT 32.1* 29.7* 30.3*   MCV 93.7 94.4 94.5   RDW 15.7* 15.7* 16.1*    342 353     BNP: No results for input(s): BNP in the last 72 hours. PT/INR: No results for input(s): PROTIME, INR in the last 72 hours. Assessment:     Principal Problem:    Perirectal abscess  Active Problems:    Normocytic anemia    Essential hypertension    Type 2 diabetes mellitus with diabetic polyneuropathy, without long-term current use of insulin (HCC)    Perirectal skin irritation    Right knee pain    Depression    Hyperlipidemia    Elevated serum creatinine    Decreased GFR    Chronic kidney disease, stage 3a    Diabetic nephropathy ([facility] [address]  Resolved Problems:    * No resolved hospital problems.  *      Plan:   Follow up out patient    Electronically signed by Melyssa Conrad MD on 2/1/2021 at 5:06 PM    Rounding Hospitalist

## 2021-02-02 ENCOUNTER — CARE COORDINATION (OUTPATIENT)
Dept: CASE MANAGEMENT | Age: 61
End: 2021-02-02

## 2021-02-02 LAB — GLUCOSE BLD-MCNC: 81 MG/DL (ref 65–105)

## 2021-02-02 NOTE — FLOWSHEET NOTE
Discharge instructions reviewed with the patient. Medications filled by meds to beds are with the patient at discharge.

## 2021-02-02 NOTE — CARE COORDINATION
Covid-19 Outreach call, no answer.   Left VM with contact information and request  for return call at [address], [address] [place] [address] Coordination Transition

## 2021-02-02 NOTE — DISCHARGE SUMMARY
(L) 36 - 46 %    MCV 93.7 80 - 100 fL    MCH 31.8 26 - 34 pg    MCHC 34.0 31 - 37 g/dL    RDW 15.7 (H) 11.5 - 14.9 %    Platelets 628 187 - 475 k/uL    MPV 7.7 6.0 - 12.0 fL    NRBC Automated NOT REPORTED per 100 WBC    Differential Type NOT REPORTED     Seg Neutrophils 68 (H) 36 - 66 %    Lymphocytes 25 24 - 44 %    Monocytes 5 1 - 7 %    Eosinophils % 1 0 - 4 %    Basophils 1 0 - 2 %    Immature Granulocytes NOT REPORTED 0 %    Segs Absolute 6.50 1.3 - 9.1 k/uL    Absolute Lymph # 2.30 1.0 - 4.8 k/uL    Absolute Mono # 0.40 0.1 - 1.3 k/uL    Absolute Eos # 0.10 0.0 - 0.4 k/uL    Basophils Absolute 0.10 0.0 - 0.2 k/uL    Absolute Immature Granulocyte NOT REPORTED 0.00 - 0.30 k/uL    WBC Morphology NOT REPORTED     RBC Morphology NOT REPORTED     Platelet Estimate NOT REPORTED    Comprehensive Metabolic Panel w/ Reflex to MG   Result Value Ref Range    Glucose 278 (H) 70 - 99 mg/dL    BUN 20 6 - 20 mg/dL    CREATININE 1.30 (H) 0.50 - 0.90 mg/dL    Bun/Cre Ratio NOT REPORTED 9 - 20    Calcium 10.6 (H) 8.6 - 10.4 mg/dL    Sodium 135 135 - 144 mmol/L    Potassium 4.2 3.7 - 5.3 mmol/L    Chloride 99 98 - 107 mmol/L    CO2 27 20 - 31 mmol/L    Anion Gap 9 9 - 17 mmol/L    Alkaline Phosphatase 118 (H) 35 - 104 U/L    ALT 5 5 - 33 U/L    AST 6 <32 U/L    Total Bilirubin 0.18 (L) 0.3 - 1.2 mg/dL    Total Protein 8.2 6.4 - 8.3 g/dL    Albumin 4.0 3.5 - 5.2 g/dL    Albumin/Globulin Ratio NOT REPORTED 1.0 - 2.5    GFR Non-African American 42 (L) >60 mL/min    GFR  51 (L) >60 mL/min    GFR Comment          GFR Staging NOT REPORTED    COVID-19    Specimen: Other   Result Value Ref Range    SARS-CoV-2          SARS-CoV-2, Rapid Not Detected Not Detected    Source . NASOPHARYNGEAL SWAB     SARS-CoV-2         CBC Auto Differential   Result Value Ref Range    WBC 7.9 3.5 - 11.0 k/uL    RBC 3.15 (L) 4.0 - 5.2 m/uL    Hemoglobin 9.6 (L) 12.0 - 16.0 g/dL    Hematocrit 29.7 (L) 36 - 46 %    MCV 94.4 80 - 100 fL    MCH 30.6 26 - 34 pg    MCHC 32.4 31 - 37 g/dL    RDW 15.7 (H) 11.5 - 14.9 %    Platelets 125 107 - 614 k/uL    MPV 7.5 6.0 - 12.0 fL    NRBC Automated NOT REPORTED per 100 WBC    Differential Type NOT REPORTED     Seg Neutrophils 48 36 - 66 %    Lymphocytes 43 24 - 44 %    Monocytes 6 1 - 7 %    Eosinophils % 2 0 - 4 %    Basophils 1 0 - 2 %    Immature Granulocytes NOT REPORTED 0 %    Segs Absolute 3.80 1.3 - 9.1 k/uL    Absolute Lymph # 3.40 1.0 - 4.8 k/uL    Absolute Mono # 0.50 0.1 - 1.3 k/uL    Absolute Eos # 0.20 0.0 - 0.4 k/uL    Basophils Absolute 0.10 0.0 - 0.2 k/uL    Absolute Immature Granulocyte NOT REPORTED 0.00 - 0.30 k/uL    WBC Morphology NOT REPORTED     RBC Morphology NOT REPORTED     Platelet Estimate NOT REPORTED    Basic Metabolic Panel   Result Value Ref Range    Glucose 121 (H) 70 - 99 mg/dL    BUN 19 6 - 20 mg/dL    CREATININE 1.22 (H) 0.50 - 0.90 mg/dL    Bun/Cre Ratio NOT REPORTED 9 - 20    Calcium 9.8 8.6 - 10.4 mg/dL    Sodium 137 135 - 144 mmol/L    Potassium 3.9 3.7 - 5.3 mmol/L    Chloride 105 98 - 107 mmol/L    CO2 26 20 - 31 mmol/L    Anion Gap 6 (L) 9 - 17 mmol/L    GFR Non-African American 45 (L) >60 mL/min    GFR  55 (L) >60 mL/min    GFR Comment          GFR Staging NOT REPORTED    Ferritin   Result Value Ref Range    Ferritin 125 13 - 150 ug/L   Iron and TIBC   Result Value Ref Range    Iron 28 (L) 37 - 145 ug/dL    TIBC 179 (L) 250 - 450 ug/dL    Iron Saturation 16 (L) 20 - 55 %    UIBC 151 112 - 347 ug/dL   Basic Metabolic Panel w/ Reflex to MG   Result Value Ref Range    Glucose 84 70 - 99 mg/dL    BUN 23 (H) 6 - 20 mg/dL    CREATININE 1.39 (H) 0.50 - 0.90 mg/dL    Bun/Cre Ratio NOT REPORTED 9 - 20    Calcium 10.3 8.6 - 10.4 mg/dL    Sodium 137 135 - 144 mmol/L    Potassium 4.3 3.7 - 5.3 mmol/L    Chloride 103 98 - 107 mmol/L    CO2 26 20 - 31 mmol/L    Anion Gap 8 (L) 9 - 17 mmol/L    GFR Non-African American 39 (L) >60 mL/min    GFR  47 (L) >60 mL/min    GFR Comment          GFR Staging NOT REPORTED    CBC auto differential   Result Value Ref Range    WBC 9.0 3.5 - 11.0 k/uL    RBC 3.21 (L) 4.0 - 5.2 m/uL    Hemoglobin 9.8 (L) 12.0 - 16.0 g/dL    Hematocrit 30.3 (L) 36 - 46 %    MCV 94.5 80 - 100 fL    MCH 30.7 26 - 34 pg    MCHC 32.5 31 - 37 g/dL    RDW 16.1 (H) 11.5 - 14.9 %    Platelets 304 336 - 368 k/uL    MPV 7.5 6.0 - 12.0 fL    NRBC Automated NOT REPORTED per 100 WBC    Differential Type NOT REPORTED     Seg Neutrophils 63 36 - 66 %    Lymphocytes 30 24 - 44 %    Monocytes 6 1 - 7 %    Eosinophils % 0 0 - 4 %    Basophils 1 0 - 2 %    Immature Granulocytes NOT REPORTED 0 %    Segs Absolute 5.70 1.3 - 9.1 k/uL    Absolute Lymph # 2.70 1.0 - 4.8 k/uL    Absolute Mono # 0.50 0.1 - 1.3 k/uL    Absolute Eos # 0.00 0.0 - 0.4 k/uL    Basophils Absolute 0.10 0.0 - 0.2 k/uL    Absolute Immature Granulocyte NOT REPORTED 0.00 - 0.30 k/uL    WBC Morphology NOT REPORTED     RBC Morphology NOT REPORTED     Platelet Estimate NOT REPORTED    Hemoglobin A1C   Result Value Ref Range    Hemoglobin A1C 7.7 (H) 4.0 - 6.0 %    Estimated Avg Glucose 174 mg/dL   POC Glucose Fingerstick   Result Value Ref Range    POC Glucose 378 (H) 65 - 105 mg/dL   POC Glucose Fingerstick   Result Value Ref Range    POC Glucose 124 (H) 65 - 105 mg/dL   POC Glucose Fingerstick   Result Value Ref Range    POC Glucose 141 (H) 65 - 105 mg/dL   POC Glucose Fingerstick   Result Value Ref Range    POC Glucose 156 (H) 65 - 105 mg/dL   POC Glucose Fingerstick   Result Value Ref Range    POC Glucose 262 (H) 65 - 105 mg/dL   POC Glucose Fingerstick   Result Value Ref Range    POC Glucose 353 (H) 65 - 105 mg/dL   POC Glucose Fingerstick   Result Value Ref Range    POC Glucose 85 65 - 105 mg/dL   POC Glucose Fingerstick   Result Value Ref Range    POC Glucose 117 (H) 65 - 105 mg/dL   POC Glucose Fingerstick   Result Value Ref Range    POC Glucose 81 65 - 105 mg/dL        {Radiology:    Xr Knee Right (3 Views)    Result Date: 2021  EXAMINATION: THREE XRAY VIEWS OF THE RIGHT KNEE 2021 10:35 am COMPARISON: None. HISTORY: ORDERING SYSTEM PROVIDED HISTORY: pain TECHNOLOGIST PROVIDED HISTORY: pain Reason for Exam: right knee pain Acuity: Unknown Type of Exam: Unknown FINDINGS: There is tricompartmental narrowing of the knee with associated spurring of the condyles. No fractures or dislocations are seen. Soft tissues appear within normal limits. There is chondrocalcinosis. Vascular calcifications are seen compatible with atherosclerotic disease. Mild osteoarthritis of the knee. No acute bony abnormalities are noted     Ct Pelvis W Contrast Additional Contrast? None    Addendum Date: 2021    ADDENDUM: Both hip joints are intact. Mild degenerative changes with subcortical cysts seen in the femoral heads more notably on the left. No hip joint effusions are identified. No fractures, osteolytic or blastic changes are seen. Result Date: 2021  EXAMINATION: CT OF THE PELVIS WITH CONTRAST 2021 12:36 pm TECHNIQUE: CT of the pelvis was performed with the administration of intravenous contrast. Multiplanar reformatted images are provided for review. Dose modulation, iterative reconstruction, and/or weight based adjustment of the mA/kV was utilized to reduce the radiation dose to as low as reasonably achievable. COMPARISON: None. HISTORY: ORDERING SYSTEM PROVIDED HISTORY: r/o abscess TECHNOLOGIST PROVIDED HISTORY: r/o abscess Decision Support Exception->Emergency Medical Condition (MA) Reason for Exam: boil; r/o abscess Acuity: Unknown Type of Exam: Unknown Additional signs and symptoms: Pt c/o boil on her buttocks and right leg pain, unable to walk Relevant Medical/Surgical History: Diabetic; hx  FINDINGS: Pelvis: There is a 4 cm by 1.5 cm inflammatory soft tissue lesion long the posterior aspect of the right perirectal region with suggestion of small central fluid collection.   No intrapelvic mass is identified. Uterus is anteverted and tilted to the right. Bladder and rectum are otherwise intact. Peritoneum/Retroperitoneum: No free fluid. No lymphadenopathy. No evidence of pneumoperitoneum. Bones/Soft Tissues: Degenerative changes seen in the visualized spine . No acute bony abnormalities. Vascular calcifications are seen compatible with atherosclerotic disease. Incidentally noted are gallstones. Small fat containing ventral wall hernia. 4 cm x 1.5 cm right perirectal abscess. Cholelithiasis        Consultations:    Consults:     Final Specialist Recommendations/Findings:   IP CONSULT TO GENERAL SURGERY  IP CONSULT TO INTERNAL MEDICINE  IP CONSULT TO SOCIAL WORK  IP CONSULT TO SOCIAL WORK  IP CONSULT TO ORTHOPEDIC SURGERY      The patient was seen and examined on day of discharge and this discharge summary is in conjunction with any daily progress note from day of discharge. Discharge plan:     Disposition: Home    Physician Follow Up: With PCP and as specified     Requiring Further Evaluation/Follow Up POST HOSPITALIZATION/Incidental Findings:    Diet: regular     Activity: As tolerated    I  Discharge Medications:      Medication List      START taking these medications    ondansetron 4 MG tablet  Commonly known as: Zofran  Take every six hours as needed     oxyCODONE-acetaminophen 5-325 MG per tablet  Commonly known as: Percocet  Take 1 tablet by mouth every 6 hours as needed for Pain for up to 7 days. . Take lowest dose possible to manage pain     sulfamethoxazole-trimethoprim 800-160 MG per tablet  Commonly known as: Bactrim DS  Take 1 tablet by mouth 2 times daily for 14 days        CHANGE how you take these medications    butalbital-aspirin-caffeine -40 MG capsule  Commonly known as: Fiorinal  Take 1 capsule by mouth every 6 hours as needed for Headaches for up to 3 days. What changed: Another medication with the same name was removed.  Continue taking this medication, and follow the directions you see here. CONTINUE taking these medications    alogliptin 25 MG Tabs tablet  Commonly known as: NESINA  Take 1 tablet by mouth daily     aspirin EC 81 MG EC tablet  Take 1 tablet by mouth daily     atenolol 50 MG tablet  Commonly known as: TENORMIN  TAKE ONE-HALF TABLET BY MOUTH DAILY     Blood Pressure Cuff Misc  1 Dose by Does not apply route once for 1 dose     buPROPion 150 MG extended release tablet  Commonly known as: Wellbutrin XL  Take 1 tablet by mouth every morning     dicyclomine 10 MG capsule  Commonly known as: Bentyl  Take 1 capsule by mouth every 6 hours as needed (cramps)     gabapentin 600 MG tablet  Commonly known as: NEURONTIN  TAKE 1 TABLET BY MOUTH 3 TIMES DAILY     glipiZIDE 10 MG tablet  Commonly known as: Glucotrol  Take 1 tablet by mouth 2 times daily (before meals)     * glucose monitoring kit monitoring kit  Diagnosis: Diabetes type 2, insulin-dependent. Use 3-4 times daily. Ok to give insurance brand     * blood glucose monitor kit and supplies  Test 4 times a day & as needed for symptoms of irregular blood glucose. lisinopril 30 MG tablet  Commonly known as: PRINIVIL;ZESTRIL  TAKE 1 TABLET BY MOUTH ONCE DAILY     nystatin 147980 UNIT/GM powder  Commonly known as: MYCOSTATIN  APPLY TOPICALLY TO AFFECTED AREAS FOUR TIMES A DAY     * OneTouch Delica Lancets 23G Misc  CHECK BLOOD SUGAR ONCE DAILY     * Lancets Misc  Use as directed, Dx Insulin dependent DM     OneTouch Ultra strip  Generic drug: blood glucose test strips  TEST 4 TIMES A DAY & AS NEEDED FOR SYMPTOMS OF IRREGULAR BLOOD GLUCOSE.     pravastatin 40 MG tablet  Commonly known as: PRAVACHOL  TAKE 1 TABLET BY MOUTH ONCE DAILY         * This list has 4 medication(s) that are the same as other medications prescribed for you. Read the directions carefully, and ask your doctor or other care provider to review them with you.             STOP taking these medications    ondansetron 4 MG disintegrating tablet  Commonly known as: Zofran ODT           Where to Get Your Medications      These medications were sent to [facility], [address] [facility] [address]    Phone: [phone]   · ondansetron 4 MG tablet  · oxyCODONE-acetaminophen 5-325 MG per tablet  · sulfamethoxazole-trimethoprim 800-160 MG per tablet           Time spent on discharge planning ;          [] less than 30 minutes . [x]   more  than 30 minutes . Ellectronically signed by   Lucio Adams MD      Thank you Dr. Niko García, APRN - CNP for the opportunity to be involved in this patient's care. Please note that this chart was generated using voice recognition Dragon dictation software. Although every effort was made to ensure the accuracy of this automated transcription, some errors in transcription may have occurred.

## 2021-02-03 ENCOUNTER — CARE COORDINATION (OUTPATIENT)
Dept: CASE MANAGEMENT | Age: 61
End: 2021-02-03

## 2021-02-03 NOTE — CARE COORDINATION
Covid-19 Outreach call, no answer.   Left VM with contact information and request  for return call at [address], [address] [place] [address] Coordination Transition

## 2021-02-05 DIAGNOSIS — F32.1 EPISODE OF MODERATE MAJOR DEPRESSION (HCC): ICD-10-CM

## 2021-02-05 DIAGNOSIS — I10 ESSENTIAL HYPERTENSION: ICD-10-CM

## 2021-02-05 DIAGNOSIS — E11.42 DIABETIC POLYNEUROPATHY ASSOCIATED WITH TYPE 2 DIABETES MELLITUS (HCC): ICD-10-CM

## 2021-02-05 DIAGNOSIS — E11.42 TYPE 2 DIABETES MELLITUS WITH DIABETIC POLYNEUROPATHY, WITHOUT LONG-TERM CURRENT USE OF INSULIN (HCC): ICD-10-CM

## 2021-02-05 DIAGNOSIS — E78.00 PURE HYPERCHOLESTEROLEMIA: ICD-10-CM

## 2021-02-05 LAB
CULTURE: ABNORMAL
CULTURE: ABNORMAL
DIRECT EXAM: ABNORMAL
DIRECT EXAM: ABNORMAL
Lab: ABNORMAL
SPECIMEN DESCRIPTION: ABNORMAL

## 2021-02-05 RX ORDER — GABAPENTIN 600 MG/1
TABLET ORAL
Qty: 90 TABLET | Refills: 2 | Status: SHIPPED | OUTPATIENT
Start: 2021-02-05 | End: 2021-05-04

## 2021-02-05 RX ORDER — ATENOLOL 50 MG/1
TABLET ORAL
Qty: 15 TABLET | Refills: 5 | Status: SHIPPED | OUTPATIENT
Start: 2021-02-05 | End: 2021-07-15

## 2021-02-05 RX ORDER — BUPROPION HYDROCHLORIDE 150 MG/1
150 TABLET ORAL EVERY MORNING
Qty: 30 TABLET | Refills: 5 | Status: SHIPPED | OUTPATIENT
Start: 2021-02-05 | End: 2021-07-15

## 2021-02-05 RX ORDER — PRAVASTATIN SODIUM 40 MG
TABLET ORAL
Qty: 30 TABLET | Refills: 5 | Status: SHIPPED | OUTPATIENT
Start: 2021-02-05 | End: 2021-07-15

## 2021-02-05 RX ORDER — GLIPIZIDE 10 MG/1
TABLET ORAL
Qty: 60 TABLET | Refills: 5 | Status: SHIPPED | OUTPATIENT
Start: 2021-02-05 | End: 2021-07-15

## 2021-02-05 NOTE — TELEPHONE ENCOUNTER
Health Maintenance   Topic Date Due    Diabetic retinal exam  05/03/1971    Hepatitis B vaccine (1 of 3 - Risk 3-dose series) 05/03/1980    Cervical cancer screen  05/03/1982    Shingles Vaccine (1 of 2) 05/03/2011    Diabetic foot exam  03/30/2018    Colon Cancer Screen FIT/FOBT  12/08/2018    Lipid screen  01/24/2021    Flu vaccine (1) 11/18/2021 (Originally 9/1/2020)    Breast cancer screen  10/23/2021    A1C test (Diabetic or Prediabetic)  02/01/2022    Potassium monitoring  02/01/2022    Creatinine monitoring  02/01/2022    DTaP/Tdap/Td vaccine (2 - Td) 03/30/2027    Pneumococcal 0-64 years Vaccine  Completed    Hepatitis C screen  Completed    HIV screen  Completed    Hepatitis A vaccine  Aged Out    Hib vaccine  Aged Out    Meningococcal (ACWY) vaccine  Aged Out             (applicable per patient's age: Cancer Screenings, Depression Screening, Fall Risk Screening, Immunizations)    Hemoglobin A1C (%)   Date Value   02/01/2021 7.7 (H)   11/18/2020 8.2   08/19/2020 7.8     Microalb/Crt.  Ratio (mcg/mg creat)   Date Value   08/19/2020 750 (H)     LDL Cholesterol (mg/dL)   Date Value   01/24/2020 79     AST (U/L)   Date Value   01/30/2021 6     ALT (U/L)   Date Value   01/30/2021 5     BUN (mg/dL)   Date Value   02/01/2021 23 (H)      (goal A1C is < 7)   (goal LDL is <100) need 30-50% reduction from baseline     BP Readings from Last 3 Encounters:   02/01/21 118/78   01/31/21 (!) 158/74   11/18/20 (!) 149/76    (goal /80)      All Future Testing planned in [facility]:  Lab Frequency Next Occurrence   POCT FECAL IMMUNOCHEMICAL TEST (FIT) Once 12/22/2020       Next Visit Date:  Future Appointments   Date Time Provider Nida Avalos   2/10/2021  1:30 PM CONRADO Serna - [facility] [place]   2/17/2021  3:30 PM Sonam Castillo MD [facility]   3/24/2021  1:00 PM CONRADO Serna - [facility]            Patient Active Problem List: Normocytic anemia     Chronic bilateral low back pain without sciatica     Essential hypertension     Type 2 diabetes mellitus with diabetic polyneuropathy, without long-term current use of insulin (HCC)     Cigarette nicotine dependence without complication     Carpal tunnel syndrome of right wrist     Acute cystitis     Perirectal skin irritation     Right knee pain     Perirectal abscess     Depression     Hyperlipidemia     Elevated serum creatinine     Decreased GFR     Chronic kidney disease, stage 3a     Diabetic nephropathy (HCC)

## 2021-02-05 NOTE — TELEPHONE ENCOUNTER
Normocytic anemia     Chronic bilateral low back pain without sciatica     Essential hypertension     Type 2 diabetes mellitus with diabetic polyneuropathy, without long-term current use of insulin (HCC)     Cigarette nicotine dependence without complication     Carpal tunnel syndrome of right wrist     Acute cystitis     Perirectal skin irritation     Right knee pain     Perirectal abscess     Depression     Hyperlipidemia     Elevated serum creatinine     Decreased GFR     Chronic kidney disease, stage 3a     Diabetic nephropathy (HCC)

## 2021-02-08 ENCOUNTER — TELEPHONE (OUTPATIENT)
Dept: PRIMARY CARE CLINIC | Age: 61
End: 2021-02-08

## 2021-02-08 NOTE — TELEPHONE ENCOUNTER
PA request for Alogliptin     PA processed and submitted to pt insurance, waiting for response in regards to coverage

## 2021-02-10 ENCOUNTER — OFFICE VISIT (OUTPATIENT)
Dept: PRIMARY CARE CLINIC | Age: 61
End: 2021-02-10
Payer: MEDICAID

## 2021-02-10 VITALS
BODY MASS INDEX: 31.45 KG/M2 | OXYGEN SATURATION: 99 % | WEIGHT: 189 LBS | TEMPERATURE: 97.2 F | DIASTOLIC BLOOD PRESSURE: 82 MMHG | HEART RATE: 74 BPM | SYSTOLIC BLOOD PRESSURE: 134 MMHG

## 2021-02-10 DIAGNOSIS — Z09 HOSPITAL DISCHARGE FOLLOW-UP: Primary | ICD-10-CM

## 2021-02-10 DIAGNOSIS — K61.1 PERI-RECTAL ABSCESS: ICD-10-CM

## 2021-02-10 DIAGNOSIS — B37.31 VAGINA, CANDIDIASIS: ICD-10-CM

## 2021-02-10 DIAGNOSIS — E11.42 DIABETIC POLYNEUROPATHY ASSOCIATED WITH TYPE 2 DIABETES MELLITUS (HCC): ICD-10-CM

## 2021-02-10 PROCEDURE — G8417 CALC BMI ABV UP PARAM F/U: HCPCS | Performed by: NURSE PRACTITIONER

## 2021-02-10 PROCEDURE — 4004F PT TOBACCO SCREEN RCVD TLK: CPT | Performed by: NURSE PRACTITIONER

## 2021-02-10 PROCEDURE — G8484 FLU IMMUNIZE NO ADMIN: HCPCS | Performed by: NURSE PRACTITIONER

## 2021-02-10 PROCEDURE — 99214 OFFICE O/P EST MOD 30 MIN: CPT | Performed by: NURSE PRACTITIONER

## 2021-02-10 PROCEDURE — 2022F DILAT RTA XM EVC RTNOPTHY: CPT | Performed by: NURSE PRACTITIONER

## 2021-02-10 PROCEDURE — 3017F COLORECTAL CA SCREEN DOC REV: CPT | Performed by: NURSE PRACTITIONER

## 2021-02-10 PROCEDURE — 1111F DSCHRG MED/CURRENT MED MERGE: CPT | Performed by: NURSE PRACTITIONER

## 2021-02-10 PROCEDURE — 3051F HG A1C>EQUAL 7.0%<8.0%: CPT | Performed by: NURSE PRACTITIONER

## 2021-02-10 PROCEDURE — G8427 DOCREV CUR MEDS BY ELIG CLIN: HCPCS | Performed by: NURSE PRACTITIONER

## 2021-02-10 RX ORDER — FLUCONAZOLE 150 MG/1
150 TABLET ORAL DAILY
Qty: 3 TABLET | Refills: 0 | Status: SHIPPED | OUTPATIENT
Start: 2021-02-10 | End: 2021-02-10 | Stop reason: SDUPTHER

## 2021-02-10 RX ORDER — INSULIN GLARGINE 100 [IU]/ML
10 INJECTION, SOLUTION SUBCUTANEOUS NIGHTLY
Qty: 5 PEN | Refills: 3 | Status: SHIPPED | OUTPATIENT
Start: 2021-02-10 | End: 2021-09-29 | Stop reason: SDUPTHER

## 2021-02-10 RX ORDER — PEN NEEDLE, DIABETIC 31 G X1/4"
1 NEEDLE, DISPOSABLE MISCELLANEOUS DAILY
Qty: 100 EACH | Refills: 3 | Status: SHIPPED | OUTPATIENT
Start: 2021-02-10

## 2021-02-10 RX ORDER — METRONIDAZOLE 7.5 MG/G
1 GEL VAGINAL DAILY
Qty: 1 TUBE | Refills: 0 | Status: SHIPPED | OUTPATIENT
Start: 2021-02-10 | End: 2021-02-15

## 2021-02-10 RX ORDER — FLUCONAZOLE 150 MG/1
150 TABLET ORAL
Qty: 3 TABLET | Refills: 0 | Status: SHIPPED | OUTPATIENT
Start: 2021-02-10 | End: 2021-02-17

## 2021-02-10 ASSESSMENT — ENCOUNTER SYMPTOMS
COUGH: 0
SHORTNESS OF BREATH: 0

## 2021-02-10 NOTE — PROGRESS NOTES
Visit Information    Have you changed or started any medications since your last visit including any over-the-counter medicines, vitamins, or herbal medicines? no   Are you having any side effects from any of your medications? -  yes - Bactrim, causing yeast infection  Have you stopped taking any of your medications? Is so, why? -  no    Have you seen any other physician or provider since your last visit? Yes - Records Obtained  Have you had any other diagnostic tests since your last visit? Yes - Records Obtained  Have you been seen in the emergency room and/or had an admission to a hospital since we last saw you? Yes - Records Obtained  Have you had your routine dental cleaning in the past 6 months? no    Have you activated your [employer] account? If not, what are your barriers?  No: pt declined     Patient Care Team:  CONRADO Carnes CNP as PCP - General (Family Nurse Practitioner)  [facility] Carn[facility]  Gilberto Pascual MD as Consulting Physician (Obstetrics & Gynecology)    Medical History Review  Past Medical, Family, and Social History reviewed and does not contribute to the patient presenting condition    Health Maintenance   Topic Date Due    Diabetic retinal exam  05/03/1971    Hepatitis B vaccine (1 of 3 - Risk 3-dose series) 05/03/1980    Cervical cancer screen  05/03/1982    Shingles Vaccine (1 of 2) 05/03/2011    Diabetic foot exam  03/30/2018    Colon Cancer Screen FIT/FOBT  12/08/2018    Lipid screen  01/24/2021    Flu vaccine (1) 11/18/2021 (Originally 9/1/2020)    Breast cancer screen  10/23/2021    A1C test (Diabetic or Prediabetic)  02/01/2022    Potassium monitoring  02/01/2022    Creatinine monitoring  02/01/2022    DTaP/Tdap/Td vaccine (2 - Td) 03/30/2027    Pneumococcal 0-64 years Vaccine  Completed    Hepatitis C screen  Completed    HIV screen  Completed    Hepatitis A vaccine  Aged Out    Hib vaccine  Aged C/ [address] 20 Meningococcal (ACWY) vaccine  Aged Out

## 2021-02-10 NOTE — PROGRESS NOTES
blood glucose test strips (ONETOUCH ULTRA) strip  TEST 4 TIMES A DAY & AS NEEDED FOR SYMPTOMS OF IRREGULAR BLOOD GLUCOSE. Blood Pressure Monitoring (BLOOD PRESSURE CUFF) MISC  1 Dose by Does not apply route once for 1 dose             buPROPion (WELLBUTRIN XL) 150 MG extended release tablet  TAKE 1 TABLET BY MOUTH EVERY MORNING             butalbital-aspirin-caffeine (FIORINAL) -40 MG capsule  Take 1 capsule by mouth every 6 hours as needed for Headaches for up to 3 days. dicyclomine (BENTYL) 10 MG capsule  Take 1 capsule by mouth every 6 hours as needed (cramps)             fluconazole (DIFLUCAN) 150 MG tablet  Take 1 tablet by mouth daily for 3 doses             gabapentin (NEURONTIN) 600 MG tablet  TAKE 1 TABLET BY MOUTH 3 TIMES DAILY             glipiZIDE (GLUCOTROL) 10 MG tablet  TAKE 1 TABLET BY MOUTH 2 TIMES DAILY BEFORE MEALS             glucose monitoring kit (FREESTYLE) monitoring kit  Diagnosis: Diabetes type 2, insulin-dependent. Use 3-4 times daily.  Ok to give insurance brand             insulin glargine (LANTUS SOLOSTAR) 100 UNIT/ML injection pen  Inject 10 Units into the skin nightly             Insulin Pen Needle (KROGER PEN NEEDLES) 31G X 6 MM MISC  1 each by Does not apply route daily             Lancets MISC  Use as directed, Dx Insulin dependent DM             lisinopril (PRINIVIL;ZESTRIL) 30 MG tablet  TAKE 1 TABLET BY MOUTH ONCE DAILY             metroNIDAZOLE (METROGEL) 0.75 % vaginal gel  Place 1 Applicatorful vaginally daily for 5 days             nystatin (MYCOSTATIN) 837510 UNIT/GM powder  APPLY TOPICALLY TO AFFECTED AREAS FOUR TIMES A DAY             ondansetron (ZOFRAN) 4 MG tablet  Take every six hours as needed             ONETOUCH DELICA LANCETS 98D MISC  CHECK BLOOD SUGAR ONCE DAILY             pravastatin (PRAVACHOL) 40 MG tablet  TAKE 1 TABLET BY MOUTH ONCE DAILY             sulfamethoxazole-trimethoprim (BACTRIM DS) 800-160 MG per tablet  Take 1 tablet by mouth 2 times daily for 14 days                   Medications marked \"taking\" at this time  Outpatient Medications Marked as Taking for the 2/10/21 encounter (Office Visit) with CONRADO Carnes CNP   Medication Sig Dispense Refill    fluconazole (DIFLUCAN) 150 MG tablet Take 1 tablet by mouth daily for 3 doses 3 tablet 0    insulin glargine (LANTUS SOLOSTAR) 100 UNIT/ML injection pen Inject 10 Units into the skin nightly 5 pen 3    Insulin Pen Needle (KROGER PEN NEEDLES) 31G X 6 MM MISC 1 each by Does not apply route daily 100 each 3    metroNIDAZOLE (METROGEL) 0.75 % vaginal gel Place 1 Applicatorful vaginally daily for 5 days 1 Tube 0    glipiZIDE (GLUCOTROL) 10 MG tablet TAKE 1 TABLET BY MOUTH 2 TIMES DAILY BEFORE MEALS 60 tablet 5    buPROPion (WELLBUTRIN XL) 150 MG extended release tablet TAKE 1 TABLET BY MOUTH EVERY MORNING 30 tablet 5    pravastatin (PRAVACHOL) 40 MG tablet TAKE 1 TABLET BY MOUTH ONCE DAILY 30 tablet 5    atenolol (TENORMIN) 50 MG tablet TAKE 1/2 TABLET BY MOUTH DAILY 15 tablet 5    gabapentin (NEURONTIN) 600 MG tablet TAKE 1 TABLET BY MOUTH 3 TIMES DAILY 90 tablet 2    ondansetron (ZOFRAN) 4 MG tablet Take every six hours as needed 20 tablet 0    sulfamethoxazole-trimethoprim (BACTRIM DS) 800-160 MG per tablet Take 1 tablet by mouth 2 times daily for 14 days 28 tablet 0    blood glucose test strips (ONETOUCH ULTRA) strip TEST 4 TIMES A DAY & AS NEEDED FOR SYMPTOMS OF IRREGULAR BLOOD GLUCOSE. 120 each 5    alogliptin (NESINA) 25 MG TABS tablet Take 1 tablet by mouth daily 30 tablet 2    nystatin (MYCOSTATIN) 128979 UNIT/GM powder APPLY TOPICALLY TO AFFECTED AREAS FOUR TIMES A DAY 15 g 11    lisinopril (PRINIVIL;ZESTRIL) 30 MG tablet TAKE 1 TABLET BY MOUTH ONCE DAILY 30 tablet 5    Lancets MISC Use as directed, Dx Insulin dependent  each 5    Blood Pressure Monitoring (BLOOD PRESSURE CUFF) MISC 1 Dose by Does not apply route once for 1 dose 1 each 0    butalbital-aspirin-caffeine (FIORINAL) -40 MG capsule Take 1 capsule by mouth every 6 hours as needed for Headaches for up to 3 days. 30 capsule 0    dicyclomine (BENTYL) 10 MG capsule Take 1 capsule by mouth every 6 hours as needed (cramps) 20 capsule 0    blood glucose monitor kit and supplies Test 4 times a day & as needed for symptoms of irregular blood glucose. 1 kit 0    aspirin EC 81 MG EC tablet Take 1 tablet by mouth daily 30 tablet 2    glucose monitoring kit (FREESTYLE) monitoring kit Diagnosis: Diabetes type 2, insulin-dependent. Use 3-4 times daily. [id number] [address] Arredondo to give insurance brand 1 kit 0    ONETOUCH DELICA LANCETS 06X MISC CHECK BLOOD SUGAR ONCE DAILY 100 each 5        Medications patient taking as of now reconciled against medications ordered at time of hospital discharge: Yes    Chief Complaint   Patient presents with    Follow-Up from Hospital     mike-rectal abscess, yeast infection       HPI   Pt is here for hospital follow up. She was admitted on jan 30 for mike rectal abscess. She states she underwent surgery to have the infection removed. This has been a long time problem for her, recurrent for years. She reports her bowel movements are back to normal.    She does report she has a yeast infection from the antibiotics- she would like both pills and cream.   She was having some hyperglycemia in the hospital, a1c was found to be 7.7, she states she was feeling much better when she was on insulin in the hospital and she is willing to try this outpatient. Inpatient course: Discharge summary reviewed- see chart. Review of Systems   Constitutional: Negative for chills and fever. Respiratory: Negative for cough and shortness of breath. Cardiovascular: Negative for chest pain, palpitations and leg swelling.        Vitals:    02/10/21 1325 02/10/21 1330   BP: (!) 149/85 134/82   Site: Right Upper Arm    Position: Sitting    Cuff Size: Large Adult    Pulse: 73 74   Temp: 97.2 °F (36.2 °C)    TempSrc: Temporal    SpO2: 99%    Weight: 189 lb (85.7 kg)      Body mass index is 31.45 kg/m². Wt Readings from Last 3 Encounters:   02/10/21 189 lb (85.7 kg)   01/31/21 192 lb 7.4 oz (87.3 kg)   11/18/20 189 lb (85.7 kg)     BP Readings from Last 3 Encounters:   02/10/21 134/82   02/01/21 118/78   01/31/21 (!) 158/74       Physical Exam  Constitutional:       Appearance: She is well-developed. HENT:      Right Ear: External ear normal.      Left Ear: External ear normal.      Nose: Nose normal.   Neck:      Musculoskeletal: Full passive range of motion without pain and normal range of motion. Cardiovascular:      Rate and Rhythm: Normal rate and regular rhythm. Heart sounds: Normal heart sounds, S1 normal and S2 normal.   Pulmonary:      Effort: Pulmonary effort is normal. No respiratory distress. Breath sounds: Normal breath sounds. Genitourinary:      Musculoskeletal: Normal range of motion. General: No deformity. Skin:     General: Skin is warm and dry. Neurological:      Mental Status: She is alert and oriented to person, place, and time. Assessment/Plan:  1. Hospital discharge follow-up    - NH DISCHARGE MEDS RECONCILED W/ CURRENT OUTPATIENT MED LIST  - Basic Metabolic Panel; Future  - CBC; Future    2. Diabetic polyneuropathy associated with type 2 diabetes mellitus (HCC)    - insulin glargine (LANTUS SOLOSTAR) 100 UNIT/ML injection pen; Inject 10 Units into the skin nightly  Dispense: 5 pen; Refill: 3  - Insulin Pen Needle ([employer]ER PEN NEEDLES) 31G X 6 MM MISC; 1 each by Does not apply route daily  Dispense: 100 each; Refill: 3    3. Gin-rectal abscess  healing    4.  Vagina, candidiasis    - metroNIDAZOLE (METROGEL) 0.75 % vaginal gel; Place 1 Applicatorful vaginally daily for 5 days  Dispense: 1 Tube; Refill: 0        Medical Decision Making: moderate complexity

## 2021-03-05 DIAGNOSIS — E11.42 TYPE 2 DIABETES MELLITUS WITH DIABETIC POLYNEUROPATHY, WITHOUT LONG-TERM CURRENT USE OF INSULIN (HCC): ICD-10-CM

## 2021-03-05 NOTE — TELEPHONE ENCOUNTER
Health Maintenance   Topic Date Due    Diabetic retinal exam  Never done    Hepatitis B vaccine (1 of 3 - Risk 3-dose series) Never done    Cervical cancer screen  Never done    Shingles Vaccine (1 of 2) Never done    Diabetic foot exam  03/30/2018    Colon Cancer Screen FIT/FOBT  12/08/2018    Lipid screen  01/24/2021    Flu vaccine (1) 11/18/2021 (Originally 9/1/2020)    Breast cancer screen  10/23/2021    A1C test (Diabetic or Prediabetic)  02/01/2022    Potassium monitoring  02/01/2022    Creatinine monitoring  02/01/2022    DTaP/Tdap/Td vaccine (2 - Td) 03/30/2027    Pneumococcal 0-64 years Vaccine  Completed    Hepatitis C screen  Completed    HIV screen  Completed    Hepatitis A vaccine  Aged Out    Hib vaccine  Aged Out    Meningococcal (ACWY) vaccine  Aged Out             (applicable per patient's age: Cancer Screenings, Depression Screening, Fall Risk Screening, Immunizations)    Hemoglobin A1C (%)   Date Value   02/01/2021 7.7 (H)   11/18/2020 8.2   08/19/2020 7.8     Microalb/Crt.  Ratio (mcg/mg creat)   Date Value   08/19/2020 750 (H)     LDL Cholesterol (mg/dL)   Date Value   01/24/2020 79     AST (U/L)   Date Value   01/30/2021 6     ALT (U/L)   Date Value   01/30/2021 5     BUN (mg/dL)   Date Value   02/01/2021 23 (H)      (goal A1C is < 7)   (goal LDL is <100) need 30-50% reduction from baseline     BP Readings from Last 3 Encounters:   02/10/21 134/82   02/01/21 118/78   01/31/21 (!) 158/74    (goal /80)      All Future Testing planned in [facility]:  Lab Frequency Next Occurrence   POCT FECAL IMMUNOCHEMICAL TEST (FIT) Once 61/40/7229   Basic Metabolic Panel Once 99/05/2404   CBC Once 05/18/2021       Next Visit Date:  Future Appointments   Date Time Provider Nida Avalos   3/23/2021 10:50 AM Shena Nichols MD Neuro [facility] [place]   5/12/2021  1:15 PM CONRADO Smith - CNP [facility]            Patient Active Problem List:     Normocytic anemia Chronic bilateral low back pain without sciatica     Essential hypertension     Type 2 diabetes mellitus with diabetic polyneuropathy, without long-term current use of insulin (HCC)     Cigarette nicotine dependence without complication     Carpal tunnel syndrome of right wrist     Acute cystitis     Perirectal skin irritation     Right knee pain     Perirectal abscess     Depression     Hyperlipidemia     Elevated serum creatinine     Decreased GFR     Chronic kidney disease, stage 3a     Diabetic nephropathy (HCC)

## 2021-03-12 RX ORDER — LANCETS 33 GAUGE
EACH MISCELLANEOUS
Qty: 100 EACH | Refills: 5 | Status: SHIPPED | OUTPATIENT
Start: 2021-03-12

## 2021-05-22 DIAGNOSIS — I10 ESSENTIAL HYPERTENSION: ICD-10-CM

## 2021-05-24 RX ORDER — LISINOPRIL 30 MG/1
TABLET ORAL
Qty: 30 TABLET | Refills: 5 | Status: SHIPPED | OUTPATIENT
Start: 2021-05-24 | End: 2021-11-05

## 2021-05-24 NOTE — TELEPHONE ENCOUNTER
Hemoglobin A1C (%)   Date Value   02/01/2021 7.7 (H)   11/18/2020 8.2   08/19/2020 7.8             ( goal A1C is < 7)   Microalb/Crt.  Ratio (mcg/mg creat)   Date Value   08/19/2020 750 (H)     LDL Cholesterol (mg/dL)   Date Value   01/24/2020 79       (goal LDL is <100)   AST (U/L)   Date Value   01/30/2021 6     ALT (U/L)   Date Value   01/30/2021 5     BUN (mg/dL)   Date Value   02/01/2021 23 (H)     BP Readings from Last 3 Encounters:   02/10/21 134/82   02/01/21 118/78   01/31/21 (!) 158/74          (goal 120/80)        Patient Active Problem List:     Normocytic anemia     Chronic bilateral low back pain without sciatica     Essential hypertension     Type 2 diabetes mellitus with diabetic polyneuropathy, without long-term current use of insulin (HCC)     Cigarette nicotine dependence without complication     Carpal tunnel syndrome of right wrist     Acute cystitis     Perirectal skin irritation     Right knee pain     Perirectal abscess     Depression     Hyperlipidemia     Elevated serum creatinine     Decreased GFR     Chronic kidney disease, stage 3a     Diabetic nephropathy ([facility] [address]      ----Moose Du

## 2021-05-26 ENCOUNTER — HOSPITAL ENCOUNTER (EMERGENCY)
Age: 61
Discharge: HOME | End: 2021-05-26
Attending: EMERGENCY MEDICINE
Payer: MEDICAID

## 2021-05-26 ENCOUNTER — APPOINTMENT (OUTPATIENT)
Dept: GENERAL RADIOLOGY | Age: 61
End: 2021-05-26
Payer: MEDICAID

## 2021-05-26 ENCOUNTER — OFFICE VISIT (OUTPATIENT)
Dept: PRIMARY CARE CLINIC | Age: 61
End: 2021-05-26
Payer: MEDICAID

## 2021-05-26 ENCOUNTER — HOSPITAL ENCOUNTER (OUTPATIENT)
Age: 61
Setting detail: SPECIMEN
Discharge: HOME | End: 2021-05-26
Payer: MEDICAID

## 2021-05-26 VITALS
WEIGHT: 191 LBS | DIASTOLIC BLOOD PRESSURE: 88 MMHG | OXYGEN SATURATION: 99 % | SYSTOLIC BLOOD PRESSURE: 186 MMHG | TEMPERATURE: 97.9 F | BODY MASS INDEX: 31.78 KG/M2 | HEART RATE: 100 BPM

## 2021-05-26 VITALS
RESPIRATION RATE: 20 BRPM | HEART RATE: 99 BPM | DIASTOLIC BLOOD PRESSURE: 101 MMHG | SYSTOLIC BLOOD PRESSURE: 197 MMHG | TEMPERATURE: 98.8 F

## 2021-05-26 DIAGNOSIS — I10 ESSENTIAL HYPERTENSION: ICD-10-CM

## 2021-05-26 DIAGNOSIS — R31.9 HEMATURIA, UNSPECIFIED TYPE: ICD-10-CM

## 2021-05-26 DIAGNOSIS — I10 HYPERTENSION, UNSPECIFIED TYPE: Primary | ICD-10-CM

## 2021-05-26 DIAGNOSIS — R35.0 URINARY FREQUENCY: Primary | ICD-10-CM

## 2021-05-26 DIAGNOSIS — G47.9 SLEEP DISTURBANCE: ICD-10-CM

## 2021-05-26 DIAGNOSIS — R35.0 URINARY FREQUENCY: ICD-10-CM

## 2021-05-26 DIAGNOSIS — E11.42 DIABETIC POLYNEUROPATHY ASSOCIATED WITH TYPE 2 DIABETES MELLITUS (HCC): ICD-10-CM

## 2021-05-26 LAB
ABSOLUTE EOS #: 0.37 K/UL (ref 0–0.44)
ABSOLUTE IMMATURE GRANULOCYTE: <0.03 K/UL (ref 0–0.3)
ABSOLUTE LYMPH #: 3.33 K/UL (ref 1.1–3.7)
ABSOLUTE MONO #: 0.37 K/UL (ref 0.1–1.2)
ANION GAP SERPL CALCULATED.3IONS-SCNC: 8 MMOL/L (ref 9–17)
BASOPHILS # BLD: 1 % (ref 0–2)
BASOPHILS ABSOLUTE: 0.05 K/UL (ref 0–0.2)
BILIRUBIN, POC: NEGATIVE
BLOOD URINE, POC: ABNORMAL
BUN BLDV-MCNC: 10 MG/DL (ref 8–23)
BUN/CREAT BLD: ABNORMAL (ref 9–20)
CALCIUM SERPL-MCNC: 10.5 MG/DL (ref 8.6–10.4)
CHLORIDE BLD-SCNC: 107 MMOL/L (ref 98–107)
CLARITY, POC: ABNORMAL
CO2: 23 MMOL/L (ref 20–31)
COLOR, POC: YELLOW
CREAT SERPL-MCNC: 0.95 MG/DL (ref 0.5–0.9)
DIFFERENTIAL TYPE: ABNORMAL
EOSINOPHILS RELATIVE PERCENT: 4 % (ref 1–4)
GFR AFRICAN AMERICAN: >60 ML/MIN
GFR NON-AFRICAN AMERICAN: >60 ML/MIN
GFR SERPL CREATININE-BSD FRML MDRD: ABNORMAL ML/MIN/{1.73_M2}
GFR SERPL CREATININE-BSD FRML MDRD: ABNORMAL ML/MIN/{1.73_M2}
GLUCOSE BLD-MCNC: 197 MG/DL (ref 70–99)
GLUCOSE URINE, POC: NEGATIVE
HCT VFR BLD CALC: 36.8 % (ref 36.3–47.1)
HEMOGLOBIN: 11.9 G/DL (ref 11.9–15.1)
IMMATURE GRANULOCYTES: 0 %
KETONES, POC: NEGATIVE
LEUKOCYTE EST, POC: NEGATIVE
LYMPHOCYTES # BLD: 36 % (ref 24–43)
MCH RBC QN AUTO: 29.9 PG (ref 25.2–33.5)
MCHC RBC AUTO-ENTMCNC: 32.3 G/DL (ref 28.4–34.8)
MCV RBC AUTO: 92.5 FL (ref 82.6–102.9)
MONOCYTES # BLD: 4 % (ref 3–12)
NITRITE, POC: NEGATIVE
NRBC AUTOMATED: 0 PER 100 WBC
PDW BLD-RTO: 14.7 % (ref 11.8–14.4)
PH, POC: 6
PLATELET # BLD: 371 K/UL (ref 138–453)
PLATELET ESTIMATE: ABNORMAL
PMV BLD AUTO: 9.9 FL (ref 8.1–13.5)
POTASSIUM SERPL-SCNC: 4.4 MMOL/L (ref 3.7–5.3)
PROTEIN, POC: ABNORMAL
RBC # BLD: 3.98 M/UL (ref 3.95–5.11)
RBC # BLD: ABNORMAL 10*6/UL
SEG NEUTROPHILS: 55 % (ref 36–65)
SEGMENTED NEUTROPHILS ABSOLUTE COUNT: 5.07 K/UL (ref 1.5–8.1)
SODIUM BLD-SCNC: 138 MMOL/L (ref 135–144)
SPECIFIC GRAVITY, POC: 1.02
TROPONIN INTERP: NORMAL
TROPONIN T: NORMAL NG/ML
TROPONIN, HIGH SENSITIVITY: 8 NG/L (ref 0–14)
TSH SERPL DL<=0.05 MIU/L-ACNC: 1 MIU/L (ref 0.3–5)
UROBILINOGEN, POC: NEGATIVE
WBC # BLD: 9.2 K/UL (ref 3.5–11.3)
WBC # BLD: ABNORMAL 10*3/UL

## 2021-05-26 PROCEDURE — 93005 ELECTROCARDIOGRAM TRACING: CPT | Performed by: EMERGENCY MEDICINE

## 2021-05-26 PROCEDURE — G8427 DOCREV CUR MEDS BY ELIG CLIN: HCPCS | Performed by: NURSE PRACTITIONER

## 2021-05-26 PROCEDURE — 3017F COLORECTAL CA SCREEN DOC REV: CPT | Performed by: NURSE PRACTITIONER

## 2021-05-26 PROCEDURE — 84443 ASSAY THYROID STIM HORMONE: CPT

## 2021-05-26 PROCEDURE — 84484 ASSAY OF TROPONIN QUANT: CPT

## 2021-05-26 PROCEDURE — 99214 OFFICE O/P EST MOD 30 MIN: CPT | Performed by: NURSE PRACTITIONER

## 2021-05-26 PROCEDURE — 71046 X-RAY EXAM CHEST 2 VIEWS: CPT

## 2021-05-26 PROCEDURE — 81003 URINALYSIS AUTO W/O SCOPE: CPT | Performed by: NURSE PRACTITIONER

## 2021-05-26 PROCEDURE — 6370000000 HC RX 637 (ALT 250 FOR IP): Performed by: EMERGENCY MEDICINE

## 2021-05-26 PROCEDURE — 85025 COMPLETE CBC W/AUTO DIFF WBC: CPT

## 2021-05-26 PROCEDURE — 80048 BASIC METABOLIC PNL TOTAL CA: CPT

## 2021-05-26 PROCEDURE — 2022F DILAT RTA XM EVC RTNOPTHY: CPT | Performed by: NURSE PRACTITIONER

## 2021-05-26 PROCEDURE — 3051F HG A1C>EQUAL 7.0%<8.0%: CPT | Performed by: NURSE PRACTITIONER

## 2021-05-26 PROCEDURE — G8417 CALC BMI ABV UP PARAM F/U: HCPCS | Performed by: NURSE PRACTITIONER

## 2021-05-26 PROCEDURE — 4004F PT TOBACCO SCREEN RCVD TLK: CPT | Performed by: NURSE PRACTITIONER

## 2021-05-26 PROCEDURE — 99283 EMERGENCY DEPT VISIT LOW MDM: CPT

## 2021-05-26 RX ORDER — ATENOLOL 25 MG/1
25 TABLET ORAL ONCE
Status: COMPLETED | OUTPATIENT
Start: 2021-05-26 | End: 2021-05-26

## 2021-05-26 RX ORDER — GABAPENTIN 600 MG/1
TABLET ORAL
Qty: 90 TABLET | Refills: 2 | Status: SHIPPED | OUTPATIENT
Start: 2021-06-04 | End: 2021-09-29

## 2021-05-26 RX ORDER — NITROFURANTOIN 25; 75 MG/1; MG/1
100 CAPSULE ORAL 2 TIMES DAILY
Qty: 20 CAPSULE | Refills: 0 | Status: SHIPPED | OUTPATIENT
Start: 2021-05-26 | End: 2021-06-05

## 2021-05-26 RX ORDER — FLUCONAZOLE 150 MG/1
150 TABLET ORAL ONCE
Qty: 1 TABLET | Refills: 0 | Status: SHIPPED | OUTPATIENT
Start: 2021-05-26 | End: 2021-05-26

## 2021-05-26 RX ORDER — ADHESIVE BANDAGE 3/4"
1 BANDAGE TOPICAL ONCE
Qty: 1 EACH | Refills: 0 | Status: SHIPPED | OUTPATIENT
Start: 2021-05-26 | End: 2021-09-29 | Stop reason: SDUPTHER

## 2021-05-26 RX ORDER — LISINOPRIL 10 MG/1
30 TABLET ORAL ONCE
Status: COMPLETED | OUTPATIENT
Start: 2021-05-26 | End: 2021-05-26

## 2021-05-26 RX ORDER — LANOLIN ALCOHOL/MO/W.PET/CERES
3 CREAM (GRAM) TOPICAL NIGHTLY
Qty: 30 TABLET | Refills: 1 | Status: SHIPPED | OUTPATIENT
Start: 2021-05-26 | End: 2022-03-25 | Stop reason: SDUPTHER

## 2021-05-26 RX ORDER — DIPHENHYDRAMINE HCL 25 MG
25 CAPSULE ORAL 2 TIMES DAILY PRN
Qty: 60 CAPSULE | Refills: 1 | Status: SHIPPED | OUTPATIENT
Start: 2021-05-26 | End: 2022-10-05 | Stop reason: SDUPTHER

## 2021-05-26 RX ADMIN — ATENOLOL 25 MG: 25 TABLET ORAL at 15:56

## 2021-05-26 RX ADMIN — LISINOPRIL 30 MG: 10 TABLET ORAL at 15:56

## 2021-05-26 SDOH — ECONOMIC STABILITY: FOOD INSECURITY: WITHIN THE PAST 12 MONTHS, YOU WORRIED THAT YOUR FOOD WOULD RUN OUT BEFORE YOU GOT MONEY TO BUY MORE.: NEVER TRUE

## 2021-05-26 SDOH — ECONOMIC STABILITY: FOOD INSECURITY: WITHIN THE PAST 12 MONTHS, THE FOOD YOU BOUGHT JUST DIDN'T LAST AND YOU DIDN'T HAVE MONEY TO GET MORE.: NEVER TRUE

## 2021-05-26 ASSESSMENT — ENCOUNTER SYMPTOMS
SHORTNESS OF BREATH: 1
DIARRHEA: 0
ABDOMINAL PAIN: 0
NAUSEA: 0
COUGH: 0
SHORTNESS OF BREATH: 0
VOMITING: 0
CONSTIPATION: 0
SORE THROAT: 0

## 2021-05-26 ASSESSMENT — SOCIAL DETERMINANTS OF HEALTH (SDOH): HOW HARD IS IT FOR YOU TO PAY FOR THE VERY BASICS LIKE FOOD, HOUSING, MEDICAL CARE, AND HEATING?: NOT HARD AT ALL

## 2021-05-26 NOTE — ED NOTES
Patient refuses to keep blood pressure cuff on, patient states that its too tight and hurts      Alfred Carpenter RN  05/26/21 7855

## 2021-05-26 NOTE — PROGRESS NOTES
[facility]  [address], [address]  [phone]    Johnnie Salcido is a 62 y.o. female who presents today for her  medical conditions/complaintsas noted below. Johnnie Salcido is c/o of Urinary Frequency (every 2-3 mins )  . HPI:     Urinary Frequency   This is a new problem. The current episode started in the past 7 days. The problem has been gradually worsening. The quality of the pain is described as burning. There has been no fever. Associated symptoms include frequency. Pertinent negatives include no chills, hematuria or hesitancy. She has tried nothing for the symptoms. history of uti's in the past, states this is similar    requesting a diflucan and monistat for antibiotic use, states this is the only thing that resolves her yeast infections. Not sleeping well. States she is having a hard time falling asleep, seeing the am news come on. She doesn't know how long this has been going on. She has not taken any meds to sleep. Doesn't snore loud. Didn't take bp meds today- bp is elevated, she denies cp, sob, dizziness. She has a foot doctor apt in this building that she doesn't want to miss and states she will go to the er after if bp is still elevated. Wt Readings from Last 3 Encounters:   05/26/21 191 lb (86.6 kg)   02/10/21 189 lb (85.7 kg)   01/31/21 192 lb 7.4 oz (87.3 kg)     Nursing note reviewedand discussed with patient. Patient'smedications, allergies, past medical, surgical, social and family histories werereviewed and updated as appropriate.   Current Outpatient Medications on File Prior to Visit   Medication Sig Dispense Refill    lisinopril (PRINIVIL;ZESTRIL) 30 MG tablet TAKE 1 TABLET BY MOUTH ONCE DAILY 30 tablet 5    gabapentin (NEURONTIN) 600 MG tablet TAKE 1 TABLET BY MOUTH 3 TIMES DAILY 90 tablet 0    Alcohol Swabs (ALCOHOL PREP) PADS USE TO TEST BLOOD SUGAR 4 TIMES A  each 2    blood glucose test strips (TRUE METRIX BLOOD GLUCOSE TEST) strip USE TO TEST BLOOD SUGAR 4 TIMES A  each 2    Pharmacist Choice Lancets MISC USE AS DIRECTED 4 TIMES A  each 2    Lancet Devices (SIMPLE DIAGNOSTICS LANCING DEV) MISC USE TO TEST BLOOD SUGAR AS DIRECTED 1 each 2    Blood Glucose Monitoring Suppl (TRUE METRIX AIR GLUCOSE METER) w/Device KIT USE TO TEST BLOOD SUGAR AS DIRECTED 1 kit 2    Lancets (ONETOUCH DELICA PLUS [id number]) MISC USE TO CHECK BLOOD SUGAR 3 TIMES DAILY 100 each 5    insulin glargine (LANTUS SOLOSTAR) 100 UNIT/ML injection pen Inject 10 Units into the skin nightly 5 pen 3    Insulin Pen Needle ([employer]OGER PEN NEEDLES) 31G X 6 MM MISC 1 each by Does not apply route daily 100 each 3    glipiZIDE (GLUCOTROL) 10 MG tablet TAKE 1 TABLET BY MOUTH 2 TIMES DAILY BEFORE MEALS 60 tablet 5    buPROPion (WELLBUTRIN XL) 150 MG extended release tablet TAKE 1 TABLET BY MOUTH EVERY MORNING 30 tablet 5    pravastatin (PRAVACHOL) 40 MG tablet TAKE 1 TABLET BY MOUTH ONCE DAILY 30 tablet 5    atenolol (TENORMIN) 50 MG tablet TAKE 1/2 TABLET BY MOUTH DAILY 15 tablet 5    alogliptin (NESINA) 25 MG TABS tablet Take 1 tablet by mouth daily 30 tablet 2    nystatin (MYCOSTATIN) 598196 UNIT/GM powder APPLY TOPICALLY TO AFFECTED AREAS FOUR TIMES A DAY 15 g 11    blood glucose monitor kit and supplies Test 4 times a day & as needed for symptoms of irregular blood glucose. 1 kit 0    aspirin EC 81 MG EC tablet Take 1 tablet by mouth daily 30 tablet 2    glucose monitoring kit (FREESTYLE) monitoring kit Diagnosis: Diabetes type 2, insulin-dependent. Use 3-4 times daily. Ok to give insurance brand 1 kit 0    ONETOUCH DELICA LANCETS 79N MISC CHECK BLOOD SUGAR ONCE DAILY 100 each 5     No current facility-administered medications on file prior to visit.      Past Medical History:   Diagnosis Date    Anemia     Chronic back pain     Depression     Hyperlipidemia     Hypertension     Type 2 diabetes mellitus with diabetic polyneuropathy, without long-term current use of insulin [facility] 11/10/2016    Type II or unspecified type diabetes mellitus without mention of complication, not stated as uncontrolled       Past Surgical History:   Procedure Laterality Date     SECTION      EXTERNAL EAR SURGERY      EYE SURGERY      RECTAL SURGERY Right 2021    RECTAL PERIRECTAL INCISION AND DRAINAGE performed [facility] Radha Delgado [facility] at [facility]       No family history on file. Social History     Tobacco Use    Smoking status: Current Every Day Smoker     Packs/day: 0.50     Years: 39.00     Pack years: 19.50     Types: Cigarettes    Smokeless tobacco: Never Used   Substance Use Topics    Alcohol use: No     Alcohol/week: 0.0 standard drinks     Comment: 1 x mon      Allergies   Allergen Reactions    Ibuprofen Itching       Subjective:     Review of Systems   Constitutional: Negative for chills and fever. Respiratory: Negative for cough and shortness of breath. Cardiovascular: Negative for chest pain, palpitations and leg swelling. Genitourinary: Positive for frequency. Negative for hematuria and hesitancy. Objective:     BP (!) 186/88 Comment: left arm  Pulse 100   Temp 97.9 °F (36.6 °C)   Wt 191 lb (86.6 kg)   SpO2 99%   Breastfeeding No   BMI 31.78 kg/m²    Physical Exam  Constitutional:       Appearance: She is well-developed. HENT:      Right Ear: External ear normal.      Left Ear: External ear normal.      Nose: Nose normal.   Cardiovascular:      Rate and Rhythm: Normal rate and regular rhythm. Heart sounds: Normal heart sounds, S1 normal and S2 normal.   Pulmonary:      Effort: Pulmonary effort is normal. No respiratory distress. Breath sounds: Normal breath sounds. Musculoskeletal:         General: No deformity. Normal range of motion. Cervical back: Full passive range of motion without pain and normal range of motion.    Skin:     General: Skin is warm and dry. Neurological:      Mental Status: She is alert and oriented to person, place, and time. Assessment/Plan         1. Urinary frequency    - Culture, Urine; Future  - POCT Urinalysis No Micro (Auto)    2. Essential hypertension  Discussed bp goal  [facility]  - Blood Pressure Monitoring (BLOOD PRESSURE CUFF) MISC; 1 Dose by Does not apply route once for 1 dose  Dispense: 1 each; Refill: 0    3. Sleep disturbance    - melatonin (RA MELATONIN) 3 MG TABS tablet; Take 1 tablet by mouth nightly  Dispense: 30 tablet; Refill: 1    4. Hematuria, unspecified type    - nitrofurantoin, macrocrystal-monohydrate, (MACROBID) 100 MG capsule; Take 1 capsule by mouth 2 times daily for 10 days  Dispense: 20 capsule; Refill: 0    RTO if symptoms worsen or fail to improve  Pt agreeable with plan      Patient given educationalmaterials - see patient instructions. Discussed use, benefit, and side effectsof prescribed medications. All patient questions answered. Pt voiced understanding. Reviewed health maintenance. Instructed to continue current medications, diet andexercise. 1.  Shauna received counseling on the following healthy behaviors: nutrition, exercise and medication adherence  2. Patient given educational materials when available - see patient instructionswhen applicable  3. Discussed use, benefit, and side effects of prescribed medications. Barriersto medication compliance addressed. All patient questions answered. Pt voicedunderstanding. 4.  Reviewed prior labs and health maintenance  5. Continuecurrent medications, diet and exercise.     Completed Refills   Requested Prescriptions     Signed Prescriptions Disp Refills    nitrofurantoin, macrocrystal-monohydrate, (MACROBID) 100 MG capsule 20 capsule 0     Sig: Take 1 capsule by mouth 2 times daily for 10 days    fluconazole (DIFLUCAN) 150 MG tablet 1 tablet 0     Sig: Take 1 tablet by mouth once for 1 dose    miconazole

## 2021-05-26 NOTE — ED NOTES
Patient asking when they can go home. Patient given warm blankets.  Resident to update patient       Nataly Quiros RN  05/26/21 4542

## 2021-05-26 NOTE — ED PROVIDER NOTES
[address] HANDOFF       Handoff taken on the following patient from prior Attending Physician:  Pt Name: Manuel Adalberto  PCP:  CONRADO Gutierrez - [facility]NA[facility]    Attestation  I was available and discussed any additional care issues that arose and coordinated the management plans with the resident(s) caring for the patient during my duty period. Any areas of disagreement with resident's documentation of care or procedures are noted on the chart. I was personally present for the key portions of any/all procedures during my duty period. I have documented in the chart those procedures where I was not present during the key portions.              Mena Hartman MD  05/26/21 4861

## 2021-05-26 NOTE — ED PROVIDER NOTES
[facility]     Emergency Department     Faculty Note/ Attestation      Pt Name: Nadeem Alva                                       MRN: [id number]  Blancagfstephane 1960  Date of evaluation: 5/26/2021    Patients PCP:    CONRADO Swenson CNP    Attestation  I performed a history and physical examination of the patient and discussed management with the resident. I reviewed the residents note and agree with the documented findings and plan of care. Any areas of disagreement are noted on the chart. I was personally present for the key portions of any procedures. I have documented in the chart those procedures where I was not present during the key portions. I have reviewed the emergency nurses triage note. I agree with the chief complaint, past medical history, past surgical history, allergies, medications, social and family history as documented unless otherwise noted below. For Physician Assistant/ Nurse Practitioner cases/documentation I have personally evaluated this patient and have completed at least one if not all key elements of the E/M (history, physical exam, and MDM). Additional findings are as noted. Initial Screens:             Vitals:    Vitals:    05/26/21 1510 05/26/21 1511 05/26/21 1516 05/26/21 1517   BP:   (!) 177/98 (!) 177/98   Pulse: 100 104 99 99   Resp: 15 19 20 20   Temp:       TempSrc:           CHIEF COMPLAINT       Chief Complaint   Patient presents with    Hypertension       Patient is a 81-year-old female who comes to the emergency department due to high blood pressure was sent in by her podiatrist due to high pressures. The patient currently denying any pain other than aching and tingling into her legs bilaterally no numbness or weakness patient has no difficulty walking ambulating no vision changes.   The patient admits to not taking any of her medications for blood pressure still smoking no double or blurry vision        EMERGENCY DEPARTMENT COURSE:     -------------------------  BP: (!) 177/98, Temp: 98.8 °F (37.1 °C), Pulse: 99, Resp: 20  Physical Exam  Constitutional:       Appearance: She is well-developed. She is not diaphoretic. HENT:      Head: Normocephalic and atraumatic. Right Ear: External ear normal.      Left Ear: External ear normal.   Eyes:      General: No scleral icterus. Right eye: No discharge. Left eye: No discharge. Neck:      Trachea: No tracheal deviation. Cardiovascular:      Rate and Rhythm: Normal rate. Pulses: Normal pulses. Pulmonary:      Effort: Pulmonary effort is normal. No respiratory distress. Breath sounds: No stridor. Musculoskeletal:         General: Normal range of motion. Cervical back: Normal range of motion. Skin:     General: Skin is warm and dry. Neurological:      Mental Status: She is alert and oriented to person, place, and time. Coordination: Coordination normal.   Psychiatric:         Behavior: Behavior normal.           Comments  The patient presents with complaint of hypertension     Based on the patients history and physical exam the patient is not concerning for  hypertensive emergency patient has no signs of acute endorgan damage does have chronic end-stage renal disease nothing new at this time patient has no signs of numbness weakness tingling or intracranial hemorrhage    Treatment does include follow-up with patient's PCP for better management of her blood pressure    Awaiting troponins at the time of sign out.         ED Course as of May 26 1548   Wed May 26, 2021   1530 EKG Interpretation   Interpreted by Og Heck DO    Rhythm: normal sinus with sinus arrhthmia  Rate: normal  Axis: normal  Ectopy: none  Conduction: mild prolongation of QTc 487  ST Segments: normal  T Waves: normal  Q Waves: none    Clinical Impression: sinus arrhythmia      [WK]      ED Course User Index  [WK] DO Og Zelaya DO,, DO,

## 2021-05-26 NOTE — PATIENT INSTRUCTIONS
Schedule a Vaccine  When you qualify to receive the vaccine, call the [facility]) COVID-19 Vaccination Hotline to schedule your appointment or to get additional information about the [facility]) locations which are offering the COVID-19 vaccine. To be 94% effective, it's important that you receive two doses of one of the COVID-19 vaccines. -If you are receiving the Swenson Peter vaccine, your second shot will be scheduled as close to 21 days after the first shot as possible. -If you are receiving the Moderna vaccine, your second shot will be scheduled as close to 28 days after the first shot as possible. [facility]) COVID-19 Vaccination Hotline: [phone]    Links to [facility]) website and [facility] website:    [email or web address]    [email or web address]

## 2021-05-26 NOTE — PROGRESS NOTES
Visit Information    Have you changed or started any medications since your last visit including any over-the-counter medicines, vitamins, or herbal medicines? no   Are you having any side effects from any of your medications? -  no  Have you stopped taking any of your medications? Is so, why? -  no    Have you seen any other physician or provider since your last visit? Yes  Have you had any other diagnostic tests since your last visit? No  Have you been seen in the emergency room and/or had an admission to a hospital since we last saw you? No  Have you had your routine dental cleaning in the past 6 months? no    Have you activated your [employer] account? If not, what are your barriers?  Yes     Patient Care Team:  CONRADO Bowen CNP as PCP - General (Family Nurse Practitioner)  CONRADO Bowen CNP as PCP - [facility]  Marlon Rodriguez MD as Consulting Physician (Obstetrics & Gynecology)    Medical History Review  Past Medical, Family, and Social History reviewed and does not contribute to the patient presenting condition    Health Maintenance   Topic Date Due    Diabetic retinal exam  Never done    COVID-19 Vaccine (1) Never done    Cervical cancer screen  Never done    Shingles Vaccine (1 of 2) Never done    Diabetic foot exam  03/30/2018    Colon Cancer Screen FIT/FOBT  12/08/2018    Lipid screen  01/24/2021    Flu vaccine (Season Ended) 11/18/2021 (Originally 9/1/2021)    Breast cancer screen  10/23/2021    A1C test (Diabetic or Prediabetic)  02/01/2022    Potassium monitoring  02/01/2022    Creatinine monitoring  02/01/2022    Pneumococcal 0-64 years Vaccine (2 of 2) 05/03/2026    DTaP/Tdap/Td vaccine (2 - Td) 03/30/2027    Hepatitis C screen  Completed    HIV screen  Completed    Hepatitis A vaccine  Aged Out    Hib vaccine  Aged Out    Meningococcal (ACWY) vaccine  Aged Out

## 2021-05-26 NOTE — ED PROVIDER NOTES
[address] [facility]  ED  Emergency Department Encounter  EmergencyMedicine Resident     Pt Name:Shauna Parnell  MRN: [id number]  [place] 1960  Date of evaluation: 21  PCP:  CONRADO Lawrence CNP    CHIEF COMPLAINT       Chief Complaint   Patient presents with    Hypertension       HISTORY OF PRESENT ILLNESS  (Location/Symptom, Timing/Onset, Context/Setting, Quality, Duration, Modifying Factors, Severity.)      Juan Manuel Champion is a 62 y.o. female who presents to the emergency department with a 1 day history of hypertension. Patient has a chronic history of CKD and hypertension on atenolol and lisinopril and has not taken her medicines today because she was fasting for her PCP appointment. At her PCP appointment her blood pressure was 186/88 but the patient was denying chest pain, shortness of breath, dizziness, and was only being evaluated for a urinary tract infection characterized by dysuria for the past 2 days. At the podiatrist today blood pressure was 200/107 and the patient was agreeable to getting evaluated in the emergency department for this. On arrival she endorses a 1 year history of dyspnea on exertion and chest pain that occurred 2 days ago and was midsternal not exacerbated or relieved by anything. At this time she denies blurry vision, chest pain, shortness of breath, nausea vomiting, problems with bowel movements, or numbness or tingling anywhere. Active smoker, last use was today. PAST MEDICAL / SURGICAL / SOCIAL / FAMILY HISTORY      has a past medical history of Anemia, Chronic back pain, Depression, Hyperlipidemia, Hypertension, Type 2 diabetes mellitus with diabetic polyneuropathy, without long-term current use of insulin ([facility] [address] and Type II or unspecified type diabetes mellitus without mention of complication, not stated as uncontrolled. has a past surgical history that includes eye surgery; Thyroid surgery; External ear surgery;   section; and capsule by mouth 2 times daily for 10 days 5/26/21 6/5/21  CONRADO Rhodes CNP   fluconazole (DIFLUCAN) 150 MG tablet Take 1 tablet by mouth once for 1 dose 5/26/21 5/26/21  CONRADO Rhodes CNP   miconazole (MICONAZOLE 7) 2 % vaginal cream Place 1 applicator vaginally nightly Place vaginally nightly x 7 days 5/26/21   CONRADO Rhodes CNP   melatonin (RA MELATONIN) 3 MG TABS tablet Take 1 tablet by mouth nightly 5/26/21   CONRADO Rhodes CNP   Blood Pressure Monitoring (BLOOD PRESSURE CUFF) MISC 1 Dose by Does not apply route once for 1 dose 5/26/21 5/26/21  CONRADO Rhodes CNP   diphenhydrAMINE (BENADRYL ALLERGY) 25 MG capsule Take 1 capsule by mouth 2 times daily as needed for Itching 5/26/21 6/5/21  CONRADO Rhodes CNP   gabapentin (NEURONTIN) 600 MG tablet TAKE 1 TABLET BY MOUTH 3 TIMES DAILY 6/4/21 9/4/21  CONRADO Rhodes CNP   lisinopril (PRINIVIL;ZESTRIL) 30 MG tablet TAKE 1 TABLET BY MOUTH ONCE DAILY 5/24/21   CONRADO Rhodes CNP   Alcohol Swabs (ALCOHOL PREP) PADS USE TO TEST BLOOD SUGAR 4 TIMES A DAY 4/28/21   CONRADO Rhodes CNP   blood glucose test strips (TRUE METRIX BLOOD GLUCOSE TEST) strip USE TO TEST BLOOD SUGAR 4 TIMES A DAY 4/28/21   CONRADO Rhodes CNP   Pharmacist Choice Lancets MISC USE AS DIRECTED 4 TIMES A DAY 4/28/21   CONRADO Rhodes CNP   Lancet Devices (SIMPLE DIAGNOSTICS LANCING DEV) MISC USE TO TEST BLOOD SUGAR AS DIRECTED 4/28/21   CONRADO Rhodes CNP   Blood Glucose Monitoring Suppl (TRUE METRIX AIR GLUCOSE METER) w/Device KIT USE TO TEST BLOOD SUGAR AS DIRECTED 4/28/21   CONRADO Rhodes CNP   Lancets (ONETOUCH DELICA PLUS RMPGKT54I) [address] USE TO CHECK BLOOD SUGAR 3 TIMES DAILY 3/12/21   CONRADO Rhodes CNP   insulin glargine (LANTUS SOLOSTAR) 100 UNIT/ML injection pen Inject 10 Units into the skin nightly 2/10/21   CONRADO Anglin CNP   Insulin Pen Needle ([employer] PEN NEEDLES) 31G X 6 MM MISC 1 each by Does not apply route daily 2/10/21   CONRADO Anglin CNP   glipiZIDE (GLUCOTROL) 10 MG tablet TAKE 1 TABLET BY MOUTH 2 TIMES DAILY BEFORE MEALS 2/5/21   CONRADO Anglin CNP   buPROPion (WELLBUTRIN XL) 150 MG extended release tablet TAKE 1 TABLET BY MOUTH EVERY MORNING 2/5/21   CONRADO Anglin CNP   pravastatin (PRAVACHOL) 40 MG tablet TAKE 1 TABLET BY MOUTH ONCE DAILY 2/5/21   Eliza Comer, APRN - CNP   atenolol (TENORMIN) 50 MG tablet TAKE 1/2 TABLET BY MOUTH DAILY 2/5/21   Eliza Comer, APRN - CNP   alogliptin (NESINA) 25 MG TABS tablet Take 1 tablet by mouth daily 11/18/20   CONRADO Anglin CNP   nystatin (MYCOSTATIN) 297310 UNIT/GM powder APPLY TOPICALLY TO AFFECTED AREAS FOUR TIMES A DAY 8/19/20   CONRADO Anglin CNP   blood glucose monitor kit and supplies Test 4 times a day & as needed for symptoms of irregular blood glucose. 12/13/19   Yolie Hodge MD   aspirin EC 81 MG EC tablet Take 1 tablet by mouth daily 2/16/18   Dheeraj Spears MD   glucose monitoring kit (FREESTYLE) monitoring kit Diagnosis: Diabetes type 2, insulin-dependent. Use 3-4 times daily. Ok to give insurance brand 11/10/17   Dheeraj Spears MD   Jayden Agatha LANCETS 36B MISC CHECK BLOOD SUGAR ONCE DAILY 5/9/17   Serenity Mckenna MD       REVIEW OF SYSTEMS    (2-9 systems for level 4, 10 or more for level 5)      Review of Systems   Constitutional: Negative for chills and fever. HENT: Negative for ear pain, hearing loss and sore throat. Eyes: Negative for visual disturbance. Respiratory: Positive for shortness of breath (dyspnea on exertion, chronic). Cardiovascular: Negative for chest pain. Gastrointestinal: Negative for abdominal pain, constipation, diarrhea, nausea and vomiting. Genitourinary: Positive for dysuria.  Negative for difficulty urinating. Musculoskeletal: Negative for arthralgias and myalgias. Neurological: Positive for light-headedness. Negative for weakness and numbness. Psychiatric/Behavioral: Negative for agitation and confusion. PHYSICAL EXAM   (up to 7 for level 4, 8 or more for level 5)      INITIAL VITALS:   BP (!) 197/101   Pulse 99   Temp 98.8 °F (37.1 °C) (Skin)   Resp 20     Physical Exam  Vitals and nursing note reviewed. Constitutional:       General: She is not in acute distress. Appearance: Normal appearance. She is well-developed. She is not ill-appearing or diaphoretic. HENT:      Head: Normocephalic and atraumatic. Right Ear: External ear normal.      Left Ear: External ear normal.      Nose: Nose normal.      Mouth/Throat:      Mouth: Mucous membranes are moist.   Eyes:      Extraocular Movements: Extraocular movements intact. Conjunctiva/sclera: Conjunctivae normal.      Pupils: Pupils are equal, round, and reactive to light. Comments: Proptosis noted   Neck:      Trachea: No tracheal deviation. Cardiovascular:      Rate and Rhythm: Regular rhythm. Tachycardia present. Heart sounds: Normal heart sounds. No murmur heard. No friction rub. No gallop. Pulmonary:      Effort: Pulmonary effort is normal. No respiratory distress. Breath sounds: Normal breath sounds. No wheezing, rhonchi or rales. Abdominal:      General: Abdomen is flat. There is no distension. Palpations: Abdomen is soft. There is no mass. Tenderness: There is no abdominal tenderness. There is no guarding or rebound. Musculoskeletal:         General: No swelling, deformity or signs of injury. Normal range of motion. Cervical back: Normal range of motion and neck supple. Right lower leg: No edema. Left lower leg: No edema. Skin:     General: Skin is warm and dry. Capillary Refill: Capillary refill takes less than 2 seconds.       Coloration: Skin is not jaundiced. Findings: No bruising or lesion. Neurological:      General: No focal deficit present. Mental Status: She is alert and oriented to person, place, and time. Mental status is at baseline. Cranial Nerves: No cranial nerve deficit. Sensory: No sensory deficit. Motor: No weakness or abnormal muscle tone. Coordination: Coordination normal.      Comments: NIHSS 0         DIFFERENTIAL  DIAGNOSIS     PLAN (LABS / IMAGING / EKG):  Orders Placed This Encounter   Procedures    XR CHEST (2 VW)    CBC Auto Differential    Basic Metabolic Panel w/ Reflex to MG    TSH with Reflex    Troponin    EKG 12 Lead       MEDICATIONS ORDERED:  Orders Placed This Encounter   Medications    atenolol (TENORMIN) tablet 25 mg    lisinopril (PRINIVIL;ZESTRIL) tablet 30 mg       DDX: Manuel Glover is a 62 y.o. female who presents to the emergency department with hypertension.  Differential diagnosis includes asymptomatic hypertension    DIAGNOSTIC RESULTS / EMERGENCY DEPARTMENT COURSE / MDM   LAB RESULTS:  Results for orders placed or performed during the hospital encounter of 05/26/21   CBC Auto Differential   Result Value Ref Range    WBC 9.2 3.5 - 11.3 k/uL    RBC 3.98 3.95 - 5.11 m/uL    Hemoglobin 11.9 11.9 - 15.1 g/dL    Hematocrit 36.8 36.3 - 47.1 %    MCV 92.5 82.6 - 102.9 fL    MCH 29.9 25.2 - 33.5 pg    MCHC 32.3 28.4 - 34.8 g/dL    RDW 14.7 (H) 11.8 - 14.4 %    Platelets 273 146 - 845 k/uL    MPV 9.9 8.1 - 13.5 fL    NRBC Automated 0.0 0.0 per 100 WBC    Differential Type NOT REPORTED     Seg Neutrophils 55 36 - 65 %    Lymphocytes 36 24 - 43 %    Monocytes 4 3 - 12 %    Eosinophils % 4 1 - 4 %    Basophils 1 0 - 2 %    Immature Granulocytes 0 0 %    Segs Absolute 5.07 1.50 - 8.10 k/uL    Absolute Lymph # 3.33 1.10 - 3.70 k/uL    Absolute Mono # 0.37 0.10 - 1.20 k/uL    Absolute Eos # 0.37 0.00 - 0.44 k/uL    Basophils Absolute 0.05 0.00 - 0.20 k/uL    Absolute Immature Granulocyte <0.03 0.00 - 0.30 k/uL    WBC Morphology NOT REPORTED     RBC Morphology ANISOCYTOSIS PRESENT     Platelet Estimate NOT REPORTED    Basic Metabolic Panel w/ Reflex to MG   Result Value Ref Range    Glucose 197 (H) 70 - 99 mg/dL    BUN 10 8 - 23 mg/dL    CREATININE 0.95 (H) 0.50 - 0.90 mg/dL    Bun/Cre Ratio NOT REPORTED 9 - 20    Calcium 10.5 (H) 8.6 - 10.4 mg/dL    Sodium 138 135 - 144 mmol/L    Potassium 4.4 3.7 - 5.3 mmol/L    Chloride 107 98 - 107 mmol/L    CO2 23 20 - 31 mmol/L    Anion Gap 8 (L) 9 - 17 mmol/L    GFR Non-African American >60 >60 mL/min    GFR African American >60 >60 mL/min    GFR Comment          GFR Staging NOT REPORTED    TSH with Reflex   Result Value Ref Range    TSH 1.00 0.30 - 5.00 mIU/L   Troponin   Result Value Ref Range    Troponin, High Sensitivity 8 0 - 14 ng/L    Troponin T NOT REPORTED <0.03 ng/mL    Troponin Interp NOT REPORTED    EKG 12 Lead   Result Value Ref Range    Ventricular Rate 99 BPM    Atrial Rate 99 BPM    P-R Interval 128 ms    QRS Duration 82 ms    Q-T Interval 380 ms    QTc Calculation (Bazett) 487 ms    P Axis 64 degrees    R Axis 7 degrees    T Axis 73 degrees       IMPRESSION: Zack Gloria is a 62 y.o. female who presents to the emergency department with hypertension. On examination temperature is 98.8 °F, pulse 99 bpm and blood pressure 177/98. Of note nursing reports that patient does not tolerate the blood pressure cuff well and tenses her arm which may be falsely elevating reading. Regardless the patient has no new symptoms today except for an initial complaint of lightheadedness recently. We will obtain labs and imaging to evaluate for endorgan damage, dose her home antihypertensives, reevaluate. If patient remains asymptomatic and EKG is within normal limits likely discharge. RADIOLOGY:  No results found.     EKG  EKG Interpretation    Interpreted by emergency department physician    Rhythm: normal sinus   Rate: normal  Axis: Left  Ectopy: 1 PAC  Conduction: normal  ST Segments: no acute change  T Waves: no acute change  Q Waves: none    Clinical Impression: Normal sinus with left axis deviation, nonspecific EKG    Cabrera Dillard MD    All EKG's are interpreted by the Emergency Department Physician who either signs or co-signs this chart in the absence of a cardiologist.    EMERGENCY DEPARTMENT COURSE:  ED Course as of May 26 1738   Wed May 26, 2021   1530 EKG Interpretation   Interpreted by Haven Dial DO    Rhythm: normal sinus with sinus arrhthmia  Rate: normal  Axis: normal  Ectopy: none  Conduction: mild prolongation of QTc 487  ST Segments: normal  T Waves: normal  Q Waves: none    Clinical Impression: sinus arrhythmia      [WK]   1723 Patient resting comfortably, advised of negative tests and need for follow-up. Advised we will not be changing her blood pressure medicine at this time because she is asymptomatic and we do not follow-up with her in clinic. Patient verbalized understanding agreement with plan. [TS]      ED Course User Index  [TS] Brianda Courtney MD  [WK] Haven Dial DO       PROCEDURES:  None    CONSULTS:  None    CRITICAL CARE:  Please see attending note. FINAL IMPRESSION      1. Hypertension, unspecified type          DISPOSITION / PLAN     DISPOSITION Decision To Discharge 05/26/2021 05:23:32 PM      PATIENT REFERRED TO:  Amy Joseph, APRN - [facility]  [address]  [phone]    Schedule an appointment as soon as possible for a visit in 1 week  For followup    [facility]  [address]  [id number] [phone]  Go to   As needed, If symptoms worsen      DISCHARGE MEDICATIONS:  New Prescriptions    No medications on file       Brianda Courtney MD  Emergency Medicine Resident    This patient was evaluated in the Emergency Department for symptoms described in the history of present illness.  He/she was evaluated in the context of the global COVID-19 pandemic, which necessitated consideration that the patient might be at risk for infection with the SARS-CoV-2 virus that causes COVID-19. Institutional protocols and algorithms that pertain to the evaluation of patients at risk for COVID-19 are in a state of rapid change based on information released by regulatory bodies including the CDC and federal and state organizations. These policies and algorithms were followed during the patient's care in the ED.     (Please note that portions of thisnote were completed with a voice recognition program.  Efforts were made to edit the dictations but occasionally words are mis-transcribed.)        Candance Gartner, MD  Resident  05/26/21 1160

## 2021-05-27 LAB
EKG ATRIAL RATE: 99 BPM
EKG P AXIS: 64 DEGREES
EKG P-R INTERVAL: 128 MS
EKG Q-T INTERVAL: 380 MS
EKG QRS DURATION: 82 MS
EKG QTC CALCULATION (BAZETT): 487 MS
EKG R AXIS: 7 DEGREES
EKG T AXIS: 73 DEGREES
EKG VENTRICULAR RATE: 99 BPM

## 2021-05-27 PROCEDURE — 93010 ELECTROCARDIOGRAM REPORT: CPT | Performed by: INTERNAL MEDICINE

## 2021-05-28 LAB
CULTURE: NORMAL
Lab: NORMAL
SPECIMEN DESCRIPTION: NORMAL

## 2021-07-12 DIAGNOSIS — E11.42 TYPE 2 DIABETES MELLITUS WITH DIABETIC POLYNEUROPATHY, WITHOUT LONG-TERM CURRENT USE OF INSULIN (HCC): ICD-10-CM

## 2021-07-12 DIAGNOSIS — E78.00 PURE HYPERCHOLESTEROLEMIA: ICD-10-CM

## 2021-07-12 DIAGNOSIS — I10 ESSENTIAL HYPERTENSION: ICD-10-CM

## 2021-07-12 DIAGNOSIS — F32.1 EPISODE OF MODERATE MAJOR DEPRESSION (HCC): ICD-10-CM

## 2021-07-12 NOTE — TELEPHONE ENCOUNTER
Hemoglobin A1C (%)   Date Value   02/01/2021 7.7 (H)   11/18/2020 8.2   08/19/2020 7.8             ( goal A1C is < 7)   Microalb/Crt.  Ratio (mcg/mg creat)   Date Value   08/19/2020 750 (H)     LDL Cholesterol (mg/dL)   Date Value   01/24/2020 79       (goal LDL is <100)   AST (U/L)   Date Value   01/30/2021 6     ALT (U/L)   Date Value   01/30/2021 5     BUN (mg/dL)   Date Value   05/26/2021 10     BP Readings from Last 3 Encounters:   05/26/21 (!) 197/101   05/26/21 (!) 200/107   05/26/21 (!) 186/88          (goal 120/80)        Patient Active Problem List:     Normocytic anemia     Chronic bilateral low back pain without sciatica     Essential hypertension     Type 2 diabetes mellitus with diabetic polyneuropathy, without long-term current use of insulin (HCC)     Cigarette nicotine dependence without complication     Carpal tunnel syndrome of right wrist     Acute cystitis     Perirectal skin irritation     Right knee pain     Perirectal abscess     Depression     Hyperlipidemia     Elevated serum creatinine     Decreased GFR     Chronic kidney disease, stage 3a ([address]     Diabetic nephropathy ([address]      ----Luis Talamantes

## 2021-07-15 RX ORDER — BUPROPION HYDROCHLORIDE 150 MG/1
150 TABLET ORAL EVERY MORNING
Qty: 30 TABLET | Refills: 5 | Status: SHIPPED | OUTPATIENT
Start: 2021-07-15 | End: 2021-12-27

## 2021-07-15 RX ORDER — GLIPIZIDE 10 MG/1
TABLET ORAL
Qty: 60 TABLET | Refills: 5 | Status: SHIPPED | OUTPATIENT
Start: 2021-07-15 | End: 2021-12-27

## 2021-07-15 RX ORDER — PRAVASTATIN SODIUM 40 MG
TABLET ORAL
Qty: 30 TABLET | Refills: 5 | Status: SHIPPED | OUTPATIENT
Start: 2021-07-15 | End: 2021-12-27

## 2021-07-15 RX ORDER — ATENOLOL 50 MG/1
TABLET ORAL
Qty: 15 TABLET | Refills: 5 | Status: SHIPPED | OUTPATIENT
Start: 2021-07-15 | End: 2021-11-05

## 2021-07-27 DIAGNOSIS — E11.42 TYPE 2 DIABETES MELLITUS WITH DIABETIC POLYNEUROPATHY, WITHOUT LONG-TERM CURRENT USE OF INSULIN (HCC): ICD-10-CM

## 2021-07-27 RX ORDER — ISOPROPYL ALCOHOL 0.7 ML/1
SWAB TOPICAL
Qty: 100 EACH | Refills: 2 | Status: SHIPPED | OUTPATIENT
Start: 2021-07-27

## 2021-07-27 RX ORDER — CALCIUM CITRATE/VITAMIN D3 200MG-6.25
TABLET ORAL
Qty: 100 EACH | Refills: 2 | Status: SHIPPED | OUTPATIENT
Start: 2021-07-27

## 2021-07-27 RX ORDER — NYSTATIN 100000 [USP'U]/G
POWDER TOPICAL
Qty: 15 G | Refills: 11 | Status: SHIPPED | OUTPATIENT
Start: 2021-07-27 | End: 2022-10-05 | Stop reason: SDUPTHER

## 2021-07-27 NOTE — TELEPHONE ENCOUNTER
Hemoglobin A1C (%)   Date Value   02/01/2021 7.7 (H)   11/18/2020 8.2   08/19/2020 7.8             ( goal A1C is < 7)   Microalb/Crt.  Ratio (mcg/mg creat)   Date Value   08/19/2020 750 (H)     LDL Cholesterol (mg/dL)   Date Value   01/24/2020 79       (goal LDL is <100)   AST (U/L)   Date Value   01/30/2021 6     ALT (U/L)   Date Value   01/30/2021 5     BUN (mg/dL)   Date Value   05/26/2021 10     BP Readings from Last 3 Encounters:   05/26/21 (!) 197/101   05/26/21 (!) 200/107   05/26/21 (!) 186/88          (goal 120/80)        Patient Active Problem List:     Normocytic anemia     Chronic bilateral low back pain without sciatica     Essential hypertension     Type 2 diabetes mellitus with diabetic polyneuropathy, without long-term current use of insulin (HCC)     Cigarette nicotine dependence without complication     Carpal tunnel syndrome of right wrist     Acute cystitis     Perirectal skin irritation     Right knee pain     Perirectal abscess     Depression     Hyperlipidemia     Elevated serum creatinine     Decreased GFR     Chronic kidney disease, stage 3a ([address]     Diabetic nephropathy ([address]      ----Nolberto Cervantes

## 2021-08-24 DIAGNOSIS — E11.42 DIABETIC POLYNEUROPATHY ASSOCIATED WITH TYPE 2 DIABETES MELLITUS (HCC): ICD-10-CM

## 2021-08-24 NOTE — TELEPHONE ENCOUNTER
Hemoglobin A1C (%)   Date Value   02/01/2021 7.7 (H)   11/18/2020 8.2   08/19/2020 7.8             ( goal A1C is < 7)   Microalb/Crt.  Ratio (mcg/mg creat)   Date Value   08/19/2020 750 (H)     LDL Cholesterol (mg/dL)   Date Value   01/24/2020 79       (goal LDL is <100)   AST (U/L)   Date Value   01/30/2021 6     ALT (U/L)   Date Value   01/30/2021 5     BUN (mg/dL)   Date Value   05/26/2021 10     BP Readings from Last 3 Encounters:   05/26/21 (!) 197/101   05/26/21 (!) 200/107   05/26/21 (!) 186/88          (goal 120/80)        Patient Active Problem List:     Normocytic anemia     Chronic bilateral low back pain without sciatica     Essential hypertension     Type 2 diabetes mellitus with diabetic polyneuropathy, without long-term current use of insulin (HCC)     Cigarette nicotine dependence without complication     Carpal tunnel syndrome of right wrist     Acute cystitis     Perirectal skin irritation     Right knee pain     Perirectal abscess     Depression     Hyperlipidemia     Elevated serum creatinine     Decreased GFR     Chronic kidney disease, stage 3a ([address]     Diabetic nephropathy ([address]      ----Zoey Lucas

## 2021-09-03 RX ORDER — GLUCOSAM/CHON-MSM1/C/MANG/BOSW 500-416.6
TABLET ORAL
Qty: 100 EACH | Refills: 2 | Status: SHIPPED | OUTPATIENT
Start: 2021-09-03

## 2021-09-03 NOTE — TELEPHONE ENCOUNTER
Health Maintenance   Topic Date Due    Diabetic retinal exam  Never done    COVID-19 Vaccine (1) Never done    Cervical cancer screen  Never done    Shingles Vaccine (1 of 2) Never done    Diabetic foot exam  03/30/2018    Colon Cancer Screen FIT/FOBT  12/08/2018    Lipid screen  01/24/2021    Flu vaccine (1) 09/01/2021    Breast cancer screen  10/23/2021    A1C test (Diabetic or Prediabetic)  02/01/2022    Potassium monitoring  05/26/2022    Creatinine monitoring  05/26/2022    Pneumococcal 0-64 years Vaccine (2 of 2 - PPSV23) 05/03/2026    DTaP/Tdap/Td vaccine (2 - Td or Tdap) 03/30/2027    Hepatitis C screen  Completed    HIV screen  Completed    Hepatitis A vaccine  Aged Out    Hib vaccine  Aged Out    Meningococcal (ACWY) vaccine  Aged Out             (applicable per patient's age: Cancer Screenings, Depression Screening, Fall Risk Screening, Immunizations)    Hemoglobin A1C (%)   Date Value   02/01/2021 7.7 (H)   11/18/2020 8.2   08/19/2020 7.8     Microalb/Crt. Ratio (mcg/mg creat)   Date Value   08/19/2020 750 (H)     LDL Cholesterol (mg/dL)   Date Value   01/24/2020 79     AST (U/L)   Date Value   01/30/2021 6     ALT (U/L)   Date Value   01/30/2021 5     BUN (mg/dL)   Date Value   05/26/2021 10      (goal A1C is < 7)   (goal LDL is <100) need 30-50% reduction from baseline     BP Readings from Last 3 Encounters:   05/26/21 (!) 197/101   05/26/21 (!) 200/107   05/26/21 (!) 186/88    (goal /80)      All Future Testing planned in [facility]:  Lab Frequency Next Occurrence   CBC Once 05/18/2021       Next Visit Date:  No future appointments.          Patient Active Problem List:     Normocytic anemia     Chronic bilateral low back pain without sciatica     Essential hypertension     Type 2 diabetes mellitus with diabetic polyneuropathy, without long-term current use of insulin (HCC)     Cigarette nicotine dependence without complication     Carpal tunnel syndrome of right wrist     Acute cystitis     Perirectal skin irritation     Right knee pain     Perirectal abscess     Depression     Hyperlipidemia     Elevated serum creatinine     Decreased GFR     Chronic kidney disease, stage 3a (HCC)     Diabetic nephropathy (HCC)

## 2021-09-20 NOTE — PROGRESS NOTES
Patient instructed to remove shoes and socks and instructed to sit in exam chair. Current PCP is CONRADO Josue CNP and date of last visit was 5/26/21. Do you have a follow up visit scheduled?   Yes  If yes, the date is 9/29/21

## 2021-09-29 ENCOUNTER — HOSPITAL ENCOUNTER (OUTPATIENT)
Age: 61
Setting detail: SPECIMEN
Discharge: HOME | End: 2021-09-29
Payer: MEDICAID

## 2021-09-29 ENCOUNTER — OFFICE VISIT (OUTPATIENT)
Dept: PODIATRY | Age: 61
End: 2021-09-29
Payer: MEDICAID

## 2021-09-29 ENCOUNTER — OFFICE VISIT (OUTPATIENT)
Dept: PRIMARY CARE CLINIC | Age: 61
End: 2021-09-29
Payer: MEDICAID

## 2021-09-29 VITALS
HEART RATE: 76 BPM | SYSTOLIC BLOOD PRESSURE: 184 MMHG | BODY MASS INDEX: 30.7 KG/M2 | DIASTOLIC BLOOD PRESSURE: 82 MMHG | HEIGHT: 66 IN | WEIGHT: 191 LBS

## 2021-09-29 VITALS
OXYGEN SATURATION: 97 % | SYSTOLIC BLOOD PRESSURE: 176 MMHG | DIASTOLIC BLOOD PRESSURE: 91 MMHG | HEART RATE: 81 BPM | TEMPERATURE: 97 F | BODY MASS INDEX: 30.95 KG/M2 | WEIGHT: 186 LBS

## 2021-09-29 DIAGNOSIS — M76.821 POSTERIOR TIBIAL TENDON DYSFUNCTION (PTTD) OF BOTH LOWER EXTREMITIES: ICD-10-CM

## 2021-09-29 DIAGNOSIS — E11.42 TYPE 2 DIABETES MELLITUS WITH DIABETIC POLYNEUROPATHY, WITHOUT LONG-TERM CURRENT USE OF INSULIN (HCC): ICD-10-CM

## 2021-09-29 DIAGNOSIS — R35.0 URINE FREQUENCY: ICD-10-CM

## 2021-09-29 DIAGNOSIS — M76.822 POSTERIOR TIBIAL TENDON DYSFUNCTION (PTTD) OF BOTH LOWER EXTREMITIES: ICD-10-CM

## 2021-09-29 DIAGNOSIS — Z12.11 SCREENING FOR MALIGNANT NEOPLASM OF COLON: ICD-10-CM

## 2021-09-29 DIAGNOSIS — G47.9 SLEEP DISTURBANCE: ICD-10-CM

## 2021-09-29 DIAGNOSIS — E11.42 DIABETIC POLYNEUROPATHY ASSOCIATED WITH TYPE 2 DIABETES MELLITUS (HCC): ICD-10-CM

## 2021-09-29 DIAGNOSIS — Z12.31 ENCOUNTER FOR SCREENING MAMMOGRAM FOR BREAST CANCER: ICD-10-CM

## 2021-09-29 DIAGNOSIS — I10 ESSENTIAL HYPERTENSION: Primary | ICD-10-CM

## 2021-09-29 DIAGNOSIS — B35.1 ONYCHOMYCOSIS: ICD-10-CM

## 2021-09-29 DIAGNOSIS — G60.0 CHARCOT-MARIE-TOOTH DISEASE-LIKE DEFORMITY OF FOOT: Primary | ICD-10-CM

## 2021-09-29 PROCEDURE — 11721 DEBRIDE NAIL 6 OR MORE: CPT

## 2021-09-29 PROCEDURE — 4004F PT TOBACCO SCREEN RCVD TLK: CPT | Performed by: NURSE PRACTITIONER

## 2021-09-29 PROCEDURE — 99214 OFFICE O/P EST MOD 30 MIN: CPT | Performed by: NURSE PRACTITIONER

## 2021-09-29 PROCEDURE — 3017F COLORECTAL CA SCREEN DOC REV: CPT | Performed by: NURSE PRACTITIONER

## 2021-09-29 PROCEDURE — 99202 OFFICE O/P NEW SF 15 MIN: CPT

## 2021-09-29 PROCEDURE — G8417 CALC BMI ABV UP PARAM F/U: HCPCS | Performed by: NURSE PRACTITIONER

## 2021-09-29 PROCEDURE — 3051F HG A1C>EQUAL 7.0%<8.0%: CPT | Performed by: NURSE PRACTITIONER

## 2021-09-29 PROCEDURE — G8427 DOCREV CUR MEDS BY ELIG CLIN: HCPCS | Performed by: NURSE PRACTITIONER

## 2021-09-29 PROCEDURE — 81003 URINALYSIS AUTO W/O SCOPE: CPT | Performed by: NURSE PRACTITIONER

## 2021-09-29 PROCEDURE — 2022F DILAT RTA XM EVC RTNOPTHY: CPT | Performed by: NURSE PRACTITIONER

## 2021-09-29 RX ORDER — INSULIN GLARGINE 100 [IU]/ML
10 INJECTION, SOLUTION SUBCUTANEOUS NIGHTLY
Qty: 5 PEN | Refills: 3 | Status: SHIPPED | OUTPATIENT
Start: 2021-09-29 | End: 2022-03-25 | Stop reason: SDUPTHER

## 2021-09-29 RX ORDER — TRAZODONE HYDROCHLORIDE 50 MG/1
50 TABLET ORAL NIGHTLY
Qty: 30 TABLET | Refills: 3 | Status: SHIPPED | OUTPATIENT
Start: 2021-09-29 | End: 2022-03-25 | Stop reason: SDUPTHER

## 2021-09-29 RX ORDER — CLOTRIMAZOLE 1 %
CREAM (GRAM) TOPICAL
Qty: 60 G | Refills: 2 | Status: SHIPPED | OUTPATIENT
Start: 2021-09-29 | End: 2021-10-06

## 2021-09-29 RX ORDER — AMLODIPINE BESYLATE 5 MG/1
5 TABLET ORAL DAILY
Qty: 90 TABLET | Refills: 1 | Status: SHIPPED | OUTPATIENT
Start: 2021-09-29 | End: 2022-03-25 | Stop reason: SDUPTHER

## 2021-09-29 RX ORDER — GABAPENTIN 600 MG/1
TABLET ORAL
Qty: 90 TABLET | Refills: 2 | Status: SHIPPED | OUTPATIENT
Start: 2021-09-29 | End: 2021-12-10

## 2021-09-29 RX ORDER — ADHESIVE BANDAGE 3/4"
1 BANDAGE TOPICAL ONCE
Qty: 1 EACH | Refills: 0 | Status: SHIPPED | OUTPATIENT
Start: 2021-09-29 | End: 2021-09-29

## 2021-09-29 ASSESSMENT — ENCOUNTER SYMPTOMS
COUGH: 0
SHORTNESS OF BREATH: 0

## 2021-09-29 NOTE — PROGRESS NOTES
[facility]  [address], [address]  [phone]    Miracle Baker is a 62 y.o. female who presents today for her  medical conditions/complaintsas noted below. Miracle Baker is c/o of Diabetes and Urinary Tract Infection (frequency)    HPI:     HPI   htn- she does not know what meds she is on. Not checking bp's. She did take bp meds today. No cp,sob. Her son was just murdered. She is not sleeping. She has tried melatonin. She is also having some situational depression. Having urinary frequency, concerns for uti. Nursing note reviewedand discussed with patient. Patient'smedications, allergies, past medical, surgical, social and family histories werereviewed and updated as appropriate.   Current Outpatient Medications on File Prior to Visit   Medication Sig Dispense Refill    TRUEplus Lancets 30G MISC USE AS DIRECTED 4 TIMES A  each 2    TRUE METRIX BLOOD GLUCOSE TEST strip USE TO TEST BLOOD SUGAR 4 TIMES A  each 2    Alcohol Swabs ( STERILE ALCOHOL PREP) PADS USE TO TEST BLOOD SUGAR 4 TIMES A  each 2    nystatin (MYCOSTATIN) 389724 UNIT/GM powder APPLY TOPICALLY TO AFFECTED AREAS FOUR TIMES A DAY 15 g 11    atenolol (TENORMIN) 50 MG tablet TAKE 1/2 TABLET BY MOUTH DAILY 15 tablet 5    pravastatin (PRAVACHOL) 40 MG tablet TAKE 1 TABLET BY MOUTH ONCE DAILY 30 tablet 5    buPROPion (WELLBUTRIN XL) 150 MG extended release tablet TAKE 1 TABLET BY MOUTH EVERY MORNING 30 tablet 5    glipiZIDE (GLUCOTROL) 10 MG tablet TAKE 1 TABLET BY MOUTH 2 TIMES DAILY BEFORE MEALS 60 tablet 5    miconazole (MICONAZOLE 7) 2 % vaginal cream Place 1 applicator vaginally nightly Place vaginally nightly x 7 days 60 g 3    melatonin (RA MELATONIN) 3 MG TABS tablet Take 1 tablet by mouth nightly 30 tablet 1    lisinopril (PRINIVIL;ZESTRIL) 30 MG tablet TAKE 1 TABLET BY MOUTH ONCE DAILY 30 tablet 5    Lancet Devices (SIMPLE DIAGNOSTICS LANCING DEV) MISC USE TO TEST BLOOD SUGAR AS DIRECTED 1 each 2    Blood Glucose Monitoring Suppl (TRUE METRIX AIR GLUCOSE METER) w/Device KIT USE TO TEST BLOOD SUGAR AS DIRECTED 1 kit 2    Lancets (ONETOUCH DELICA PLUS [id number]) MISC USE TO CHECK BLOOD SUGAR 3 TIMES DAILY 100 each 5    Insulin Pen Needle ([employer] PEN NEEDLES) 31G X 6 MM MISC 1 each by Does not apply route daily 100 each 3    alogliptin (NESINA) 25 MG TABS tablet Take 1 tablet by mouth daily 30 tablet 2    blood glucose monitor kit and supplies Test 4 times a day & as needed for symptoms of irregular blood glucose. 1 kit 0    aspirin EC 81 MG EC tablet Take 1 tablet by mouth daily 30 tablet 2    glucose monitoring kit (FREESTYLE) monitoring kit Diagnosis: Diabetes type 2, insulin-dependent. Use 3-4 times daily. Ok to give insurance brand 1 kit 0    ONETOUCH DELICA LANCETS 86W MISC CHECK BLOOD SUGAR ONCE DAILY 100 each 5    gabapentin (NEURONTIN) 600 MG tablet TAKE 1 TABLET BY MOUTH 3 TIMES DAILY TAKE ONE TABLET BY MOUTH THREE TIMES A DAY 90 tablet 2     No current facility-administered medications on file prior to visit. Past Medical History:   Diagnosis Date    Anemia     Chronic back pain     Depression     Hyperlipidemia     Hypertension     Type 2 diabetes mellitus with diabetic polyneuropathy, without long-term current use of insulin ([facility] [address] 11/10/2016    Type II or unspecified type diabetes mellitus without mention of complication, not stated as uncontrolled       Past Surgical History:   Procedure Laterality Date     SECTION      EXTERNAL EAR SURGERY      EYE SURGERY      RECTAL SURGERY Right 2021    RECTAL PERIRECTAL INCISION AND DRAINAGE performed by Ke eYpez MD at [facility]       History reviewed. No pertinent family history.   Social History     Tobacco Use    Smoking status: Current Every Day Smoker     Packs/day: 0.50     Years: 39.00     Pack years: 19.50 Types: Cigarettes    Smokeless tobacco: Never Used   Substance Use Topics    Alcohol use: No     Alcohol/week: 0.0 standard drinks     Comment: 1 x mon      Allergies   Allergen Reactions    Ibuprofen Itching       Subjective:     Review of Systems   Constitutional: Negative for chills and fever. Respiratory: Negative for cough and shortness of breath. Cardiovascular: Negative for chest pain, palpitations and leg swelling. Psychiatric/Behavioral: Positive for sleep disturbance. Objective:     BP (!) 176/91   Pulse 81   Temp 97 °F (36.1 °C)   Wt 186 lb (84.4 kg)   SpO2 97%   BMI 30.95 kg/m²    Physical Exam  Constitutional:       Appearance: She is well-developed. HENT:      Right Ear: External ear normal.      Left Ear: External ear normal.      Nose: Nose normal.   Cardiovascular:      Rate and Rhythm: Normal rate and regular rhythm. Heart sounds: Normal heart sounds, S1 normal and S2 normal.   Pulmonary:      Effort: Pulmonary effort is normal. No respiratory distress. Breath sounds: Normal breath sounds. Musculoskeletal:         General: No deformity. Normal range of motion. Cervical back: Full passive range of motion without pain and normal range of motion. Skin:     General: Skin is warm and dry. Neurological:      Mental Status: She is alert and oriented to person, place, and time. Assessment/Plan         1. Essential hypertension  Add amlodipine    - Blood Pressure Monitoring (BLOOD PRESSURE CUFF) MISC; 1 Dose by Does not apply route once for 1 dose  Dispense: 1 each; Refill: 0  - amLODIPine (NORVASC) 5 MG tablet; Take 1 tablet by mouth daily  Dispense: 90 tablet; Refill: 1    2.     3. Type 2 diabetes mellitus with diabetic polyneuropathy, without long-term current use of insulin ([address]      4. Diabetic polyneuropathy associated with type 2 diabetes mellitus (HCC)    - insulin glargine (LANTUS SOLOSTAR) 100 UNIT/ML injection pen;  Inject 10 Units into the skin nightly  Dispense: 5 pen; Refill: 3    5. Encounter for screening mammogram for breast cancer    - JACOBO DIGITAL DIAGNOSTIC W OR WO CAD BILATERAL; Future    6. Screening for malignant neoplasm of colon    - POCT Fecal Immunochemical Test (FIT); Future    7. Urine frequency    - Culture, Urine; Future    8. Sleep disturbance    - traZODone (DESYREL) 50 MG tablet; Take 1 tablet by mouth nightly  Dispense: 30 tablet; Refill: 3    RTO if symptoms worsen or fail to improve  Pt agreeable with plan      Patient given educationalmaterials - see patient instructions. Discussed use, benefit, and side effectsof prescribed medications. All patient questions answered. Pt voiced understanding. Reviewed health maintenance. Instructed to continue current medications, diet andexercise. 1.  Shauna received counseling on the following healthy behaviors: nutrition, exercise and medication adherence  2. Patient given educational materials when available - see patient instructionswhen applicable  3. Discussed use, benefit, and side effects of prescribed medications. Barriersto medication compliance addressed. All patient questions answered. Pt voicedunderstanding. 4.  Reviewed prior labs and health maintenance  5. Continuecurrent medications, diet and exercise. Completed Refills   Requested Prescriptions     Signed Prescriptions Disp Refills    Blood Pressure Monitoring (BLOOD PRESSURE CUFF) MISC 1 each 0     Si Dose by Does not apply route once for 1 dose    insulin glargine (LANTUS SOLOSTAR) 100 UNIT/ML injection pen 5 pen 3     Sig: Inject 10 Units into the skin nightly    traZODone (DESYREL) 50 MG tablet 30 tablet 3     Sig: Take 1 tablet by mouth nightly    amLODIPine (NORVASC) 5 MG tablet 90 tablet 1     Sig: Take 1 tablet by mouth daily         This note was transcribed using dictation with Dragon services. Efforts were made to correct any errors but some words may be misinterpreted.     Electronically signed by CONRADO Gibbs - CNP, CNP on 10/20/2021 at 3:11 PM

## 2021-09-29 NOTE — PROGRESS NOTES
[address]  [address] [place] [address],  ALFREDO Frost  Tel: [phone]  Fax: [phone]    Subjective     CC: Elongated toenails    HPI:  Monisha Flores is a 62y.o. year old female who presents to clinic today for painful and elongated toenails to her bilateral extremity. Patient states that she is feeling depressed today as she lost her son a week ago. Today she presents to clinic due to all of her her toenails being painful. She also complains of pain to the bottom of her both feet left > right. Patient was last seen in our office back in 2017. Patient has a history of type 2 diabetes with polyneuropathy. Patient denies any other pedal complaints at this time. Primary care physician is CONRADO Bennett CNP.    ROS:    Constitutional: Denies nausea, vomiting, fever, chills. Neurologic: Denies numbness, tingling, and burning in the feet. Vascular: Denies symptoms of lower extremity claudication. Skin: Denies open wounds. Otherwise negative except as noted in the HPI.      PMH:  Past Medical History:   Diagnosis Date    Anemia     Chronic back pain     Depression     Hyperlipidemia     Hypertension     Type 2 diabetes mellitus with diabetic polyneuropathy, without long-term current use of insulin ([facility] [address] 11/10/2016    Type II or unspecified type diabetes mellitus without mention of complication, not stated as uncontrolled        Surgical History:   Past Surgical History:   Procedure Laterality Date     SECTION      EXTERNAL EAR SURGERY      EYE SURGERY      RECTAL SURGERY Right 2021    RECTAL PERIRECTAL INCISION AND DRAINAGE performed by Gael Marcial MD at [facility]         Social History:  Social History     Tobacco Use    Smoking status: Current Every Day Smoker     Packs/day: 0.50     Years: 39.00     Pack years: 19.50     Types: Cigarettes    Smokeless tobacco: Never Used   Substance Use Topics    Alcohol use: No     Alcohol/week: 0.0 standard drinks     Comment: 1 x mon    Drug use: Not Currently     Types: Marijuana       Medications:  Prior to Admission medications    Medication Sig Start Date End Date Taking? Authorizing Provider   Blood Pressure Monitoring (BLOOD PRESSURE CUFF) MISC 1 Dose by Does not apply route once for 1 dose 9/29/21 9/29/21 Yes CONRADO Batres CNP   insulin glargine (LANTUS SOLOSTAR) 100 UNIT/ML injection pen Inject 10 Units into the skin nightly 9/29/21  Yes CONRADO Batres CNP   traZODone (DESYREL) 50 MG tablet Take 1 tablet by mouth nightly 9/29/21  Yes CONRADO Batres CNP   amLODIPine (NORVASC) 5 MG tablet Take 1 tablet by mouth daily 9/29/21  Yes CONRADO Batres CNP   clotrimazole (LOTRIMIN AF) 1 % cream Apply topically 2 times daily.  9/29/21 10/6/21 Yes Ermelinda Molina DPM   TRUEplus Lancets 30G MISC USE AS DIRECTED 4 TIMES A DAY 9/3/21  Yes CONRADO Batres CNP   TRUE METRIX BLOOD GLUCOSE TEST strip USE TO TEST BLOOD SUGAR 4 TIMES A DAY 7/27/21  Yes CONRADO Batres CNP   Alcohol Swabs ( STERILE ALCOHOL PREP) PADS USE TO TEST BLOOD SUGAR 4 TIMES A DAY 7/27/21  Yes CONRADO Batres CNP   nystatin (MYCOSTATIN) 096327 UNIT/GM powder APPLY TOPICALLY TO AFFECTED AREAS FOUR TIMES A DAY 7/27/21  Yes CONRADO Batres CNP   atenolol (TENORMIN) 50 MG tablet TAKE 1/2 TABLET BY MOUTH DAILY 7/15/21  Yes CONRADO Batres CNP   pravastatin (PRAVACHOL) 40 MG tablet TAKE 1 TABLET BY MOUTH ONCE DAILY 7/15/21  Yes CONRADO Batres CNP   buPROPion (WELLBUTRIN XL) 150 MG extended release tablet TAKE 1 TABLET BY MOUTH EVERY MORNING 7/15/21  Yes CONRADO Batres CNP   glipiZIDE (GLUCOTROL) 10 MG tablet TAKE 1 TABLET BY MOUTH 2 TIMES DAILY BEFORE MEALS 7/15/21  Yes CONRADO Batres CNP   miconazole (MICONAZOLE 7) 2 % vaginal cream Place 1 applicator vaginally nightly Place vaginally nightly x 7 days 5/26/21  Yes CONRADO Lott CNP   melatonin (RA MELATONIN) 3 MG TABS tablet Take 1 tablet by mouth nightly 5/26/21  Yes CONRADO Lott CNP   lisinopril (PRINIVIL;ZESTRIL) 30 MG tablet TAKE 1 TABLET BY MOUTH ONCE DAILY 5/24/21  Yes CONRADO Lott CNP   Lancet Devices (SIMPLE DIAGNOSTICS LANCING DEV) MISC USE TO TEST BLOOD SUGAR AS DIRECTED 4/28/21  Yes CONRADO Lott CNP   Blood Glucose Monitoring Suppl (TRUE METRIX AIR GLUCOSE METER) w/Device KIT USE TO TEST BLOOD SUGAR AS DIRECTED 4/28/21  Yes CONRADO Lott CNP   Lancets ([address]) [address] USE TO CHECK BLOOD SUGAR 3 TIMES DAILY 3/12/21  Yes CONRADO Lott CNP   Insulin Pen Needle ([employer] PEN NEEDLES) 31G X 6 MM MISC 1 each by Does not apply route daily 2/10/21  Yes CONRADO Lott CNP   alogliptin (NESINA) 25 MG TABS tablet Take 1 tablet by mouth daily 11/18/20  Yes CONRADO Lott CNP   blood glucose monitor kit and supplies Test 4 times a day & as needed for symptoms of irregular blood glucose. 12/13/19  Yes Kirti Hdez MD   aspirin EC 81 MG EC tablet Take 1 tablet by mouth daily 2/16/18  Yes Dheeraj Coles MD   glucose monitoring kit (FREESTYLE) monitoring kit Diagnosis: Diabetes type 2, insulin-dependent. Use 3-4 times daily. Ok to give insurance brand 11/10/17  Yes Dheeraj Coles MD   [facility] LANCETS 98M MISC CHECK BLOOD SUGAR ONCE DAILY 5/9/17  Yes Greyson Moreland MD   gabapentin (NEURONTIN) 600 MG tablet TAKE 1 TABLET BY MOUTH 3 TIMES DAILY TAKE ONE TABLET BY MOUTH THREE TIMES A DAY 9/29/21 12/29/21  CONRADO Lott CNP       Objective     Vitals:    09/29/21 1328   BP: (!) 184/82   Pulse: 76       Lab Results   Component Value Date    LABA1C 7.7 (H) 02/01/2021       Physical Exam:  General:  Alert and oriented x3. In no acute distress.      Lower Extremity Physical Exam:    Vascular: DP and PT pulses are nonpalpable but audible on Doppler Bilateral. CFT <3 seconds to all digits, Bilateral.  No edema, Bilateral.  Hair growth is absent to the level of the digits, Bilateral.     Neuro: Saph/sural/SP/DP/plantar sensation absent to light touch. Protective sensation is intact to 3/10 sites as tested with a 5.07g SWMF, Bilateral.     Musculoskeletal: EHL/FHL/GS/TA gross motor intact. Tenderness to palpation of medial aspect of the left foot and ankle and mild tenderness to palpation of medial aspect of the right foot and ankle. Gross deformity is absent, Bilateral.     Dermatologic: No open lesions, Bilateral.  Interdigital maceration absent, Bilateral.  Nails 1-10 are thickened, discolored with subungual debris. Fish Conrad is a 62 y.o. female with     Diagnosis Orders   1. Charcot-Disha-Tooth disease-like deformity of foot  XR FOOT RIGHT (MIN 3 VIEWS)    XR FOOT LEFT (MIN 3 VIEWS)   2. Posterior tibial tendon dysfunction (PTTD) of both lower extremities     3. Onychomycosis     4. Type 2 diabetes mellitus with diabetic polyneuropathy, without long-term current use of insulin ([facility] [address]          Plan   Patient examined and evaluated  Diagnosis and treatment options discussed in detail  Patient's nails were trimmed 1-5 bilaterally with sterile nail nippers without incident. Patient educated on wearing proper shoe gear. Patient also advised to get x-rays for bilateral feet before the next appointment  Patient to RTC in 3 months for routine nail care  Patient seen and evaluated with Dr. Sukhdev Durant  Please, call the office with any questions or concerns     Orders Placed This Encounter   Medications    clotrimazole (LOTRIMIN AF) 1 % cream     Sig: Apply topically 2 times daily.      Dispense:  60 g     Refill:  2     Orders Placed This Encounter   Procedures    XR FOOT RIGHT (MIN 3 VIEWS)     Standing Status:   Future     Standing Expiration Date: 9/29/2022    XR FOOT LEFT (MIN 3 VIEWS)     Standing Status:   Future     Standing Expiration Date:   9/29/2022       Tala Lockwood DPM   Podiatric Medicine & Surgery   9/29/2021 at 7:07 PM

## 2021-09-29 NOTE — PROGRESS NOTES
Visit Information    Have you changed or started any medications since your last visit including any over-the-counter medicines, vitamins, or herbal medicines? no   Are you having any side effects from any of your medications? -  no  Have you stopped taking any of your medications? Is so, why? -  no    Have you seen any other physician or provider since your last visit? No  Have you had any other diagnostic tests since your last visit? No  Have you been seen in the emergency room and/or had an admission to a hospital since we last saw you? No  Have you had your routine dental cleaning in the past 6 months? no    Have you activated your [employer] account? If not, what are your barriers?  No:      Patient Care Team:  CONRADO Ying CNP as PCP - General (Family Nurse Practitioner)  CONRADO Ying CNP as PCP - [facility] Provider  Sunil Hester MD as Consulting Physician (Obstetrics & Gynecology)    Medical History Review  Past Medical, Family, and Social History reviewed and does contribute to the patient presenting condition    Health Maintenance   Topic Date Due    Diabetic retinal exam  Never done    Cervical cancer screen  Never done    Shingles Vaccine (1 of 2) Never done    Diabetic foot exam  03/30/2018    Colon Cancer Screen FIT/FOBT  12/08/2018    Lipid screen  01/24/2021    Flu vaccine (1) 09/01/2021    Breast cancer screen  10/23/2021    COVID-19 Vaccine (2 - Pfizer 2-dose series) 10/04/2021    A1C test (Diabetic or Prediabetic)  02/01/2022    Potassium monitoring  05/26/2022    Creatinine monitoring  05/26/2022    Pneumococcal 0-64 years Vaccine (2 of 2 - PPSV23) 05/03/2026    DTaP/Tdap/Td vaccine (2 - Td or Tdap) 03/30/2027    Hepatitis C screen  Completed    HIV screen  Completed    Hepatitis A vaccine  Aged Out    Hib vaccine  Aged Out    Meningococcal (ACWY) vaccine  Aged Out

## 2021-09-30 LAB
CULTURE: NORMAL
Lab: NORMAL
SPECIMEN DESCRIPTION: NORMAL

## 2021-11-04 DIAGNOSIS — I10 ESSENTIAL HYPERTENSION: ICD-10-CM

## 2021-11-05 RX ORDER — LISINOPRIL 30 MG/1
TABLET ORAL
Qty: 30 TABLET | Refills: 5 | Status: SHIPPED | OUTPATIENT
Start: 2021-11-05 | End: 2022-06-29 | Stop reason: SDUPTHER

## 2021-11-05 RX ORDER — ATENOLOL 50 MG/1
TABLET ORAL
Qty: 15 TABLET | Refills: 5 | Status: SHIPPED | OUTPATIENT
Start: 2021-11-05 | End: 2022-06-29 | Stop reason: SDUPTHER

## 2021-12-25 DIAGNOSIS — E11.42 TYPE 2 DIABETES MELLITUS WITH DIABETIC POLYNEUROPATHY, WITHOUT LONG-TERM CURRENT USE OF INSULIN (HCC): ICD-10-CM

## 2021-12-25 DIAGNOSIS — F32.1 EPISODE OF MODERATE MAJOR DEPRESSION (HCC): ICD-10-CM

## 2021-12-25 DIAGNOSIS — E78.00 PURE HYPERCHOLESTEROLEMIA: ICD-10-CM

## 2021-12-27 RX ORDER — PRAVASTATIN SODIUM 40 MG
TABLET ORAL
Qty: 30 TABLET | Refills: 5 | Status: SHIPPED | OUTPATIENT
Start: 2021-12-27 | End: 2022-06-29 | Stop reason: SDUPTHER

## 2021-12-27 RX ORDER — BUPROPION HYDROCHLORIDE 150 MG/1
150 TABLET ORAL EVERY MORNING
Qty: 30 TABLET | Refills: 5 | Status: SHIPPED | OUTPATIENT
Start: 2021-12-27 | End: 2022-06-29 | Stop reason: SDUPTHER

## 2021-12-27 RX ORDER — GLIPIZIDE 10 MG/1
TABLET ORAL
Qty: 60 TABLET | Refills: 5 | Status: SHIPPED | OUTPATIENT
Start: 2021-12-27 | End: 2022-06-29 | Stop reason: SDUPTHER

## 2021-12-27 NOTE — TELEPHONE ENCOUNTER
Next Visit Date:  12/29/2021    Hemoglobin A1C (%)   Date Value   02/01/2021 7.7 (H)   11/18/2020 8.2   08/19/2020 7.8             ( goal A1C is < 7)   Microalb/Crt.  Ratio (mcg/mg creat)   Date Value   08/19/2020 750 (H)     LDL Cholesterol (mg/dL)   Date Value   01/24/2020 79       (goal LDL is <100)   AST (U/L)   Date Value   01/30/2021 6     ALT (U/L)   Date Value   01/30/2021 5     BUN (mg/dL)   Date Value   05/26/2021 10     BP Readings from Last 3 Encounters:   09/29/21 (!) 184/82   09/29/21 (!) 176/91   05/26/21 (!) 197/101          (goal 120/80)        Patient Active Problem List:     Normocytic anemia     Chronic bilateral low back pain without sciatica     Essential hypertension     Type 2 diabetes mellitus with diabetic polyneuropathy, without long-term current use of insulin (HCC)     Cigarette nicotine dependence without complication     Carpal tunnel syndrome of right wrist     Acute cystitis     Perirectal skin irritation     Right knee pain     Perirectal abscess     Depression     Hyperlipidemia     Elevated serum creatinine     Decreased GFR     Chronic kidney disease, stage 3a ([address]     Diabetic nephropathy ([address]      ----Anurag Huber

## 2022-01-07 NOTE — PROGRESS NOTES
Patient instructed to remove shoes and socks and instructed to sit in exam chair. Current PCP is CONRADO Downs CNP and date of last visit was 9/29/21. Do you have a follow up visit scheduled? Yes  If yes, the date is 1/28/22    Diabetic visit information    Blood pressure (Control is BP <140/90)  BP Readings from Last 3 Encounters:   09/29/21 (!) 184/82   09/29/21 (!) 176/91   05/26/21 (!) 197/101       BP taken with correct size cuff? - Yes   Repeated if > 140/90 Yes      Tobacco use:  Patient  reports that she has been smoking cigarettes. She has a 19.50 pack-year smoking history. She has never used smokeless tobacco.  If Smoker - Cessation materials given? - Yes       Diabetic Health Maintenance Items due  Diabetes Management   Topic Date Due    Diabetic retinal exam  Never done    Diabetic foot exam  03/30/2018    Lipid screen  01/24/2021       Diabetic retinal exam done in last year? - Yes   If No: remind patient that it is due and they should schedule an exam    Medications  Is patient taking any medications for diabetes? -   Yes  Have blood sugars been controlled? Fasting blood sugars under 120   -   Yes   Random home sugars or today's POCT glucose is under 180 -   Yes   []  If No to the above then patient should schedule appt with PCP.      Diabetic Plan    A1C Plan  Lab Results   Component Value Date    LABA1C 7.7 (H) 02/01/2021    LABA1C 8.2 11/18/2020    LABA1C 7.8 08/19/2020      []  If A1C over 8 and last result >3 months ago - Order A1C and refer to PCP   []  If last A1C over 6 months ago - Order A1C and refer to PCP for follow up   []  If elevated blood sugars > 180 - refer to PCP for follow up    []  Blood sugar controlled - A1C under 8 and last check was < 6 months      Cholesterol Plan   Lab Results   Component Value Date    LDLCHOLESTEROL 79 01/24/2020      []  If LDL > 100 and last result >3 months ago - order Fasting lipids and refer to PCP for follow up   []  If LDL < 100 and over 1 year ago - Order Fasting lipids and refer to PCP for follow up   [] LDL is controlled. LDL < 100 and checked within the last year     Blood Pressure  BP Readings from Last 3 Encounters:   09/29/21 (!) 184/82   09/29/21 (!) 176/91   05/26/21 (!) 197/101      []  If SBP >140 mmhg - refer to PCP for follow up   []  If DBP > 90 mmhg - refer to PCP for follow up   [] BP is controlled <140/90     Order labs as PCP ordered.   (ie: Lipids, A1C, CMP)

## 2022-01-19 ENCOUNTER — OFFICE VISIT (OUTPATIENT)
Dept: PODIATRY | Age: 62
End: 2022-01-19
Payer: MEDICAID

## 2022-01-19 VITALS
DIASTOLIC BLOOD PRESSURE: 84 MMHG | WEIGHT: 191 LBS | HEIGHT: 66 IN | BODY MASS INDEX: 30.7 KG/M2 | SYSTOLIC BLOOD PRESSURE: 161 MMHG | HEART RATE: 85 BPM

## 2022-01-19 DIAGNOSIS — E11.42 TYPE 2 DIABETES MELLITUS WITH DIABETIC POLYNEUROPATHY, WITHOUT LONG-TERM CURRENT USE OF INSULIN (HCC): ICD-10-CM

## 2022-01-19 DIAGNOSIS — B35.1 ONYCHOMYCOSIS: Primary | ICD-10-CM

## 2022-01-19 PROCEDURE — G8484 FLU IMMUNIZE NO ADMIN: HCPCS | Performed by: PODIATRIST

## 2022-01-19 PROCEDURE — 11721 DEBRIDE NAIL 6 OR MORE: CPT | Performed by: PODIATRIST

## 2022-01-19 PROCEDURE — 3017F COLORECTAL CA SCREEN DOC REV: CPT | Performed by: PODIATRIST

## 2022-01-19 PROCEDURE — 99212 OFFICE O/P EST SF 10 MIN: CPT | Performed by: PODIATRIST

## 2022-01-19 PROCEDURE — G8427 DOCREV CUR MEDS BY ELIG CLIN: HCPCS | Performed by: PODIATRIST

## 2022-01-19 PROCEDURE — 99213 OFFICE O/P EST LOW 20 MIN: CPT | Performed by: PODIATRIST

## 2022-01-19 PROCEDURE — 3046F HEMOGLOBIN A1C LEVEL >9.0%: CPT | Performed by: PODIATRIST

## 2022-01-19 PROCEDURE — G8417 CALC BMI ABV UP PARAM F/U: HCPCS | Performed by: PODIATRIST

## 2022-01-19 PROCEDURE — 2022F DILAT RTA XM EVC RTNOPTHY: CPT | Performed by: PODIATRIST

## 2022-01-19 PROCEDURE — 4004F PT TOBACCO SCREEN RCVD TLK: CPT | Performed by: PODIATRIST

## 2022-01-19 RX ORDER — PEN NEEDLE, DIABETIC 31 GX5/16"
NEEDLE, DISPOSABLE MISCELLANEOUS
COMMUNITY
Start: 2022-01-11 | End: 2022-01-19

## 2022-01-19 RX ORDER — CLOTRIMAZOLE 1 %
CREAM (GRAM) TOPICAL
COMMUNITY
Start: 2022-01-11

## 2022-01-19 NOTE — PROGRESS NOTES
[address]  [address] [place] [address] [address] [facility], [address]  Tel: [phone]  Fax: [phone]    Subjective     CC: Elongated, thickend painful toenails    HPI:  Eduin Wheat is a 62y.o. year old female who presents to clinic today for thickened and elongated toenails to her bilateral extremity. Today she presents to clinic due to all of her her toenails being painful. Patient has a history of type 2 diabetes with polyneuropathy. Patient denies any other pedal complaints at this time. Primary care physician is CONRADO Soares CNP.    ROS:    Constitutional: Denies nausea, vomiting, fever, chills. Neurologic: Denies numbness, tingling, and burning in the feet. Vascular: Denies symptoms of lower extremity claudication. Skin: Denies open wounds. Otherwise negative except as noted in the HPI.      PMH:  Past Medical History:   Diagnosis Date    Anemia     Chronic back pain     Depression     Hyperlipidemia     Hypertension     Type 2 diabetes mellitus with diabetic polyneuropathy, without long-term current use of insulin ([facility] 11/10/2016    Type II or unspecified type diabetes mellitus without mention of complication, not stated as uncontrolled        Surgical History:   Past Surgical History:   Procedure Laterality Date     SECTION      EXTERNAL EAR SURGERY      EYE SURGERY      RECTAL SURGERY Right 2021    RECTAL PERIRECTAL INCISION AND DRAINAGE performed by Deandre Reyes MD at [facility]         Social History:  Social History     Tobacco Use    Smoking status: Current Every Day Smoker     Packs/day: 0.50     Years: 39.00     Pack years: 19.50     Types: Cigarettes    Smokeless tobacco: Never Used   Substance Use Topics    Alcohol use: No     Alcohol/week: 0.0 standard drinks     Comment: 1 x mon    Drug use: Not Currently     Types: Marijuana Olejair Whitten)       Medications:  Prior to Admission medications    Medication Sig Take 1 tablet by mouth nightly 5/26/21  Yes CONRADO Hooper CNP   Lancet Devices (SIMPLE DIAGNOSTICS LANCING DEV) MISC USE TO TEST BLOOD SUGAR AS DIRECTED 4/28/21  Yes CONRADO Hooper CNP   Blood Glucose Monitoring Suppl (TRUE METRIX AIR GLUCOSE METER) w/Device KIT USE TO TEST BLOOD SUGAR AS DIRECTED 4/28/21  Yes CONRADO Hooper CNP   Lancets ([address]) [address] USE TO CHECK BLOOD SUGAR 3 TIMES DAILY 3/12/21  Yes CONRAOD Hooper CNP   Insulin Pen Needle ([employer] PEN NEEDLES) 31G X 6 MM MISC 1 each by Does not apply route daily 2/10/21  Yes CONRADO Hooper CNP   alogliptin (NESINA) 25 MG TABS tablet Take 1 tablet by mouth daily 11/18/20  Yes CONRADO Hooper CNP   blood glucose monitor kit and supplies Test 4 times a day & as needed for symptoms of irregular blood glucose. 12/13/19  Yes Anuradha Moran MD   aspirin EC 81 MG EC tablet Take 1 tablet by mouth daily 2/16/18  Yes Dheeraj Lemus MD   glucose monitoring kit (FREESTYLE) monitoring kit Diagnosis: Diabetes type 2, insulin-dependent. Use 3-4 times daily. Ok to give insurance brand 11/10/17  Yes Dheeraj Lemus MD   [facility] LANCETS 23E MISC CHECK BLOOD SUGAR ONCE DAILY 5/9/17  Yes Crissy Duff MD   gabapentin (NEURONTIN) 600 MG tablet TAKE 1 TABLET BY MOUTH 3 TIMES DAILY 12/10/21 1/10/22  CONRADO Hooper CNP   Blood Pressure Monitoring (BLOOD PRESSURE CUFF) MISC 1 Dose by Does not apply route once for 1 dose 9/29/21 9/29/21  CONRADO Hooper CNP       Objective     Vitals:    01/19/22 1311   BP: (!) 161/84   Pulse: 85       Lab Results   Component Value Date    LABA1C 7.7 (H) 02/01/2021       Physical Exam:  General:  Alert and oriented x3. In no acute distress.      Lower Extremity Physical Exam:    Vascular: DP and PT pulses are nonpalpable but audible on Doppler Bilateral. CFT <3 seconds to all digits, Bilateral.  No edema, Bilateral.  Hair growth is absent to the level of the digits, Bilateral.     Neuro: Saph/sural/SP/DP/plantar sensation absent to light touch. Protective sensation is intact to 3/10 sites as tested with a 5.07g SWMF, Bilateral.     Musculoskeletal: EHL/FHL/GS/TA gross motor intact. Tenderness to palpation of medial aspect of the left foot and ankle and mild tenderness to palpation of medial aspect of the right foot and ankle. Gross deformity is absent, Bilateral.     Dermatologic: No open lesions, Bilateral.  Interdigital maceration absent, Bilateral.  Nails 1-10 are thickened, discolored with subungual debris. Claudia Raza is a 62 y.o. female with     Diagnosis Orders   1. Onychomycosis     2. Type 2 diabetes mellitus with diabetic polyneuropathy, without long-term current use of insulin ([facility] [address]          Plan   Patient examined and evaluated  Diagnosis and treatment options discussed in detail  Patient's nails were trimmed 1-5 on the left and 1,2 on the right with sterile nail nippers with minor incident. Patient educated on wearing proper shoe gear. Rx Penlac  Patient to RTC in 3 months for routine nail care  Patient seen and evaluated with Dr. Jeremiah Rascon  Please, call the office with any questions or concerns     Orders Placed This Encounter   Medications    ciclopirox (PENLAC) 8 % solution     Sig: Apply topically nightly. Dispense:  6 mL     Refill:  1     No orders of the defined types were placed in this encounter. I performed a history and physical examination of the patient and discussed management with the resident. I reviewed the residents note and agree with the documented findings and plan of care. Any areas of disagreement are noted on the chart. I was personally present for the key portions of any procedures. I have documented in the chart those procedures where I was not present during the key portions. I have reviewed the Podiatry Resident progress note.  I agree with the chief complaint, past medical history, past surgical history, allergies, medications, social and family history as documented unless otherwise noted below. Documentation of the HPI, Physical Exam and Medical Decision Making performed by medical students or scribes is based on my personal performance of the HPI, PE and MDM. I have personally evaluated this patient and have completed at least one if not all key elements of the E/M (history, physical exam, and MDM). Additional findings are as noted. Leida Hardin DPM,D.P.M.     Tejas Domínguez DPM   Podiatric Medicine & Surgery   1/19/2022 at 6:11 PM

## 2022-01-21 ENCOUNTER — HOSPITAL ENCOUNTER (OUTPATIENT)
Dept: WOMENS IMAGING | Age: 62
Discharge: HOME | End: 2022-01-23
Payer: MEDICAID

## 2022-01-21 DIAGNOSIS — Z12.31 VISIT FOR SCREENING MAMMOGRAM: ICD-10-CM

## 2022-01-21 PROCEDURE — 77063 BREAST TOMOSYNTHESIS BI: CPT

## 2022-02-24 ENCOUNTER — HOSPITAL ENCOUNTER (EMERGENCY)
Age: 62
Discharge: HOME | End: 2022-02-24
Attending: EMERGENCY MEDICINE
Payer: MEDICAID

## 2022-02-24 VITALS
DIASTOLIC BLOOD PRESSURE: 68 MMHG | TEMPERATURE: 98.7 F | RESPIRATION RATE: 16 BRPM | WEIGHT: 191 LBS | SYSTOLIC BLOOD PRESSURE: 176 MMHG | HEART RATE: 88 BPM | BODY MASS INDEX: 30.7 KG/M2 | OXYGEN SATURATION: 95 % | HEIGHT: 66 IN

## 2022-02-24 DIAGNOSIS — L02.91 ABSCESS: Primary | ICD-10-CM

## 2022-02-24 PROCEDURE — 99283 EMERGENCY DEPT VISIT LOW MDM: CPT

## 2022-02-24 RX ORDER — CEPHALEXIN 500 MG/1
500 CAPSULE ORAL 4 TIMES DAILY
Qty: 28 CAPSULE | Refills: 0 | Status: SHIPPED | OUTPATIENT
Start: 2022-02-24 | End: 2022-03-03

## 2022-02-24 RX ORDER — SULFAMETHOXAZOLE AND TRIMETHOPRIM 800; 160 MG/1; MG/1
1 TABLET ORAL 2 TIMES DAILY
Qty: 14 TABLET | Refills: 0 | Status: SHIPPED | OUTPATIENT
Start: 2022-02-24 | End: 2022-03-03

## 2022-02-24 RX ORDER — OXYCODONE HYDROCHLORIDE AND ACETAMINOPHEN 5; 325 MG/1; MG/1
1-2 TABLET ORAL EVERY 6 HOURS PRN
Qty: 12 TABLET | Refills: 0 | Status: SHIPPED | OUTPATIENT
Start: 2022-02-24 | End: 2022-02-27

## 2022-02-24 NOTE — ED PROVIDER NOTES
eMERGENCY dEPARTMENT eNCOUnter   Independent Attestation     Pt Name: Cecilio Barrett  MRN: [id number]  [place] 1960  Date of evaluation: 2/24/22     Cecilio Barrett is a 62 y.o. female with CC: Abscess      Based on the medical record the care appears appropriate. I was personally available for consultation in the Emergency Department.   The care is provided during an unprecedented national emergency due to the novel coronavirus, COVID [address], DO  Attending Emergency Physician                    Niki Rbolero DO  02/24/22 [id number]

## 2022-02-24 NOTE — ED PROVIDER NOTES
[address]  eMERGENCY dEPARTMENT eNCOUnter      Pt Name: Lupillo Cueto  MRN: [id number]  [place] 1960  Date of evaluation: 2/24/2022  Provider: Emile Olszewski, PA-C    CHIEF COMPLAINT       Chief Complaint   Patient presents with    Abscess     HISTORY OF PRESENT ILLNESS  (Location/Symptom, Timing/Onset, Context/Setting, Quality, Duration, Modifying Factors, Severity.)   Lupillo Cueto is a 62 y.o. female who presents to the emergency department with complaints of a abscess. Patient states she had a history of a perirectal abscess that was operated on before. She states that she has another one she feels like in a similar area. It is already burst and drained. She has not followed up with the surgeon who did her first surgery. She states it is painful. Denies any fever chills cough or congestion. Nursing Notes were reviewed. REVIEW OF SYSTEMS    (2-9 systems for level 4, 10 or more for level 5)     Constitutional: Denies recent fever, chills, weakness. Visual: Denies recent change in vision, discharge or pain. ENT: Denies recent sore throat, nasal congestion, ear pain. Respiratory: Denies recent shortness of breath,  cough , wheezing or pleuritic chest pain. Cardiac:  Denies recent chest pain palpitations dyspnea on exertion or swelling in the lower extremities. GI: denies any recent abdominal pain nausea vomiting or diarrhea. : denies any recent difficulty urinating or pain in the genitals. Musculoskeletal :  Denies neck pain back pain joint pain or recent trauma. Neurologic: denies any seizure activity headaches stroke like symptoms or syncope. Skin:  Denies any recent new rash itching or change in skin color. Psychiatric:  Denies any thoughts of suicide homicide. Patient does not voice any problems with anxiety or depression    Except as noted above the remainder of the review of systems was reviewed and negative.      PAST MEDICAL HISTORY         Diagnosis Date  Anemia     Chronic back pain     Depression     Hyperlipidemia     Hypertension     Type 2 diabetes mellitus with diabetic polyneuropathy, without long-term current use of insulin ([facility] 11/10/2016    Type II or unspecified type diabetes mellitus without mention of complication, not stated as uncontrolled        SURGICAL HISTORY           Procedure Laterality Date     SECTION      EXTERNAL EAR SURGERY      EYE SURGERY      RECTAL SURGERY Right 2021    RECTAL PERIRECTAL INCISION AND DRAINAGE performed by Raul Vázquez MD at [address]       Previous Medications    ALCOHOL SWABS ( STERILE ALCOHOL PREP) PADS    USE TO TEST BLOOD SUGAR 4 TIMES A DAY    ALOGLIPTIN (NESINA) 25 MG TABS TABLET    Take 1 tablet by mouth daily    AMLODIPINE (NORVASC) 5 MG TABLET    Take 1 tablet by mouth daily    ASPIRIN EC 81 MG EC TABLET    Take 1 tablet by mouth daily    ATENOLOL (TENORMIN) 50 MG TABLET    TAKE 1/2 TABLET BY MOUTH DAILY    BLOOD GLUCOSE MONITOR KIT AND SUPPLIES    Test 4 times a day & as needed for symptoms of irregular blood glucose. BLOOD GLUCOSE MONITORING SUPPL (TRUE METRIX AIR GLUCOSE METER) W/DEVICE KIT    USE TO TEST BLOOD SUGAR AS DIRECTED    BLOOD PRESSURE MONITORING (BLOOD PRESSURE CUFF) MISC    1 Dose by Does not apply route once for 1 dose    BUPROPION (WELLBUTRIN XL) 150 MG EXTENDED RELEASE TABLET    TAKE 1 TABLET BY MOUTH EVERY MORNING    CICLOPIROX (PENLAC) 8 % SOLUTION    Apply topically nightly. CLOTRIMAZOLE (LOTRIMIN) 1 % CREAM    APPLY TOPICALLY 2 TIMES DAILY. GABAPENTIN (NEURONTIN) 600 MG TABLET    TAKE 1 TABLET BY MOUTH 3 TIMES DAILY    GLIPIZIDE (GLUCOTROL) 10 MG TABLET    TAKE 1 TABLET BY MOUTH 2 TIMES DAILY BEFORE MEALS    GLUCOSE MONITORING KIT (FREESTYLE) MONITORING KIT    Diagnosis: Diabetes type 2, insulin-dependent. Use 3-4 times daily.  Ok to give insurance brand    INSULIN GLARGINE (LANTUS SOLOSTAR) 100 UNIT/ML INJECTION PEN    Inject 10 Units into the skin nightly    INSULIN PEN NEEDLE ([employer] PEN NEEDLES) 31G X 6 MM MISC    1 each by Does not apply route daily    LANCET DEVICES (SIMPLE DIAGNOSTICS LANCING DEV) MISC    USE TO TEST BLOOD SUGAR AS DIRECTED    LANCETS (ONETOUCH DELICA PLUS [id number]) MISC    USE TO CHECK BLOOD SUGAR 3 TIMES DAILY    LISINOPRIL (PRINIVIL;ZESTRIL) 30 MG TABLET    TAKE 1 TABLET BY MOUTH ONCE DAILY    MELATONIN (RA MELATONIN) 3 MG TABS TABLET    Take 1 tablet by mouth nightly    MICONAZOLE (MICONAZOLE 7) 2 % VAGINAL CREAM    Place 1 applicator vaginally nightly Place vaginally nightly x 7 days    NYSTATIN (MYCOSTATIN) 950313 UNIT/GM POWDER    APPLY TOPICALLY TO AFFECTED AREAS FOUR TIMES A DAY    ONETOUCH DELICA LANCETS 58I MISC    CHECK BLOOD SUGAR ONCE DAILY    PRAVASTATIN (PRAVACHOL) 40 MG TABLET    TAKE 1 TABLET BY MOUTH ONCE DAILY    TRAZODONE (DESYREL) 50 MG TABLET    Take 1 tablet by mouth nightly    TRUE METRIX BLOOD GLUCOSE TEST STRIP    USE TO TEST BLOOD SUGAR 4 TIMES A DAY    TRUEPLUS LANCETS 30G MISC    USE AS DIRECTED 4 TIMES A DAY       ALLERGIES     Ibuprofen    FAMILY HISTORY     History reviewed. No pertinent family history. Family Status   Relation Name Status    Mother  Alive    Father        SOCIAL HISTORY      reports that she has been smoking cigarettes. She has a 19.50 pack-year smoking history. She has never used smokeless tobacco. She reports previous drug use. Drug: Marijuana Melonie Bachelor). She reports that she does not drink alcohol. PHYSICAL EXAM    (up to 7 for level 4, 8 or more for level 5)     ED Triage Vitals [22 1558]   BP Temp Temp src Pulse Resp SpO2 Height Weight   (!) 176/68 98.7 °F (37.1 °C) -- 88 16 95 % 5' 6\" (1.676 m) 191 lb (86.6 kg)     Physical Exam  Vitals and nursing note reviewed. Constitutional:       Appearance: Normal appearance. She is obese. HENT:      Head: Normocephalic and atraumatic.       Nose: Nose normal. Mouth/Throat:      Mouth: Mucous membranes are moist.   Eyes:      Extraocular Movements: Extraocular movements intact. Conjunctiva/sclera: Conjunctivae normal.      Pupils: Pupils are equal, round, and reactive to light. Cardiovascular:      Rate and Rhythm: Normal rate and regular rhythm. Pulses: Normal pulses. Heart sounds: Normal heart sounds. Abdominal:      General: Abdomen is flat. Bowel sounds are normal.      Palpations: Abdomen is soft. Musculoskeletal:         General: Normal range of motion. Cervical back: Normal range of motion and neck supple. Skin:            Comments: 3 cm draining abscess noted on the anterior portion of the right buttock. Does not appear to involve the rectum. Serosanguineous drainage with foul smell noted. Unable to palpate any extra drainage out of the abscess. Neurological:      General: No focal deficit present. Mental Status: She is alert and oriented to person, place, and time. Mental status is at baseline. Psychiatric:         Mood and Affect: Mood normal.       Vital Signs reviewed    MEDICAL DECISION MAKING:     Discussed with the patient that she will need to follow-up with Dr. Russell. We will start her on antibiotics and give her pain medication. At this time it does not require admission. The abscess is fully drained and we are unable to palpate out any more serosanguineous drainage at this time. Patient verbalizes understanding and all questions were answered. DIAGNOSTIC RESULTS     EKG: Not clinically indicated    RADIOLOGY: Not clinically indicated  Non-plain film images such as CT, Ultrasound and MRI are read by the radiologist.   Interpretation per the Radiologist below:     No orders to display     LABS: Not clinically indicated  Labs Reviewed - No data to display    All other labs were within normal range or not returned as of this dictation.     EMERGENCY DEPARTMENT COURSE and DIFFERENTIAL DIAGNOSIS/MDM:   Vitals: Vitals:    02/24/22 1558   BP: (!) 176/68   Pulse: 88   Resp: 16   Temp: 98.7 °F (37.1 °C)   SpO2: 95%   Weight: 191 lb (86.6 kg)   Height: 5' 6\" (1.676 m)       Orders Placed This Encounter   Medications    sulfamethoxazole-trimethoprim (BACTRIM DS) 800-160 MG per tablet     Sig: Take 1 tablet by mouth 2 times daily for 7 days     Dispense:  14 tablet     Refill:  0    cephALEXin (KEFLEX) 500 MG capsule     Sig: Take 1 capsule by mouth 4 times daily for 7 days     Dispense:  28 capsule     Refill:  0    oxyCODONE-acetaminophen (PERCOCET) 5-325 MG per tablet     Sig: Take 1-2 tablets by mouth every 6 hours as needed for Pain for up to 3 days. Dispense:  12 tablet     Refill:  0       CONSULTS:  None    PROCEDURES:  None    FINAL IMPRESSION      1. Abscess          DISPOSITION/PLAN   DISPOSITION Decision To Discharge 02/24/2022 04:23:08 PM      PATIENT REFERRED TO:  CONRADO [facility] - [facility]  [address]  [phone]    Schedule an appointment as soon as possible for a visit       Raul Vázquez MD  [address] [place] [address] [address] [address]  [phone]    Schedule an appointment as soon as possible for a visit         DISCHARGE MEDICATIONS:  New Prescriptions    CEPHALEXIN (KEFLEX) 500 MG CAPSULE    Take 1 capsule by mouth 4 times daily for 7 days    OXYCODONE-ACETAMINOPHEN (PERCOCET) 5-325 MG PER TABLET    Take 1-2 tablets by mouth every 6 hours as needed for Pain for up to 3 days.     SULFAMETHOXAZOLE-TRIMETHOPRIM (BACTRIM DS) 800-160 MG PER TABLET    Take 1 tablet by mouth 2 times daily for 7 days       (Please note that portions of this note were completed with a voice recognition program.  Efforts were made to edit the dictations but occasionally words are mis-transcribed.)    SMITH Momin PA-C  02/24/22 1774

## 2022-02-24 NOTE — ED TRIAGE NOTES
Patient to emergency department with complaints of abscess to buttocks ongoing for a week, states \"it busted today and draining really bad. I came here to get the core out\". Pt had same event occur last year.

## 2022-03-03 DIAGNOSIS — I10 ESSENTIAL HYPERTENSION: ICD-10-CM

## 2022-03-03 RX ORDER — AMLODIPINE BESYLATE 5 MG/1
5 TABLET ORAL DAILY
Qty: 90 TABLET | Refills: 1 | OUTPATIENT
Start: 2022-03-03

## 2022-03-04 DIAGNOSIS — I10 ESSENTIAL HYPERTENSION: ICD-10-CM

## 2022-03-04 RX ORDER — AMLODIPINE BESYLATE 5 MG/1
5 TABLET ORAL DAILY
Qty: 90 TABLET | Refills: 1 | OUTPATIENT
Start: 2022-03-04

## 2022-03-05 RX ORDER — AMLODIPINE BESYLATE 5 MG/1
5 TABLET ORAL DAILY
Qty: 90 TABLET | Refills: 1 | OUTPATIENT
Start: 2022-03-05

## 2022-03-25 ENCOUNTER — OFFICE VISIT (OUTPATIENT)
Dept: PRIMARY CARE CLINIC | Age: 62
End: 2022-03-25
Payer: MEDICAID

## 2022-03-25 VITALS
OXYGEN SATURATION: 99 % | TEMPERATURE: 97.3 F | BODY MASS INDEX: 30.99 KG/M2 | WEIGHT: 192 LBS | HEART RATE: 80 BPM | SYSTOLIC BLOOD PRESSURE: 138 MMHG | DIASTOLIC BLOOD PRESSURE: 82 MMHG

## 2022-03-25 DIAGNOSIS — E11.42 DIABETIC POLYNEUROPATHY ASSOCIATED WITH TYPE 2 DIABETES MELLITUS (HCC): ICD-10-CM

## 2022-03-25 DIAGNOSIS — Z13.220 SCREENING FOR HYPERLIPIDEMIA: ICD-10-CM

## 2022-03-25 DIAGNOSIS — Z12.11 SCREENING FOR COLON CANCER: ICD-10-CM

## 2022-03-25 DIAGNOSIS — G47.9 SLEEP DISTURBANCE: ICD-10-CM

## 2022-03-25 DIAGNOSIS — I10 ESSENTIAL HYPERTENSION: ICD-10-CM

## 2022-03-25 LAB — HBA1C MFR BLD: 12 %

## 2022-03-25 PROCEDURE — 3046F HEMOGLOBIN A1C LEVEL >9.0%: CPT | Performed by: NURSE PRACTITIONER

## 2022-03-25 PROCEDURE — 83036 HEMOGLOBIN GLYCOSYLATED A1C: CPT | Performed by: NURSE PRACTITIONER

## 2022-03-25 PROCEDURE — G8484 FLU IMMUNIZE NO ADMIN: HCPCS | Performed by: NURSE PRACTITIONER

## 2022-03-25 PROCEDURE — G8417 CALC BMI ABV UP PARAM F/U: HCPCS | Performed by: NURSE PRACTITIONER

## 2022-03-25 PROCEDURE — 2022F DILAT RTA XM EVC RTNOPTHY: CPT | Performed by: NURSE PRACTITIONER

## 2022-03-25 PROCEDURE — 99214 OFFICE O/P EST MOD 30 MIN: CPT | Performed by: NURSE PRACTITIONER

## 2022-03-25 PROCEDURE — 4004F PT TOBACCO SCREEN RCVD TLK: CPT | Performed by: NURSE PRACTITIONER

## 2022-03-25 PROCEDURE — 3017F COLORECTAL CA SCREEN DOC REV: CPT | Performed by: NURSE PRACTITIONER

## 2022-03-25 PROCEDURE — G8427 DOCREV CUR MEDS BY ELIG CLIN: HCPCS | Performed by: NURSE PRACTITIONER

## 2022-03-25 RX ORDER — INSULIN GLARGINE 100 [IU]/ML
20 INJECTION, SOLUTION SUBCUTANEOUS NIGHTLY
Qty: 5 PEN | Refills: 3 | Status: SHIPPED | OUTPATIENT
Start: 2022-03-25 | End: 2022-06-29 | Stop reason: SDUPTHER

## 2022-03-25 RX ORDER — FLUCONAZOLE 150 MG/1
150 TABLET ORAL ONCE
Qty: 1 TABLET | Refills: 1 | Status: SHIPPED | OUTPATIENT
Start: 2022-03-25 | End: 2022-03-25

## 2022-03-25 RX ORDER — GABAPENTIN 600 MG/1
TABLET ORAL
Qty: 90 TABLET | Refills: 2 | Status: SHIPPED | OUTPATIENT
Start: 2022-03-25 | End: 2022-06-29 | Stop reason: SDUPTHER

## 2022-03-25 RX ORDER — CLOTRIMAZOLE 1 %
CREAM (GRAM) TOPICAL
Qty: 1 EACH | Refills: 0 | Status: SHIPPED | OUTPATIENT
Start: 2022-03-25 | End: 2022-04-01

## 2022-03-25 RX ORDER — LANOLIN ALCOHOL/MO/W.PET/CERES
3 CREAM (GRAM) TOPICAL NIGHTLY
Qty: 30 TABLET | Refills: 5 | Status: SHIPPED | OUTPATIENT
Start: 2022-03-25 | End: 2022-10-05 | Stop reason: SDUPTHER

## 2022-03-25 RX ORDER — AMLODIPINE BESYLATE 5 MG/1
5 TABLET ORAL DAILY
Qty: 90 TABLET | Refills: 1 | Status: SHIPPED | OUTPATIENT
Start: 2022-03-25 | End: 2022-06-29 | Stop reason: SDUPTHER

## 2022-03-25 RX ORDER — TRAZODONE HYDROCHLORIDE 50 MG/1
50 TABLET ORAL NIGHTLY
Qty: 30 TABLET | Refills: 3 | Status: SHIPPED | OUTPATIENT
Start: 2022-03-25 | End: 2022-06-29 | Stop reason: SDUPTHER

## 2022-03-25 ASSESSMENT — PATIENT HEALTH QUESTIONNAIRE - PHQ9
4. FEELING TIRED OR HAVING LITTLE ENERGY: 1
SUM OF ALL RESPONSES TO PHQ QUESTIONS 1-9: 11
2. FEELING DOWN, DEPRESSED OR HOPELESS: 3
3. TROUBLE FALLING OR STAYING ASLEEP: 3
8. MOVING OR SPEAKING SO SLOWLY THAT OTHER PEOPLE COULD HAVE NOTICED. OR THE OPPOSITE, BEING SO FIGETY OR RESTLESS THAT YOU HAVE BEEN MOVING AROUND A LOT MORE THAN USUAL: 0
9. THOUGHTS THAT YOU WOULD BE BETTER OFF DEAD, OR OF HURTING YOURSELF: 0
6. FEELING BAD ABOUT YOURSELF - OR THAT YOU ARE A FAILURE OR HAVE LET YOURSELF OR YOUR FAMILY DOWN: 1
SUM OF ALL RESPONSES TO PHQ QUESTIONS 1-9: 11
10. IF YOU CHECKED OFF ANY PROBLEMS, HOW DIFFICULT HAVE THESE PROBLEMS MADE IT FOR YOU TO DO YOUR WORK, TAKE CARE OF THINGS AT HOME, OR GET ALONG WITH OTHER PEOPLE: 1
7. TROUBLE CONCENTRATING ON THINGS, SUCH AS READING THE NEWSPAPER OR WATCHING TELEVISION: 2
SUM OF ALL RESPONSES TO PHQ QUESTIONS 1-9: 11
5. POOR APPETITE OR OVEREATING: 1
SUM OF ALL RESPONSES TO PHQ QUESTIONS 1-9: 11

## 2022-03-25 ASSESSMENT — ENCOUNTER SYMPTOMS
SHORTNESS OF BREATH: 0
COUGH: 0

## 2022-03-25 NOTE — PROGRESS NOTES
Geeta Goins (:  1960) is a 62 y.o. female,Established patient, here for evaluation of the following chief complaint(s):  Hypertension (Medication refill )         ASSESSMENT/PLAN:  1. Diabetic polyneuropathy associated with type 2 diabetes mellitus ([address]  Assessment & Plan:   Uncontrolled, changes made today: increase lantus to 20 units, cut out coke again, medication adherence emphasized and lifestyle modifications recommended  Orders:  -     gabapentin (NEURONTIN) 600 MG tablet; TAKE 1 TABLET BY MOUTH 3 TIMES DAILY, Disp-90 tablet, R-2Normal  -     POCT glycosylated hemoglobin (Hb A1C)  -     insulin glargine (LANTUS SOLOSTAR) 100 UNIT/ML injection pen; Inject 20 Units into the skin nightly, Disp-5 pen, R-3Normal  2. Sleep disturbance  -     melatonin (RA MELATONIN) 3 MG TABS tablet; Take 1 tablet by mouth nightly, Disp-30 tablet, R-5Normal  -     traZODone (DESYREL) 50 MG tablet; Take 1 tablet by mouth nightly, Disp-30 tablet, R-3Normal  3. Screening for colon cancer  -     POCT FECAL IMMUNOCHEMICAL TEST (FIT); Future  4. Screening for hyperlipidemia  -     Lipid, Fasting; Future  5. Essential hypertension  Assessment & Plan:   Well-controlled, continue current medications  Orders:  -     amLODIPine (NORVASC) 5 MG tablet; Take 1 tablet by mouth daily, Disp-90 tablet, R-1Normal      No follow-ups on file. Subjective   SUBJECTIVE/OBJECTIVE:  HPI   Vaginal dc- gets frequent yeast infections she reports, she would alida to have diflucan and cream on hand. Dm- she is drinking coke again. She is depressed. She is up all night eating. a1c is 12 today. Review of Systems   Constitutional: Negative for chills and fever. Respiratory: Negative for cough and shortness of breath. Cardiovascular: Negative for chest pain, palpitations and leg swelling.           Objective      Vitals:    22 1030   BP: 138/82   Pulse:    Temp:    SpO2:      Vitals:    22 1030   BP: 138/82   Pulse: Temp:    SpO2:        Physical Exam  Constitutional:       Appearance: She is well-developed. HENT:      Right Ear: External ear normal.      Left Ear: External ear normal.      Nose: Nose normal.   Cardiovascular:      Rate and Rhythm: Normal rate and regular rhythm. Heart sounds: Normal heart sounds, S1 normal and S2 normal.   Pulmonary:      Effort: Pulmonary effort is normal. No respiratory distress. Breath sounds: Normal breath sounds. Musculoskeletal:         General: No deformity. Normal range of motion. Cervical back: Full passive range of motion without pain and normal range of motion. Skin:     General: Skin is warm and dry. Neurological:      Mental Status: She is alert and oriented to person, place, and time. An electronic signature was used to authenticate this note.     --CONRADO Lanier - CNP

## 2022-03-25 NOTE — ASSESSMENT & PLAN NOTE
Uncontrolled, changes made today: increase lantus to 20 units, cut out coke again, medication adherence emphasized and lifestyle modifications recommended

## 2022-04-15 ENCOUNTER — TELEPHONE (OUTPATIENT)
Dept: PRIMARY CARE CLINIC | Age: 62
End: 2022-04-15

## 2022-04-15 NOTE — TELEPHONE ENCOUNTER
Pt called in requesting refill on Gabapentin 600mg. Writer informed pt Rx was sent to pharmacy on 3/25. Pt stated pharm did not receive. Writer contacted pharm spoke with Aj at [facility] stated medication was delivered on 3/29/22. Writer returned call to pt and informed of the date Rx was sent out by pharm. Any concerns will need to be directed to pharm. Pt voiced understanding.

## 2022-04-18 RX ORDER — FLUCONAZOLE 150 MG/1
TABLET ORAL
Qty: 1 TABLET | Refills: 1 | OUTPATIENT
Start: 2022-04-18

## 2022-04-18 NOTE — TELEPHONE ENCOUNTER
Health Maintenance   Topic Date Due    Diabetic retinal exam  Never done    Cervical cancer screen  Never done    Shingles Vaccine (1 of 2) Never done    Pneumococcal 0-64 years Vaccine (2 - PCV) 11/10/2017    Diabetic foot exam  03/30/2018    Colorectal Cancer Screen  12/08/2018    Lipid screen  01/24/2021    COVID-19 Vaccine (3 - Booster for Pfizer series) 03/06/2022    Potassium monitoring  05/26/2022    Creatinine monitoring  05/26/2022    A1C test (Diabetic or Prediabetic)  06/25/2022    Flu vaccine (Season Ended) 09/01/2022    Depression Monitoring  03/25/2023    Breast cancer screen  01/21/2024    DTaP/Tdap/Td vaccine (2 - Td or Tdap) 03/30/2027    Hepatitis C screen  Completed    HIV screen  Completed    Hepatitis A vaccine  Aged Out    Hib vaccine  Aged Out    Meningococcal (ACWY) vaccine  Aged Out             (applicable per patient's age: Cancer Screenings, Depression Screening, Fall Risk Screening, Immunizations)    Hemoglobin A1C (%)   Date Value   03/25/2022 12.0   02/01/2021 7.7 (H)   11/18/2020 8.2     Microalb/Crt.  Ratio (mcg/mg creat)   Date Value   08/19/2020 750 (H)     LDL Cholesterol (mg/dL)   Date Value   01/24/2020 79     AST (U/L)   Date Value   01/30/2021 6     ALT (U/L)   Date Value   01/30/2021 5     BUN (mg/dL)   Date Value   05/26/2021 10      (goal A1C is < 7)   (goal LDL is <100) need 30-50% reduction from baseline     BP Readings from Last 3 Encounters:   03/25/22 138/82   02/24/22 (!) 176/68   01/19/22 (!) 161/84    (goal /80)      All Future Testing planned in [facility]:  Lab Frequency Next Occurrence   [facility]ACOBO DIGITAL DIAGNOSTIC W OR WO CAD BILATERAL Once 10/06/2021   XR FOOT RIGHT (MIN 3 VIEWS) Once 04/13/2022   XR FOOT LEFT (MIN 3 VIEWS) Once 04/13/2022   POCT FECAL IMMUNOCHEMICAL TEST (FIT) Once 04/25/2022   Lipid, Fasting Once 04/24/2022       Next Visit Date:  Future Appointments   Date Time Provider Nida Avalos   4/20/2022  1:15 PM Erick Love

## 2022-06-29 ENCOUNTER — OFFICE VISIT (OUTPATIENT)
Dept: PRIMARY CARE CLINIC | Age: 62
End: 2022-06-29
Payer: MEDICAID

## 2022-06-29 VITALS
SYSTOLIC BLOOD PRESSURE: 158 MMHG | WEIGHT: 191 LBS | OXYGEN SATURATION: 100 % | BODY MASS INDEX: 30.83 KG/M2 | HEART RATE: 75 BPM | TEMPERATURE: 97.2 F | DIASTOLIC BLOOD PRESSURE: 87 MMHG

## 2022-06-29 DIAGNOSIS — I10 ESSENTIAL HYPERTENSION: ICD-10-CM

## 2022-06-29 DIAGNOSIS — Z13.29 SCREENING FOR THYROID DISORDER: ICD-10-CM

## 2022-06-29 DIAGNOSIS — F32.1 EPISODE OF MODERATE MAJOR DEPRESSION (HCC): ICD-10-CM

## 2022-06-29 DIAGNOSIS — E78.00 PURE HYPERCHOLESTEROLEMIA: ICD-10-CM

## 2022-06-29 DIAGNOSIS — G47.9 SLEEP DISTURBANCE: ICD-10-CM

## 2022-06-29 DIAGNOSIS — E11.42 DIABETIC POLYNEUROPATHY ASSOCIATED WITH TYPE 2 DIABETES MELLITUS (HCC): ICD-10-CM

## 2022-06-29 DIAGNOSIS — E11.42 TYPE 2 DIABETES MELLITUS WITH DIABETIC POLYNEUROPATHY, WITHOUT LONG-TERM CURRENT USE OF INSULIN (HCC): ICD-10-CM

## 2022-06-29 DIAGNOSIS — R41.3 MEMORY CHANGE: Primary | ICD-10-CM

## 2022-06-29 PROCEDURE — G8417 CALC BMI ABV UP PARAM F/U: HCPCS | Performed by: NURSE PRACTITIONER

## 2022-06-29 PROCEDURE — 4004F PT TOBACCO SCREEN RCVD TLK: CPT | Performed by: NURSE PRACTITIONER

## 2022-06-29 PROCEDURE — 2022F DILAT RTA XM EVC RTNOPTHY: CPT | Performed by: NURSE PRACTITIONER

## 2022-06-29 PROCEDURE — G8427 DOCREV CUR MEDS BY ELIG CLIN: HCPCS | Performed by: NURSE PRACTITIONER

## 2022-06-29 PROCEDURE — 99214 OFFICE O/P EST MOD 30 MIN: CPT | Performed by: NURSE PRACTITIONER

## 2022-06-29 PROCEDURE — 3017F COLORECTAL CA SCREEN DOC REV: CPT | Performed by: NURSE PRACTITIONER

## 2022-06-29 PROCEDURE — 3046F HEMOGLOBIN A1C LEVEL >9.0%: CPT | Performed by: NURSE PRACTITIONER

## 2022-06-29 RX ORDER — AMLODIPINE BESYLATE 5 MG/1
5 TABLET ORAL DAILY
Qty: 90 TABLET | Refills: 1 | Status: SHIPPED | OUTPATIENT
Start: 2022-06-29 | End: 2022-10-05 | Stop reason: SDUPTHER

## 2022-06-29 RX ORDER — ATENOLOL 50 MG/1
TABLET ORAL
Qty: 15 TABLET | Refills: 5 | Status: SHIPPED | OUTPATIENT
Start: 2022-06-29 | End: 2022-10-05 | Stop reason: SDUPTHER

## 2022-06-29 RX ORDER — INSULIN GLARGINE 100 [IU]/ML
20 INJECTION, SOLUTION SUBCUTANEOUS NIGHTLY
Qty: 5 PEN | Refills: 3 | Status: SHIPPED | OUTPATIENT
Start: 2022-06-29

## 2022-06-29 RX ORDER — LISINOPRIL 30 MG/1
TABLET ORAL
Qty: 30 TABLET | Refills: 5 | Status: SHIPPED | OUTPATIENT
Start: 2022-06-29 | End: 2022-10-05 | Stop reason: SDUPTHER

## 2022-06-29 RX ORDER — GABAPENTIN 600 MG/1
TABLET ORAL
Qty: 90 TABLET | Refills: 2 | Status: SHIPPED | OUTPATIENT
Start: 2022-06-29 | End: 2022-10-05 | Stop reason: SDUPTHER

## 2022-06-29 RX ORDER — TRAZODONE HYDROCHLORIDE 50 MG/1
50 TABLET ORAL NIGHTLY
Qty: 30 TABLET | Refills: 3 | Status: SHIPPED | OUTPATIENT
Start: 2022-06-29 | End: 2022-10-05 | Stop reason: SDUPTHER

## 2022-06-29 RX ORDER — BUPROPION HYDROCHLORIDE 300 MG/1
300 TABLET ORAL EVERY MORNING
Qty: 30 TABLET | Refills: 5 | Status: SHIPPED | OUTPATIENT
Start: 2022-06-29 | End: 2022-10-05

## 2022-06-29 RX ORDER — GLIPIZIDE 10 MG/1
TABLET ORAL
Qty: 60 TABLET | Refills: 5 | Status: SHIPPED | OUTPATIENT
Start: 2022-06-29 | End: 2022-10-05 | Stop reason: SDUPTHER

## 2022-06-29 RX ORDER — PRAVASTATIN SODIUM 40 MG
TABLET ORAL
Qty: 30 TABLET | Refills: 5 | Status: SHIPPED | OUTPATIENT
Start: 2022-06-29 | End: 2022-10-05 | Stop reason: SDUPTHER

## 2022-06-29 SDOH — ECONOMIC STABILITY: FOOD INSECURITY: WITHIN THE PAST 12 MONTHS, THE FOOD YOU BOUGHT JUST DIDN'T LAST AND YOU DIDN'T HAVE MONEY TO GET MORE.: NEVER TRUE

## 2022-06-29 SDOH — ECONOMIC STABILITY: FOOD INSECURITY: WITHIN THE PAST 12 MONTHS, YOU WORRIED THAT YOUR FOOD WOULD RUN OUT BEFORE YOU GOT MONEY TO BUY MORE.: NEVER TRUE

## 2022-06-29 ASSESSMENT — ENCOUNTER SYMPTOMS
SHORTNESS OF BREATH: 0
COUGH: 0

## 2022-06-29 ASSESSMENT — SOCIAL DETERMINANTS OF HEALTH (SDOH): HOW HARD IS IT FOR YOU TO PAY FOR THE VERY BASICS LIKE FOOD, HOUSING, MEDICAL CARE, AND HEATING?: NOT HARD AT ALL

## 2022-06-29 NOTE — PROGRESS NOTES
Zully Arguello (:  1960) is a 65 y.o. female,Established patient, here for evaluation of the following chief complaint(s):  Discuss Medications (pill pack, )         ASSESSMENT/PLAN:  1. Memory change  -     [facility], Neurology, [facility]  2. Diabetic polyneuropathy associated with type 2 diabetes mellitus ([address]  -     [facility]) Medication Mgmt (Comprehensive Med Review - Clinical Pharmacy) - [facility]  -     gabapentin (NEURONTIN) 600 MG tablet; TAKE 1 TABLET BY MOUTH 3 TIMES DAILY, Disp-90 tablet, R-2Normal  -     insulin glargine (LANTUS SOLOSTAR) 100 UNIT/ML injection pen; Inject 20 Units into the skin nightly, Disp-5 pen, R-3Normal  3. Essential hypertension  -     [facility] Medication Mgmt (Comprehensive Med Review - Clinical Pharmacy) - [facility]  -     amLODIPine (NORVASC) 5 MG tablet; Take 1 tablet by mouth daily, Disp-90 tablet, R-1Normal  -     lisinopril (PRINIVIL;ZESTRIL) 30 MG tablet; Take one tablet by mouth daily, Disp-30 tablet, R-5Normal  -     atenolol (TENORMIN) 50 MG tablet; Take 1/2 tablet by mouth daily, Disp-15 tablet, R-5Normal  4. Sleep disturbance  -     traZODone (DESYREL) 50 MG tablet; Take 1 tablet by mouth nightly, Disp-30 tablet, R-3Normal  5. Episode of moderate major depression (HCC)  -     buPROPion (WELLBUTRIN XL) 300 MG extended release tablet; Take 1 tablet by mouth every morning, Disp-30 tablet, R-5Normal  6. Type 2 diabetes mellitus with diabetic polyneuropathy, without long-term current use of insulin (HCC)  -     CBC; Future  -     Comprehensive Metabolic Panel; Future  -     Hemoglobin A1C; Future  -     glipiZIDE (GLUCOTROL) 10 MG tablet; TAKE 1 TABLET BY MOUTH 2 TIMES DAILY BEFORE MEALS, Disp-60 tablet, R-5Normal  7. Pure hypercholesterolemia  -     pravastatin (PRAVACHOL) 40 MG tablet; Take one tablet by mouth daily, Disp-30 tablet, R-5Normal  8. Screening for thyroid disorder  -     TSH; Future      No follow-ups on file. Subjective   SUBJECTIVE/OBJECTIVE:  HPI   esme states she shakes for no reason. She does not feel her brain is working right. She has memory problems. She would like to see neuro. Dm- stopped drinking coke. Will check a1c with lab work. She is very upset her pharmacy closed. The new pharmacy gave her meds that she is not even taking. She would like her meds sent somewhere that she can get a pill pack. She does not know what meds she is taking right now. Would like a note for lmha- for a two bedroom, her nieces come stay with her when she is feeling dizzy. Will give referral to neuro today. She has multiple major things that need to be repaired on her house but she can't afford it. Depression- she is working with a community group. She is still very tearful regarding her sons death. She is waiting to see a female therapist.  She does feel the community group has helped. Will increase her wellbutrin- rto in 6 weeks or prn. Review of Systems   Constitutional: Negative for chills and fever. Respiratory: Negative for cough and shortness of breath. Cardiovascular: Negative for chest pain, palpitations and leg swelling. Objective      Vitals:    06/29/22 1159   BP: (!) 158/87   Pulse: 75   Temp:    SpO2:      Wt Readings from Last 3 Encounters:   06/29/22 191 lb (86.6 kg)   03/25/22 192 lb (87.1 kg)   02/24/22 191 lb (86.6 kg)       Physical Exam  Constitutional:       Appearance: She is well-developed. HENT:      Right Ear: External ear normal.      Left Ear: External ear normal.      Nose: Nose normal.   Cardiovascular:      Rate and Rhythm: Normal rate and regular rhythm. Heart sounds: Normal heart sounds, S1 normal and S2 normal.   Pulmonary:      Effort: Pulmonary effort is normal. No respiratory distress. Breath sounds: Normal breath sounds. Musculoskeletal:         General: No deformity. Normal range of motion.       Cervical back: Full passive range of motion without pain and normal range of motion. Skin:     General: Skin is warm and dry. Neurological:      Mental Status: She is alert and oriented to person, place, and time. An electronic signature was used to authenticate this note.     --CONRADO Patrick - CNP

## 2022-07-06 NOTE — PROGRESS NOTES
Patient instructed to remove shoes and socks and instructed to sit in exam chair. Current PCP is CONRADO Elizondo CNP and date of last visit was 06/29/2022. Do you have a follow up visit scheduled?   No  If yes, the date is

## 2022-07-08 DIAGNOSIS — E11.42 DIABETIC POLYNEUROPATHY ASSOCIATED WITH TYPE 2 DIABETES MELLITUS (HCC): ICD-10-CM

## 2022-07-08 RX ORDER — GABAPENTIN 600 MG/1
TABLET ORAL
Qty: 90 TABLET | Refills: 2 | OUTPATIENT
Start: 2022-07-08

## 2022-07-15 ENCOUNTER — OFFICE VISIT (OUTPATIENT)
Dept: PODIATRY | Age: 62
End: 2022-07-15
Payer: MEDICAID

## 2022-07-15 VITALS
BODY MASS INDEX: 30.7 KG/M2 | DIASTOLIC BLOOD PRESSURE: 79 MMHG | HEIGHT: 66 IN | RESPIRATION RATE: 80 BRPM | SYSTOLIC BLOOD PRESSURE: 147 MMHG | WEIGHT: 191 LBS

## 2022-07-15 DIAGNOSIS — L85.3 XEROSIS OF SKIN: ICD-10-CM

## 2022-07-15 DIAGNOSIS — E11.42 TYPE 2 DIABETES MELLITUS WITH DIABETIC POLYNEUROPATHY, WITHOUT LONG-TERM CURRENT USE OF INSULIN (HCC): ICD-10-CM

## 2022-07-15 DIAGNOSIS — B35.1 ONYCHOMYCOSIS: Primary | ICD-10-CM

## 2022-07-15 PROCEDURE — G8417 CALC BMI ABV UP PARAM F/U: HCPCS

## 2022-07-15 PROCEDURE — 3017F COLORECTAL CA SCREEN DOC REV: CPT

## 2022-07-15 PROCEDURE — 11721 DEBRIDE NAIL 6 OR MORE: CPT

## 2022-07-15 PROCEDURE — 2022F DILAT RTA XM EVC RTNOPTHY: CPT

## 2022-07-15 PROCEDURE — G8427 DOCREV CUR MEDS BY ELIG CLIN: HCPCS

## 2022-07-15 PROCEDURE — 99213 OFFICE O/P EST LOW 20 MIN: CPT

## 2022-07-15 PROCEDURE — 4004F PT TOBACCO SCREEN RCVD TLK: CPT

## 2022-07-15 PROCEDURE — 3046F HEMOGLOBIN A1C LEVEL >9.0%: CPT

## 2022-07-15 RX ORDER — AMMONIUM LACTATE 12 G/100G
CREAM TOPICAL
Qty: 385 G | Refills: 2 | Status: SHIPPED | OUTPATIENT
Start: 2022-07-15 | End: 2022-10-05 | Stop reason: SDUPTHER

## 2022-07-15 NOTE — PROGRESS NOTES
[address],  [place]  Tel: [phone]   Fax: [phone]    Subjective     CC: Diabetic foot exam and painful and elongated toe nails    HPI:  Estela Kirkland is a 65y.o. year old female who presents to clinic today for diabetic foot examination and also complaining of painful and elongated toenails. The patient is a diabetic, and they're last HgbA1c was 7.7% in (22). The patient states their digits are painful when the toenails are elongated, causing rubbing in shoe gear. The patient admits to tingling and numbness in the lower extremities. Patient denies any rest pain or claudication symptoms. The patient denies any history of acute trauma. Patient also complains of dry flaky skin bilateral. She is interested in obtaining a pair of diabetic shoes. The patient denies any other pedal complaints. Primary care physician is CONRADO Josue CNP.    ROS:    Constitutional: Denies nausea, vomiting, fever, chills. Neurologic: Admits to numbness, tingling, and burning in the feet. Vascular: Denies symptoms of lower extremity claudication. Skin: Denies open wounds. Otherwise negative except as noted in the HPI.      PMH:  Past Medical History:   Diagnosis Date    Anemia     Chronic back pain     Depression     Hyperlipidemia     Hypertension     Type 2 diabetes mellitus with diabetic polyneuropathy, without long-term current use of insulin ([facility] 11/10/2016    Type II or unspecified type diabetes mellitus without mention of complication, not stated as uncontrolled        Surgical History:   Past Surgical History:   Procedure Laterality Date     SECTION      EXTERNAL EAR SURGERY      EYE SURGERY      RECTAL SURGERY Right 2021    RECTAL PERIRECTAL INCISION AND DRAINAGE performed by Sade Davis MD at [facility] History:  Social History     Tobacco Use    Smoking status: Every Day Packs/day: 0.50     Years: 39.00     Pack years: 19.50     Types: Cigarettes    Smokeless tobacco: Never   Substance Use Topics    Alcohol use: No     Alcohol/week: 0.0 standard drinks     Comment: 1 x mon    Drug use: Not Currently     Types: Marijuana [place])       Medications:  Prior to Admission medications    Medication Sig Start Date End Date Taking? Authorizing Provider   ammonium lactate (LAC-HYDRIN) 12 % cream Apply topically as needed. 7/15/22  Yes Alan Luther DPM   gabapentin (NEURONTIN) 600 MG tablet TAKE 1 TABLET BY MOUTH 3 TIMES DAILY 6/29/22 10/12/22 Yes CONRADO Grant CNP   amLODIPine (NORVASC) 5 MG tablet Take 1 tablet by mouth daily 6/29/22  Yes CONRADO Grant CNP   traZODone (DESYREL) 50 MG tablet Take 1 tablet by mouth nightly 6/29/22  Yes CONRADO Grant CNP   insulin glargine (LANTUS SOLOSTAR) 100 UNIT/ML injection pen Inject 20 Units into the skin nightly 6/29/22  Yes CONRADO Grant CNP   buPROPion (WELLBUTRIN XL) 300 MG extended release tablet Take 1 tablet by mouth every morning 6/29/22  Yes CONRADO Grant CNP   glipiZIDE (GLUCOTROL) 10 MG tablet TAKE 1 TABLET BY MOUTH 2 TIMES DAILY BEFORE MEALS 6/29/22  Yes CONRADO Grant CNP   pravastatin (PRAVACHOL) 40 MG tablet Take one tablet by mouth daily 6/29/22  Yes CONRADO Grant CNP   lisinopril (PRINIVIL;ZESTRIL) 30 MG tablet Take one tablet by mouth daily 6/29/22  Yes CONRADO Grant CNP   atenolol (TENORMIN) 50 MG tablet Take 1/2 tablet by mouth daily 6/29/22  Yes CONRADO Grant CNP   melatonin (RA MELATONIN) 3 MG TABS tablet Take 1 tablet by mouth nightly 3/25/22  Yes CONRADO Grant CNP   clotrimazole (LOTRIMIN) 1 % cream APPLY TOPICALLY 2 TIMES DAILY. 1/11/22  Yes Historical Provider, MD   ciclopirox (PENLAC) 8 % solution Apply topically nightly.  1/19/22  Yes Tiana Castillo, YONI   TRUEplus Lancets 30G MISC USE AS DIRECTED 4 TIMES A DAY 9/3/21  Yes Clinton Severin, APRN - CNP   TRUE METRIX BLOOD GLUCOSE TEST strip USE TO TEST BLOOD SUGAR 4 TIMES A DAY 7/27/21  Yes Clinton Severin, APRN - CNP   Alcohol Swabs (HM STERILE ALCOHOL PREP) PADS USE TO TEST BLOOD SUGAR 4 TIMES A DAY 7/27/21  Yes Clinton Severin, APRN - CNP   nystatin (MYCOSTATIN) 724637 UNIT/GM powder APPLY TOPICALLY TO AFFECTED AREAS FOUR TIMES A DAY 7/27/21  Yes Clinton Severin, APRN - CNP   miconazole (MICONAZOLE 7) 2 % vaginal cream Place 1 applicator vaginally nightly Place vaginally nightly x 7 days 5/26/21  Yes Clinton Severin, APRN - CNP   Lancet Devices ([employer] LANCING DEV) MISC USE TO TEST BLOOD SUGAR AS DIRECTED 4/28/21  Yes Clinton Severin, APRN - CNP   Blood Glucose Monitoring Suppl (TRUE METRIX AIR GLUCOSE METER) w/Device KIT USE TO TEST BLOOD SUGAR AS DIRECTED 4/28/21  Yes Clinton Severin, APRN - CNP   Lancets ([address]) [facility] USE TO CHECK BLOOD SUGAR 3 TIMES DAILY 3/12/21  Yes Clinton Severin, APRN - CNP   Insulin Pen Needle ([employer] PEN NEEDLES) 31G X 6 MM MISC 1 each by Does not apply route daily 2/10/21  Yes Clinton Severin, APRN - CNP   alogliptin (NESINA) 25 MG TABS tablet Take 1 tablet by mouth daily 11/18/20  Yes Clinton Severin, APRN - CNP   blood glucose monitor kit and supplies Test 4 times a day & as needed for symptoms of irregular blood glucose. 12/13/19  Yes Philly Bruce MD   aspirin EC 81 MG EC tablet Take 1 tablet by mouth daily 2/16/18  Yes Dheeraj Sepulveda MD   glucose monitoring kit (FREESTYLE) monitoring kit Diagnosis: Diabetes type 2, insulin-dependent. Use 3-4 times daily.  Ok to give insurance brand 11/10/17  Yes MD Ashleigh [employer] LANCETS 35J MISC CHECK BLOOD SUGAR ONCE DAILY 5/9/17  Yes Griselda Charity, MD   Blood Pressure Monitoring (BLOOD PRESSURE CUFF) MISC 1 Dose by Does not apply route once for 1 dose 9/29/21 9/29/21  Colbyasael Chinak, APRN - CNP       Objective     Vitals:    07/15/22 1236   BP: (!) 147/79   Resp: (!) 80       Lab Results   Component Value Date    LABA1C 12.0 03/25/2022       Physical Exam:  General:  Alert and oriented x3. In no acute distress. Lower Extremity Physical Exam:    Vascular: DP and PT pulses are nonpalpable but audible on Doppler Bilateral. CFT <3 seconds to all digits, Bilateral.  No edema, Bilateral.  Hair growth is absent to the level of the digits, Bilateral.     Neuro: Saph/sural/SP/DP/plantar sensation absent to light touch. Protective sensation is intact to 3/10 sites as tested with a 5.07g SWMF, Bilateral.     Musculoskeletal: EHL/FHL/GS/TA gross motor intact. Gross deformity is absent, Bilateral.     Dermatologic: No open lesions, Bilateral.  Interdigital maceration absent, Bilateral.  Nails 1-10 are thickened, discolored with subungual debris. Significant Xerotic skin, bilateral.    Class A Findings (Q7) - One Class A finding  [] Non traumatic amputation of foot or integral skeletal portion thereof  Class B Findings (Q8) - Two Class B findings  [x]Absent posterior tibial pulse   [x]Absent dorsalis pedis pulse   []Advanced trophic changes; three of the following are required:   [x]hair growth (decrease or absence)   [x]nail changes (thickening)   []pigmentary changes (discoloration)   []skin texture (thin, shiny)   []skin color (rubor or redness)  Class C Findings (Q9) - One Class B and two Class C findings  []Claudication   []Temperature changes   []Edema   []Paresthesia   []Burning      Assessment   Narciso Houser is a 65 y.o. female with     Diagnosis Orders   1. Onychomycosis        2. Xerosis of skin  ammonium lactate (LAC-HYDRIN) 12 % cream      3. Type 2 diabetes mellitus with diabetic polyneuropathy, without long-term current use of insulin ([facility]             Plan   Patient examined and evaluated.   Diagnosis and treatment options discussed in detail. Nails 1-10 debrided sharply with sterile nail nippers without incident. Patient was educated on the utmost importance of tight glycemic control. Patient was advised to continue daily foot checks, in addition to not ambulating barefoot. Prescription of Ammonium lactate was written to help with Xerotic skin   Patients was given different locations to obtain diabetic shoes   Patient to RTC in 3 months. Please, call the office with any questions or concerns     Orders Placed This Encounter   Medications    ammonium lactate (LAC-HYDRIN) 12 % cream     Sig: Apply topically as needed. Dispense:  385 g     Refill:  2     No orders of the defined types were placed in this encounter.       Karlene Awad DPM   Podiatric Medicine & Surgery   7/15/2022 at 4:14 PM

## 2022-07-20 ENCOUNTER — TELEPHONE (OUTPATIENT)
Dept: PHARMACY | Age: 62
End: 2022-07-20

## 2022-07-20 NOTE — TELEPHONE ENCOUNTER
Patient was a No Call No Show for Initial DM appointment today. Called to reschedule, left voicemail. [address] [place] [address]  Ph., CACP, Clinical Pharmacist  Anticoagulation Services, [address] [place] [address] Coumadin Clinic  7/20/2022  4:30 PM

## 2022-08-03 NOTE — TELEPHONE ENCOUNTER
Patient has a scheduled appointment for today. She was again a NCNS. I will cancel the referral today due to lack of follow up.

## 2022-08-31 ENCOUNTER — HOSPITAL ENCOUNTER (EMERGENCY)
Age: 62
Discharge: HOME | End: 2022-08-31
Attending: ORTHOPAEDIC SURGERY
Payer: MEDICAID

## 2022-08-31 ENCOUNTER — APPOINTMENT (OUTPATIENT)
Dept: GENERAL RADIOLOGY | Age: 62
End: 2022-08-31
Payer: MEDICAID

## 2022-08-31 VITALS
BODY MASS INDEX: 30.37 KG/M2 | WEIGHT: 189 LBS | TEMPERATURE: 98.7 F | HEART RATE: 105 BPM | HEIGHT: 66 IN | OXYGEN SATURATION: 97 % | DIASTOLIC BLOOD PRESSURE: 87 MMHG | RESPIRATION RATE: 17 BRPM | SYSTOLIC BLOOD PRESSURE: 168 MMHG

## 2022-08-31 DIAGNOSIS — M16.11 OSTEOARTHRITIS OF RIGHT HIP, UNSPECIFIED OSTEOARTHRITIS TYPE: Primary | ICD-10-CM

## 2022-08-31 DIAGNOSIS — N30.00 ACUTE CYSTITIS WITHOUT HEMATURIA: ICD-10-CM

## 2022-08-31 DIAGNOSIS — M47.816 ARTHRITIS OF LUMBAR SPINE: ICD-10-CM

## 2022-08-31 LAB
BACTERIA: ABNORMAL
BILIRUBIN URINE: NEGATIVE
CASTS UA: ABNORMAL /LPF
COLOR: YELLOW
EPITHELIAL CELLS UA: ABNORMAL /HPF
GLUCOSE URINE: NEGATIVE
KETONES, URINE: NEGATIVE
LEUKOCYTE ESTERASE, URINE: ABNORMAL
NITRITE, URINE: NEGATIVE
PH UA: 6.5 (ref 5–8)
PROTEIN UA: ABNORMAL
RBC UA: ABNORMAL /HPF
SPECIFIC GRAVITY UA: 1.01 (ref 1–1.03)
TURBIDITY: ABNORMAL
URINE HGB: NEGATIVE
UROBILINOGEN, URINE: NORMAL
WBC UA: ABNORMAL /HPF

## 2022-08-31 PROCEDURE — 72100 X-RAY EXAM L-S SPINE 2/3 VWS: CPT

## 2022-08-31 PROCEDURE — 6370000000 HC RX 637 (ALT 250 FOR IP): Performed by: PHYSICIAN ASSISTANT

## 2022-08-31 PROCEDURE — 87086 URINE CULTURE/COLONY COUNT: CPT

## 2022-08-31 PROCEDURE — 73502 X-RAY EXAM HIP UNI 2-3 VIEWS: CPT

## 2022-08-31 PROCEDURE — 81001 URINALYSIS AUTO W/SCOPE: CPT

## 2022-08-31 PROCEDURE — 99284 EMERGENCY DEPT VISIT MOD MDM: CPT

## 2022-08-31 RX ORDER — FLUCONAZOLE 150 MG/1
150 TABLET ORAL ONCE
Qty: 1 TABLET | Refills: 0 | Status: SHIPPED | OUTPATIENT
Start: 2022-08-31 | End: 2022-08-31

## 2022-08-31 RX ORDER — ACETAMINOPHEN 500 MG
1000 TABLET ORAL ONCE
Status: COMPLETED | OUTPATIENT
Start: 2022-08-31 | End: 2022-08-31

## 2022-08-31 RX ORDER — CEPHALEXIN 500 MG/1
500 CAPSULE ORAL 3 TIMES DAILY
Qty: 15 CAPSULE | Refills: 0 | Status: SHIPPED | OUTPATIENT
Start: 2022-08-31 | End: 2022-09-05

## 2022-08-31 RX ADMIN — ACETAMINOPHEN 1000 MG: 500 TABLET ORAL at 13:08

## 2022-08-31 ASSESSMENT — PAIN DESCRIPTION - LOCATION
LOCATION: BACK
LOCATION: BACK

## 2022-08-31 ASSESSMENT — PAIN - FUNCTIONAL ASSESSMENT: PAIN_FUNCTIONAL_ASSESSMENT: 0-10

## 2022-08-31 ASSESSMENT — PAIN DESCRIPTION - ORIENTATION
ORIENTATION: RIGHT
ORIENTATION: RIGHT

## 2022-08-31 ASSESSMENT — PAIN SCALES - GENERAL
PAINLEVEL_OUTOF10: 9
PAINLEVEL_OUTOF10: 10
PAINLEVEL_OUTOF10: 9

## 2022-08-31 NOTE — ED NOTES
Pt stating she is upset at how long it is taking to be diagnosed and discharged.  Niesha Villeda Do, RN  08/31/22 4394

## 2022-08-31 NOTE — ED PROVIDER NOTES
[address] ED  eMERGENCY dEPARTMENT eNCOUnter      Pt Name: Aaron Johnson  MRN: [id number]  [place] 1960  Date of evaluation: 8/31/2022  Provider: Gay Syed PA-C    CHIEF COMPLAINT       Chief Complaint   Patient presents with    Back Pain           HISTORY OF PRESENT ILLNESS  (Location/Symptom, Timing/Onset, Context/Setting, Quality, Duration, Modifying Factors, Severity.)   Aaron Johnson is a 65 y.o. female who presents to the emergency department for evaluation of low back pain. Pt reports chronic back pain. States it became severe over the past 4-5 days. Denies injury. Pain radiates into right hip and down leg. Denies numbness, weakness, dysuria, urgency, frequency, loss of bowel or bladder control, saddle anesthesia, abd pain, n/v/cp/sob/fevers. Pt takes gabepentin. No other complaints. Patient has a history of chronic back pain. No loss of bowel or bladder control, no numbness or tingling  Patient denies any history of IV drug use, denies any fevers, chills, abdominal pain nausea or vomiting      Nursing Notes were reviewed. REVIEW OF SYSTEMS    (2-9 systems for level 4, 10 or more for level 5)     Review of Systems   Constitutional: Negative. Respiratory: Negative. Cardiovascular: Negative. Gastrointestinal: Negative. Musculoskeletal: Complaining of back pain  Genitourinary: Negative. Skin: Negative. Neurological: Negative. Except as noted above the remainder of the review of systems was reviewed and negative.        PAST MEDICAL HISTORY         Diagnosis Date    Anemia     Chronic back pain     Depression     Hyperlipidemia     Hypertension     Type 2 diabetes mellitus with diabetic polyneuropathy, without long-term current use of insulin ([address] 11/10/2016    Type II or unspecified type diabetes mellitus without mention of complication, not stated as uncontrolled      None otherwise stated in nurses notes    SURGICAL HISTORY           Procedure Laterality Date     SECTION      EXTERNAL EAR SURGERY      EYE SURGERY      RECTAL SURGERY Right 2021    RECTAL PERIRECTAL INCISION AND DRAINAGE performed by Farzad Copeland MD at [address] [place] [address]       None otherwise stated in nurses notes    CURRENT MEDICATIONS       Discharge Medication List as of 2022  1:34 PM        CONTINUE these medications which have NOT CHANGED    Details   ammonium lactate (LAC-HYDRIN) 12 % cream Apply topically as needed. , Disp-385 g, R-2, Print      gabapentin (NEURONTIN) 600 MG tablet TAKE 1 TABLET BY MOUTH 3 TIMES DAILY, Disp-90 tablet, R-2Normal      amLODIPine (NORVASC) 5 MG tablet Take 1 tablet by mouth daily, Disp-90 tablet, R-1Normal      traZODone (DESYREL) 50 MG tablet Take 1 tablet by mouth nightly, Disp-30 tablet, R-3Normal      insulin glargine (LANTUS SOLOSTAR) 100 UNIT/ML injection pen Inject 20 Units into the skin nightly, Disp-5 pen, R-3Normal      buPROPion (WELLBUTRIN XL) 300 MG extended release tablet Take 1 tablet by mouth every morning, Disp-30 tablet, R-5Normal      glipiZIDE (GLUCOTROL) 10 MG tablet TAKE 1 TABLET BY MOUTH 2 TIMES DAILY BEFORE MEALS, Disp-60 tablet, R-5Normal      pravastatin (PRAVACHOL) 40 MG tablet Take one tablet by mouth daily, Disp-30 tablet, R-5Normal      lisinopril (PRINIVIL;ZESTRIL) 30 MG tablet Take one tablet by mouth daily, Disp-30 tablet, R-5Normal      atenolol (TENORMIN) 50 MG tablet Take 1/2 tablet by mouth daily, Disp-15 tablet, R-5Normal      melatonin (RA MELATONIN) 3 MG TABS tablet Take 1 tablet by mouth nightly, Disp-30 tablet, R-5Normal      clotrimazole (LOTRIMIN) 1 % cream APPLY TOPICALLY 2 TIMES DAILY. , Historical Med      ciclopirox (PENLAC) 8 % solution Apply topically nightly., Disp-6 mL, R-1, Print      Blood Pressure Monitoring (BLOOD PRESSURE CUFF) MISC ONCE Starting 2021, For 1 dose, Disp-1 each, R-0, Print      !! TRUEplus Lancets 30G MISC Disp-100 each, R-2, Normal      TRUE METRIX BLOOD GLUCOSE TEST strip USE TO TEST BLOOD SUGAR 4 TIMES A DAY, Disp-100 each, R-2Normal      Alcohol Swabs ( STERILE ALCOHOL PREP) PADS USE TO TEST BLOOD SUGAR 4 TIMES A DAY, Disp-100 each, R-2Normal      nystatin (MYCOSTATIN) 414190 UNIT/GM powder APPLY TOPICALLY TO AFFECTED AREAS FOUR TIMES A DAY, Disp-15 g, R-11, Normal      miconazole (MICONAZOLE 7) 2 % vaginal cream Place 1 applicator vaginally nightly Place vaginally nightly x 7 days, Disp-60 g, R-3Normal      Lancet Devices ([employer] LANCING DEV) MISC Disp-1 each, R-2, Normal      Blood Glucose Monitoring Suppl (TRUE METRIX AIR GLUCOSE METER) w/Device KIT USE TO TEST BLOOD SUGAR AS DIRECTED, Disp-1 kit, R-2Normal      !! Lancets (ONETOUCH DELICA PLUS [id number]) MISC USE TO CHECK BLOOD SUGAR 3 TIMES DAILY, Disp-100 each, R-5Normal      Insulin Pen Needle ([employer] PEN NEEDLES) 31G X 6 MM MISC DAILY Starting Wed 2/10/2021, Disp-100 each, R-3, Normal      alogliptin (NESINA) 25 MG TABS tablet Take 1 tablet by mouth daily, Disp-30 tablet,R-2Normal      blood glucose monitor kit and supplies Test 4 times a day & as needed for symptoms of irregular blood glucose., Disp-1 kit, R-0, Normal      aspirin EC 81 MG EC tablet Take 1 tablet by mouth daily, Disp-30 tablet, R-2Normal      glucose monitoring kit (FREESTYLE) monitoring kit Disp-1 kit, R-0, PrintDiagnosis: Diabetes type 2, insulin-dependent. Use 3-4 times daily. Ok to give insurance brand      !! ONETOUCH DELICA LANCETS 49L MISC CHECK BLOOD SUGAR ONCE DAILY, Disp-100 each, R-5Normal       !! - Potential duplicate medications found. Please discuss with provider. ALLERGIES     Ibuprofen    FAMILY HISTORY     History reviewed. No pertinent family history. Family Status   Relation Name Status    Mother  Alive    Father        None otherwise stated in nurses notes    SOCIAL HISTORY      reports that she has been smoking cigarettes. She has a 19.50 pack-year smoking history.  She has never used multilevel degenerative changes within the lumbar spine. Stable mild 4 mm anterolisthesis of L5 on S1.   2. Mild lumbar levoscoliosis. 3. No acute vertebral body height loss within the lumbar spine. Right hip:      1. Mild right hip osteoarthrosis. 2. No acute fracture or dislocation. XR HIP RIGHT (2-3 VIEWS)   Final Result   Lumbar spine:      1. Mild-to-moderate multilevel degenerative changes within the lumbar spine. Stable mild 4 mm anterolisthesis of L5 on S1.   2. Mild lumbar levoscoliosis. 3. No acute vertebral body height loss within the lumbar spine. Right hip:      1. Mild right hip osteoarthrosis. 2. No acute fracture or dislocation. LABS:  Labs Reviewed   URINALYSIS WITH REFLEX TO CULTURE - Abnormal; Notable for the following components:       Result Value    Turbidity UA Cloudy (*)     Protein, UA 3+ (*)     Leukocyte Esterase, Urine TRACE (*)     All other components within normal limits   MICROSCOPIC URINALYSIS - Abnormal; Notable for the following components:    Bacteria, UA FEW (*)     All other components within normal limits   CULTURE, URINE       All other labs were within normal range or not returned as of this dictation. EMERGENCY DEPARTMENT COURSE and DIFFERENTIAL DIAGNOSIS/MDM:   Vitals:    Vitals:    08/31/22 1212   BP: (!) 168/87   Pulse: (!) 105   Resp: 17   Temp: 98.7 °F (37.1 °C)   TempSrc: Oral   SpO2: 97%   Weight: 189 lb (85.7 kg)   Height: 5' 6\" (1.676 m)           ED MEDICATIONS  Orders Placed This Encounter   Medications    acetaminophen (TYLENOL) tablet 1,000 mg    cephALEXin (KEFLEX) 500 MG capsule     Sig: Take 1 capsule by mouth 3 times daily for 5 days     Dispense:  15 capsule     Refill:  0    fluconazole (DIFLUCAN) 150 MG tablet     Sig: Take 1 tablet by mouth once for 1 dose     Dispense:  1 tablet     Refill:  0         CONSULTS:  None    PROCEDURES:  None    Acute on chronic low back pain. No recent injury.   Radiates into the right hip and into leg. No neurological deficits. Ambulatory. No CVA tenderness. Will check imaging and UA. UA shows few bacteria. No HGB. Low concern for stone. Will start on keflex. Imaging is unremarkable for acute pathology. Low concern for cauda equina, epidural abscess, hematoma. Will dc home. Recommend FU with PCP. Strict return precautions dw patient. She is agreeable   Discussed results and plan with the pt. They expressed appropriate understanding. Pt given close follow up, supportive care instructions and strict return instructions at the bedside. Patient instructed to return to the emergency room if symptoms worsen, return, or any other concern right away which is agreed. Follow up with PCP in 2-3 days for re-evaluation. The patient presents with low back pain without signs of spinal cord compression, cauda equina syndrome, infection, aneurysm or other serious etiology. The patient is neurologically intact. Given the extremely low risk of these diagnoses further testing and evaluation for these possibilities does not appear to be indicated at this time. The patient has been instructed to return if the symptoms worsen or change in any way. The care is provided during an unprecedented national emergency due to the novel coronavirus, COVID-19. FINAL IMPRESSION      1. Osteoarthritis of right hip, unspecified osteoarthritis type    2. Arthritis of lumbar spine    3.  Acute cystitis without hematuria          DISPOSITION/PLAN   DISPOSITION Decision To Discharge    PATIENT REFERRED TO:  CONRADO Doan - CNP  [address] [facility] [address]  [phone]        DISCHARGE MEDICATIONS:  Discharge Medication List as of 8/31/2022  1:34 PM        START taking these medications    Details   cephALEXin (KEFLEX) 500 MG capsule Take 1 capsule by mouth 3 times daily for 5 days, Disp-15 capsule, R-0Print             (Please note that portions of this note were completed with a voice recognition program.  Efforts were made to edit the dictations but occasionally words are mis-transcribed.)    Branson Fabry, [address], PA-C  08/31/22 3105

## 2022-08-31 NOTE — DISCHARGE INSTRUCTIONS
Please follow up with PCP. Return to the ED if you develop worsening pain, numbness, weakness, incontinence, abd pain, fevers.

## 2022-08-31 NOTE — ED PROVIDER NOTES
eMERGENCY dEPARTMENT eNCOUnter   Independent Attestation     Pt Name: Monisha Flores  MRN: [id number]  [place] 1960  Date of evaluation: 8/31/22     Monisha Flores is a 65 y.o. female with CC: Back Pain      Based on the medical record the care appears appropriate. I was personally available for consultation in the Emergency Department. The care is provided during an unprecedented national emergency due to the novel coronavirus, COVID 19.     [facility],   Attending Emergency Physician                 [facility],   09/01/22 7805

## 2022-09-01 LAB
CULTURE: NORMAL
SPECIMEN DESCRIPTION: NORMAL

## 2022-10-03 DIAGNOSIS — E11.42 DIABETIC POLYNEUROPATHY ASSOCIATED WITH TYPE 2 DIABETES MELLITUS (HCC): ICD-10-CM

## 2022-10-03 NOTE — TELEPHONE ENCOUNTER
Health Maintenance   Topic Date Due    Diabetic retinal exam  Never done    Cervical cancer screen  Never done    Shingles vaccine (1 of 2) Never done    Diabetic foot exam  03/30/2018    Colorectal Cancer Screen  12/08/2018    Lipids  01/24/2021    COVID-19 Vaccine (3 - Booster for Pfizer series) 03/06/2022    A1C test (Diabetic or Prediabetic)  06/25/2022    Flu vaccine (1) 08/01/2022    Depression Monitoring  03/25/2023    Breast cancer screen  01/21/2024    DTaP/Tdap/Td vaccine (2 - Td or Tdap) 03/30/2027    Pneumococcal 0-64 years Vaccine  Completed    Hepatitis C screen  Completed    HIV screen  Completed    Hepatitis A vaccine  Aged Out    Hib vaccine  Aged Out    Meningococcal (ACWY) vaccine  Aged Out             (applicable per patient's age: Cancer Screenings, Depression Screening, Fall Risk Screening, Immunizations)    Hemoglobin A1C (%)   Date Value   03/25/2022 12.0   02/01/2021 7.7 (H)   11/18/2020 8.2     Microalb/Crt.  Ratio (mcg/mg creat)   Date Value   08/19/2020 750 (H)     LDL Cholesterol (mg/dL)   Date Value   01/24/2020 79     AST (U/L)   Date Value   01/30/2021 6     ALT (U/L)   Date Value   01/30/2021 5     BUN (mg/dL)   Date Value   05/26/2021 10      (goal A1C is < 7)   (goal LDL is <100) need 30-50% reduction from baseline     BP Readings from Last 3 Encounters:   08/31/22 (!) 168/87   07/15/22 (!) 147/79   06/29/22 (!) 158/87    (goal /80)      All Future Testing planned in [facility]:  Lab Frequency Next Occurrence   JACOBO DIGITAL DIAGNOSTIC W OR WO CAD BILATERAL Once 10/06/2021   POCT FECAL IMMUNOCHEMICAL TEST (FIT) Once 04/25/2022   Lipid, Fasting Once 04/24/2022   CBC Once 06/29/2022   Comprehensive Metabolic Panel Once 93/95/5981   Hemoglobin A1C Once 06/29/2022   TSH Once 06/29/2022       Next Visit Date:  Future Appointments   Date Time Provider Nida Avalos   10/14/2022 12:45 PM Kaye Harrell DPM [address] [id number]            Patient Active Problem List: Normocytic anemia     Chronic bilateral low back pain without sciatica     Essential hypertension     Diabetic polyneuropathy associated with type 2 diabetes mellitus (HCC)     Cigarette nicotine dependence without complication     Carpal tunnel syndrome of right wrist     Acute cystitis     Perirectal skin irritation     Right knee pain     Perirectal abscess     Depression     Hyperlipidemia     Elevated serum creatinine     Decreased GFR     Chronic kidney disease, stage 3a (HCC)     Diabetic nephropathy (HCC)     Sleep disturbance

## 2022-10-05 ENCOUNTER — OFFICE VISIT (OUTPATIENT)
Dept: PRIMARY CARE CLINIC | Age: 62
End: 2022-10-05
Payer: MEDICAID

## 2022-10-05 VITALS
DIASTOLIC BLOOD PRESSURE: 106 MMHG | WEIGHT: 185 LBS | TEMPERATURE: 97 F | OXYGEN SATURATION: 97 % | SYSTOLIC BLOOD PRESSURE: 197 MMHG | HEART RATE: 93 BPM | BODY MASS INDEX: 29.86 KG/M2

## 2022-10-05 DIAGNOSIS — G47.9 SLEEP DISTURBANCE: ICD-10-CM

## 2022-10-05 DIAGNOSIS — R41.3 MEMORY CHANGE: Primary | ICD-10-CM

## 2022-10-05 DIAGNOSIS — I10 ESSENTIAL HYPERTENSION: ICD-10-CM

## 2022-10-05 DIAGNOSIS — E78.00 PURE HYPERCHOLESTEROLEMIA: ICD-10-CM

## 2022-10-05 DIAGNOSIS — L85.3 XEROSIS OF SKIN: ICD-10-CM

## 2022-10-05 DIAGNOSIS — E11.42 TYPE 2 DIABETES MELLITUS WITH DIABETIC POLYNEUROPATHY, WITHOUT LONG-TERM CURRENT USE OF INSULIN (HCC): ICD-10-CM

## 2022-10-05 DIAGNOSIS — E11.42 DIABETIC POLYNEUROPATHY ASSOCIATED WITH TYPE 2 DIABETES MELLITUS (HCC): ICD-10-CM

## 2022-10-05 DIAGNOSIS — H10.13 ALLERGIC CONJUNCTIVITIS OF BOTH EYES: ICD-10-CM

## 2022-10-05 DIAGNOSIS — F32.1 EPISODE OF MODERATE MAJOR DEPRESSION (HCC): ICD-10-CM

## 2022-10-05 LAB — HBA1C MFR BLD: 7.2 %

## 2022-10-05 PROCEDURE — 90674 CCIIV4 VAC NO PRSV 0.5 ML IM: CPT | Performed by: NURSE PRACTITIONER

## 2022-10-05 PROCEDURE — 3017F COLORECTAL CA SCREEN DOC REV: CPT | Performed by: NURSE PRACTITIONER

## 2022-10-05 PROCEDURE — 4004F PT TOBACCO SCREEN RCVD TLK: CPT | Performed by: NURSE PRACTITIONER

## 2022-10-05 PROCEDURE — G8427 DOCREV CUR MEDS BY ELIG CLIN: HCPCS | Performed by: NURSE PRACTITIONER

## 2022-10-05 PROCEDURE — G8417 CALC BMI ABV UP PARAM F/U: HCPCS | Performed by: NURSE PRACTITIONER

## 2022-10-05 PROCEDURE — 2022F DILAT RTA XM EVC RTNOPTHY: CPT | Performed by: NURSE PRACTITIONER

## 2022-10-05 PROCEDURE — 90471 IMMUNIZATION ADMIN: CPT | Performed by: NURSE PRACTITIONER

## 2022-10-05 PROCEDURE — 99214 OFFICE O/P EST MOD 30 MIN: CPT | Performed by: NURSE PRACTITIONER

## 2022-10-05 PROCEDURE — G8482 FLU IMMUNIZE ORDER/ADMIN: HCPCS | Performed by: NURSE PRACTITIONER

## 2022-10-05 PROCEDURE — 3051F HG A1C>EQUAL 7.0%<8.0%: CPT | Performed by: NURSE PRACTITIONER

## 2022-10-05 PROCEDURE — 83036 HEMOGLOBIN GLYCOSYLATED A1C: CPT | Performed by: NURSE PRACTITIONER

## 2022-10-05 RX ORDER — BUPROPION HYDROCHLORIDE 150 MG/1
TABLET ORAL
COMMUNITY
Start: 2022-07-08

## 2022-10-05 RX ORDER — GLIPIZIDE 10 MG/1
TABLET ORAL
Qty: 60 TABLET | Refills: 5 | Status: SHIPPED | OUTPATIENT
Start: 2022-10-05

## 2022-10-05 RX ORDER — GABAPENTIN 600 MG/1
TABLET ORAL
Qty: 90 TABLET | Refills: 2 | Status: SHIPPED | OUTPATIENT
Start: 2022-10-05 | End: 2023-01-18

## 2022-10-05 RX ORDER — AMLODIPINE BESYLATE 10 MG/1
10 TABLET ORAL DAILY
Qty: 30 TABLET | Refills: 5 | Status: SHIPPED | OUTPATIENT
Start: 2022-10-05

## 2022-10-05 RX ORDER — LANOLIN ALCOHOL/MO/W.PET/CERES
3 CREAM (GRAM) TOPICAL NIGHTLY
Qty: 30 TABLET | Refills: 5 | Status: SHIPPED | OUTPATIENT
Start: 2022-10-05

## 2022-10-05 RX ORDER — TRAZODONE HYDROCHLORIDE 50 MG/1
50 TABLET ORAL NIGHTLY
Qty: 30 TABLET | Refills: 3 | Status: SHIPPED | OUTPATIENT
Start: 2022-10-05

## 2022-10-05 RX ORDER — NYSTATIN 100000 [USP'U]/G
POWDER TOPICAL
Qty: 15 G | Refills: 11 | Status: SHIPPED | OUTPATIENT
Start: 2022-10-05

## 2022-10-05 RX ORDER — GABAPENTIN 600 MG/1
TABLET ORAL
Qty: 90 TABLET | Refills: 2 | OUTPATIENT
Start: 2022-10-05

## 2022-10-05 RX ORDER — PRAVASTATIN SODIUM 40 MG
TABLET ORAL
Qty: 30 TABLET | Refills: 5 | Status: SHIPPED | OUTPATIENT
Start: 2022-10-05

## 2022-10-05 RX ORDER — HYDROCHLOROTHIAZIDE 12.5 MG/1
12.5 CAPSULE, GELATIN COATED ORAL EVERY MORNING
Qty: 90 CAPSULE | Refills: 1 | Status: SHIPPED | OUTPATIENT
Start: 2022-10-05

## 2022-10-05 RX ORDER — LISINOPRIL 30 MG/1
TABLET ORAL
Qty: 30 TABLET | Refills: 5 | Status: SHIPPED | OUTPATIENT
Start: 2022-10-05

## 2022-10-05 RX ORDER — ATENOLOL 50 MG/1
TABLET ORAL
Qty: 15 TABLET | Refills: 5 | Status: SHIPPED | OUTPATIENT
Start: 2022-10-05

## 2022-10-05 RX ORDER — ASPIRIN 81 MG/1
81 TABLET ORAL DAILY
Qty: 30 TABLET | Refills: 2 | Status: SHIPPED | OUTPATIENT
Start: 2022-10-05

## 2022-10-05 RX ORDER — BUPROPION HYDROCHLORIDE 300 MG/1
300 TABLET ORAL EVERY MORNING
Qty: 30 TABLET | Refills: 5 | Status: SHIPPED | OUTPATIENT
Start: 2022-10-05

## 2022-10-05 RX ORDER — ALOGLIPTIN 25 MG/1
25 TABLET, FILM COATED ORAL DAILY
Qty: 30 TABLET | Refills: 2 | Status: SHIPPED | OUTPATIENT
Start: 2022-10-05

## 2022-10-05 RX ORDER — AMMONIUM LACTATE 12 G/100G
CREAM TOPICAL
Qty: 385 G | Refills: 2 | Status: SHIPPED | OUTPATIENT
Start: 2022-10-05

## 2022-10-05 RX ORDER — OLOPATADINE HYDROCHLORIDE 1 MG/ML
1 SOLUTION/ DROPS OPHTHALMIC 2 TIMES DAILY
Qty: 5 ML | Refills: 5 | Status: SHIPPED | OUTPATIENT
Start: 2022-10-05 | End: 2022-11-04

## 2022-10-05 RX ORDER — DIPHENHYDRAMINE HCL 25 MG
25 CAPSULE ORAL 2 TIMES DAILY PRN
Qty: 60 CAPSULE | Refills: 1 | Status: SHIPPED | OUTPATIENT
Start: 2022-10-05 | End: 2022-10-15

## 2022-10-05 ASSESSMENT — ENCOUNTER SYMPTOMS
SHORTNESS OF BREATH: 0
COUGH: 0

## 2022-10-05 NOTE — PROGRESS NOTES
Visit Information    Have you changed or started any medications since your last visit including any over-the-counter medicines, vitamins, or herbal medicines? no   Are you having any side effects from any of your medications? -  no  Have you stopped taking any of your medications? Is so, why? -  no    Have you seen any other physician or provider since your last visit? No  Have you had any other diagnostic tests since your last visit? No  Have you been seen in the emergency room and/or had an admission to a hospital since we last saw you? No  Have you had your routine dental cleaning in the past 6 months? no    Have you activated your [employer] account? If not, what are your barriers?  No:      Patient Care Team:  CONRADO Vargas CNP as PCP - General (Family Nurse Practitioner)  CONRADO Vargas CNP as PCP - [facility] Provider  Marco Antonio Melendez MD as Consulting Physician (Obstetrics & Gynecology)    Medical History Review  Past Medical, Family, and Social History reviewed and does not contribute to the patient presenting condition    Health Maintenance   Topic Date Due    Diabetic retinal exam  Never done    Cervical cancer screen  Never done    Shingles vaccine (1 of 2) Never done    Diabetic foot exam  03/30/2018    Colorectal Cancer Screen  12/08/2018    Lipids  01/24/2021    COVID-19 Vaccine (3 - Booster for Pfizer series) 03/06/2022    A1C test (Diabetic or Prediabetic)  06/25/2022    Flu vaccine (1) 08/01/2022    Depression Monitoring  03/25/2023    Breast cancer screen  01/21/2024    DTaP/Tdap/Td vaccine (2 - Td or Tdap) 03/30/2027    Pneumococcal 0-64 years Vaccine  Completed    Hepatitis C screen  Completed    HIV screen  Completed    Hepatitis A vaccine  Aged Out    Hib vaccine  Aged Out    Meningococcal (ACWY) vaccine  Aged Out

## 2022-10-05 NOTE — PROGRESS NOTES
Arabella Bustos (:  1960) is a 65 y.o. female,Established patient, here for evaluation of the following chief complaint(s):  Medication Refill, Other (Issues with cab trans, needs neuro ref ), and Eye Drainage (For a while just wants eye drops )         ASSESSMENT/PLAN:  1. Memory change  Pt needs a new referral, states she missed apts. -     [facility], Neurology, [facility]  2. Essential hypertension  Increase amlodipine  Start hctz  -     atenolol (TENORMIN) 50 MG tablet; Take 1/2 tablet by mouth daily, Disp-15 tablet, R-5Normal  -     amLODIPine (NORVASC) 10 MG tablet; Take 1 tablet by mouth daily, Disp-30 tablet, R-5Normal  -     lisinopril (PRINIVIL;ZESTRIL) 30 MG tablet; Take one tablet by mouth daily, Disp-30 tablet, R-5Normal  -     hydroCHLOROthiazide (MICROZIDE) 12.5 MG capsule; Take 1 capsule by mouth every morning, Disp-90 capsule, R-1Normal  3. Type 2 diabetes mellitus with diabetic polyneuropathy, without long-term current use of insulin (HCC)  A1c much improved. -     POCT glycosylated hemoglobin (Hb A1C)  -     aspirin EC 81 MG EC tablet; Take 1 tablet by mouth daily, Disp-30 tablet, R-2Normal  -     glipiZIDE (GLUCOTROL) 10 MG tablet; TAKE 1 TABLET BY MOUTH 2 TIMES DAILY BEFORE MEALS, Disp-60 tablet, R-5Normal  -     External Referral To Ophthalmology  4. Xerosis of skin  -     ammonium lactate (LAC-HYDRIN) 12 % cream; Apply topically as needed. , Disp-385 g, R-2, Normal  5. Episode of moderate major depression (HCC)  -     buPROPion (WELLBUTRIN XL) 300 MG extended release tablet; Take 1 tablet by mouth every morning, Disp-30 tablet, R-5Normal  6. Sleep disturbance  -     melatonin (RA MELATONIN) 3 MG TABS tablet; Take 1 tablet by mouth nightly, Disp-30 tablet, R-5Normal  -     traZODone (DESYREL) 50 MG tablet; Take 1 tablet by mouth nightly, Disp-30 tablet, R-3Normal  7. Pure hypercholesterolemia  -     pravastatin (PRAVACHOL) 40 MG tablet;  Take one tablet by mouth daily, Disp-30 tablet, R-5Normal  8. Allergic conjunctivitis of both eyes  9. Diabetic polyneuropathy associated with type 2 diabetes mellitus (HCC)  -     gabapentin (NEURONTIN) 600 MG tablet; TAKE 1 TABLET BY MOUTH 3 TIMES DAILY, Disp-90 tablet, R-2Normal      No follow-ups on file. Subjective   SUBJECTIVE/OBJECTIVE:  HPI  Has joined a support group to help with her stress. She is walking more. Losing some weight. Bp- states she takes her bp meds at night. Dm- a1c is down to 7.2 from 12 in march. Review of Systems   Constitutional:  Negative for chills and fever. Respiratory:  Negative for cough and shortness of breath. Cardiovascular:  Negative for chest pain, palpitations and leg swelling. Objective   Vitals:    10/05/22 1031   BP: (!) 197/106   Pulse:    Temp:    SpO2:      Wt Readings from Last 3 Encounters:   10/05/22 185 lb (83.9 kg)   08/31/22 189 lb (85.7 kg)   07/15/22 191 lb (86.6 kg)       Physical Exam  Constitutional:       Appearance: She is well-developed. HENT:      Right Ear: External ear normal.      Left Ear: External ear normal.      Nose: Nose normal.   Cardiovascular:      Rate and Rhythm: Normal rate and regular rhythm. Heart sounds: Normal heart sounds, S1 normal and S2 normal.   Pulmonary:      Effort: Pulmonary effort is normal. No respiratory distress. Breath sounds: Normal breath sounds. Musculoskeletal:         General: No deformity. Normal range of motion. Cervical back: Full passive range of motion without pain and normal range of motion. Skin:     General: Skin is warm and dry. Neurological:      Mental Status: She is alert and oriented to person, place, and time. An electronic signature was used to authenticate this note.     --Lidia Snider, APRN - CNP

## 2022-11-07 DIAGNOSIS — G47.9 SLEEP DISTURBANCE: ICD-10-CM

## 2022-11-07 RX ORDER — TRAZODONE HYDROCHLORIDE 50 MG/1
TABLET ORAL
Qty: 30 TABLET | Refills: 3 | OUTPATIENT
Start: 2022-11-07

## 2022-12-05 ENCOUNTER — TELEPHONE (OUTPATIENT)
Dept: PRIMARY CARE CLINIC | Age: 62
End: 2022-12-05

## 2022-12-05 NOTE — TELEPHONE ENCOUNTER
Patient called into the office. States that she has right hand pain that goes numb sometimes. States this normally happens at night and when she puts her hand in hot water, that helps relieve the pain and numbness. No changes in speech, vision or additional symptoms. Symptoms have been going on for over 1 month with no change. States she does have an appt with neurology on 12/12/22 to address \"stuff with my head\" and to talk about her hand. I spoke with provider AK in the office and relayed to the patient, per AK, that this does not sound like an active stroke, but that she does need to follow up with neurology for this. Patient also informed that if symptoms change and she develops pain up the whole arm, chest pain, or shortness of breath she should seek ED immediately for treatment. Patient understood and will go to ED if symptoms should change. Sending to PCP as RADHA, closing enc.

## 2022-12-12 NOTE — PROGRESS NOTES
Patient instructed to remove shoes and socks and instructed to sit in exam chair. Current PCP is CONRADO Reno CNP and date of last visit was 10/05/2022. Do you have a follow up visit scheduled? Yes  If yes, the date is 01/11/2023      Diabetic visit information    Blood pressure (Control is BP <140/90)  BP Readings from Last 3 Encounters:   10/05/22 (!) 197/106   08/31/22 (!) 168/87   07/15/22 (!) 147/79       BP taken with correct size cuff? - Yes   Repeated if > 140/90 Yes      Tobacco use:  Patient  reports that she has been smoking cigarettes. She has a 19.50 pack-year smoking history. She has never used smokeless tobacco.  If Smoker - Cessation materials given? - Yes       Diabetic Health Maintenance Items due  Diabetes Management   Topic Date Due    Diabetic retinal exam  Never done    Diabetic foot exam  03/30/2018    Lipids  01/24/2021       Diabetic retinal exam done in last year? - Yes   If No: remind patient that it is due and they should schedule an exam    Medications  Is patient taking any medications for diabetes? -   Yes  Have blood sugars been controlled? Fasting blood sugars under 120   -   Yes   Random home sugars or today's POCT glucose is under 180 -   Yes   []  If No to the above then patient should schedule appt with PCP.      Diabetic Plan    A1C Plan  Lab Results   Component Value Date    LABA1C 7.2 10/05/2022    LABA1C 12.0 03/25/2022    LABA1C 7.7 (H) 02/01/2021      []  If A1C over 8 and last result >3 months ago - Order A1C and refer to PCP   []  If last A1C over 6 months ago - Order A1C and refer to PCP for follow up   []  If elevated blood sugars > 180 - refer to PCP for follow up    []  Blood sugar controlled - A1C under 8 and last check was < 6 months      Cholesterol Plan   Lab Results   Component Value Date    LDLCHOLESTEROL 79 01/24/2020      []  If LDL > 100 and last result >3 months ago - order Fasting lipids and refer to PCP for follow up   []  If LDL < 100 and over 1 year ago - Order Fasting lipids and refer to PCP for follow up   [] LDL is controlled. LDL < 100 and checked within the last year     Blood Pressure  BP Readings from Last 3 Encounters:   10/05/22 (!) 197/106   08/31/22 (!) 168/87   07/15/22 (!) 147/79      []  If SBP >140 mmhg - refer to PCP for follow up   []  If DBP > 90 mmhg - refer to PCP for follow up   [] BP is controlled <140/90     Order labs as PCP ordered.   (ie: Lipids, A1C, CMP)

## 2022-12-14 ENCOUNTER — OFFICE VISIT (OUTPATIENT)
Dept: NEUROLOGY | Age: 62
End: 2022-12-14

## 2022-12-14 VITALS
DIASTOLIC BLOOD PRESSURE: 88 MMHG | HEART RATE: 96 BPM | TEMPERATURE: 97.8 F | WEIGHT: 185 LBS | OXYGEN SATURATION: 98 % | BODY MASS INDEX: 29.73 KG/M2 | SYSTOLIC BLOOD PRESSURE: 138 MMHG | HEIGHT: 66 IN

## 2022-12-14 DIAGNOSIS — R25.1 TREMORS OF NERVOUS SYSTEM: Primary | ICD-10-CM

## 2022-12-14 NOTE — PROGRESS NOTES
[place] [address] [facility] [address]. [id number]  Dept: [phone]  Dept Fax: [phone]    NEUROLOGY NEW PATIENT NOTE       PATIENT NAME: Sally Gallegos  PATIENT MRN: [id number]  PRIMARY CARE PHYSICIAN: Petty Iverson, CONRADO - CNP      HPI:      Sally Gallegos is a 65 y.o. female past medical history of essential hypertension, on amlodipine and hydrochlorothiazide,, lisinopril, diabetes mellitus type 2, episodes of moderate major depression, on Wellbutrin 300 mg extended release, sleep disturbance on melatonin, pure hypercholesterolemia, diabetic polyneuropathy associated with type 2 diabetes, came in today to the clinic for first-time visit    Patient came in today complaining of right hand shaking, with left hand shaking sometimes, that happens almost every 3 days, interrupting her work, but my assessment patient did not have any shaking or asterixis, patient is not complaining of any imbalance issues or headache, no cogwheel rigidity noted, normal reflexes, patient had been diagnosed before with bilateral carpal tunnel syndrome in [facility] however no reports noted on her chart, patient requesting splints, upon discussing adding new medication patient declined the offer, and she said that she takes a lot of medication already, patient is following with primary care physician for management of diabetes and hypertension    PREVIOUS WORKUP:     Lab Results   Component Value Date    WBC 9.2 05/26/2021    HGB 11.9 05/26/2021    HCT 36.8 05/26/2021    MCV 92.5 05/26/2021     05/26/2021       Past Medical History:   Diagnosis Date    Anemia     Chronic back pain     Depression     Hyperlipidemia     Hypertension     Type 2 diabetes mellitus with diabetic polyneuropathy, without long-term current use of insulin ([facility] 11/10/2016    Type II or unspecified type diabetes mellitus without mention of complication, not stated as uncontrolled Past Surgical History:   Procedure Laterality Date     SECTION      EXTERNAL EAR SURGERY      EYE SURGERY      RECTAL SURGERY Right 2021    RECTAL PERIRECTAL INCISION AND DRAINAGE performed by Lori Banegas MD at [facility] History     Socioeconomic History    Marital status: Single     Spouse name: Not on file    Number of children: Not on file    Years of education: Not on file    Highest education level: Not on file   Occupational History    Not on file   Tobacco Use    Smoking status: Every Day     Packs/day: 0.50     Years: 39.00     Pack years: 19.50     Types: Cigarettes    Smokeless tobacco: Never   Substance and Sexual Activity    Alcohol use: No     Alcohol/week: 0.0 standard drinks     Comment: 1 x mon    Drug use: Not Currently     Types: Marijuana [employer])    Sexual activity: Never   Other Topics Concern    Not on file   Social History Narrative    Not on file     Social Determinants of Health     Financial Resource Strain: Low Risk     Difficulty of Paying Living Expenses: Not hard at all   Food Insecurity: No Food Insecurity    Worried About Running Out of Food in the Last Year: Never true    Ran Out of Food in the Last Year: Never true   Transportation Needs: Not on file   Physical Activity: Not on file   Stress: Not on file   Social Connections: Not on file   Intimate Partner Violence: Not on file   Housing Stability: Not on file        Current Outpatient Medications   Medication Sig Dispense Refill    buPROPion (WELLBUTRIN XL) 150 MG extended release tablet       atenolol (TENORMIN) 50 MG tablet Take 1/2 tablet by mouth daily 15 tablet 5    aspirin EC 81 MG EC tablet Take 1 tablet by mouth daily 30 tablet 2    amLODIPine (NORVASC) 10 MG tablet Take 1 tablet by mouth daily 30 tablet 5    glipiZIDE (GLUCOTROL) 10 MG tablet TAKE 1 TABLET BY MOUTH 2 TIMES DAILY BEFORE MEALS 60 tablet 5    alogliptin (NESINA) 25 MG TABS tablet Take 1 tablet by mouth daily 30 tablet 2    lisinopril (PRINIVIL;ZESTRIL) 30 MG tablet Take one tablet by mouth daily 30 tablet 5    pravastatin (PRAVACHOL) 40 MG tablet Take one tablet by mouth daily 30 tablet 5    traZODone (DESYREL) 50 MG tablet Take 1 tablet by mouth nightly 30 tablet 3    gabapentin (NEURONTIN) 600 MG tablet TAKE 1 TABLET BY MOUTH 3 TIMES DAILY 90 tablet 2    ammonium lactate (LAC-HYDRIN) 12 % cream Apply topically as needed. (Patient not taking: Reported on 12/14/2022) 385 g 2    ciclopirox (PENLAC) 8 % solution Apply topically nightly. (Patient not taking: Reported on 12/14/2022) 6 mL 1    buPROPion (WELLBUTRIN XL) 300 MG extended release tablet Take 1 tablet by mouth every morning (Patient not taking: Reported on 12/14/2022) 30 tablet 5    melatonin (RA MELATONIN) 3 MG TABS tablet Take 1 tablet by mouth nightly (Patient not taking: Reported on 12/14/2022) 30 tablet 5    nystatin (MYCOSTATIN) 731214 UNIT/GM powder Apply topically 4 times daily. (Patient not taking: Reported on 12/14/2022) 15 g 11    miconazole (MICONAZOLE 7) 2 % vaginal cream Place 1 applicator vaginally nightly Place vaginally nightly x 7 days (Patient not taking: Reported on 12/14/2022) 60 g 3    hydroCHLOROthiazide (MICROZIDE) 12.5 MG capsule Take 1 capsule by mouth every morning (Patient not taking: Reported on 12/14/2022) 90 capsule 1    insulin glargine (LANTUS SOLOSTAR) 100 UNIT/ML injection pen Inject 20 Units into the skin nightly (Patient not taking: Reported on 12/14/2022) 5 pen 3    clotrimazole (LOTRIMIN) 1 % cream APPLY TOPICALLY 2 TIMES DAILY.  (Patient not taking: Reported on 12/14/2022)      Blood Pressure Monitoring (BLOOD PRESSURE CUFF) MISC 1 Dose by Does not apply route once for 1 dose 1 each 0    TRUEplus Lancets 30G MISC USE AS DIRECTED 4 TIMES A DAY (Patient not taking: Reported on 12/14/2022) 100 each 2    TRUE METRIX BLOOD GLUCOSE TEST strip USE TO TEST BLOOD SUGAR 4 TIMES A DAY (Patient not taking: Reported on 12/14/2022) 100 each 2    Alcohol Swabs ( STERILE ALCOHOL PREP) PADS USE TO TEST BLOOD SUGAR 4 TIMES A DAY (Patient not taking: Reported on 12/14/2022) 100 each 2    Lancet Devices (SIMPLE DIAGNOSTICS LANCING DEV) MISC USE TO TEST BLOOD SUGAR AS DIRECTED (Patient not taking: Reported on 12/14/2022) 1 each 2    Blood Glucose Monitoring Suppl (TRUE METRIX AIR GLUCOSE METER) w/Device KIT USE TO TEST BLOOD SUGAR AS DIRECTED (Patient not taking: Reported on 12/14/2022) 1 kit 2    Lancets (ONETOUCH DELICA PLUS [id number]) MISC USE TO CHECK BLOOD SUGAR 3 TIMES DAILY (Patient not taking: Reported on 12/14/2022) 100 each 5    Insulin Pen Needle ([employer] PEN NEEDLES) 31G X 6 MM MISC 1 each by Does not apply route daily (Patient not taking: Reported on 12/14/2022) 100 each 3    blood glucose monitor kit and supplies Test 4 times a day & as needed for symptoms of irregular blood glucose. (Patient not taking: Reported on 12/14/2022) 1 kit 0    glucose monitoring kit (FREESTYLE) monitoring kit Diagnosis: Diabetes type 2, insulin-dependent. Use 3-4 times daily. Ok to give insurance brand (Patient not taking: Reported on 12/14/2022) 1 kit 0    ONETOUCH DELICA LANCETS 72V MISC CHECK BLOOD SUGAR ONCE DAILY (Patient not taking: Reported on 12/14/2022) 100 each 5     No current facility-administered medications for this visit. Allergies   Allergen Reactions    Ibuprofen Itching        REVIEW OF SYSTEMS:     Review of Systems     VITALS  /88 (Site: Right Upper Arm, Position: Sitting, Cuff Size: Large Adult)   Pulse 96   Temp 97.8 °F (36.6 °C) (Temporal)   Ht 5' 6\" (1.676 m)   Wt 185 lb (83.9 kg)   SpO2 98%   BMI 29.86 kg/m²      PHYSICAL EXAMINATION:     Physical Exam   General appearance: cooperative  Skin: no rash or skin lesions. HEENT: normocephalic  Optic Fundi: deferred  Neck: supple, no cervcical adenopathy or carotid bruit  Lungs: clear to auscultation  Heart: Regular rate and rhythm, normal S1, S2.  No murmurs, clicks or gallops.   Peripheral pulses: radial pulses palpable  Abdominal: BS present, soft, NT, ND  Extremities: no edema    NEUROLOGICAL EXAMINATION:     GENERAL  Appears comfortable and in no distress   HEENT  NC/ AT   HEART  S1 and S2 heard; palpation of pulses: radial pulse    NECK  Supple and no bruits heard   MENTAL STATUS:  Alert, oriented, intact memory, no confusion, normal speech, normal language, no hallucination or delusion   CRANIAL NERVES: II     -      Visual fields intact to confrontation  III,IV,VI -  PERR, EOMs full, no ptosis  V     -     Normal facial sensation   VII    -     Normal facial symmetry  VIII   -     Intact hearing   IX,X -     Symmetrical palate  XI    -     Symmetrical shoulder shrug  XII   -     Midline tongue, no atrophy    MOTOR FUNCTION: RUE: Significant for good strength of grade 5/5 in proximal and distal muscle groups   LUE: Significant for good strength of grade 5/5 in proximal and distal muscle groups   RLE: Significant for good strength of grade 5/5 in proximal and distal muscle groups   LLE: Significant for good strength of grade 5/5 in proximal and distal muscle groups      Normal bulk, normal tone and no involuntary movements, no tremor   SENSORY FUNCTION:  Normal touch, normal pin, normal vibration, normal proprioception   CEREBELLAR FUNCTION:  Intact fine motor control over upper limbs and lower limbs   REFLEX FUNCTION:  Symmetric in upper and lower extremities, no Babinski sign   STATION and GAIT  Normal gait and tandem station, normal tip toes and heel walking       ASSESSMENT:     Patient came in today complaining of right hand shaking, with left hand shaking sometimes, that happens almost every 3 days, interrupting her work, but my assessment patient did not have any shaking or asterixis, patient is not complaining of any imbalance issues or headache, no cogwheel rigidity noted, normal reflexes, patient had been diagnosed before with bilateral carpal tunnel syndrome in [facility] however no reports noted on her chart, patient requesting splints, upon discussing adding new medication patient declined the offer, and she said that she takes a lot of medication already, patient is following with primary care physician for management of diabetes and hypertension    PLAN:     - Datscan to rule out parkinsonian  - splints for wrests bilaterally    - follow up in 6 weeks     Ms. Tre Landaverde received counseling on the following healthy behaviors: medical compliance, smoking cessation, blood pressure control, regular follow up with primary doctor.         Electronically signed by Zaina Blank MD on 12/14/2022 at 2:23 PM

## 2022-12-21 ENCOUNTER — OFFICE VISIT (OUTPATIENT)
Dept: PODIATRY | Age: 62
End: 2022-12-21
Payer: MEDICAID

## 2022-12-21 VITALS
HEIGHT: 65 IN | HEART RATE: 78 BPM | BODY MASS INDEX: 30.82 KG/M2 | DIASTOLIC BLOOD PRESSURE: 66 MMHG | WEIGHT: 185 LBS | SYSTOLIC BLOOD PRESSURE: 124 MMHG

## 2022-12-21 DIAGNOSIS — B35.1 ONYCHOMYCOSIS: Primary | ICD-10-CM

## 2022-12-21 DIAGNOSIS — E11.42 TYPE 2 DIABETES MELLITUS WITH DIABETIC POLYNEUROPATHY, WITHOUT LONG-TERM CURRENT USE OF INSULIN (HCC): ICD-10-CM

## 2022-12-21 PROCEDURE — 11721 DEBRIDE NAIL 6 OR MORE: CPT

## 2022-12-21 PROCEDURE — 99999 PR OFFICE/OUTPT VISIT,PROCEDURE ONLY: CPT

## 2022-12-21 NOTE — PROGRESS NOTES
[address],  ALFREDO Frost  Tel: [phone]   Fax: [phone]    Subjective     CC: Diabetic foot exam and painful and elongated toe nails    Interval history:  Patient returns to clinic today for diabetic foot examination and painful toenails. Patient explains that her toenails become elongated and cause her pain with ambulation. She states that she gets really admitted through professional debridement. Patient admits to being a diabetic and her last known A1c was 7.2% on 10/5/2022. She admits to numbness and tingling to her bilateral lower extremities. Patient also admits to mild pain to her medial arches. She explains that she just obtained a new pair of custom orthotics but does not wear them all the time. Patient denies any rest pain or claudication symptoms. Patient denies any history of acute trauma. Patient denies any other pedal complaints at this time    HPI:  Beatrice Valiente is a 65y.o. year old female who presents to clinic today for diabetic foot examination and also complaining of painful and elongated toenails. The patient is a diabetic, and they're last HgbA1c was 7.7% in (2/1/22). The patient states their digits are painful when the toenails are elongated, causing rubbing in shoe gear. The patient admits to tingling and numbness in the lower extremities. Patient denies any rest pain or claudication symptoms. The patient denies any history of acute trauma. Patient also complains of dry flaky skin bilateral. She is interested in obtaining a pair of diabetic shoes. The patient denies any other pedal complaints. Primary care physician is CONRADO Reyes - CNP.    ROS:    Constitutional: Denies nausea, vomiting, fever, chills. Neurologic: Admits to numbness, tingling, and burning in the feet. Vascular: Denies symptoms of lower extremity claudication. Skin: Denies open wounds.   Otherwise negative except as noted in the HPI.     PMH:  Past Medical History:   Diagnosis Date    Anemia     Chronic back pain     Depression     Hyperlipidemia     Hypertension     Type 2 diabetes mellitus with diabetic polyneuropathy, without long-term current use of insulin ([facility] [address] 11/10/2016    Type II or unspecified type diabetes mellitus without mention of complication, not stated as uncontrolled        Surgical History:   Past Surgical History:   Procedure Laterality Date     SECTION      EXTERNAL EAR SURGERY      EYE SURGERY      RECTAL SURGERY Right 2021    RECTAL PERIRECTAL INCISION AND DRAINAGE performed by Tino Theodore MD at [facility] History:  Social History     Tobacco Use    Smoking status: Every Day     Packs/day: 0.50     Years: 39.00     Pack years: 19.50     Types: Cigarettes    Smokeless tobacco: Never   Substance Use Topics    Alcohol use: No     Alcohol/week: 0.0 standard drinks     Comment: 1 x mon    Drug use: Not Currently     Types: Marijuana Jerry Montanez)       Medications:  Prior to Admission medications    Medication Sig Start Date End Date Taking? Authorizing Provider   buPROPion (WELLBUTRIN XL) 150 MG extended release tablet  22   Historical Provider, MD   atenolol (TENORMIN) 50 MG tablet Take 1/2 tablet by mouth daily 10/5/22   CONRADO Hess - CNP   aspirin EC 81 MG EC tablet Take 1 tablet by mouth daily 10/5/22   CONRADO Hess - CNP   ammonium lactate (LAC-HYDRIN) 12 % cream Apply topically as needed. Patient not taking: Reported on 2022 10/5/22   CONRADO Hess CNP   amLODIPine (NORVASC) 10 MG tablet Take 1 tablet by mouth daily 10/5/22   CONRADO Hess - MEHNAZ   glipiZIDE (GLUCOTROL) 10 MG tablet TAKE 1 TABLET BY MOUTH 2 TIMES DAILY BEFORE MEALS 10/5/22   CONRADO Hess CNP   ciclopirox (PENLAC) 8 % solution Apply topically nightly.   Patient not taking: Reported on 2022 10/5/22   Verónica Pastor APRN - CNP   buPROPion (WELLBUTRIN XL) 300 MG extended release tablet Take 1 tablet by mouth every morning  Patient not taking: Reported on 12/14/2022 10/5/22   CONRADO Doan CNP   alogliptin (NESINA) 25 MG TABS tablet Take 1 tablet by mouth daily 10/5/22   Jim Hernandez, APRN - CNP   lisinopril (PRINIVIL;ZESTRIL) 30 MG tablet Take one tablet by mouth daily 10/5/22   Jim Hernandez, APRN - CNP   melatonin (RA MELATONIN) 3 MG TABS tablet Take 1 tablet by mouth nightly  Patient not taking: Reported on 12/14/2022 10/5/22   CONRADO Doan CNP   pravastatin (PRAVACHOL) 40 MG tablet Take one tablet by mouth daily 10/5/22   Jim Hernandez, CONRADO - CNP   traZODone (DESYREL) 50 MG tablet Take 1 tablet by mouth nightly 10/5/22   CONRADO Doan CNP   nystatin (MYCOSTATIN) 736332 UNIT/GM powder Apply topically 4 times daily. Patient not taking: Reported on 12/14/2022 10/5/22   CONRADO Doan CNP   miconazole (MICONAZOLE 7) 2 % vaginal cream Place 1 applicator vaginally nightly Place vaginally nightly x 7 days  Patient not taking: Reported on 12/14/2022 10/5/22   CONRADO Doan CNP   hydroCHLOROthiazide (MICROZIDE) 12.5 MG capsule Take 1 capsule by mouth every morning  Patient not taking: Reported on 12/14/2022 10/5/22   CONRADO Doan CNP   gabapentin (NEURONTIN) 600 MG tablet TAKE 1 TABLET BY MOUTH 3 TIMES DAILY 10/5/22 1/18/23  CONRADO Doan CNP   insulin glargine (LANTUS SOLOSTAR) 100 UNIT/ML injection pen Inject 20 Units into the skin nightly  Patient not taking: Reported on 12/14/2022 6/29/22   CONRADO Doan CNP   clotrimazole (LOTRIMIN) 1 % cream APPLY TOPICALLY 2 TIMES DAILY.   Patient not taking: Reported on 12/14/2022 1/11/22   Historical Provider, MD   Blood Pressure Monitoring (BLOOD PRESSURE CUFF) MISC 1 Dose by Does not apply route once for 1 dose 9/29/21 9/29/21  Danelle Desir CONRADO Medina CNP   TRUEplus Lancets 30G MISC USE AS DIRECTED 4 TIMES A DAY  Patient not taking: Reported on 12/14/2022 9/3/21   CONRADO Trinidad CNP   TRUE METRIX BLOOD GLUCOSE TEST strip USE TO TEST BLOOD SUGAR 4 TIMES A DAY  Patient not taking: Reported on 12/14/2022 7/27/21   CONRADO Trinidad CNP   Alcohol Swabs (HM STERILE ALCOHOL PREP) PADS USE TO TEST BLOOD SUGAR 4 TIMES A DAY  Patient not taking: Reported on 12/14/2022 7/27/21   CONRADO Trinidad CNP   Lancet Devices (SIMPLE DIAGNOSTICS LANCING DEV) [facility] USE TO TEST BLOOD SUGAR AS DIRECTED  Patient not taking: Reported on 12/14/2022 4/28/21   CONRADO Trinidad CNP   Blood Glucose Monitoring Suppl (TRUE METRIX AIR GLUCOSE METER) w/Device KIT USE TO TEST BLOOD SUGAR AS DIRECTED  Patient not taking: Reported on 12/14/2022 4/28/21   CONRADO Trinidad CNP   Lancets ([address]) [address] USE TO [facility] [address] [place] 3 TIMES DAILY  Patient not taking: Reported on 12/14/2022 3/12/21   CONRADO Trinidad CNP   Insulin Pen Needle (R[employer] PEN NEEDLES) 31G X 6 MM MISC 1 each by Does not apply route daily  Patient not taking: Reported on 12/14/2022 2/10/21   CONRADO Trinidad CNP   blood glucose monitor kit and supplies Test 4 times a day & as needed for symptoms of irregular blood glucose. Patient not taking: Reported on 12/14/2022 12/13/19   Miguel Peters MD   glucose monitoring kit (FREESTYLE) monitoring kit Diagnosis: Diabetes type 2, insulin-dependent. Use 3-4 times daily.  Ok to give insurance brand  Patient not taking: Reported on 12/14/2022 11/10/17   Dheeraj Menjivar MD   [address] [place] LANCETS 29K MISC CHECK BLOOD SUGAR ONCE DAILY  Patient not taking: Reported on 12/14/2022 5/9/17   Pearl Doss MD       Objective     Vitals:    12/21/22 1347   BP: 124/66   Pulse: 78       Lab Results   Component Value Date    LABA1C 7.2 10/05/2022       Physical Exam:  General:  Alert and oriented x3. In no acute distress. Lower Extremity Physical Exam:    Vascular: DP and PT pulses are nonpalpable but audible on Doppler, bilateral. CFT <3 seconds to all digits, Bilateral.  No edema, Bilateral.  Hair growth is absent to the level of the digits, Bilateral.     Neuro: Saph/sural/SP/DP/plantar sensation absent to light touch. Protective sensation is intact to 3/10 sites as tested with a 5.07g SWMF, Bilateral.     Musculoskeletal: EHL/FHL/GS/TA gross motor intact. Minimal tenderness to palpation to medial band of plantar fascia. Minimal tenderness to palpation along PT tendon. Patient does have a pes planus deformity to bilateral lower extremities    Dermatologic: No open lesions, Bilateral.  Interdigital maceration absent, Bilateral.  Nails 1-10 are thickened, discolored with subungual debris. Xerotic skin, bilateral.    Class A Findings (Q7) - One Class A finding  [] Non traumatic amputation of foot or integral skeletal portion thereof  Class B Findings (Q8) - Two Class B findings  [x]Absent posterior tibial pulse   [x]Absent dorsalis pedis pulse   []Advanced trophic changes; three of the following are required:   [x]hair growth (decrease or absence)   [x]nail changes (thickening)   []pigmentary changes (discoloration)   []skin texture (thin, shiny)   []skin color (rubor or redness)  Class C Findings (Q9) - One Class B and two Class C findings  []Claudication   []Temperature changes   []Edema   []Paresthesia   []Burning      Assessment   Connie Seaman is a 65 y.o. female with     Diagnosis Orders   1. Onychomycosis        2. Type 2 diabetes mellitus with diabetic polyneuropathy, without long-term current use of insulin ([facility] [address]             Plan   Patient examined and evaluated. Diagnosis and treatment options discussed in detail. Nails 1-10 debrided sharply with sterile nail nippers without incident.   Patient was educated on the utmost importance of tight glycemic control. Patient was advised to continue daily foot checks, in addition to not ambulating barefoot. Patient was instructed to continue her use of custom orthotics and to slowly break the orthotics in. If complaints of posterior tibial and plantar fascial pain are present at next visit, consider discussing plantar fascial steroid injection and MRI to evaluate peroneal tendons. Also due to our inability to palpate patient's pulses, discussion of vascular work-up will be held during next visit  Patient to RTC in 3 months. Please, call the office with any questions or concerns     No orders of the defined types were placed in this encounter. No orders of the defined types were placed in this encounter. Agueda Amor DPM   Podiatric Medicine & Surgery   12/22/2022 at 8:16 PM       I performed a history and physical examination of the patient and discussed management with the resident. I reviewed the residents note and agree with the documented findings and plan of care. Any areas of disagreement are noted on the chart. I was personally present for the key portions of any procedures. I have documented in the chart those procedures where I was not present during the key portions. I have reviewed the Podiatry Resident progress note. I agree with the chief complaint, past medical history, past surgical history, allergies, medications, social and family history as documented unless otherwise noted below. Documentation of the HPI, Physical Exam and Medical Decision Making performed by medical students or scribes is based on my personal performance of the HPI, PE and MDM. I have personally evaluated this patient and have completed at least one if not all key elements of the E/M (history, physical exam, and MDM). Additional findings are as noted.      Electronically signed by Derrell Jarrell DPM on 1/4/2023 at 1:28 PM

## 2023-01-04 DIAGNOSIS — E11.42 DIABETIC POLYNEUROPATHY ASSOCIATED WITH TYPE 2 DIABETES MELLITUS (HCC): ICD-10-CM

## 2023-01-04 DIAGNOSIS — E11.42 TYPE 2 DIABETES MELLITUS WITH DIABETIC POLYNEUROPATHY, WITHOUT LONG-TERM CURRENT USE OF INSULIN (HCC): ICD-10-CM

## 2023-01-05 RX ORDER — ASPIRIN 81 MG/1
TABLET, COATED ORAL
Qty: 30 TABLET | Refills: 2 | Status: SHIPPED | OUTPATIENT
Start: 2023-01-05

## 2023-01-05 RX ORDER — DIPHENHYDRAMINE HYDROCHLORIDE 25 MG/1
CAPSULE ORAL
Qty: 60 CAPSULE | Refills: 1 | Status: SHIPPED | OUTPATIENT
Start: 2023-01-05

## 2023-01-05 RX ORDER — GABAPENTIN 600 MG/1
TABLET ORAL
Qty: 90 TABLET | Refills: 0 | Status: SHIPPED | OUTPATIENT
Start: 2023-01-05 | End: 2023-02-05

## 2023-01-05 NOTE — TELEPHONE ENCOUNTER
Mat Powell is calling to request a refill on the following medication(s):    Last Visit Date (If Applicable):  28/2/5229    Next Visit Date:    1/11/2023    Medication Request:  Requested Prescriptions     Pending Prescriptions Disp Refills    BANOPHEN 25 MG capsule [Pharmacy Med Name: Égnesis Vermillion 25MG CAPS] 60 capsule 1     Sig: TAKE 1 CAPSULE BY MOUTH TWICE DAILY AS NEEDED FOR ITCHING. gabapentin (NEURONTIN) 600 MG tablet [Pharmacy Med Name: GABAPENTIN 600MG TABS] 90 tablet 2     Sig: TAKE 1 TABLET BY MOUTH THREE TIMES A DAY    ASPIRIN LOW DOSE 81 MG EC tablet [Pharmacy Med Name: ASPIRIN LOW DOSE 81MG TBEC] 30 tablet 2     Sig: TAKE 1 TABLET BY MOUTH ONCE DAILY.

## 2023-01-09 ENCOUNTER — APPOINTMENT (OUTPATIENT)
Dept: CT IMAGING | Age: 63
End: 2023-01-09
Payer: MEDICAID

## 2023-01-09 ENCOUNTER — HOSPITAL ENCOUNTER (EMERGENCY)
Age: 63
Discharge: HOME | End: 2023-01-09
Attending: EMERGENCY MEDICINE
Payer: MEDICAID

## 2023-01-09 VITALS
HEART RATE: 75 BPM | HEIGHT: 65 IN | BODY MASS INDEX: 32.49 KG/M2 | RESPIRATION RATE: 18 BRPM | DIASTOLIC BLOOD PRESSURE: 100 MMHG | WEIGHT: 195 LBS | SYSTOLIC BLOOD PRESSURE: 201 MMHG | OXYGEN SATURATION: 100 % | TEMPERATURE: 98.8 F

## 2023-01-09 DIAGNOSIS — R51.9 ACUTE NONINTRACTABLE HEADACHE, UNSPECIFIED HEADACHE TYPE: Primary | ICD-10-CM

## 2023-01-09 DIAGNOSIS — N17.9 AKI (ACUTE KIDNEY INJURY) (HCC): ICD-10-CM

## 2023-01-09 LAB
ABSOLUTE EOS #: 0.3 K/UL (ref 0–0.4)
ABSOLUTE LYMPH #: 3.4 K/UL (ref 1–4.8)
ABSOLUTE MONO #: 0.6 K/UL (ref 0.1–1.3)
ANION GAP SERPL CALCULATED.3IONS-SCNC: 10 MMOL/L (ref 9–17)
BASOPHILS # BLD: 1 % (ref 0–2)
BASOPHILS ABSOLUTE: 0.1 K/UL (ref 0–0.2)
BUN BLDV-MCNC: 25 MG/DL (ref 8–23)
CALCIUM SERPL-MCNC: 10.5 MG/DL (ref 8.6–10.4)
CHLORIDE BLD-SCNC: 99 MMOL/L (ref 98–107)
CO2: 24 MMOL/L (ref 20–31)
CREAT SERPL-MCNC: 1.52 MG/DL (ref 0.5–0.9)
EOSINOPHILS RELATIVE PERCENT: 2 % (ref 0–4)
GFR SERPL CREATININE-BSD FRML MDRD: 39 ML/MIN/1.73M2
GLUCOSE BLD-MCNC: 231 MG/DL (ref 70–99)
HCT VFR BLD CALC: 29.7 % (ref 36–46)
HEMOGLOBIN: 10.7 G/DL (ref 12–16)
LYMPHOCYTES # BLD: 26 % (ref 24–44)
MCH RBC QN AUTO: 31.7 PG (ref 26–34)
MCHC RBC AUTO-ENTMCNC: 35.9 G/DL (ref 31–37)
MCV RBC AUTO: 88.2 FL (ref 80–100)
MONOCYTES # BLD: 5 % (ref 1–7)
PDW BLD-RTO: 15.4 % (ref 11.5–14.9)
PLATELET # BLD: 498 K/UL (ref 150–450)
PMV BLD AUTO: 7 FL (ref 6–12)
POTASSIUM SERPL-SCNC: 4.3 MMOL/L (ref 3.7–5.3)
RBC # BLD: 3.36 M/UL (ref 4–5.2)
SEG NEUTROPHILS: 66 % (ref 36–66)
SEGMENTED NEUTROPHILS ABSOLUTE COUNT: 8.4 K/UL (ref 1.3–9.1)
SODIUM BLD-SCNC: 133 MMOL/L (ref 135–144)
WBC # BLD: 12.7 K/UL (ref 3.5–11)

## 2023-01-09 PROCEDURE — 80048 BASIC METABOLIC PNL TOTAL CA: CPT

## 2023-01-09 PROCEDURE — 36415 COLL VENOUS BLD VENIPUNCTURE: CPT

## 2023-01-09 PROCEDURE — 6360000002 HC RX W HCPCS: Performed by: EMERGENCY MEDICINE

## 2023-01-09 PROCEDURE — 99284 EMERGENCY DEPT VISIT MOD MDM: CPT

## 2023-01-09 PROCEDURE — 85025 COMPLETE CBC W/AUTO DIFF WBC: CPT

## 2023-01-09 PROCEDURE — 96375 TX/PRO/DX INJ NEW DRUG ADDON: CPT

## 2023-01-09 PROCEDURE — 70450 CT HEAD/BRAIN W/O DYE: CPT

## 2023-01-09 PROCEDURE — 96374 THER/PROPH/DIAG INJ IV PUSH: CPT

## 2023-01-09 RX ORDER — PROCHLORPERAZINE EDISYLATE 5 MG/ML
10 INJECTION INTRAMUSCULAR; INTRAVENOUS ONCE
Status: COMPLETED | OUTPATIENT
Start: 2023-01-09 | End: 2023-01-09

## 2023-01-09 RX ORDER — BUTALBITAL, ACETAMINOPHEN AND CAFFEINE 300; 40; 50 MG/1; MG/1; MG/1
1 CAPSULE ORAL EVERY 4 HOURS PRN
Qty: 10 CAPSULE | Refills: 0 | Status: SHIPPED | OUTPATIENT
Start: 2023-01-09 | End: 2023-01-11 | Stop reason: SDUPTHER

## 2023-01-09 RX ADMIN — HYDROMORPHONE HYDROCHLORIDE 1 MG: 1 INJECTION, SOLUTION INTRAMUSCULAR; INTRAVENOUS; SUBCUTANEOUS at 18:09

## 2023-01-09 RX ADMIN — PROCHLORPERAZINE EDISYLATE 10 MG: 5 INJECTION INTRAMUSCULAR; INTRAVENOUS at 18:15

## 2023-01-09 ASSESSMENT — PAIN - FUNCTIONAL ASSESSMENT
PAIN_FUNCTIONAL_ASSESSMENT: 0-10
PAIN_FUNCTIONAL_ASSESSMENT: 0-10

## 2023-01-09 ASSESSMENT — PAIN SCALES - GENERAL
PAINLEVEL_OUTOF10: 10

## 2023-01-09 ASSESSMENT — LIFESTYLE VARIABLES
HOW OFTEN DO YOU HAVE A DRINK CONTAINING ALCOHOL: NEVER
HOW MANY STANDARD DRINKS CONTAINING ALCOHOL DO YOU HAVE ON A TYPICAL DAY: PATIENT DOES NOT DRINK

## 2023-01-09 ASSESSMENT — PAIN DESCRIPTION - DESCRIPTORS
DESCRIPTORS: SHOOTING;STABBING;ACHING
DESCRIPTORS: ACHING

## 2023-01-09 ASSESSMENT — PAIN DESCRIPTION - LOCATION
LOCATION: HEAD;NECK
LOCATION: HEAD;NECK

## 2023-01-09 NOTE — ED NOTES
Patient angry about not being immediately seen. Told writer, \"do your f**ajith job. I am in pain. \"  Addison Price took patient vitals attempted to position for comfort.      Jhoana Estrella RN  01/09/23 0517

## 2023-01-10 ASSESSMENT — ENCOUNTER SYMPTOMS
NAUSEA: 1
COUGH: 0
VOMITING: 0

## 2023-01-10 NOTE — ED PROVIDER NOTES
[address]  EMERGENCY DEPARTMENT ENCOUNTER      Pt Name: Molly Royal  MRN: [id number]  [place] 1960  Date of evaluation: 1/9/23      CHIEF COMPLAINT       Chief Complaint   Patient presents with    Headache    Neck Pain     Pt here with headache and neck pain for over a week, was seen by PCP and given referral to neurology, states neuro appt is on Wednesday but the pain is too much and can't even sleep         HISTORY OF PRESENT ILLNESS   HPI 65 y.o. female presents with c/o severe headache. Pt reports that she has had a severe headache for about a week and a half. She reports that she can't eat, can't sleep, and that the symptoms are not responding to typical headache medicine. Symptoms have been constant in course. Pt reports that she is going to be seeing a neurologist on Wednesday because she has developed a tremor, but she can't wait that long to get relief. Headache is in the frontal area with radiation to the back of her head and down her neck. Reviewing the medical record, she does get severe headaches intermittently, but the last time she was seen in out emergency department for headache was several years ago. She states that she can not remember ever having a headache this bad. She denies any fevers or chills. She denies congestion or cough. Kendra Zarate REVIEW OF SYSTEMS       Review of Systems   Constitutional:  Negative for fever. HENT:  Negative for congestion. Eyes:  Negative for visual disturbance. Respiratory:  Negative for cough. Gastrointestinal:  Positive for nausea. Negative for vomiting. Musculoskeletal:  Positive for neck pain. Skin:  Negative for rash. Neurological:  Positive for headaches.      PAST MEDICAL HISTORY     Past Medical History:   Diagnosis Date    Anemia     Chronic back pain     Depression     Hyperlipidemia     Hypertension     Type 2 diabetes mellitus with diabetic polyneuropathy, without long-term current use of insulin ([facility] 11/10/2016 Type II or unspecified type diabetes mellitus without mention of complication, not stated as uncontrolled        SURGICAL HISTORY       Past Surgical History:   Procedure Laterality Date     SECTION      EXTERNAL EAR SURGERY      EYE SURGERY      RECTAL SURGERY Right 2021    RECTAL PERIRECTAL INC[facility]       Discharge Medication List as of 2023  7:40 PM        CONTINUE these medications which have NOT CHANGED    Details   BANOPHEN 25 MG capsule TAKE 1 CAPSULE BY MOUTH TWICE DAILY AS NEEDED FOR ITCHING., Disp-60 capsule, R-1Normal      gabapentin (NEURONTIN) 600 MG tablet TAKE 1 TABLET BY MOUTH THREE TIMES A DAY, Disp-90 tablet, R-0Normal      ASPIRIN LOW DOSE 81 MG EC tablet TAKE 1 TABLET BY MOUTH ONCE DAILY. , Disp-30 tablet, R-2Normal      !! buPROPion (WELLBUTRIN XL) 150 MG extended release tablet Historical Med      atenolol (TENORMIN) 50 MG tablet Take 1/2 tablet by mouth daily, Disp-15 tablet, R-5Normal      ammonium lactate (LAC-HYDRIN) 12 % cream Apply topically as needed. , Disp-385 g, R-2, Normal      amLODIPine (NORVASC) 10 MG tablet Take 1 tablet by mouth daily, Disp-30 tablet, R-5Normal      glipiZIDE (GLUCOTROL) 10 MG tablet TAKE 1 TABLET BY MOUTH 2 TIMES DAILY BEFORE MEALS, Disp-60 tablet, R-5Normal      ciclopirox (PENLAC) 8 % solution Apply topically nightly., Disp-6 mL, R-1, Normal      !!  buPROPion (WELLBUTRIN XL) 300 MG extended release tablet Take 1 tablet by mouth every morning, Disp-30 tablet, R-5Normal      alogliptin (NESINA) 25 MG TABS tablet Take 1 tablet by mouth daily, Disp-30 tablet, R-2Normal      lisinopril (PRINIVIL;ZESTRIL) 30 MG tablet Take one tablet by mouth daily, Disp-30 tablet, R-5Normal      melatonin (RA MELATONIN) 3 MG TABS tablet Take 1 tablet by mouth nightly, Disp-30 tablet, R-5Normal      pravastatin (PRAVACHOL) 40 MG tablet Take one tablet by mouth daily, Disp-30 tablet, R-5Normal      traZODone (DESYREL) 50 MG tablet Take 1 tablet by mouth nightly, Disp-30 tablet, R-3Normal      nystatin (MYCOSTATIN) 764943 UNIT/GM powder Apply topically 4 times daily. , Disp-15 g, R-11, Normal      miconazole (MICONAZOLE 7) 2 % vaginal cream Place 1 applicator vaginally nightly Place vaginally nightly x 7 days, Disp-60 g, R-3Normal      hydroCHLOROthiazide (MICROZIDE) 12.5 MG capsule Take 1 capsule by mouth every morning, Disp-90 capsule, R-1Normal      insulin glargine (LANTUS SOLOSTAR) 100 UNIT/ML injection pen Inject 20 Units into the skin nightly, Disp-5 pen, R-3Normal      clotrimazole (LOTRIMIN) 1 % cream APPLY TOPICALLY 2 TIMES DAILY. , Historical Med      Blood Pressure Monitoring (BLOOD PRESSURE CUFF) MISC ONCE Starting Wed 9/29/2021, For 1 dose, Disp-1 each, R-0, Print      !! TRUEplus Lancets 30G MISC Disp-100 each, R-2, Normal      TRUE METRIX BLOOD GLUCOSE TEST strip USE TO TEST BLOOD SUGAR 4 TIMES A DAY, Disp-100 each, R-2Normal      Alcohol Swabs ( STERILE ALCOHOL PREP) PADS USE TO TEST BLOOD SUGAR 4 TIMES A DAY, Disp-100 each, R-2Normal      Lancet Devices (SIMPLE DIAGNOSTICS LANCING DEV) MISC Disp-1 each, R-2, Normal      Blood Glucose Monitoring Suppl (TRUE METRIX AIR GLUCOSE METER) w/Device KIT USE TO TEST BLOOD SUGAR AS DIRECTED, Disp-1 kit, R-2Normal      !! Lancets (ONETOUCH DELICA PLUS [id number]) MISC USE TO CHECK BLOOD SUGAR 3 TIMES DAILY, Disp-100 each, R-5Normal      Insulin Pen Needle ([employer] PEN NEEDLES) 31G X 6 MM MISC DAILY Starting Wed 2/10/2021, Disp-100 each, R-3, Normal      blood glucose monitor kit and supplies Test 4 times a day & as needed for symptoms of irregular blood glucose., Disp-1 kit, R-0, Normal      glucose monitoring kit (FREESTYLE) monitoring kit Disp-1 kit, R-0, PrintDiagnosis: Diabetes type 2, insulin-dependent. Use 3-4 times daily.  Ok to give insurance brand      !! ONETOUCH DELICA LANCETS 10J MISC CHECK BLOOD SUGAR ONCE DAILY, Disp-100 each, R-5Normal       !! - Potential duplicate medications found. Please discuss with provider. ALLERGIES     is allergic to ibuprofen. FAMILY HISTORY     She indicated that her mother is alive. She indicated that her father is . SOCIAL HISTORY      reports that she has been smoking cigarettes. She has a 19.50 pack-year smoking history. She has never used smokeless tobacco. She reports that she does not currently use drugs after having used the following drugs: Marijuana Isatu Cotton). She reports that she does not drink alcohol. PHYSICAL EXAM     INITIAL VITALS: BP (!) 201/100   Pulse 75   Temp 98.8 °F (37.1 °C) (Oral)   Resp 18   Ht 5' 5\" (1.651 m)   Wt 195 lb (88.5 kg)   SpO2 100%   BMI 32.45 kg/m²   Gen: nad  Head: Normocephalic, atraumatic, no tempral ttp b/l  Eye: Pupils equal round reactive to light, no conjunctivitis  ENT: MMM, poor dentition  Neck: no rigidity, no adenopathy, no bruit, no JVD  Heart: Regular rate and rhythm no murmurs  Lungs: Clear to auscultation bilaterally, no respiratory distress  Abdomen: Soft, nontender, nondistended, with no peritoneal signs  Neurologic: Patient is alert and oriented x3, motor and sensation is intact in all 4 extremities, fluent speech  Extremities: no edema  MEDICAL DECISION MAKIN yo F presenting with hypertension and severe unrelenting non-traumatic headache. I reviewed her medical record and previous ct imaging of her head from 2020 and 2018. Her studies showed no aneurysms at that time. Given her severe symptoms ddx includes a subarachnoid hemorrhage, dissection. No fevers or neck rigidity to suspect meningitis. IV placed. Laboratory studies ordered and ct head and cta ordered. Pt provided parenteral analgesics. Laboratory studies reviewed and pt found to have an ALBERTO and reduced GFR. Pt reassessed and she states her headache has resolved.   Discussed risks of contrast administration and pt declines cta imaging. CT head reviewed and shows no brain mass, and no sign of hemorrhage. I discussed limitations of non-contrasted ct imaging, but pt declines further evaluation. Karyshaunezio sent to her pharmacy. D/w pt the results, treatment plan, warning precautions for prompt ED return and importance of close OP FU, SHE verbalizes understanding and agrees with the treatment plan. Procedures      DATA FOR LAB AND RADIOLOGY TESTS ORDERED BELOW ARE REVIEWED BY THE ED CLINICIAN:    RADIOLOGY: All x-rays, CT, MRI, and formal ultrasound images (except ED bedside ultrasound) are read by the radiologist, see reports below, unless otherwise noted in MDM or here. Reports below are reviewed by myself. CT HEAD WO CONTRAST   Final Result   No acute intracranial abnormality. LABS: Lab orders shown below, the results are reviewed by myself, and all abnormals are listed below.   Labs Reviewed   CBC WITH AUTO DIFFERENTIAL - Abnormal; Notable for the following components:       Result Value    WBC 12.7 (*)     RBC 3.36 (*)     Hemoglobin 10.7 (*)     Hematocrit 29.7 (*)     RDW 15.4 (*)     Platelets 264 (*)     All other components within normal limits   BASIC METABOLIC PANEL - Abnormal; Notable for the following components:    Glucose 231 (*)     BUN 25 (*)     Creatinine 1.52 (*)     Est, Glom Filt Rate 39 (*)     Calcium 10.5 (*)     Sodium 133 (*)     All other components within normal limits       Vitals Reviewed:    Vitals:    01/09/23 1421 01/09/23 1719 01/09/23 1809   BP: (!) 192/89 (!) 201/100    Pulse: 99 75    Resp: 16 16 18   Temp: 99.2 °F (37.3 °C) 98.8 °F (37.1 °C)    TempSrc: Oral Oral    SpO2: 97% 100%    Weight: 189 lb (85.7 kg) 195 lb (88.5 kg)    Height: 5' 5\" (1.651 m) 5' 5\" (1.651 m)      MEDICATIONS GIVEN TO PATIENT THIS ENCOUNTER:  Orders Placed This Encounter   Medications    HYDROmorphone (DILAUDID) injection 1 mg    prochlorperazine (COMPAZINE) injection 10 mg butalbital-APAP-caffeine (FIORICET) -40 MG CAPS per capsule     Sig: Take 1 capsule by mouth every 4 hours as needed for Headaches     Dispense:  10 capsule     Refill:  0     DISCHARGE PRESCRIPTIONS:  Discharge Medication List as of 1/9/2023  7:40 PM        START taking these medications    Details   butalbital-APAP-caffeine (FIORICET) -40 MG CAPS per capsule Take 1 capsule by mouth every 4 hours as needed for Headaches, Disp-10 capsule, R-0Print           PHYSICIAN CONSULTS ORDERED THIS ENCOUNTER:  None     FINAL IMPRESSION      1. Acute nonintractable headache, unspecified headache type    2.  ALBERTO (acute kidney injury) [facility])          DISPOSITION/PLAN   DISPOSITION Decision To Discharge 01/09/2023 07:37:34 PM      PATIENT REFERRED TO:  CONRADO Hardin - CNP  [address]  [phone]    In 2 days      [facility] ED  [facility] [address] [place] [address]  [phone]    If symptoms worsen      DISCHARGE MEDICATIONS:  Discharge Medication List as of 1/9/2023  7:40 PM        START taking these medications    Details   butalbital-APAP-caffeine (FIORICET) -40 MG CAPS per capsule Take 1 capsule by mouth every 4 hours as needed for Headaches, Disp-10 capsule, R-0Print               Ezio Baer MD  Attending Emergency Physician                      Ezio Baer MD  01/10/23 2780

## 2023-01-11 ENCOUNTER — TELEPHONE (OUTPATIENT)
Dept: PRIMARY CARE CLINIC | Age: 63
End: 2023-01-11

## 2023-01-11 ENCOUNTER — HOSPITAL ENCOUNTER (OUTPATIENT)
Dept: NUCLEAR MEDICINE | Age: 63
Discharge: HOME | End: 2023-01-13
Payer: MEDICAID

## 2023-01-11 ENCOUNTER — OFFICE VISIT (OUTPATIENT)
Dept: PRIMARY CARE CLINIC | Age: 63
End: 2023-01-11
Payer: MEDICAID

## 2023-01-11 VITALS
HEART RATE: 94 BPM | DIASTOLIC BLOOD PRESSURE: 84 MMHG | SYSTOLIC BLOOD PRESSURE: 136 MMHG | OXYGEN SATURATION: 96 % | WEIGHT: 193 LBS | BODY MASS INDEX: 32.12 KG/M2

## 2023-01-11 DIAGNOSIS — M25.531 WRIST PAIN, RIGHT: Primary | ICD-10-CM

## 2023-01-11 DIAGNOSIS — G47.9 SLEEP DISTURBANCE: ICD-10-CM

## 2023-01-11 DIAGNOSIS — G44.209 ACUTE NON INTRACTABLE TENSION-TYPE HEADACHE: ICD-10-CM

## 2023-01-11 DIAGNOSIS — R25.1 TREMORS OF NERVOUS SYSTEM: ICD-10-CM

## 2023-01-11 DIAGNOSIS — E11.42 TYPE 2 DIABETES MELLITUS WITH DIABETIC POLYNEUROPATHY, WITHOUT LONG-TERM CURRENT USE OF INSULIN (HCC): Primary | ICD-10-CM

## 2023-01-11 DIAGNOSIS — M25.532 WRIST PAIN, LEFT: ICD-10-CM

## 2023-01-11 DIAGNOSIS — F32.1 EPISODE OF MODERATE MAJOR DEPRESSION (HCC): ICD-10-CM

## 2023-01-11 DIAGNOSIS — M25.532 BILATERAL WRIST PAIN: ICD-10-CM

## 2023-01-11 DIAGNOSIS — I10 ESSENTIAL HYPERTENSION: ICD-10-CM

## 2023-01-11 DIAGNOSIS — E11.42 DIABETIC POLYNEUROPATHY ASSOCIATED WITH TYPE 2 DIABETES MELLITUS (HCC): ICD-10-CM

## 2023-01-11 DIAGNOSIS — R93.89 ABNORMAL CT SCAN: ICD-10-CM

## 2023-01-11 DIAGNOSIS — M25.531 BILATERAL WRIST PAIN: ICD-10-CM

## 2023-01-11 DIAGNOSIS — E78.00 PURE HYPERCHOLESTEROLEMIA: ICD-10-CM

## 2023-01-11 DIAGNOSIS — E83.52 HYPERCALCEMIA: ICD-10-CM

## 2023-01-11 DIAGNOSIS — M54.2 NECK PAIN: ICD-10-CM

## 2023-01-11 LAB — HBA1C MFR BLD: 8.2 %

## 2023-01-11 PROCEDURE — 3075F SYST BP GE 130 - 139MM HG: CPT | Performed by: NURSE PRACTITIONER

## 2023-01-11 PROCEDURE — 99215 OFFICE O/P EST HI 40 MIN: CPT | Performed by: NURSE PRACTITIONER

## 2023-01-11 PROCEDURE — 83036 HEMOGLOBIN GLYCOSYLATED A1C: CPT | Performed by: NURSE PRACTITIONER

## 2023-01-11 PROCEDURE — 3079F DIAST BP 80-89 MM HG: CPT | Performed by: NURSE PRACTITIONER

## 2023-01-11 PROCEDURE — G8482 FLU IMMUNIZE ORDER/ADMIN: HCPCS | Performed by: NURSE PRACTITIONER

## 2023-01-11 PROCEDURE — 4004F PT TOBACCO SCREEN RCVD TLK: CPT | Performed by: NURSE PRACTITIONER

## 2023-01-11 PROCEDURE — G8417 CALC BMI ABV UP PARAM F/U: HCPCS | Performed by: NURSE PRACTITIONER

## 2023-01-11 PROCEDURE — A9584 IODINE I-123 IOFLUPANE: HCPCS | Performed by: STUDENT IN AN ORGANIZED HEALTH CARE EDUCATION/TRAINING PROGRAM

## 2023-01-11 PROCEDURE — 2022F DILAT RTA XM EVC RTNOPTHY: CPT | Performed by: NURSE PRACTITIONER

## 2023-01-11 PROCEDURE — 3430000000 HC RX DIAGNOSTIC RADIOPHARMACEUTICAL: Performed by: STUDENT IN AN ORGANIZED HEALTH CARE EDUCATION/TRAINING PROGRAM

## 2023-01-11 PROCEDURE — 3052F HG A1C>EQUAL 8.0%<EQUAL 9.0%: CPT | Performed by: NURSE PRACTITIONER

## 2023-01-11 PROCEDURE — G8427 DOCREV CUR MEDS BY ELIG CLIN: HCPCS | Performed by: NURSE PRACTITIONER

## 2023-01-11 PROCEDURE — 6370000000 HC RX 637 (ALT 250 FOR IP): Performed by: STUDENT IN AN ORGANIZED HEALTH CARE EDUCATION/TRAINING PROGRAM

## 2023-01-11 PROCEDURE — 78803 RP LOCLZJ TUM SPECT 1 AREA: CPT

## 2023-01-11 PROCEDURE — 3017F COLORECTAL CA SCREEN DOC REV: CPT | Performed by: NURSE PRACTITIONER

## 2023-01-11 RX ORDER — LISINOPRIL 30 MG/1
TABLET ORAL
Qty: 30 TABLET | Refills: 5 | Status: SHIPPED | OUTPATIENT
Start: 2023-01-11

## 2023-01-11 RX ORDER — TOPIRAMATE 25 MG/1
25 TABLET ORAL NIGHTLY
Qty: 30 TABLET | Refills: 2 | Status: SHIPPED | OUTPATIENT
Start: 2023-01-11

## 2023-01-11 RX ORDER — BUPROPION HYDROCHLORIDE 300 MG/1
300 TABLET ORAL EVERY MORNING
Qty: 30 TABLET | Refills: 5 | Status: SHIPPED | OUTPATIENT
Start: 2023-01-11

## 2023-01-11 RX ORDER — TRAZODONE HYDROCHLORIDE 50 MG/1
50 TABLET ORAL NIGHTLY
Qty: 30 TABLET | Refills: 5 | Status: SHIPPED | OUTPATIENT
Start: 2023-01-11

## 2023-01-11 RX ORDER — POTASSIUM IODIDE 65 MG/ML
130 SOLUTION ORAL ONCE
Status: COMPLETED | OUTPATIENT
Start: 2023-01-11 | End: 2023-01-11

## 2023-01-11 RX ORDER — BUPROPION HYDROCHLORIDE 150 MG/1
300 TABLET ORAL EVERY MORNING
Qty: 30 TABLET | Refills: 5 | Status: SHIPPED | OUTPATIENT
Start: 2023-01-11

## 2023-01-11 RX ORDER — GLIPIZIDE 10 MG/1
TABLET ORAL
Qty: 60 TABLET | Refills: 5 | Status: SHIPPED | OUTPATIENT
Start: 2023-01-11

## 2023-01-11 RX ORDER — GABAPENTIN 600 MG/1
TABLET ORAL
Qty: 90 TABLET | Refills: 2 | Status: SHIPPED | OUTPATIENT
Start: 2023-01-11 | End: 2023-02-11

## 2023-01-11 RX ORDER — HYDROCHLOROTHIAZIDE 12.5 MG/1
12.5 CAPSULE, GELATIN COATED ORAL EVERY MORNING
Qty: 90 CAPSULE | Refills: 1 | Status: SHIPPED | OUTPATIENT
Start: 2023-01-11

## 2023-01-11 RX ORDER — PRAVASTATIN SODIUM 40 MG
TABLET ORAL
Qty: 30 TABLET | Refills: 5 | Status: SHIPPED | OUTPATIENT
Start: 2023-01-11

## 2023-01-11 RX ORDER — ATENOLOL 50 MG/1
TABLET ORAL
Qty: 15 TABLET | Refills: 5 | Status: SHIPPED | OUTPATIENT
Start: 2023-01-11

## 2023-01-11 RX ORDER — METHYLPREDNISOLONE 4 MG/1
TABLET ORAL
Qty: 1 KIT | Refills: 0 | Status: SHIPPED | OUTPATIENT
Start: 2023-01-11 | End: 2023-01-17

## 2023-01-11 RX ORDER — HYDROCHLOROTHIAZIDE 12.5 MG/1
12.5 CAPSULE, GELATIN COATED ORAL EVERY MORNING
Qty: 30 CAPSULE | Refills: 5 | Status: SHIPPED | OUTPATIENT
Start: 2023-01-11

## 2023-01-11 RX ORDER — AMLODIPINE BESYLATE 10 MG/1
10 TABLET ORAL DAILY
Qty: 30 TABLET | Refills: 5 | Status: SHIPPED | OUTPATIENT
Start: 2023-01-11

## 2023-01-11 RX ORDER — BUTALBITAL, ACETAMINOPHEN AND CAFFEINE 300; 40; 50 MG/1; MG/1; MG/1
1 CAPSULE ORAL EVERY 4 HOURS PRN
Qty: 20 CAPSULE | Refills: 1 | Status: SHIPPED | OUTPATIENT
Start: 2023-01-11

## 2023-01-11 RX ADMIN — IOFLUPANE I-123 5.1 MILLICURIE: 2 INJECTION, SOLUTION INTRAVENOUS at 10:00

## 2023-01-11 RX ADMIN — POTASSIUM IODIDE 130 MG: 65 SOLUTION ORAL at 09:15

## 2023-01-11 ASSESSMENT — ENCOUNTER SYMPTOMS
SHORTNESS OF BREATH: 0
COUGH: 0

## 2023-01-11 ASSESSMENT — PATIENT HEALTH QUESTIONNAIRE - PHQ9
10. IF YOU CHECKED OFF ANY PROBLEMS, HOW DIFFICULT HAVE THESE PROBLEMS MADE IT FOR YOU TO DO YOUR WORK, TAKE CARE OF THINGS AT HOME, OR GET ALONG WITH OTHER PEOPLE: 1
SUM OF ALL RESPONSES TO PHQ9 QUESTIONS 1 & 2: 6
7. TROUBLE CONCENTRATING ON THINGS, SUCH AS READING THE NEWSPAPER OR WATCHING TELEVISION: 3
SUM OF ALL RESPONSES TO PHQ QUESTIONS 1-9: 19
9. THOUGHTS THAT YOU WOULD BE BETTER OFF DEAD, OR OF HURTING YOURSELF: 0
1. LITTLE INTEREST OR PLEASURE IN DOING THINGS: 3
4. FEELING TIRED OR HAVING LITTLE ENERGY: 3
SUM OF ALL RESPONSES TO PHQ QUESTIONS 1-9: 19
6. FEELING BAD ABOUT YOURSELF - OR THAT YOU ARE A FAILURE OR HAVE LET YOURSELF OR YOUR FAMILY DOWN: 0
8. MOVING OR SPEAKING SO SLOWLY THAT OTHER PEOPLE COULD HAVE NOTICED. OR THE OPPOSITE, BEING SO FIGETY OR RESTLESS THAT YOU HAVE BEEN MOVING AROUND A LOT MORE THAN USUAL: 3
SUM OF ALL RESPONSES TO PHQ QUESTIONS 1-9: 19
5. POOR APPETITE OR OVEREATING: 1
3. TROUBLE FALLING OR STAYING ASLEEP: 3
2. FEELING DOWN, DEPRESSED OR HOPELESS: 3
SUM OF ALL RESPONSES TO PHQ QUESTIONS 1-9: 19

## 2023-01-11 NOTE — TELEPHONE ENCOUNTER
Virgie Nunez from [address] [facility] called in and needs new script for wrist splint that has DX code and DX on there. He also needs it to say R or L. Pended generic to review/edit by PCP.     FAX: [phone]

## 2023-01-11 NOTE — PROGRESS NOTES
Jenae Lagos (:  1960) is a 65 y.o. female,Established patient, here for evaluation of the following chief complaint(s):  Diabetes (3 month follow up- Pt states she checks her sugar sometimes at home. Pt states its been in the 140's-150's in the morning. )         ASSESSMENT/PLAN:  1. Type 2 diabetes mellitus with diabetic polyneuropathy, without long-term current use of insulin (HCC)  -     POCT glycosylated hemoglobin (Hb A1C)  -     glipiZIDE (GLUCOTROL) 10 MG tablet; TAKE 1 TABLET BY MOUTH 2 TIMES DAILY BEFORE MEALS, Disp-60 tablet, R-5Normal  2. Essential hypertension  -     hydroCHLOROthiazide (MICROZIDE) 12.5 MG capsule; Take 1 capsule by mouth every morning, Disp-30 capsule, R-5Normal  -     lisinopril (PRINIVIL;ZESTRIL) 30 MG tablet; Take one tablet by mouth daily, Disp-30 tablet, R-5Normal  -     atenolol (TENORMIN) 50 MG tablet; Take 1/2 tablet by mouth daily, Disp-15 tablet, R-5Normal  -     amLODIPine (NORVASC) 10 MG tablet; Take 1 tablet by mouth daily, Disp-30 tablet, R-5Normal  -     hydroCHLOROthiazide (MICROZIDE) 12.5 MG capsule; Take 1 capsule by mouth every morning, Disp-90 capsule, R-1Pt wants pill packs and wants meds sent to [address] [place] [id number]  3. Sleep disturbance  -     traZODone (DESYREL) 50 MG tablet; Take 1 tablet by mouth nightly, Disp-30 tablet, R-5Normal  4. Pure hypercholesterolemia  -     pravastatin (PRAVACHOL) 40 MG tablet; Take one tablet by mouth daily, Disp-30 tablet, R-5Normal  5. Episode of moderate major depression (HCC)  -     buPROPion (WELLBUTRIN XL) 150 MG extended release tablet; Take 2 tablets by mouth every morning, Disp-30 tablet, R-5Normal  -     buPROPion (WELLBUTRIN XL) 300 MG extended release tablet; Take 1 tablet by mouth every morning, Disp-30 tablet, R-5Normal  6.  Diabetic polyneuropathy associated with type 2 diabetes mellitus ([facility]  -     gabapentin (NEURONTIN) 600 MG tablet; TAKE 1 TABLET BY MOUTH THREE TIMES A DAY, Disp-90 tablet, R-2Normal  7. Acute non intractable tension-type headache  -     butalbital-APAP-caffeine (FIORICET) -40 MG CAPS per capsule; Take 1 capsule by mouth every 4 hours as needed for Headaches, Disp-20 capsule, R-1Normal  -     methylPREDNISolone (MEDROL, KENN,) 4 MG tablet; Take as directed, Disp-1 kit, R-0Normal  -     topiramate (TOPAMAX) 25 MG tablet; Take 1 tablet by mouth nightly, Disp-30 tablet, R-2Normal  8. Neck pain  -     methylPREDNISolone (MEDROL, KENN,) 4 MG tablet; Take as directed, Disp-1 kit, R-0Normal  9. Bilateral wrist pain  -     Elastic Bandages & Supports (WRIST SPLINT) MISC; CONTINUOUS Starting Wed 1/11/2023, Disp-2 each, R-0, PrintCock up wrist splint    No follow-ups on file. Subjective   SUBJECTIVE/OBJECTIVE:  Diabetes  Pertinent negatives for diabetes include no chest pain. Being worked up for headaches. Ct on jan 9 was without acute abnormality. Headache and neck pain going on for about a week and a half. Pt states the pain is occipital and bilateral temproal.  The pressure changes based on how she is laying. She is needing to sleep sitting up. She has been taking two lisinopril since her head has been hurting. Advised her to stop taking so much lisinopril and to take meds as prescribed. This does appear to be similar to headaches in the past. Pt is unsure what has helped headaches in the past.     Incidental finding of ct in 2014 that showed diffuse enlarged thyroid- will get ultrasound/labs    Dm- a1c is up to 8.2 from 7.2. pt denies med changes- states her a1c will come down because the holidays are over. Pt really doesn't know what she is taking. Will try to get dispense list.     Send meds to [facility] for pill pack  Need dispense list.     Pt requesting that all meds be sent to   [address] [facility] [address]  Mom's house. Review of Systems   Constitutional:  Negative for chills and fever.    Respiratory:  Negative for cough and shortness of breath. Cardiovascular:  Negative for chest pain, palpitations and leg swelling. Objective   Vitals:    01/11/23 1016   BP: 136/84   Pulse: 94   SpO2: 96%     Wt Readings from Last 3 Encounters:   01/11/23 193 lb (87.5 kg)   01/09/23 195 lb (88.5 kg)   12/21/22 185 lb (83.9 kg)       Physical Exam  Constitutional:       Appearance: She is well-developed. HENT:      Right Ear: External ear normal.      Left Ear: External ear normal.      Nose: Nose normal.   Cardiovascular:      Rate and Rhythm: Normal rate and regular rhythm. Heart sounds: Normal heart sounds, S1 normal and S2 normal.   Pulmonary:      Effort: Pulmonary effort is normal. No respiratory distress. Breath sounds: Normal breath sounds. Musculoskeletal:         General: No deformity. Normal range of motion. Cervical back: Full passive range of motion without pain and normal range of motion. Comments: Very tense upper trapezius muscles into neck. Tender to palpation. Skin:     General: Skin is warm and dry. Neurological:      Mental Status: She is alert and oriented to person, place, and time. Comments: II: Normal tracking, no evidence of hemianopia or other visual field defect. III, IV, & VI: EOM intact, no ptosis, no nystagmus seen. Pupils are equal and reactive to light. V: Facial sensation is intact to light touch/temperature. VII: no facial asymmetry, facial movement intact. VIII: hearing is normal to finger rub. IX-X: Palate elevates in the midline. XI: Normal trapezius strength and/or movement. XII: Tongue movement is normal, position is midline and no fasciculations are observed. Greater than 50% of the 40 minute visit was spent in counseling the patient for the following:  Headache, neck pain, medication compliance. An electronic signature was used to authenticate this note.     --Wyatt Dawson, CONRADO - CNP

## 2023-01-17 ENCOUNTER — HOSPITAL ENCOUNTER (OUTPATIENT)
Dept: ULTRASOUND IMAGING | Age: 63
Discharge: HOME | End: 2023-01-19
Payer: MEDICAID

## 2023-01-17 ENCOUNTER — TELEPHONE (OUTPATIENT)
Dept: PRIMARY CARE CLINIC | Age: 63
End: 2023-01-17

## 2023-01-17 DIAGNOSIS — R93.89 ABNORMAL CT SCAN: ICD-10-CM

## 2023-01-17 PROCEDURE — 76536 US EXAM OF HEAD AND NECK: CPT

## 2023-01-17 NOTE — TELEPHONE ENCOUNTER
Patient called stated her head still hurts, patient states she wants ore medication, she is upset nobody has called to check on her. Patient states she's been to all her appointments, patient states she's been taking all medications, but since her headaches have been hurting she hasn't been taking all her medications. Patient states she is scared and wants to know what is going on because no one has called her to let her know. Patient states she suppose to take a pill at night but she hasn't, patient states \"it's probably the pill you are talking about\" in which I asked her if she has taken the fioricet and she stated she isn't taking any medication except gabapentin 3x a day for diabetic nerve pain, blood pressure medication, blue capsule every 4 hours as needed. Patient states she's been drinking. Patient states she's drinking pop now because she's irritated. She \"isn't feeling\" that Alma Rosan hasn't called and asked if the pills are working. Patient is saying something isn't right with us, and she wants a new doctor. Patient stated she doesn't want to go back to the ER because she doesn't understand. Patient states they make us do this survey at the end of a call and my survey is \"I don't like ya'll\" Patient also states \"the brace was suppose to be 2 braces not 1 for my hand.\"     I told the patient I would leave a message and get back with her as soon as possible.

## 2023-01-18 NOTE — TELEPHONE ENCOUNTER
Patient contacted the [facility] office upset that Charly Hodgkins has not called her back. I informed patient that she was calling the wrong office and she stated she was given this number to call. Patient started to get upset when I offered to have someone from that office call her. She then stated \"I'm going to cancel ya'll out\" and hung up.     Reached out to [facility]. V's she will contact patient

## 2023-01-18 NOTE — TELEPHONE ENCOUNTER
Patient contact - I spoke to her. She was upset and cussing. I asked her to stop cussing at me so I can pull her chart up and try to help her the best I can. Patient was still yelling and stated she did not understand why she has not heard from her Doctor and that Aries Forte should call and check on her. I advised the patient that she was prescribed medication at her appointment, she is established with Neurology. I also advised her that we were waiting on her US results. Patient stated she still has a headache and nobody cares. I advised the patient that she needs to call and get in with neurology and that she did have a work up in the ER for this. Patient stated \"what about the pills\"   I review the patients medications and she was referring to the [facility]. Pt wanted a script sent in since she finished the script that was sent in on 1/11/23. Pt was notified that this script has a refill and she just needs to call her pharmacy. I spoke to Aries Forte regarding this, Afsaneh Elliott agreed for the patient to get the refill filled of the Fioricet and to call and schedule a sooner appointment with neurology. Pt notified, voiced understanding.

## 2023-01-24 ENCOUNTER — TELEPHONE (OUTPATIENT)
Dept: NEUROLOGY | Age: 63
End: 2023-01-24

## 2023-01-24 NOTE — TELEPHONE ENCOUNTER
Patient called in today really upset that she has not heard from our office about her test results. Patient would like a provide call back. NM Brain Spect completed 1.11.23. Patient follow up 2.8. 23.

## 2023-01-25 NOTE — TELEPHONE ENCOUNTER
Patient was contacted on 1/25/2023, at 1:25 PM, DaTscan results was discussed with the patient, results came back negative, patient will follow-up with us on 2/8/2023 with Dr. Luz Marina Aguilar, patient mentioned that she still have some headaches that is managed by her medication however she would like to address her medication again she will follow-up soon and her medication will be addressed and discussed with her thoroughly

## 2023-01-31 DIAGNOSIS — G44.209 ACUTE NON INTRACTABLE TENSION-TYPE HEADACHE: ICD-10-CM

## 2023-01-31 DIAGNOSIS — E11.42 DIABETIC POLYNEUROPATHY ASSOCIATED WITH TYPE 2 DIABETES MELLITUS (HCC): ICD-10-CM

## 2023-01-31 RX ORDER — BUTALBITAL, ACETAMINOPHEN AND CAFFEINE 300; 40; 50 MG/1; MG/1; MG/1
1 CAPSULE ORAL EVERY 4 HOURS PRN
Qty: 20 CAPSULE | Refills: 1 | OUTPATIENT
Start: 2023-01-31

## 2023-01-31 RX ORDER — GABAPENTIN 600 MG/1
TABLET ORAL
Qty: 90 TABLET | Refills: 0 | OUTPATIENT
Start: 2023-01-31

## 2023-02-06 DIAGNOSIS — F32.1 EPISODE OF MODERATE MAJOR DEPRESSION (HCC): ICD-10-CM

## 2023-02-06 DIAGNOSIS — E11.42 DIABETIC POLYNEUROPATHY ASSOCIATED WITH TYPE 2 DIABETES MELLITUS (HCC): ICD-10-CM

## 2023-02-06 DIAGNOSIS — E11.42 TYPE 2 DIABETES MELLITUS WITH DIABETIC POLYNEUROPATHY, WITHOUT LONG-TERM CURRENT USE OF INSULIN (HCC): ICD-10-CM

## 2023-02-06 DIAGNOSIS — G47.9 SLEEP DISTURBANCE: ICD-10-CM

## 2023-02-06 DIAGNOSIS — E78.00 PURE HYPERCHOLESTEROLEMIA: ICD-10-CM

## 2023-02-06 DIAGNOSIS — I10 ESSENTIAL HYPERTENSION: ICD-10-CM

## 2023-02-06 DIAGNOSIS — G44.209 ACUTE NON INTRACTABLE TENSION-TYPE HEADACHE: ICD-10-CM

## 2023-02-06 NOTE — TELEPHONE ENCOUNTER
Marissa Leyden is calling to request a refill on the following medication(s):    Last Visit Date (If Applicable):  0/22/2304    Next Visit Date:    Visit date not found    Medication Request:  Requested Prescriptions     Pending Prescriptions Disp Refills    butalbital-APAP-caffeine -40 MG CAPS per capsule [Pharmacy Med Name: BUTALBITAL-APAP-CAFF -40 CAPS] 20 capsule 1     Sig: TAKE 1 CAPSULE BY MOUTH EVERY 4 HOURS AS NEEDED FOR HEADACHES.    gabapentin (NEURONTIN) 600 MG tablet [Pharmacy Med Name: GABAPENTIN 600MG TABS] 90 tablet 0     Sig: TAKE 1 TABLET BY MOUTH THREE TIMES A DAY

## 2023-02-08 ENCOUNTER — OFFICE VISIT (OUTPATIENT)
Dept: NEUROLOGY | Age: 63
End: 2023-02-08
Payer: MEDICAID

## 2023-02-08 VITALS
WEIGHT: 193 LBS | SYSTOLIC BLOOD PRESSURE: 146 MMHG | HEART RATE: 97 BPM | DIASTOLIC BLOOD PRESSURE: 78 MMHG | TEMPERATURE: 98 F | RESPIRATION RATE: 18 BRPM | OXYGEN SATURATION: 98 % | BODY MASS INDEX: 32.12 KG/M2

## 2023-02-08 DIAGNOSIS — R51.9 CHRONIC DAILY HEADACHE: Primary | ICD-10-CM

## 2023-02-08 PROCEDURE — G8417 CALC BMI ABV UP PARAM F/U: HCPCS

## 2023-02-08 PROCEDURE — 99214 OFFICE O/P EST MOD 30 MIN: CPT

## 2023-02-08 PROCEDURE — 3078F DIAST BP <80 MM HG: CPT

## 2023-02-08 PROCEDURE — 4004F PT TOBACCO SCREEN RCVD TLK: CPT

## 2023-02-08 PROCEDURE — 3074F SYST BP LT 130 MM HG: CPT

## 2023-02-08 PROCEDURE — G8482 FLU IMMUNIZE ORDER/ADMIN: HCPCS

## 2023-02-08 PROCEDURE — G8427 DOCREV CUR MEDS BY ELIG CLIN: HCPCS

## 2023-02-08 PROCEDURE — 3017F COLORECTAL CA SCREEN DOC REV: CPT

## 2023-02-08 ASSESSMENT — ENCOUNTER SYMPTOMS
WHEEZING: 0
VOICE CHANGE: 0
COLOR CHANGE: 0
ABDOMINAL PAIN: 0
PHOTOPHOBIA: 0
DIARRHEA: 0
NAUSEA: 0
TROUBLE SWALLOWING: 0
VOMITING: 0
SHORTNESS OF BREATH: 0
CHEST TIGHTNESS: 0

## 2023-02-08 NOTE — PROGRESS NOTES
[address] [place] [address],  [address], [facility], [address] [facility], [address] [place] [address]  P: [phone]  F: [phone]    NEUROLOGY CLINIC NOTE     PATIENT NAME: Barrett Pillai  PATIENT MRN: [id number]  PRIMARY CARE PHYSICIAN: CONRADO Redi - CNP    HPI:      Petty Bradford is a 64 y/o female with past medical history of T2DM with neuropathy, Hyperlipidemia, HTN presenting for follow up for tremors and headache. Patient had tremors which was present bilaterally, and was worse on the right hand versus the left. Her tremors have significantly improved. Patient states she may get them once or twice per week. She states her headaches occur often daily, and are rated at 8/10. She takes Topamax and Fioricet for headaches. SHEKHAR scan negative for parkinsonian findings. CT head done on 2023 for severe headache was negative for acute intracranial abnormality.         PATIENT HISTORY:     Past Medical History:   Diagnosis Date    Anemia     Chronic back pain     Depression     Hyperlipidemia     Hypertension     Type 2 diabetes mellitus with diabetic polyneuropathy, without long-term current use of insulin ([facility] 11/10/2016    Type II or unspecified type diabetes mellitus without mention of complication, not stated as uncontrolled         Past Surgical History:   Procedure Laterality Date     SECTION      EXTERNAL EAR SURGERY      EYE SURGERY      RECTAL SURGERY Right 2021    RECTAL PERIRECTAL INCISION AND DRAINAGE performed by Cecil Vang MD at [facility] History     Socioeconomic History    Marital status: Single     Spouse name: Not on file    Number of children: Not on file    Years of education: Not on file    Highest education level: Not on file   Occupational History    Not on file   Tobacco Use    Smoking status: Every Day     Packs/day: 0.50     Years: 39.00     Pack years: 19.50     Types: Cigarettes    Smokeless tobacco: Never   Substance and Sexual Activity    Alcohol use: No     Alcohol/week: 0.0 standard drinks     Comment: 1 x mon    Drug use: Not Currently     Types: Marijuana Hulon Donnelly)    Sexual activity: Never   Other Topics Concern    Not on file   Social History Narrative    Not on file     Social Determinants of Health     Financial Resource Strain: Low Risk     Difficulty of Paying Living Expenses: Not hard at all   Food Insecurity: No Food Insecurity    Worried About Running Out of Food in the Last Year: Never true    Ran Out of Food in the Last Year: Never true   Transportation Needs: Not on file   Physical Activity: Not on file   Stress: Not on file   Social Connections: Not on file   Intimate Partner Violence: Not on file   Housing Stability: Not on file        Current Outpatient Medications   Medication Sig Dispense Refill    butalbital-APAP-caffeine (FIORICET) -40 MG CAPS per capsule Take 1 capsule by mouth every 4 hours as needed for Headaches 20 capsule 1    topiramate (TOPAMAX) 25 MG tablet Take 1 tablet by mouth nightly 30 tablet 2    Elastic Bandages & Supports (WRIST SPLINT) MISC Apply 1 each topically continuous Cock up wrist splint 2 each 0    buPROPion (WELLBUTRIN XL) 150 MG extended release tablet Take 2 tablets by mouth every morning 30 tablet 5    traZODone (DESYREL) 50 MG tablet Take 1 tablet by mouth nightly 30 tablet 5    hydroCHLOROthiazide (MICROZIDE) 12.5 MG capsule Take 1 capsule by mouth every morning 30 capsule 5    pravastatin (PRAVACHOL) 40 MG tablet Take one tablet by mouth daily 30 tablet 5    lisinopril (PRINIVIL;ZESTRIL) 30 MG tablet Take one tablet by mouth daily 30 tablet 5    atenolol (TENORMIN) 50 MG tablet Take 1/2 tablet by mouth daily 15 tablet 5    amLODIPine (NORVASC) 10 MG tablet Take 1 tablet by mouth daily 30 tablet 5    glipiZIDE (GLUCOTROL) 10 MG tablet TAKE 1 TABLET BY MOUTH 2 TIMES DAILY BEFORE MEALS 60 tablet 5    buPROPion (WELLBUTRIN XL) 300 MG extended release tablet Take 1 tablet by mouth every morning 30 tablet 5    gabapentin (NEURONTIN) 600 MG tablet TAKE 1 TABLET BY MOUTH THREE TIMES A DAY 90 tablet 2    BANOPHEN 25 MG capsule TAKE 1 CAPSULE BY MOUTH TWICE DAILY AS NEEDED FOR ITCHING. 60 capsule 1    ammonium lactate (LAC-HYDRIN) 12 % cream Apply topically as needed. 385 g 2    ciclopirox (PENLAC) 8 % solution Apply topically nightly. 6 mL 1    alogliptin (NESINA) 25 MG TABS tablet Take 1 tablet by mouth daily 30 tablet 2    melatonin (RA MELATONIN) 3 MG TABS tablet Take 1 tablet by mouth nightly 30 tablet 5    nystatin (MYCOSTATIN) 511588 UNIT/GM powder Apply topically 4 times daily. 15 g 11    miconazole (MICONAZOLE 7) 2 % vaginal cream Place 1 applicator vaginally nightly Place vaginally nightly x 7 days 60 g 3    insulin glargine (LANTUS SOLOSTAR) 100 UNIT/ML injection pen Inject 20 Units into the skin nightly 5 pen 3    TRUEplus Lancets 30G MISC USE AS DIRECTED 4 TIMES A  each 2    TRUE METRIX BLOOD GLUCOSE TEST strip USE TO TEST BLOOD SUGAR 4 TIMES A  each 2    Alcohol Swabs ( STERILE ALCOHOL PREP) PADS USE TO TEST BLOOD SUGAR 4 TIMES A  each 2    Blood Glucose Monitoring Suppl (TRUE METRIX AIR GLUCOSE METER) w/Device KIT USE TO TEST BLOOD SUGAR AS DIRECTED 1 kit 2    Insulin Pen Needle ([employer] PEN NEEDLES) 31G X 6 MM MISC 1 each by Does not apply route daily 100 each 3    blood glucose monitor kit and supplies Test 4 times a day & as needed for symptoms of irregular blood glucose. 1 kit 0    glucose monitoring kit (FREESTYLE) monitoring kit Diagnosis: Diabetes type 2, insulin-dependent. Use 3-4 times daily. Ok to give insurance brand (Patient taking differently: Diagnosis: Diabetes type 2, insulin-dependent. Use 3-4 times daily.  Ok to give insurance brand) 1 kit 0    ONETOUCH DELICA LANCETS 59N MISC CHECK BLOOD SUGAR ONCE DAILY 100 each 5    Blood Pressure Monitoring (BLOOD PRESSURE CUFF) MISC 1 Dose by Does not apply route once for 1 dose 1 each 0     No current facility-administered medications for this visit.        Allergies   Allergen Reactions    Ibuprofen Itching        REVIEW OF SYSTEMS:     Review of Systems   Constitutional:  Negative for chills and fever.   HENT:  Negative for trouble swallowing and voice change.    Eyes:  Negative for photophobia and visual disturbance.   Respiratory:  Negative for chest tightness, shortness of breath and wheezing.    Gastrointestinal:  Negative for abdominal pain, diarrhea, nausea and vomiting.   Genitourinary:  Negative for dysuria and hematuria.   Musculoskeletal:  Negative for arthralgias and myalgias.   Skin:  Negative for color change, pallor, rash and wound.   Neurological:  Positive for headaches. Negative for dizziness, tremors, speech difficulty, weakness and numbness.   Psychiatric/Behavioral:  Negative for agitation, behavioral problems and confusion.       VITALS  BP (!) 146/78   Pulse 97   Temp 98 °F (36.7 °C)   Resp 18   Wt 193 lb (87.5 kg)   SpO2 98%   BMI 32.12 kg/m²      PHYSICAL EXAMINATION:     Constitutional: Well developed, well nourished and in no acute distress.   Head:  normocephalic, atraumatic.  Neck: supple, no carotid bruits, thyroid not palpable  Respiratory: Clear to auscultation bilaterally with no use of accessory muscles during respiration.   Cardiovascular: normal rate, regular rhythm, no murmur, gallop, rub.  Abdomen: Soft, nontender, nondistended, normal bowel sounds,    Extremities:  peripheral pulses palpable, no pedal edema or calf pain with palpation  Psych: normal affect      NEUROLOGICAL EXAMINATION:     Mental status   Alert and oriented; intact memory with no confusion, speech or language problems; no hallucinations or delusions     Cranial nerves   II - visual fields intact to confrontation                                                III, IV, VI - extra-ocular muscles full: no pupillary defect; no RADHA, no nystagmus, no ptosis   V - normal facial  sensation                                                               VII - normal facial symmetry                                                             VIII - intact hearing                                                                             IX, X - symmetrical palate                                                                  XI - symmetrical shoulder shrug                                                       XII - midline tongue without atrophy or fasciculation     Motor function  Normal muscle bulk and tone  Muscle strength: normal power 5/5  fine motor movements     Sensory function Intact to touch, pin prick, vibration, proprioception in bilateral upper and lower extremities.      Cerebellar Intact fine motor movement. No involuntary movements or tremors     Reflex function Intact 2+ DTR and symmetric. Negative Babinski     Gait                  Normal station and gait           PRIOR TESTS AND IMAGING: Following images and Labs were reviewed by the examiner             ASSESSMENT       61 y/o female presenting for follow up regarding tremors which have since improved considerably. Patient had no evidence of tremors when seen and examined today.   She continues to experience headaches for which she takes Topamax and Fioricet.       PLAN:     Continue Topamax and Fioricet for headaches.   Monitor tremor activity     Follow up in the clinic in   Instructed patient to call the clinic if symptoms worsen or develop any new symptoms.         Ms. Alexandra received counseling on the following healthy behaviors: medical compliance, smoking cessation, blood pressure control, regular follow up with primary doctor.      I have spent 60 minutes face to face with the patient more than 50% of this time was spent counseling and coordinating care.      Electronically signed by Shaka Spence DO on 2/8/2023 at 1:47 PM

## 2023-02-08 NOTE — TELEPHONE ENCOUNTER
I called [address] [place] [address] to verify if scripts were received from 01/11/23. They did not fill any due to patient not using them since 2019.   She had a lot of scripts sent to [facility]

## 2023-02-10 ENCOUNTER — TELEPHONE (OUTPATIENT)
Dept: PRIMARY CARE CLINIC | Age: 63
End: 2023-02-10

## 2023-02-10 RX ORDER — ATENOLOL 50 MG/1
TABLET ORAL
Qty: 15 TABLET | Refills: 5 | Status: SHIPPED | OUTPATIENT
Start: 2023-02-10

## 2023-02-10 RX ORDER — HYDROCHLOROTHIAZIDE 12.5 MG/1
12.5 CAPSULE, GELATIN COATED ORAL EVERY MORNING
Qty: 30 CAPSULE | Refills: 5 | Status: SHIPPED | OUTPATIENT
Start: 2023-02-10

## 2023-02-10 RX ORDER — TRAZODONE HYDROCHLORIDE 50 MG/1
50 TABLET ORAL NIGHTLY
Qty: 30 TABLET | Refills: 5 | Status: SHIPPED | OUTPATIENT
Start: 2023-02-10

## 2023-02-10 RX ORDER — AMLODIPINE BESYLATE 10 MG/1
10 TABLET ORAL DAILY
Qty: 30 TABLET | Refills: 5 | Status: SHIPPED | OUTPATIENT
Start: 2023-02-10

## 2023-02-10 RX ORDER — BUTALBITAL, ACETAMINOPHEN AND CAFFEINE 300; 40; 50 MG/1; MG/1; MG/1
1 CAPSULE ORAL EVERY 4 HOURS PRN
Qty: 20 CAPSULE | Refills: 1 | Status: SHIPPED | OUTPATIENT
Start: 2023-02-10

## 2023-02-10 RX ORDER — GLIPIZIDE 10 MG/1
TABLET ORAL
Qty: 60 TABLET | Refills: 5 | Status: SHIPPED | OUTPATIENT
Start: 2023-02-10

## 2023-02-10 RX ORDER — LISINOPRIL 30 MG/1
TABLET ORAL
Qty: 30 TABLET | Refills: 5 | Status: SHIPPED | OUTPATIENT
Start: 2023-02-10

## 2023-02-10 RX ORDER — PRAVASTATIN SODIUM 40 MG
TABLET ORAL
Qty: 30 TABLET | Refills: 5 | Status: SHIPPED | OUTPATIENT
Start: 2023-02-10

## 2023-02-10 RX ORDER — BUPROPION HYDROCHLORIDE 300 MG/1
300 TABLET ORAL EVERY MORNING
Qty: 30 TABLET | Refills: 5 | Status: SHIPPED | OUTPATIENT
Start: 2023-02-10

## 2023-02-10 RX ORDER — GABAPENTIN 600 MG/1
TABLET ORAL
Qty: 90 TABLET | Refills: 2 | Status: SHIPPED | OUTPATIENT
Start: 2023-02-10 | End: 2023-05-10

## 2023-02-10 NOTE — TELEPHONE ENCOUNTER
I called [facility] and they did confirm that they have the Gabapentin script that was sent on 1/11. However, pt states she wanted it to be sent to [address]? Are we able to cancel the original one and get a new one at [address]?

## 2023-02-10 NOTE — TELEPHONE ENCOUNTER
I also called [facility]'s pharmacy and they confirmed she picked up the [address] on 1/11 and then picked up her last refill on 1/19 so she has no refills left.

## 2023-02-10 NOTE — TELEPHONE ENCOUNTER
Pt called back today to get an update on medication. She states she never received the medication from exact scripts back in January. She voices frustration of having to make multiple calls this week to get refills and nothing being filled. Can we get this filled for her please? Or what am  I to tell her is going on? Please advise.

## 2023-04-05 DIAGNOSIS — E11.42 TYPE 2 DIABETES MELLITUS WITH DIABETIC POLYNEUROPATHY, WITHOUT LONG-TERM CURRENT USE OF INSULIN (HCC): ICD-10-CM

## 2023-04-05 DIAGNOSIS — I10 ESSENTIAL HYPERTENSION: ICD-10-CM

## 2023-04-05 DIAGNOSIS — G47.9 SLEEP DISTURBANCE: ICD-10-CM

## 2023-04-05 RX ORDER — TRAZODONE HYDROCHLORIDE 50 MG/1
TABLET ORAL
Qty: 30 TABLET | Refills: 3 | OUTPATIENT
Start: 2023-04-05

## 2023-04-05 RX ORDER — ASPIRIN 81 MG/1
TABLET, COATED ORAL
Qty: 30 TABLET | Refills: 2 | Status: SHIPPED | OUTPATIENT
Start: 2023-04-05

## 2023-04-05 RX ORDER — AMLODIPINE BESYLATE 10 MG/1
TABLET ORAL
Qty: 30 TABLET | Refills: 5 | OUTPATIENT
Start: 2023-04-05

## 2023-04-05 RX ORDER — ALOGLIPTIN 12.5 MG/1
TABLET, FILM COATED ORAL
Qty: 60 TABLET | Refills: 2 | Status: SHIPPED | OUTPATIENT
Start: 2023-04-05

## 2023-04-12 ENCOUNTER — OFFICE VISIT (OUTPATIENT)
Dept: PODIATRY | Age: 63
End: 2023-04-12
Payer: MEDICAID

## 2023-04-12 VITALS
SYSTOLIC BLOOD PRESSURE: 142 MMHG | HEIGHT: 65 IN | WEIGHT: 193 LBS | BODY MASS INDEX: 32.15 KG/M2 | DIASTOLIC BLOOD PRESSURE: 89 MMHG

## 2023-04-12 DIAGNOSIS — B35.1 ONYCHOMYCOSIS: Primary | ICD-10-CM

## 2023-04-12 DIAGNOSIS — E11.42 DIABETIC POLYNEUROPATHY ASSOCIATED WITH TYPE 2 DIABETES MELLITUS (HCC): ICD-10-CM

## 2023-04-12 PROCEDURE — 2022F DILAT RTA XM EVC RTNOPTHY: CPT

## 2023-04-12 PROCEDURE — 99213 OFFICE O/P EST LOW 20 MIN: CPT

## 2023-04-12 PROCEDURE — 4004F PT TOBACCO SCREEN RCVD TLK: CPT

## 2023-04-12 PROCEDURE — 3052F HG A1C>EQUAL 8.0%<EQUAL 9.0%: CPT

## 2023-04-12 PROCEDURE — 3074F SYST BP LT 130 MM HG: CPT

## 2023-04-12 PROCEDURE — 3017F COLORECTAL CA SCREEN DOC REV: CPT

## 2023-04-12 PROCEDURE — 3078F DIAST BP <80 MM HG: CPT

## 2023-04-12 PROCEDURE — G8427 DOCREV CUR MEDS BY ELIG CLIN: HCPCS

## 2023-04-12 PROCEDURE — 11721 DEBRIDE NAIL 6 OR MORE: CPT

## 2023-04-12 PROCEDURE — G8417 CALC BMI ABV UP PARAM F/U: HCPCS

## 2023-05-10 DIAGNOSIS — E11.42 DIABETIC POLYNEUROPATHY ASSOCIATED WITH TYPE 2 DIABETES MELLITUS (HCC): ICD-10-CM

## 2023-05-10 DIAGNOSIS — G44.209 ACUTE NON INTRACTABLE TENSION-TYPE HEADACHE: ICD-10-CM

## 2023-05-15 RX ORDER — GABAPENTIN 600 MG/1
TABLET ORAL
Qty: 90 TABLET | Refills: 0 | Status: SHIPPED | OUTPATIENT
Start: 2023-05-15 | End: 2023-05-24 | Stop reason: SDUPTHER

## 2023-05-15 RX ORDER — TOPIRAMATE 25 MG/1
TABLET ORAL
Qty: 30 TABLET | Refills: 2 | Status: SHIPPED | OUTPATIENT
Start: 2023-05-15

## 2023-05-15 RX ORDER — BUTALBITAL, ACETAMINOPHEN AND CAFFEINE 300; 40; 50 MG/1; MG/1; MG/1
CAPSULE ORAL
Qty: 20 CAPSULE | Refills: 2 | Status: SHIPPED | OUTPATIENT
Start: 2023-05-15

## 2023-05-24 ENCOUNTER — OFFICE VISIT (OUTPATIENT)
Dept: PRIMARY CARE CLINIC | Age: 63
End: 2023-05-24
Payer: MEDICAID

## 2023-05-24 ENCOUNTER — HOSPITAL ENCOUNTER (OUTPATIENT)
Age: 63
Setting detail: SPECIMEN
Discharge: HOME | End: 2023-05-24

## 2023-05-24 VITALS
DIASTOLIC BLOOD PRESSURE: 88 MMHG | HEIGHT: 65 IN | WEIGHT: 193 LBS | HEART RATE: 95 BPM | OXYGEN SATURATION: 99 % | BODY MASS INDEX: 32.15 KG/M2 | SYSTOLIC BLOOD PRESSURE: 195 MMHG

## 2023-05-24 DIAGNOSIS — G47.9 SLEEP DISTURBANCE: ICD-10-CM

## 2023-05-24 DIAGNOSIS — E78.00 PURE HYPERCHOLESTEROLEMIA: ICD-10-CM

## 2023-05-24 DIAGNOSIS — E11.42 TYPE 2 DIABETES MELLITUS WITH DIABETIC POLYNEUROPATHY, WITHOUT LONG-TERM CURRENT USE OF INSULIN (HCC): ICD-10-CM

## 2023-05-24 DIAGNOSIS — Z76.0 MEDICATION REFILL: ICD-10-CM

## 2023-05-24 DIAGNOSIS — F32.1 EPISODE OF MODERATE MAJOR DEPRESSION (HCC): ICD-10-CM

## 2023-05-24 DIAGNOSIS — I10 ESSENTIAL HYPERTENSION: ICD-10-CM

## 2023-05-24 DIAGNOSIS — R31.9 HEMATURIA, UNSPECIFIED TYPE: ICD-10-CM

## 2023-05-24 DIAGNOSIS — E11.42 DIABETIC POLYNEUROPATHY ASSOCIATED WITH TYPE 2 DIABETES MELLITUS (HCC): ICD-10-CM

## 2023-05-24 DIAGNOSIS — R35.0 URINARY FREQUENCY: Primary | ICD-10-CM

## 2023-05-24 DIAGNOSIS — R35.0 URINARY FREQUENCY: ICD-10-CM

## 2023-05-24 PROCEDURE — 3052F HG A1C>EQUAL 8.0%<EQUAL 9.0%: CPT | Performed by: NURSE PRACTITIONER

## 2023-05-24 PROCEDURE — G8417 CALC BMI ABV UP PARAM F/U: HCPCS | Performed by: NURSE PRACTITIONER

## 2023-05-24 PROCEDURE — 81003 URINALYSIS AUTO W/O SCOPE: CPT | Performed by: NURSE PRACTITIONER

## 2023-05-24 PROCEDURE — 3078F DIAST BP <80 MM HG: CPT | Performed by: NURSE PRACTITIONER

## 2023-05-24 PROCEDURE — 99214 OFFICE O/P EST MOD 30 MIN: CPT | Performed by: NURSE PRACTITIONER

## 2023-05-24 PROCEDURE — G8427 DOCREV CUR MEDS BY ELIG CLIN: HCPCS | Performed by: NURSE PRACTITIONER

## 2023-05-24 PROCEDURE — 3017F COLORECTAL CA SCREEN DOC REV: CPT | Performed by: NURSE PRACTITIONER

## 2023-05-24 PROCEDURE — 3074F SYST BP LT 130 MM HG: CPT | Performed by: NURSE PRACTITIONER

## 2023-05-24 PROCEDURE — 4004F PT TOBACCO SCREEN RCVD TLK: CPT | Performed by: NURSE PRACTITIONER

## 2023-05-24 PROCEDURE — 2022F DILAT RTA XM EVC RTNOPTHY: CPT | Performed by: NURSE PRACTITIONER

## 2023-05-24 RX ORDER — NYSTATIN 100000 U/G
CREAM TOPICAL
Qty: 1 EACH | Refills: 2 | Status: SHIPPED | OUTPATIENT
Start: 2023-05-24

## 2023-05-24 RX ORDER — DIPHENHYDRAMINE HCL 25 MG
CAPSULE ORAL
Qty: 60 CAPSULE | Refills: 1 | Status: SHIPPED | OUTPATIENT
Start: 2023-05-24

## 2023-05-24 RX ORDER — INSULIN GLARGINE 100 [IU]/ML
20 INJECTION, SOLUTION SUBCUTANEOUS NIGHTLY
Qty: 5 ADJUSTABLE DOSE PRE-FILLED PEN SYRINGE | Refills: 2 | Status: SHIPPED | OUTPATIENT
Start: 2023-05-24

## 2023-05-24 RX ORDER — GLIPIZIDE 10 MG/1
TABLET ORAL
Qty: 60 TABLET | Refills: 5 | Status: SHIPPED | OUTPATIENT
Start: 2023-05-24

## 2023-05-24 RX ORDER — BUPROPION HYDROCHLORIDE 300 MG/1
300 TABLET ORAL EVERY MORNING
Qty: 30 TABLET | Refills: 5 | Status: SHIPPED | OUTPATIENT
Start: 2023-05-24

## 2023-05-24 RX ORDER — ATENOLOL 50 MG/1
TABLET ORAL
Qty: 15 TABLET | Refills: 5 | Status: SHIPPED | OUTPATIENT
Start: 2023-05-24

## 2023-05-24 RX ORDER — AMLODIPINE BESYLATE 10 MG/1
10 TABLET ORAL DAILY
Qty: 30 TABLET | Refills: 5 | Status: SHIPPED | OUTPATIENT
Start: 2023-05-24

## 2023-05-24 RX ORDER — HYDROCHLOROTHIAZIDE 12.5 MG/1
12.5 CAPSULE, GELATIN COATED ORAL EVERY MORNING
Qty: 30 CAPSULE | Refills: 5 | Status: SHIPPED | OUTPATIENT
Start: 2023-05-24

## 2023-05-24 RX ORDER — LISINOPRIL 30 MG/1
TABLET ORAL
Qty: 30 TABLET | Refills: 5 | Status: SHIPPED | OUTPATIENT
Start: 2023-05-24

## 2023-05-24 RX ORDER — TRAZODONE HYDROCHLORIDE 50 MG/1
50 TABLET ORAL NIGHTLY
Qty: 30 TABLET | Refills: 5 | Status: SHIPPED | OUTPATIENT
Start: 2023-05-24

## 2023-05-24 RX ORDER — ASPIRIN 81 MG/1
TABLET ORAL
Qty: 30 TABLET | Refills: 5 | Status: SHIPPED | OUTPATIENT
Start: 2023-05-24

## 2023-05-24 RX ORDER — GABAPENTIN 600 MG/1
TABLET ORAL
Qty: 90 TABLET | Refills: 2 | Status: SHIPPED | OUTPATIENT
Start: 2023-05-24 | End: 2023-06-24

## 2023-05-24 RX ORDER — LANOLIN ALCOHOL/MO/W.PET/CERES
3 CREAM (GRAM) TOPICAL NIGHTLY
Qty: 30 TABLET | Refills: 5 | Status: SHIPPED | OUTPATIENT
Start: 2023-05-24

## 2023-05-24 RX ORDER — PEN NEEDLE, DIABETIC 31 G X1/4"
1 NEEDLE, DISPOSABLE MISCELLANEOUS DAILY
Qty: 100 EACH | Refills: 3 | Status: SHIPPED | OUTPATIENT
Start: 2023-05-24

## 2023-05-24 RX ORDER — ATENOLOL 25 MG/1
TABLET ORAL
COMMUNITY
Start: 2023-05-10 | End: 2023-05-24

## 2023-05-24 RX ORDER — PRAVASTATIN SODIUM 40 MG
TABLET ORAL
Qty: 30 TABLET | Refills: 5 | Status: SHIPPED | OUTPATIENT
Start: 2023-05-24

## 2023-05-24 ASSESSMENT — ENCOUNTER SYMPTOMS
VOMITING: 0
NAUSEA: 0
COUGH: 0
SHORTNESS OF BREATH: 0

## 2023-05-25 LAB
MICROORGANISM SPEC CULT: NORMAL
SPECIMEN DESCRIPTION: NORMAL

## 2023-07-19 ENCOUNTER — HOSPITAL ENCOUNTER (OUTPATIENT)
Age: 63
Discharge: HOME | End: 2023-07-19
Payer: MEDICAID

## 2023-07-19 ENCOUNTER — OFFICE VISIT (OUTPATIENT)
Dept: PRIMARY CARE CLINIC | Age: 63
End: 2023-07-19
Payer: MEDICAID

## 2023-07-19 VITALS
WEIGHT: 187.2 LBS | DIASTOLIC BLOOD PRESSURE: 97 MMHG | HEIGHT: 65 IN | BODY MASS INDEX: 31.19 KG/M2 | HEART RATE: 102 BPM | SYSTOLIC BLOOD PRESSURE: 196 MMHG | OXYGEN SATURATION: 100 %

## 2023-07-19 DIAGNOSIS — E83.52 HYPERCALCEMIA: ICD-10-CM

## 2023-07-19 DIAGNOSIS — E11.42 DIABETIC POLYNEUROPATHY ASSOCIATED WITH TYPE 2 DIABETES MELLITUS (HCC): Primary | ICD-10-CM

## 2023-07-19 DIAGNOSIS — R93.89 ABNORMAL CT SCAN: ICD-10-CM

## 2023-07-19 DIAGNOSIS — E11.42 DIABETIC POLYNEUROPATHY ASSOCIATED WITH TYPE 2 DIABETES MELLITUS (HCC): ICD-10-CM

## 2023-07-19 DIAGNOSIS — E11.42 TYPE 2 DIABETES MELLITUS WITH DIABETIC POLYNEUROPATHY, WITHOUT LONG-TERM CURRENT USE OF INSULIN (HCC): ICD-10-CM

## 2023-07-19 DIAGNOSIS — L29.9 ITCHY SKIN: ICD-10-CM

## 2023-07-19 LAB
ANION GAP SERPL CALCULATED.3IONS-SCNC: 10 MMOL/L (ref 9–17)
BUN SERPL-MCNC: 26 MG/DL (ref 8–23)
CALCIUM SERPL-MCNC: 10.4 MG/DL (ref 8.6–10.4)
CHLORIDE SERPL-SCNC: 106 MMOL/L (ref 98–107)
CHOLEST SERPL-MCNC: 207 MG/DL
CHOLESTEROL/HDL RATIO: 4.1
CO2 SERPL-SCNC: 25 MMOL/L (ref 20–31)
CREAT SERPL-MCNC: 1.5 MG/DL (ref 0.5–0.9)
CREAT UR-MCNC: 78.3 MG/DL (ref 28–217)
ERYTHROCYTE [DISTWIDTH] IN BLOOD BY AUTOMATED COUNT: 13.8 % (ref 11.8–14.4)
GFR SERPL CREATININE-BSD FRML MDRD: 39 ML/MIN/1.73M2
GLUCOSE SERPL-MCNC: 181 MG/DL (ref 70–99)
HBA1C MFR BLD: 8.6 %
HCT VFR BLD AUTO: 35.1 % (ref 36.3–47.1)
HDLC SERPL-MCNC: 50 MG/DL
HGB BLD-MCNC: 11.6 G/DL (ref 11.9–15.1)
IRON SATN MFR SERPL: 34 % (ref 20–55)
IRON SERPL-MCNC: 92 UG/DL (ref 37–145)
LDLC SERPL CALC-MCNC: 121 MG/DL (ref 0–130)
MCH RBC QN AUTO: 30.4 PG (ref 25.2–33.5)
MCHC RBC AUTO-ENTMCNC: 33 G/DL (ref 28.4–34.8)
MCV RBC AUTO: 92.1 FL (ref 82.6–102.9)
MICROALBUMIN UR-MCNC: 1372 MG/L
MICROALBUMIN/CREAT UR-RTO: 1752 MCG/MG CREAT
NRBC BLD-RTO: 0 PER 100 WBC
PLATELET # BLD AUTO: 322 K/UL (ref 138–453)
PMV BLD AUTO: 10.5 FL (ref 8.1–13.5)
POTASSIUM SERPL-SCNC: 4 MMOL/L (ref 3.7–5.3)
PTH-INTACT SERPL-MCNC: 214.3 PG/ML (ref 14–72)
RBC # BLD AUTO: 3.81 M/UL (ref 3.95–5.11)
SODIUM SERPL-SCNC: 141 MMOL/L (ref 135–144)
TIBC SERPL-MCNC: 267 UG/DL (ref 250–450)
TRIGL SERPL-MCNC: 181 MG/DL
TSH SERPL DL<=0.05 MIU/L-ACNC: 0.42 UIU/ML (ref 0.3–5)
UNSATURATED IRON BINDING CAPACITY: 175 UG/DL (ref 112–347)
WBC OTHER # BLD: 8 K/UL (ref 3.5–11.3)

## 2023-07-19 PROCEDURE — 83970 ASSAY OF PARATHORMONE: CPT

## 2023-07-19 PROCEDURE — 82570 ASSAY OF URINE CREATININE: CPT

## 2023-07-19 PROCEDURE — 36415 COLL VENOUS BLD VENIPUNCTURE: CPT

## 2023-07-19 PROCEDURE — 80048 BASIC METABOLIC PNL TOTAL CA: CPT

## 2023-07-19 PROCEDURE — 4004F PT TOBACCO SCREEN RCVD TLK: CPT | Performed by: NURSE PRACTITIONER

## 2023-07-19 PROCEDURE — 83540 ASSAY OF IRON: CPT

## 2023-07-19 PROCEDURE — 84443 ASSAY THYROID STIM HORMONE: CPT

## 2023-07-19 PROCEDURE — G8427 DOCREV CUR MEDS BY ELIG CLIN: HCPCS | Performed by: NURSE PRACTITIONER

## 2023-07-19 PROCEDURE — 3017F COLORECTAL CA SCREEN DOC REV: CPT | Performed by: NURSE PRACTITIONER

## 2023-07-19 PROCEDURE — 99214 OFFICE O/P EST MOD 30 MIN: CPT | Performed by: NURSE PRACTITIONER

## 2023-07-19 PROCEDURE — 80061 LIPID PANEL: CPT

## 2023-07-19 PROCEDURE — 2022F DILAT RTA XM EVC RTNOPTHY: CPT | Performed by: NURSE PRACTITIONER

## 2023-07-19 PROCEDURE — 3074F SYST BP LT 130 MM HG: CPT | Performed by: NURSE PRACTITIONER

## 2023-07-19 PROCEDURE — 3052F HG A1C>EQUAL 8.0%<EQUAL 9.0%: CPT | Performed by: NURSE PRACTITIONER

## 2023-07-19 PROCEDURE — 3078F DIAST BP <80 MM HG: CPT | Performed by: NURSE PRACTITIONER

## 2023-07-19 PROCEDURE — 82306 VITAMIN D 25 HYDROXY: CPT

## 2023-07-19 PROCEDURE — 83550 IRON BINDING TEST: CPT

## 2023-07-19 PROCEDURE — 82043 UR ALBUMIN QUANTITATIVE: CPT

## 2023-07-19 PROCEDURE — 83037 HB GLYCOSYLATED A1C HOME DEV: CPT | Performed by: NURSE PRACTITIONER

## 2023-07-19 PROCEDURE — 85027 COMPLETE CBC AUTOMATED: CPT

## 2023-07-19 PROCEDURE — G8417 CALC BMI ABV UP PARAM F/U: HCPCS | Performed by: NURSE PRACTITIONER

## 2023-07-19 RX ORDER — FLUCONAZOLE 150 MG/1
150 TABLET ORAL WEEKLY
Qty: 3 TABLET | Refills: 0 | Status: SHIPPED | OUTPATIENT
Start: 2023-07-19

## 2023-07-19 RX ORDER — NYSTATIN 100000 U/G
CREAM TOPICAL
Qty: 1 EACH | Refills: 2 | Status: SHIPPED | OUTPATIENT
Start: 2023-07-19

## 2023-07-19 ASSESSMENT — ENCOUNTER SYMPTOMS
SHORTNESS OF BREATH: 0
COUGH: 0

## 2023-07-19 NOTE — PROGRESS NOTES
Shilpi Zhou (:  1960) is a 59 y.o. female,Established patient, here for evaluation of the following chief complaint(s): Other (small, itchy bumps around both ears, under breasts, on her buttocks, and all over her arms, symptoms started 2-3 weeks ago, itching is getting worse )      ASSESSMENT/PLAN:  1. Diabetic polyneuropathy associated with type 2 diabetes mellitus (HCC)  -     POCT glycosylated hemoglobin (Hb A1C)  -     Microalbumin, Ur; Future  -     Alcohol prep pad  -     Continuous Blood Gluc  (FREESTYLE SONAM 2 READER) TANIYA; 1 each by Does not apply route once for 1 dose, Disp-1 each, R-0Normal  -     Continuous Blood Gluc Sensor (FREESTYLE SONAM 2 SENSOR) MISC; 1 each by Does not apply route every 14 days, Disp-2 each, R-5Normal  2. Itchy skin  -     Iron and TIBC; Future  -     nystatin (MYCOSTATIN) 234762 UNIT/GM cream; Apply topically 2 times daily. , Disp-1 each, R-2, Normal  -     fluconazole (DIFLUCAN) 150 MG tablet; Take 1 tablet by mouth once a week, Disp-3 tablet, R-0Normal      No follow-ups on file. Subjective   SUBJECTIVE/OBJECTIVE:  HPI  A1c is 8.6- Pt has been asked to bring all her meds with her to next visit. Sindy Mesa is here c/o full body rash. She reports there are little bumps everywhere, some bumps are felt, lots of scratch marks. No urticaria. Some yeast like appearance beneath bilateral breasts. Will not show rash in groin. Sindy Mesa left office before repeat bp was addressed, Sindy Mesa has been asked to rto in 1 month for med check to bring all of her meds with her. Review of Systems   Constitutional:  Negative for chills and fever. Respiratory:  Negative for cough and shortness of breath. Cardiovascular:  Negative for chest pain, palpitations and leg swelling. Objective   Vitals:    23 1339   BP: (!) 196/97   Pulse:    SpO2:      Physical Exam  Constitutional:       Appearance: She is well-developed.    HENT:      Right Ear:

## 2023-07-20 DIAGNOSIS — E34.9 ELEVATED PARATHYROID HORMONE: ICD-10-CM

## 2023-07-20 DIAGNOSIS — R79.89 ELEVATED PTHRP LEVEL: Primary | ICD-10-CM

## 2023-07-20 LAB — 25(OH)D3 SERPL-MCNC: 11.2 NG/ML

## 2023-08-24 DIAGNOSIS — R80.1 PERSISTENT PROTEINURIA: ICD-10-CM

## 2023-08-24 DIAGNOSIS — T38.3X5A ADVERSE EFFECT OF SODIUM-GLUCOSE COTRANSPORTER 2 (SGLT2) INHIBITOR: Primary | ICD-10-CM

## 2023-09-13 ENCOUNTER — OFFICE VISIT (OUTPATIENT)
Dept: PRIMARY CARE CLINIC | Age: 63
End: 2023-09-13
Payer: MEDICAID

## 2023-09-13 VITALS
HEART RATE: 83 BPM | DIASTOLIC BLOOD PRESSURE: 87 MMHG | OXYGEN SATURATION: 99 % | BODY MASS INDEX: 31.92 KG/M2 | WEIGHT: 191.8 LBS | SYSTOLIC BLOOD PRESSURE: 165 MMHG

## 2023-09-13 DIAGNOSIS — F32.1 EPISODE OF MODERATE MAJOR DEPRESSION (HCC): ICD-10-CM

## 2023-09-13 DIAGNOSIS — E11.42 TYPE 2 DIABETES MELLITUS WITH DIABETIC POLYNEUROPATHY, WITHOUT LONG-TERM CURRENT USE OF INSULIN (HCC): ICD-10-CM

## 2023-09-13 DIAGNOSIS — G44.209 ACUTE NON INTRACTABLE TENSION-TYPE HEADACHE: ICD-10-CM

## 2023-09-13 DIAGNOSIS — I10 ESSENTIAL HYPERTENSION: ICD-10-CM

## 2023-09-13 DIAGNOSIS — G47.9 SLEEP DISTURBANCE: ICD-10-CM

## 2023-09-13 DIAGNOSIS — E34.9 ELEVATED PARATHYROID HORMONE: ICD-10-CM

## 2023-09-13 DIAGNOSIS — R79.89 LOW VITAMIN D LEVEL: ICD-10-CM

## 2023-09-13 DIAGNOSIS — E78.00 PURE HYPERCHOLESTEROLEMIA: ICD-10-CM

## 2023-09-13 DIAGNOSIS — Z23 ENCOUNTER FOR IMMUNIZATION: ICD-10-CM

## 2023-09-13 DIAGNOSIS — E11.42 DIABETIC POLYNEUROPATHY ASSOCIATED WITH TYPE 2 DIABETES MELLITUS (HCC): ICD-10-CM

## 2023-09-13 DIAGNOSIS — J30.9 ALLERGIC RHINITIS, UNSPECIFIED SEASONALITY, UNSPECIFIED TRIGGER: Primary | ICD-10-CM

## 2023-09-13 PROCEDURE — G8427 DOCREV CUR MEDS BY ELIG CLIN: HCPCS | Performed by: NURSE PRACTITIONER

## 2023-09-13 PROCEDURE — 90471 IMMUNIZATION ADMIN: CPT | Performed by: NURSE PRACTITIONER

## 2023-09-13 PROCEDURE — 2022F DILAT RTA XM EVC RTNOPTHY: CPT | Performed by: NURSE PRACTITIONER

## 2023-09-13 PROCEDURE — G8417 CALC BMI ABV UP PARAM F/U: HCPCS | Performed by: NURSE PRACTITIONER

## 2023-09-13 PROCEDURE — 4004F PT TOBACCO SCREEN RCVD TLK: CPT | Performed by: NURSE PRACTITIONER

## 2023-09-13 PROCEDURE — 90472 IMMUNIZATION ADMIN EACH ADD: CPT | Performed by: NURSE PRACTITIONER

## 2023-09-13 PROCEDURE — 90674 CCIIV4 VAC NO PRSV 0.5 ML IM: CPT | Performed by: NURSE PRACTITIONER

## 2023-09-13 PROCEDURE — 3052F HG A1C>EQUAL 8.0%<EQUAL 9.0%: CPT | Performed by: NURSE PRACTITIONER

## 2023-09-13 PROCEDURE — 3078F DIAST BP <80 MM HG: CPT | Performed by: NURSE PRACTITIONER

## 2023-09-13 PROCEDURE — 90677 PCV20 VACCINE IM: CPT | Performed by: NURSE PRACTITIONER

## 2023-09-13 PROCEDURE — 99214 OFFICE O/P EST MOD 30 MIN: CPT | Performed by: NURSE PRACTITIONER

## 2023-09-13 PROCEDURE — 3017F COLORECTAL CA SCREEN DOC REV: CPT | Performed by: NURSE PRACTITIONER

## 2023-09-13 PROCEDURE — 3077F SYST BP >= 140 MM HG: CPT | Performed by: NURSE PRACTITIONER

## 2023-09-13 RX ORDER — LORATADINE 10 MG/1
10 TABLET ORAL DAILY
Qty: 10 TABLET | Refills: 0 | Status: SHIPPED | OUTPATIENT
Start: 2023-09-13 | End: 2023-09-23

## 2023-09-13 RX ORDER — HYDROCHLOROTHIAZIDE 12.5 MG/1
12.5 CAPSULE, GELATIN COATED ORAL EVERY MORNING
Qty: 30 CAPSULE | Refills: 5 | Status: SHIPPED | OUTPATIENT
Start: 2023-10-06

## 2023-09-13 RX ORDER — GABAPENTIN 600 MG/1
TABLET ORAL
Qty: 90 TABLET | Refills: 2 | Status: SHIPPED | OUTPATIENT
Start: 2023-10-06 | End: 2024-01-03

## 2023-09-13 RX ORDER — ERGOCALCIFEROL 1.25 MG/1
50000 CAPSULE ORAL WEEKLY
Qty: 12 CAPSULE | Refills: 1 | Status: SHIPPED | OUTPATIENT
Start: 2023-09-13

## 2023-09-13 RX ORDER — ATENOLOL 50 MG/1
TABLET ORAL
Qty: 30 TABLET | Refills: 5 | Status: SHIPPED | OUTPATIENT
Start: 2023-10-06

## 2023-09-13 RX ORDER — TOPIRAMATE 25 MG/1
25 TABLET ORAL NIGHTLY
Qty: 30 TABLET | Refills: 5 | Status: SHIPPED | OUTPATIENT
Start: 2023-09-13

## 2023-09-13 RX ORDER — AMLODIPINE BESYLATE 10 MG/1
10 TABLET ORAL DAILY
Qty: 30 TABLET | Refills: 5 | Status: SHIPPED | OUTPATIENT
Start: 2023-10-06

## 2023-09-13 RX ORDER — LANOLIN ALCOHOL/MO/W.PET/CERES
3 CREAM (GRAM) TOPICAL NIGHTLY
Qty: 30 TABLET | Refills: 5 | Status: SHIPPED | OUTPATIENT
Start: 2023-10-06

## 2023-09-13 RX ORDER — PEN NEEDLE, DIABETIC 31 G X1/4"
1 NEEDLE, DISPOSABLE MISCELLANEOUS DAILY
Qty: 100 EACH | Refills: 3 | Status: SHIPPED | OUTPATIENT
Start: 2023-10-06

## 2023-09-13 RX ORDER — INSULIN GLARGINE 100 [IU]/ML
20 INJECTION, SOLUTION SUBCUTANEOUS NIGHTLY
Qty: 5 ADJUSTABLE DOSE PRE-FILLED PEN SYRINGE | Refills: 2 | Status: SHIPPED | OUTPATIENT
Start: 2023-10-06

## 2023-09-13 RX ORDER — BUPROPION HYDROCHLORIDE 300 MG/1
300 TABLET ORAL EVERY MORNING
Qty: 30 TABLET | Refills: 5 | Status: SHIPPED | OUTPATIENT
Start: 2023-10-06

## 2023-09-13 RX ORDER — LISINOPRIL 30 MG/1
TABLET ORAL
Qty: 30 TABLET | Refills: 5 | Status: SHIPPED | OUTPATIENT
Start: 2023-10-06

## 2023-09-13 RX ORDER — PRAVASTATIN SODIUM 40 MG
TABLET ORAL
Qty: 30 TABLET | Refills: 5 | Status: SHIPPED | OUTPATIENT
Start: 2023-10-06

## 2023-09-13 RX ORDER — DIPHENHYDRAMINE HCL 25 MG
CAPSULE ORAL
Qty: 60 CAPSULE | Refills: 1 | Status: SHIPPED | OUTPATIENT
Start: 2023-10-06

## 2023-09-13 RX ORDER — ASPIRIN 81 MG/1
TABLET ORAL
Qty: 30 TABLET | Refills: 5 | Status: SHIPPED | OUTPATIENT
Start: 2023-10-06

## 2023-09-13 RX ORDER — GLIPIZIDE 10 MG/1
TABLET ORAL
Qty: 60 TABLET | Refills: 5 | Status: SHIPPED | OUTPATIENT
Start: 2023-10-06

## 2023-09-13 RX ORDER — TRAZODONE HYDROCHLORIDE 50 MG/1
50 TABLET ORAL NIGHTLY
Qty: 30 TABLET | Refills: 5 | Status: SHIPPED | OUTPATIENT
Start: 2023-10-06

## 2023-09-13 RX ORDER — ALOGLIPTIN 12.5 MG/1
TABLET, FILM COATED ORAL
Qty: 60 TABLET | Refills: 5 | Status: SHIPPED | OUTPATIENT
Start: 2023-10-06

## 2023-09-13 RX ORDER — ATENOLOL 50 MG/1
TABLET ORAL
Qty: 15 TABLET | Refills: 5 | Status: SHIPPED | OUTPATIENT
Start: 2023-10-06 | End: 2023-09-13 | Stop reason: SDUPTHER

## 2023-09-13 RX ORDER — KETOTIFEN FUMARATE 0.35 MG/ML
1 SOLUTION/ DROPS OPHTHALMIC 2 TIMES DAILY
Qty: 1 ML | Refills: 0 | Status: SHIPPED | OUTPATIENT
Start: 2023-09-13 | End: 2023-09-23

## 2023-09-13 SDOH — ECONOMIC STABILITY: FOOD INSECURITY: WITHIN THE PAST 12 MONTHS, YOU WORRIED THAT YOUR FOOD WOULD RUN OUT BEFORE YOU GOT MONEY TO BUY MORE.: NEVER TRUE

## 2023-09-13 SDOH — ECONOMIC STABILITY: FOOD INSECURITY: WITHIN THE PAST 12 MONTHS, THE FOOD YOU BOUGHT JUST DIDN'T LAST AND YOU DIDN'T HAVE MONEY TO GET MORE.: NEVER TRUE

## 2023-09-13 SDOH — ECONOMIC STABILITY: INCOME INSECURITY: HOW HARD IS IT FOR YOU TO PAY FOR THE VERY BASICS LIKE FOOD, HOUSING, MEDICAL CARE, AND HEATING?: NOT HARD AT ALL

## 2023-09-13 SDOH — ECONOMIC STABILITY: HOUSING INSECURITY
IN THE LAST 12 MONTHS, WAS THERE A TIME WHEN YOU DID NOT HAVE A STEADY PLACE TO SLEEP OR SLEPT IN A SHELTER (INCLUDING NOW)?: NO

## 2023-09-13 ASSESSMENT — ENCOUNTER SYMPTOMS
COUGH: 0
SHORTNESS OF BREATH: 0

## 2023-09-13 NOTE — PROGRESS NOTES
Normal rate and regular rhythm. Heart sounds: Normal heart sounds, S1 normal and S2 normal.   Pulmonary:      Effort: Pulmonary effort is normal. No respiratory distress. Breath sounds: Normal breath sounds. Musculoskeletal:         General: No deformity. Normal range of motion. Cervical back: Full passive range of motion without pain and normal range of motion. Skin:     General: Skin is warm and dry. Neurological:      Mental Status: She is alert and oriented to person, place, and time. An electronic signature was used to authenticate this note.     --Ganesh Perez, APRN - CNP

## 2023-09-18 ENCOUNTER — TELEPHONE (OUTPATIENT)
Dept: PRIMARY CARE CLINIC | Age: 63
End: 2023-09-18

## 2023-09-18 NOTE — TELEPHONE ENCOUNTER
Pt called in stating her eyes are worse than they were before using the eye drops. Could you call her in some different drops. She states they are more irritated and draining more after using drops.

## 2023-09-19 ENCOUNTER — TELEPHONE (OUTPATIENT)
Dept: PRIMARY CARE CLINIC | Age: 63
End: 2023-09-19

## 2023-09-19 RX ORDER — PEN NEEDLE, DIABETIC 31 GX5/16"
NEEDLE, DISPOSABLE MISCELLANEOUS
Qty: 100 EACH | Refills: 5 | Status: SHIPPED | OUTPATIENT
Start: 2023-09-19

## 2023-09-19 RX ORDER — SOFT LENS RINSE,STORE SOLUTION
1 AEROSOL, SPRAY (ML) MISCELLANEOUS 2 TIMES DAILY
Qty: 1 EACH | Refills: 0 | Status: SHIPPED | OUTPATIENT
Start: 2023-09-19

## 2023-09-19 NOTE — TELEPHONE ENCOUNTER
Patient called in stating that she had to drive across town to the pharmacy because someone called stating that her medication was ready for . Patient wants to know what is going on. The other MA was on the phone with the pharmacy about patient already and was told that Tosha Otto will have to prescribed a different medication because they don't have it. Patient stated she wants the [facility] on [address]. Patient stated Camryn Toledo I want this done today, because I'm sick of ya'll\" and hung up.

## 2023-09-19 NOTE — TELEPHONE ENCOUNTER
PT STATES SEND TO [address] I ADDED IT TO CHART.  LOOKS LIKE EYE DROPS WAS SENT TO [facility] TOO SHE WANTS THAT AT [facility] AS WELL

## 2023-09-19 NOTE — TELEPHONE ENCOUNTER
This was done today, she didn't tell the staff what pharmacy to send it to so I sent it to the primary listed in the chart. Please call [facility] and cancel and send to [email or web address]. Remind patient I have 24-48 hours to address med changes and I wasn't here yesterday.

## 2023-09-19 NOTE — TELEPHONE ENCOUNTER
Spoke to [employer] down at [employer] - he is sending over script to [facility].

## 2023-09-19 NOTE — TELEPHONE ENCOUNTER
Is this the correct pharmacy?   I am hoping to avoid nasty phone calls to you guys if it is not sent where she wants it

## 2023-10-23 ENCOUNTER — HOSPITAL ENCOUNTER (EMERGENCY)
Age: 63
Discharge: HOME | End: 2023-10-23
Attending: EMERGENCY MEDICINE
Payer: MEDICAID

## 2023-10-23 VITALS
BODY MASS INDEX: 32.61 KG/M2 | TEMPERATURE: 98.4 F | OXYGEN SATURATION: 100 % | SYSTOLIC BLOOD PRESSURE: 176 MMHG | WEIGHT: 195.99 LBS | RESPIRATION RATE: 18 BRPM | DIASTOLIC BLOOD PRESSURE: 95 MMHG | HEART RATE: 87 BPM

## 2023-10-23 DIAGNOSIS — H00.024 HORDEOLUM INTERNUM OF LEFT UPPER EYELID: Primary | ICD-10-CM

## 2023-10-23 PROCEDURE — 99282 EMERGENCY DEPT VISIT SF MDM: CPT

## 2023-10-23 ASSESSMENT — PAIN DESCRIPTION - LOCATION: LOCATION: EYE

## 2023-10-23 ASSESSMENT — PAIN SCALES - GENERAL: PAINLEVEL_OUTOF10: 6

## 2023-10-23 ASSESSMENT — PAIN DESCRIPTION - ORIENTATION: ORIENTATION: LEFT

## 2023-10-23 ASSESSMENT — PAIN - FUNCTIONAL ASSESSMENT: PAIN_FUNCTIONAL_ASSESSMENT: 0-10

## 2023-10-23 NOTE — DISCHARGE INSTRUCTIONS
You are seen in emergency department for eye drainage. You likely have a stye on the left side. You can use warm compresses at home. Please follow-up with your primary care provider next several days. Please return the emergency department for any new or worsening symptoms.

## 2023-10-23 NOTE — ED NOTES
Pt to ED for left eyelid swelling. Pt states she has a stye. Pt states the swelling was going down after taking eyedrops but got worse after she ran out of the eyedrops. Pt states she has blurry vision but is getting new glasses next week. Rates pain 6/10. Patient alert and oriented x4, talking in complete sentences. Respirations even and unlabored.  Call light in reach, all needs met at this time     Bri Bernard  10/23/23 7030

## 2023-10-27 ASSESSMENT — ENCOUNTER SYMPTOMS
WHEEZING: 0
NAUSEA: 0
VOMITING: 0
EYE ITCHING: 1
DIARRHEA: 0
SORE THROAT: 0
EYE DISCHARGE: 1
SINUS PRESSURE: 0
SHORTNESS OF BREATH: 0
BACK PAIN: 0
FACIAL SWELLING: 1
CONSTIPATION: 0
ABDOMINAL PAIN: 0

## 2023-12-15 DIAGNOSIS — E11.42 DIABETIC POLYNEUROPATHY ASSOCIATED WITH TYPE 2 DIABETES MELLITUS (HCC): ICD-10-CM

## 2023-12-15 DIAGNOSIS — I10 ESSENTIAL HYPERTENSION: ICD-10-CM

## 2023-12-15 DIAGNOSIS — E11.42 TYPE 2 DIABETES MELLITUS WITH DIABETIC POLYNEUROPATHY, WITHOUT LONG-TERM CURRENT USE OF INSULIN (HCC): ICD-10-CM

## 2023-12-15 DIAGNOSIS — E55.9 VITAMIN D DEFICIENCY: Primary | ICD-10-CM

## 2023-12-15 RX ORDER — LISINOPRIL 30 MG/1
TABLET ORAL
Qty: 30 TABLET | Refills: 0 | Status: SHIPPED | OUTPATIENT
Start: 2023-12-15

## 2023-12-15 RX ORDER — ATENOLOL 50 MG/1
TABLET ORAL
Qty: 30 TABLET | Refills: 0 | Status: SHIPPED | OUTPATIENT
Start: 2023-12-15

## 2023-12-15 RX ORDER — ALOGLIPTIN 12.5 MG/1
TABLET, FILM COATED ORAL
Qty: 60 TABLET | Refills: 0 | Status: SHIPPED | OUTPATIENT
Start: 2023-12-15

## 2023-12-15 RX ORDER — ERGOCALCIFEROL 1.25 MG/1
50000 CAPSULE ORAL WEEKLY
Qty: 4 CAPSULE | Refills: 0 | Status: SHIPPED | OUTPATIENT
Start: 2023-12-15

## 2023-12-15 RX ORDER — GLIPIZIDE 10 MG/1
TABLET ORAL
Qty: 60 TABLET | Refills: 0 | Status: SHIPPED | OUTPATIENT
Start: 2023-12-15

## 2023-12-15 RX ORDER — INSULIN GLARGINE 100 [IU]/ML
20 INJECTION, SOLUTION SUBCUTANEOUS NIGHTLY
Qty: 5 ADJUSTABLE DOSE PRE-FILLED PEN SYRINGE | Refills: 0 | Status: SHIPPED | OUTPATIENT
Start: 2023-12-15

## 2023-12-15 RX ORDER — ASPIRIN 81 MG/1
TABLET ORAL
Qty: 30 TABLET | Refills: 0 | Status: SHIPPED | OUTPATIENT
Start: 2023-12-15

## 2023-12-15 RX ORDER — HYDROCHLOROTHIAZIDE 12.5 MG/1
12.5 CAPSULE, GELATIN COATED ORAL EVERY MORNING
Qty: 30 CAPSULE | Refills: 0 | Status: SHIPPED | OUTPATIENT
Start: 2023-12-15

## 2023-12-15 RX ORDER — AMLODIPINE BESYLATE 10 MG/1
10 TABLET ORAL DAILY
Qty: 30 TABLET | Refills: 0 | Status: SHIPPED | OUTPATIENT
Start: 2023-12-15

## 2023-12-15 RX ORDER — DIPHENHYDRAMINE HCL 25 MG
CAPSULE ORAL
Qty: 60 CAPSULE | Refills: 0 | Status: SHIPPED | OUTPATIENT
Start: 2023-12-15

## 2023-12-15 NOTE — PROGRESS NOTES
Patient called after hours for medication refills. She states she has been out of town and f all of her medications a few days ago.

## 2024-01-14 NOTE — PROGRESS NOTES
[address] [facility].    PROGRESS NOTE             2/1/2021    9:05 AM    Name:   Isaac Greenfield  MRN:     [id number]     Acct:      [de-identified]   Room:   2054/2054-01  IP Day:  2  Admit Date:  1/30/2021  1:07 PM    PCP:  CONRADO Helms CNP  Code Status:  Full Code    Subjective:     C/C:   Chief Complaint   Patient presents with    Abscess     Rt buttock abscess    Leg Pain     Rt lge pain from hip down     Interval History Status: improved. Patient seen and examined at the bedside. No acute events overnight. Patient was post incision and drainage day 1, tolerating well. Patient afebrile, vitals stable. Patient receiving Cipro and Flagyl p.o. Patient's creatinine increased to 1.22 yesterday to 1.39. Labs discussed with patient and she is agreeable to restarting IV fluids, however is a little bit fearful of being poked. Patient is also complaining of diffuse itchiness. On examination of back, there is dry skin, skin excoriation from scratching and multiple small bumps over back with areas of scarring. Patient advised to use topical emollients and nonsedating antihistamines if necessary. Patient is also complaining of right leg pain from groin to knee joint. Patient will work with physical therapy today. Discharge likely today with [facility]. Brief History:   Patient presented to the ED with leg pain and perirectal abscess. CT pelvis revealed for by 1.5 cm right perirectal abscess. Underwent surgical I&D yesterday and tolerating well. Culture from rectal abscess shows no bacteria, few neutrophils. Patient receiving oral Cipro and Flagyl. Review of Systems:     Review of Systems   Constitutional: Negative for activity change, appetite change, chills and fever. HENT: Negative for congestion. Respiratory: Negative for cough, chest tightness, shortness of breath and wheezing.     Cardiovascular: Negative for chest You may take over the counter Tylenol (Acetaminophen) and/or Motrin (Ibuprofen) as needed, as directed on packaging.   Be sure to get plenty of rest, and drinking fluids to remain hydrated.     Please follow up with your primary provider in the next several days. Should you have any worsening of symptoms, or lack of improvement please be re-evaluated. If needed for significant concerns, consider 911 or ER evaluation.       LOCAL DENTIST if you need one:   (It is your responsibility to determine if they are accepting new patient and if they accept your insurance, this information is provided for reference only)  [facility]  Doctor: Grey Walters DMD  Location: [address] [place] [address]. [address]  Phone number: [phone]    Dr. Kj Denis & Dr. Alena Thakur   [facility] dentists which may be able to accept you as a new patient.   Please reach out to their office at the information below-  [address] [place] [address] [phone]    Dr. Liz Morris - [facility]  Local dentists which may be able to accept you as a new patient.   Please reach out to their office at the information below-  [address] [facility] [address] [phone]       cigarettes. She has a 19.50 pack-year smoking history. She has never used smokeless tobacco. She reports previous drug use. Drug: Marijuana. She reports that she does not drink alcohol. Family History: History reviewed. No pertinent family history. Vitals:  /78   Pulse 73   Temp 98.1 °F (36.7 °C) (Oral)   Resp 16   Ht 5' 5\" (1.651 m)   Wt 192 lb 7.4 oz (87.3 kg)   SpO2 99%   BMI 32.03 kg/m²   Temp (24hrs), Av.1 °F (35.6 °C), Min:91.6 °F (33.1 °C), Max:98.1 °F (36.7 °C)    Recent Labs     21  1259 21  1612 21  0623   POCGLU 156* 262* 353* 85       I/O(24Hr): Intake/Output Summary (Last 24 hours) at 2021 0905  Last data filed at 2021 4103  Gross per 24 hour   Intake 1960 ml   Output 900 ml   Net 1060 ml       Labs:    [unfilled]    Lab Results   Component Value Date/Time    SPECIAL NOT REPORTED 2021 12:34 PM     Lab Results   Component Value Date/Time    CULTURE PENDING 2021 12:34 PM       [facility]    Radiology:    Xr Knee Right (3 Views)    Result Date: 2021  EXAMINATION: THREE XRAY VIEWS OF THE RIGHT KNEE 2021 10:35 am COMPARISON: None. HISTORY: ORDERING SYSTEM PROVIDED HISTORY: pain TECHNOLOGIST PROVIDED HISTORY: pain Reason for Exam: right knee pain Acuity: Unknown Type of Exam: Unknown FINDINGS: There is tricompartmental narrowing of the knee with associated spurring of the condyles. No fractures or dislocations are seen. Soft tissues appear within normal limits. There is chondrocalcinosis. Vascular calcifications are seen compatible with atherosclerotic disease. Mild osteoarthritis of the knee. No acute bony abnormalities are noted     Ct Pelvis W Contrast Additional Contrast? None    Addendum Date: 2021    ADDENDUM: Both hip joints are intact. Mild degenerative changes with subcortical cysts seen in the femoral heads more notably on the left. No hip joint effusions are identified.   No fractures, osteolytic or blastic changes are seen. Result Date: 2021  EXAMINATION: CT OF THE PELVIS WITH CONTRAST 2021 12:36 pm TECHNIQUE: CT of the pelvis was performed with the administration of intravenous contrast. Multiplanar reformatted images are provided for review. Dose modulation, iterative reconstruction, and/or weight based adjustment of the mA/kV was utilized to reduce the radiation dose to as low as reasonably achievable. COMPARISON: None. HISTORY: ORDERING SYSTEM PROVIDED HISTORY: r/o abscess TECHNOLOGIST PROVIDED HISTORY: r/o abscess Decision Support Exception->Emergency Medical Condition (MA) Reason for Exam: boil; r/o abscess Acuity: Unknown Type of Exam: Unknown Additional signs and symptoms: Pt c/o boil on her buttocks and right leg pain, unable to walk Relevant Medical/Surgical History: Diabetic; hx  FINDINGS: Pelvis: There is a 4 cm by 1.5 cm inflammatory soft tissue lesion long the posterior aspect of the right perirectal region with suggestion of small central fluid collection. No intrapelvic mass is identified. Uterus is anteverted and tilted to the right. Bladder and rectum are otherwise intact. Peritoneum/Retroperitoneum: No free fluid. No lymphadenopathy. No evidence of pneumoperitoneum. Bones/Soft Tissues: Degenerative changes seen in the visualized spine . No acute bony abnormalities. Vascular calcifications are seen compatible with atherosclerotic disease. Incidentally noted are gallstones. Small fat containing ventral wall hernia. 4 cm x 1.5 cm right perirectal abscess. Cholelithiasis         Physical Examination:        Physical Exam  Constitutional:       General: She is not in acute distress. Appearance: Normal appearance. She is not ill-appearing. HENT:      Head: Normocephalic. Mouth/Throat:      Mouth: Mucous membranes are moist.   Cardiovascular:      Rate and Rhythm: Normal rate and regular rhythm. Pulses: Normal pulses.       Heart sounds: Normal heart sounds. No murmur. No gallop. Pulmonary:      Effort: Pulmonary effort is normal. No respiratory distress. Breath sounds: Normal breath sounds. No wheezing. Chest:      Chest wall: No tenderness. Abdominal:      General: Bowel sounds are normal. There is no distension. Palpations: Abdomen is soft. There is no mass. Tenderness: There is no abdominal tenderness. Genitourinary:     Comments: Postop I&D of perirectal abscess, wound appropriately dressed  Musculoskeletal:         General: No swelling, tenderness or deformity. Skin:     Findings: Rash present. Neurological:      General: No focal deficit present. Mental Status: She is alert and oriented to person, place, and time. Psychiatric:         Mood and Affect: Mood normal.         Behavior: Behavior normal.         Thought Content: Thought content normal.           Assessment:        Primary Problem  Perirectal abscess    Active Hospital Problems    Diagnosis Date Noted    Chronic kidney disease, stage 3a [N18.31] 01/31/2021    Diabetic nephropathy ([facility] [address] [E11.21] 01/31/2021    Right knee pain [M25.561] 01/30/2021    Perirectal abscess [K61.1] 01/30/2021    Perirectal skin irritation [K62.89] 01/30/2021    Depression [F32.9] 01/30/2021    Hyperlipidemia [E78.5] 01/30/2021    Elevated serum creatinine [R79.89] 01/30/2021    Decreased GFR [R94.4] 01/30/2021    Essential hypertension [I10] 11/10/2016    Type 2 diabetes mellitus with diabetic polyneuropathy, without long-term current use of insulin ([facility] [address] [E11.42] 11/10/2016    Normocytic anemia [D64.9]        Plan:        Perirectal abscess  -CT pelvis w/ contrast: \"4 cm x 1.5 cm right perirectal abscess\"  -Post I&D day 1  -PT/OT  -Tylenol PO/suppository PRN orders placed  -Wound cultures show few neutrophils, no bacteria  -p.o.  Cipro 500 mg twice daily, Flagyl 500 mg 3 times daily     Elevated serum creatinine, decreased GFR  -Creatinine is 1.30 on admission; it was elevated at 1.23 in July 2020 and the last value within normal reference range in our EMR was 01/25/2018  -Creatinine 1.39 today, normal saline 75 mL started  -GFR: 51  -Patient will need follow-up with PCP as outpatient for microalbuminuria testing to investigate for diabetic nephropathy     Normocytic anemia,   -10.9 on admission, appears at baseline  -Ferritin 125, iron 28 (l), TIBC 179 (l), iron saturation 16 (l)     Right knee pain  -XR: \"Mild osteoarthritis of the knee. No acute bony abnormalities are noted\"  -Tylenol PRN  -PT/OT     Type 2 diabetes mellitus with polyneuropathy, without long-term current use of insulin  -Low-dose sliding scale  -Alogliptin 25 mg daily  -Glipizide 10 mg twice daily before meals  -Hypoglycemia protocol  -Carb control diet  -Hemoglobin A1c pending     Essential hypertension  -Atenolol 25 mg p.o. daily  -Lisinopril 30 mg p.o. daily     Hyperlipidemia  -Pravastatin 40 mg p.o. daily     Depression  -Bupropion 150 mg p.o. daily    Prophylaxis: Lovenox 40 mg subcutaneously daily resumed    Manny Quiles MD  2/1/2021  9:05 AM     Attestation and add on       I have discussed the care of Mehdi New , including pertinent history and exam findings,      2/1/21    with the resident. I have seen and examined the patient and the key elements of all parts of the encounter have been performed by me . I agree with the assessment, plan and orders as documented by the resident. Principal Problem:    Perirectal abscess  Active Problems:    Normocytic anemia    Essential hypertension    Type 2 diabetes mellitus with diabetic polyneuropathy, without long-term current use of insulin (HCC)    Perirectal skin irritation    Right knee pain    Depression    Hyperlipidemia    Elevated serum creatinine    Decreased GFR    Chronic kidney disease, stage 3a    Diabetic nephropathy ([address]  Resolved Problems:    * No resolved hospital problems.  *       Discharge planning i    Patient will be discharged today with Cipro and Flagyl  Discussed with Dr. Dennis Howell ---          clinical course has improved,           Patient is stable      [x] yes     [] NO          Plan discharge [] snf   [] Home with vns [x] home    [] ----        ---Attending Physician Statement  I have personally seen the patient and participated in discharge planning and evaluation . I have reviewed the key elements of all parts of the discharge summary . I agree with the information and plans as documented by the resident. Time spent on discharge planning ;          [] less than 30 minutes . [x]   more  than 30 minutes . Electronically signed by Weston Terry MD on 2/1/2021 .  - ;        Medications: Allergies: Allergies   Allergen Reactions    Ibuprofen Itching       Current Meds:   Scheduled Meds:    enoxaparin  40 mg Subcutaneous Daily    buPROPion  150 mg Oral QAM    sodium chloride flush  10 mL Intravenous 2 times per day    alogliptin  25 mg Oral Daily    gabapentin  600 mg Oral TID    glipiZIDE  10 mg Oral BID AC    insulin lispro  0-12 Units Subcutaneous TID WC    famotidine  20 mg Oral BID    metroNIDAZOLE  500 mg Oral 3 times per day    ciprofloxacin  500 mg Oral 2 times per day    insulin lispro  0-6 Units Subcutaneous Nightly    atenolol  25 mg Oral Daily    lisinopril  30 mg Oral Daily    pravastatin  40 mg Oral Daily     Continuous Infusions:    dextrose       PRN Meds: Hydrocerin, sodium chloride flush, polyethylene glycol, potassium chloride **OR** potassium alternative oral replacement **OR** potassium chloride, glucose, dextrose, glucagon (rDNA), dextrose, potassium chloride, magnesium sulfate, acetaminophen, senna, bisacodyl, oxyCODONE-acetaminophen, sodium chloride flush, promethazine **OR** [DISCONTINUED] ondansetron        [facility] [address]    Phone [phone]   Fax: [phone]  Answering Service: [phone]

## 2024-01-18 DIAGNOSIS — I10 ESSENTIAL HYPERTENSION: ICD-10-CM

## 2024-01-18 NOTE — TELEPHONE ENCOUNTER
Shauna Alexandra is calling to request a refill on the following medication(s):    Medication Request:  Requested Prescriptions     Pending Prescriptions Disp Refills    amLODIPine (NORVASC) 10 MG tablet [Pharmacy Med Name: AMLODIPINE BESYLATE 10MG TABS] 30 tablet 0     Sig: TAKE ONE TABLET BY MOUTH ONCE A DAY    diphenhydrAMINE (BANOPHEN) 25 MG capsule [Pharmacy Med Name: BANOPHEN 25MG CAPS] 60 capsule 0     Sig: TAKE 1 CAPSULE BY MOUTH TWICE DAILY AS NEEDED FOR ITCHING.       Last Visit Date (If Applicable):  Visit date not found    Next Visit Date:    Visit date not found

## 2024-01-21 RX ORDER — AMLODIPINE BESYLATE 10 MG/1
10 TABLET ORAL DAILY
Qty: 30 TABLET | Refills: 0 | OUTPATIENT
Start: 2024-01-21

## 2024-01-21 RX ORDER — DIPHENHYDRAMINE HCL 25 MG
CAPSULE ORAL
Qty: 60 CAPSULE | Refills: 0 | OUTPATIENT
Start: 2024-01-21

## 2024-01-31 ENCOUNTER — OFFICE VISIT (OUTPATIENT)
Dept: PRIMARY CARE CLINIC | Age: 64
End: 2024-01-31
Payer: MEDICAID

## 2024-01-31 ENCOUNTER — OFFICE VISIT (OUTPATIENT)
Dept: PODIATRY | Age: 64
End: 2024-01-31
Payer: MEDICAID

## 2024-01-31 VITALS
DIASTOLIC BLOOD PRESSURE: 89 MMHG | OXYGEN SATURATION: 100 % | HEART RATE: 96 BPM | WEIGHT: 185 LBS | BODY MASS INDEX: 30.79 KG/M2 | SYSTOLIC BLOOD PRESSURE: 186 MMHG

## 2024-01-31 VITALS
HEART RATE: 95 BPM | DIASTOLIC BLOOD PRESSURE: 95 MMHG | WEIGHT: 195 LBS | HEIGHT: 65 IN | BODY MASS INDEX: 32.49 KG/M2 | SYSTOLIC BLOOD PRESSURE: 176 MMHG

## 2024-01-31 DIAGNOSIS — M79.609 PAIN DUE TO ONYCHOMYCOSIS OF NAIL: ICD-10-CM

## 2024-01-31 DIAGNOSIS — B35.1 PAIN DUE TO ONYCHOMYCOSIS OF NAIL: ICD-10-CM

## 2024-01-31 DIAGNOSIS — L29.9 ITCHY SKIN: ICD-10-CM

## 2024-01-31 DIAGNOSIS — G44.209 ACUTE NON INTRACTABLE TENSION-TYPE HEADACHE: ICD-10-CM

## 2024-01-31 DIAGNOSIS — E55.9 VITAMIN D DEFICIENCY: ICD-10-CM

## 2024-01-31 DIAGNOSIS — B35.1 ONYCHOMYCOSIS: Primary | ICD-10-CM

## 2024-01-31 DIAGNOSIS — E11.42 DIABETIC POLYNEUROPATHY ASSOCIATED WITH TYPE 2 DIABETES MELLITUS (HCC): ICD-10-CM

## 2024-01-31 DIAGNOSIS — G47.9 SLEEP DISTURBANCE: ICD-10-CM

## 2024-01-31 DIAGNOSIS — E11.42 TYPE 2 DIABETES MELLITUS WITH DIABETIC POLYNEUROPATHY, WITHOUT LONG-TERM CURRENT USE OF INSULIN (HCC): ICD-10-CM

## 2024-01-31 DIAGNOSIS — Z12.31 ENCOUNTER FOR SCREENING MAMMOGRAM FOR BREAST CANCER: ICD-10-CM

## 2024-01-31 DIAGNOSIS — I10 ESSENTIAL HYPERTENSION: ICD-10-CM

## 2024-01-31 DIAGNOSIS — Z76.0 MEDICATION REFILL: ICD-10-CM

## 2024-01-31 DIAGNOSIS — E78.00 PURE HYPERCHOLESTEROLEMIA: ICD-10-CM

## 2024-01-31 DIAGNOSIS — R22.32 MASS OF LEFT WRIST: Primary | ICD-10-CM

## 2024-01-31 DIAGNOSIS — F32.1 EPISODE OF MODERATE MAJOR DEPRESSION (HCC): ICD-10-CM

## 2024-01-31 LAB — HBA1C MFR BLD: 7.1 %

## 2024-01-31 PROCEDURE — G8427 DOCREV CUR MEDS BY ELIG CLIN: HCPCS | Performed by: NURSE PRACTITIONER

## 2024-01-31 PROCEDURE — 3017F COLORECTAL CA SCREEN DOC REV: CPT | Performed by: NURSE PRACTITIONER

## 2024-01-31 PROCEDURE — 83036 HEMOGLOBIN GLYCOSYLATED A1C: CPT | Performed by: NURSE PRACTITIONER

## 2024-01-31 PROCEDURE — 11721 DEBRIDE NAIL 6 OR MORE: CPT

## 2024-01-31 PROCEDURE — 3079F DIAST BP 80-89 MM HG: CPT | Performed by: NURSE PRACTITIONER

## 2024-01-31 PROCEDURE — 99999 PR OFFICE/OUTPT VISIT,PROCEDURE ONLY: CPT

## 2024-01-31 PROCEDURE — 99215 OFFICE O/P EST HI 40 MIN: CPT | Performed by: NURSE PRACTITIONER

## 2024-01-31 PROCEDURE — 2022F DILAT RTA XM EVC RTNOPTHY: CPT | Performed by: NURSE PRACTITIONER

## 2024-01-31 PROCEDURE — 3051F HG A1C>EQUAL 7.0%<8.0%: CPT | Performed by: NURSE PRACTITIONER

## 2024-01-31 PROCEDURE — 3077F SYST BP >= 140 MM HG: CPT | Performed by: NURSE PRACTITIONER

## 2024-01-31 PROCEDURE — G8417 CALC BMI ABV UP PARAM F/U: HCPCS | Performed by: NURSE PRACTITIONER

## 2024-01-31 PROCEDURE — 4004F PT TOBACCO SCREEN RCVD TLK: CPT | Performed by: NURSE PRACTITIONER

## 2024-01-31 PROCEDURE — G8482 FLU IMMUNIZE ORDER/ADMIN: HCPCS | Performed by: NURSE PRACTITIONER

## 2024-01-31 RX ORDER — GABAPENTIN 600 MG/1
TABLET ORAL
Qty: 90 TABLET | Refills: 2 | Status: SHIPPED | OUTPATIENT
Start: 2024-01-31 | End: 2024-05-27

## 2024-01-31 RX ORDER — ASPIRIN 81 MG/1
TABLET ORAL
Qty: 30 TABLET | Refills: 5 | Status: SHIPPED | OUTPATIENT
Start: 2024-01-31

## 2024-01-31 RX ORDER — INSULIN GLARGINE 100 [IU]/ML
20 INJECTION, SOLUTION SUBCUTANEOUS NIGHTLY
Qty: 5 ADJUSTABLE DOSE PRE-FILLED PEN SYRINGE | Refills: 5 | Status: SHIPPED | OUTPATIENT
Start: 2024-01-31

## 2024-01-31 RX ORDER — DIPHENHYDRAMINE HCL 25 MG
CAPSULE ORAL
Qty: 60 CAPSULE | Refills: 2 | Status: SHIPPED | OUTPATIENT
Start: 2024-01-31

## 2024-01-31 RX ORDER — BUPROPION HYDROCHLORIDE 300 MG/1
300 TABLET ORAL EVERY MORNING
Qty: 30 TABLET | Refills: 5 | Status: SHIPPED | OUTPATIENT
Start: 2024-01-31

## 2024-01-31 RX ORDER — ATENOLOL 50 MG/1
TABLET ORAL
Qty: 30 TABLET | Refills: 5 | Status: SHIPPED | OUTPATIENT
Start: 2024-01-31

## 2024-01-31 RX ORDER — HYDROCHLOROTHIAZIDE 12.5 MG/1
12.5 CAPSULE, GELATIN COATED ORAL EVERY MORNING
Qty: 30 CAPSULE | Refills: 5 | Status: SHIPPED | OUTPATIENT
Start: 2024-01-31

## 2024-01-31 RX ORDER — PRAVASTATIN SODIUM 40 MG
TABLET ORAL
Qty: 30 TABLET | Refills: 5 | Status: SHIPPED | OUTPATIENT
Start: 2024-01-31

## 2024-01-31 RX ORDER — AMLODIPINE BESYLATE 10 MG/1
10 TABLET ORAL DAILY
Qty: 30 TABLET | Refills: 5 | Status: SHIPPED | OUTPATIENT
Start: 2024-01-31

## 2024-01-31 RX ORDER — ERGOCALCIFEROL 1.25 MG/1
50000 CAPSULE ORAL WEEKLY
Qty: 4 CAPSULE | Refills: 5 | Status: SHIPPED | OUTPATIENT
Start: 2024-01-31

## 2024-01-31 RX ORDER — TOPIRAMATE 25 MG/1
25 TABLET ORAL NIGHTLY
Qty: 30 TABLET | Refills: 5 | Status: SHIPPED | OUTPATIENT
Start: 2024-01-31

## 2024-01-31 RX ORDER — TRAZODONE HYDROCHLORIDE 50 MG/1
50 TABLET ORAL NIGHTLY
Qty: 30 TABLET | Refills: 5 | Status: SHIPPED | OUTPATIENT
Start: 2024-01-31

## 2024-01-31 RX ORDER — PEN NEEDLE, DIABETIC 31 G X1/4"
1 NEEDLE, DISPOSABLE MISCELLANEOUS DAILY
Qty: 100 EACH | Refills: 3 | Status: SHIPPED | OUTPATIENT
Start: 2024-01-31

## 2024-01-31 RX ORDER — FLUCONAZOLE 150 MG/1
150 TABLET ORAL WEEKLY
Qty: 1 TABLET | Refills: 0 | Status: SHIPPED | OUTPATIENT
Start: 2024-01-31

## 2024-01-31 RX ORDER — GLIPIZIDE 10 MG/1
TABLET ORAL
Qty: 60 TABLET | Refills: 5 | Status: SHIPPED | OUTPATIENT
Start: 2024-01-31

## 2024-01-31 RX ORDER — LISINOPRIL 30 MG/1
TABLET ORAL
Qty: 30 TABLET | Refills: 0 | Status: SHIPPED | OUTPATIENT
Start: 2024-01-31

## 2024-01-31 RX ORDER — ALOGLIPTIN 12.5 MG/1
TABLET, FILM COATED ORAL
Qty: 60 TABLET | Refills: 5 | Status: SHIPPED | OUTPATIENT
Start: 2024-01-31

## 2024-01-31 RX ORDER — NYSTATIN 100000 [USP'U]/G
POWDER TOPICAL
Qty: 15 G | Refills: 11 | Status: SHIPPED | OUTPATIENT
Start: 2024-01-31

## 2024-01-31 NOTE — PROGRESS NOTES
Shauna Alexandra (:  1960) is a 63 y.o. female,Established patient, here for evaluation of the following chief complaint(s):  Follow-up (Patient is here today for a 3 month follow up )         ASSESSMENT/PLAN:  1. Mass of left wrist  -     Vascular duplex upper extremity venous left; Future  2. Diabetic polyneuropathy associated with type 2 diabetes mellitus (HCC)  -     Continuous Blood Gluc Sensor (FREESTYLE SONAM 2 SENSOR) MISC; 1 each by Does not apply route every 14 days, Disp-2 each, R-5Normal  -     insulin glargine (LANTUS SOLOSTAR) 100 UNIT/ML injection pen; Inject 20 Units into the skin nightly, Disp-5 Adjustable Dose Pre-filled Pen Syringe, R-5Normal  -     gabapentin (NEURONTIN) 600 MG tablet; TAKE 1 TABLET BY MOUTH 3 TIMES A DAY, Disp-90 tablet, R-2Normal  -     Alcohol prep pad  -     Insulin Pen Needle ([employer] PEN NEEDLES) 31G X 6 MM MISC; DAILY Starting 2024, Disp-100 each, R-3, Normal  3. Essential hypertension  -     amLODIPine (NORVASC) 10 MG tablet; Take 1 tablet by mouth daily, Disp-30 tablet, R-5Normal  -     atenolol (TENORMIN) 50 MG tablet; Take 1 tablet by mouth daily, Disp-30 tablet, R-5Delete other atenolol order pleaseNormal  -     hydroCHLOROthiazide 12.5 MG capsule; Take 1 capsule by mouth every morning, Disp-30 capsule, R-5Normal  -     lisinopril (PRINIVIL;ZESTRIL) 30 MG tablet; Take one tablet by mouth daily, Disp-30 tablet, R-0Normal  4. Vitamin D deficiency  -     vitamin D (ERGOCALCIFEROL) 1.25 MG (99672 UT) CAPS capsule; Take 1 capsule by mouth once a week, Disp-4 capsule, R-5Normal  5. Type 2 diabetes mellitus with diabetic polyneuropathy, without long-term current use of insulin ([facility])  -     POCT glycosylated hemoglobin (Hb A1C)  -     alogliptin (NESINA) 12.5 MG TABS tablet; TAKE 2 TABLETS (25MG) BY MOUTH ONCE DAILY., Disp-60 tablet, R-5Normal  -     glipiZIDE (GLUCOTROL) 10 MG tablet; TAKE 1 TABLET BY MOUTH 2 TIMES DAILY BEFORE MEALS, Disp-60 tablet,

## 2024-02-01 NOTE — PROGRESS NOTES
Patient instructed to remove shoes and socks and instructed to sit in exam chair.  Current PCP is Kody Licona APRN - CNP and date of last visit was 09/13/2023.   Do you have a follow up visit scheduled?  Yes  If yes, the date is 01/31/2024.      Diabetic visit information    Blood pressure (Control is BP <140/90)  BP Readings from Last 3 Encounters:   10/23/23 (!) 176/95   09/13/23 (!) 165/87   07/19/23 (!) 196/97       BP taken with correct size cuff? - Yes   Repeated if > 140/90 Yes      Tobacco use:  Patient  reports that she has been smoking cigarettes. She has a 19.5 pack-year smoking history. She has never used smokeless tobacco.  If Smoker - Cessation materials given?- Yes       Diabetic Health Maintenance Items due  Diabetes Management   Topic Date Due    Diabetic retinal exam  Never done    Diabetic foot exam  03/30/2018       Diabetic retinal exam done in last year? - Yes   If No: remind patient that it is due and they should schedule an exam    Medications  Is patient taking any medications for diabetes? -   Yes  Have blood sugars been controlled?   Fasting blood sugars under 120   -   Yes   Random home sugars or today's POCT glucose is under 180 -   Yes   []  If No to the above then patient should schedule appt with PCP.     Diabetic Plan    A1C Plan  Lab Results   Component Value Date    LABA1C 8.6 07/19/2023    LABA1C 8.2 01/11/2023    LABA1C 7.2 10/05/2022      []  If A1C over 8 and last result >3 months ago - Order A1C and refer to PCP   []  If last A1C over 6 months ago - Order A1C and refer to PCP for follow up   []  If elevated blood sugars > 180 - refer to PCP for follow up    []  Blood sugar controlled - A1C under 8 and last check was < 6 months      Cholesterol Plan   Lab Results   Component Value Date    LDLCHOLESTEROL 121 07/19/2023      []  If LDL > 100 and last result >3 months ago - order Fasting lipids and refer to PCP for follow up   []  If LDL < 100 and over 1 year ago - 
topically continuous Cock up wrist splint 1/11/23  Yes Kody Licona APRN - CNP   ammonium lactate (LAC-HYDRIN) 12 % cream Apply topically as needed. 10/5/22  Yes Kody Licona APRN - CNP   ciclopirox (PENLAC) 8 % solution Apply topically nightly. 10/5/22  Yes Kody Licona APRN - CNP   TRUEplus Lancets 30G MISC USE AS DIRECTED 4 TIMES A DAY 9/3/21  Yes Kody Licona APRN - CNP   TRUE METRIX BLOOD GLUCOSE TEST strip USE TO TEST BLOOD SUGAR 4 TIMES A DAY 7/27/21  Yes Kody Licona APRN - CNP   Continuous Blood Gluc Sensor (FREESTYLE SONAM 2 SENSOR) MISC 1 each by Does not apply route every 14 days 1/31/24   Kody Licona APRN - CNP   amLODIPine (NORVASC) 10 MG tablet Take 1 tablet by mouth daily 1/31/24   Kody Licona APRN - CNP   atenolol (TENORMIN) 50 MG tablet Take 1 tablet by mouth daily 1/31/24   Kody Licona APRN - CNP   hydroCHLOROthiazide 12.5 MG capsule Take 1 capsule by mouth every morning 1/31/24   Kody Licona APRN - CNP   lisinopril (PRINIVIL;ZESTRIL) 30 MG tablet Take one tablet by mouth daily 1/31/24   Kody Licona APRN - CNP   vitamin D (ERGOCALCIFEROL) 1.25 MG (79980 UT) CAPS capsule Take 1 capsule by mouth once a week 1/31/24   Kody Licona APRN - CNP   alogliptin (NESINA) 12.5 MG TABS tablet TAKE 2 TABLETS (25MG) BY MOUTH ONCE DAILY. 1/31/24   Kody Licona APRN - CNP   diphenhydrAMINE (BANOPHEN) 25 MG capsule TAKE 1 CAPSULE BY MOUTH TWICE DAILY AS NEEDED FOR ITCHING. 1/31/24   Kody Licona APRN - CNP   glipiZIDE (GLUCOTROL) 10 MG tablet TAKE 1 TABLET BY MOUTH 2 TIMES DAILY BEFORE MEALS 1/31/24   Kody Licona APRN - CNP   insulin glargine (LANTUS SOLOSTAR) 100 UNIT/ML injection pen Inject 20 Units into the skin nightly 1/31/24   Kody Licona APRN - CNP   gabapentin (NEURONTIN) 600 MG tablet TAKE 1 TABLET BY MOUTH 3 TIMES A DAY 1/31/24 5/27/24  Monae

## 2024-06-24 ENCOUNTER — APPOINTMENT (OUTPATIENT)
Dept: NUCLEAR MEDICINE | Age: 64
DRG: 199 | End: 2024-06-24
Payer: MEDICAID

## 2024-06-24 ENCOUNTER — APPOINTMENT (OUTPATIENT)
Dept: GENERAL RADIOLOGY | Age: 64
DRG: 199 | End: 2024-06-24
Payer: MEDICAID

## 2024-06-24 ENCOUNTER — APPOINTMENT (OUTPATIENT)
Dept: CT IMAGING | Age: 64
DRG: 199 | End: 2024-06-24
Payer: MEDICAID

## 2024-06-24 ENCOUNTER — HOSPITAL ENCOUNTER (INPATIENT)
Age: 64
LOS: 7 days | Discharge: OTHER INSTITUTION | DRG: 199 | End: 2024-07-01
Attending: EMERGENCY MEDICINE | Admitting: INTERNAL MEDICINE
Payer: MEDICAID

## 2024-06-24 DIAGNOSIS — I10 ESSENTIAL HYPERTENSION: ICD-10-CM

## 2024-06-24 DIAGNOSIS — R07.9 CHEST PAIN, UNSPECIFIED TYPE: ICD-10-CM

## 2024-06-24 DIAGNOSIS — I20.9 ANGINA PECTORIS (HCC): ICD-10-CM

## 2024-06-24 DIAGNOSIS — N17.9 AKI (ACUTE KIDNEY INJURY) (HCC): Primary | ICD-10-CM

## 2024-06-24 DIAGNOSIS — R06.02 SHORTNESS OF BREATH: ICD-10-CM

## 2024-06-24 LAB
ALBUMIN SERPL-MCNC: 4.2 G/DL (ref 3.5–5.2)
ALP SERPL-CCNC: 133 U/L (ref 35–104)
ALT SERPL-CCNC: 6 U/L (ref 5–33)
ANION GAP SERPL CALCULATED.3IONS-SCNC: 12 MMOL/L (ref 9–17)
AST SERPL-CCNC: 7 U/L
BACTERIA URNS QL MICRO: ABNORMAL
BASOPHILS # BLD: 0.1 K/UL (ref 0–0.2)
BASOPHILS NFR BLD: 1 % (ref 0–2)
BILIRUB SERPL-MCNC: 0.2 MG/DL (ref 0.3–1.2)
BILIRUB UR QL STRIP: NEGATIVE
BUN SERPL-MCNC: 37 MG/DL (ref 8–23)
CALCIUM SERPL-MCNC: 10.6 MG/DL (ref 8.6–10.4)
CASTS #/AREA URNS LPF: ABNORMAL /LPF
CHLORIDE SERPL-SCNC: 105 MMOL/L (ref 98–107)
CLARITY UR: CLEAR
CO2 SERPL-SCNC: 23 MMOL/L (ref 20–31)
COLOR UR: YELLOW
CREAT SERPL-MCNC: 2 MG/DL (ref 0.5–0.9)
D DIMER PPP FEU-MCNC: 1.38 UG/ML FEU (ref 0–0.59)
EOSINOPHIL # BLD: 0.3 K/UL (ref 0–0.4)
EOSINOPHILS RELATIVE PERCENT: 2 % (ref 0–4)
EPI CELLS #/AREA URNS HPF: ABNORMAL /HPF
ERYTHROCYTE [DISTWIDTH] IN BLOOD BY AUTOMATED COUNT: 15.4 % (ref 11.5–14.9)
GFR, ESTIMATED: 28 ML/MIN/1.73M2
GLUCOSE BLD-MCNC: 173 MG/DL (ref 65–105)
GLUCOSE SERPL-MCNC: 202 MG/DL (ref 70–99)
GLUCOSE UR STRIP-MCNC: ABNORMAL MG/DL
HCT VFR BLD AUTO: 31.1 % (ref 36–46)
HGB BLD-MCNC: 10 G/DL (ref 12–16)
HGB UR QL STRIP.AUTO: ABNORMAL
INR PPP: 1.1
KETONES UR STRIP-MCNC: NEGATIVE MG/DL
LEUKOCYTE ESTERASE UR QL STRIP: NEGATIVE
LIPASE SERPL-CCNC: 49 U/L (ref 13–60)
LYMPHOCYTES NFR BLD: 2.2 K/UL (ref 1–4.8)
LYMPHOCYTES RELATIVE PERCENT: 19 % (ref 24–44)
MAGNESIUM SERPL-MCNC: 2.1 MG/DL (ref 1.6–2.6)
MCH RBC QN AUTO: 29 PG (ref 26–34)
MCHC RBC AUTO-ENTMCNC: 32.2 G/DL (ref 31–37)
MCV RBC AUTO: 90.1 FL (ref 80–100)
MONOCYTES NFR BLD: 0.5 K/UL (ref 0.1–1.3)
MONOCYTES NFR BLD: 5 % (ref 1–7)
NEUTROPHILS NFR BLD: 73 % (ref 36–66)
NEUTS SEG NFR BLD: 8.2 K/UL (ref 1.3–9.1)
NITRITE UR QL STRIP: NEGATIVE
PH UR STRIP: 5.5 [PH] (ref 5–8)
PLATELET # BLD AUTO: 402 K/UL (ref 150–450)
PMV BLD AUTO: 7.1 FL (ref 6–12)
POTASSIUM SERPL-SCNC: 4.2 MMOL/L (ref 3.7–5.3)
PROT SERPL-MCNC: 8.7 G/DL (ref 6.4–8.3)
PROT UR STRIP-MCNC: ABNORMAL MG/DL
PROTHROMBIN TIME: 14.2 SEC (ref 11.8–14.6)
RBC # BLD AUTO: 3.45 M/UL (ref 4–5.2)
RBC #/AREA URNS HPF: ABNORMAL /HPF
SODIUM SERPL-SCNC: 140 MMOL/L (ref 135–144)
SP GR UR STRIP: 1.01 (ref 1–1.03)
TROPONIN I SERPL HS-MCNC: 10 NG/L (ref 0–14)
TROPONIN I SERPL HS-MCNC: 14 NG/L (ref 0–14)
UROBILINOGEN UR STRIP-ACNC: NORMAL EU/DL (ref 0–1)
WBC #/AREA URNS HPF: ABNORMAL /HPF
WBC OTHER # BLD: 11.3 K/UL (ref 3.5–11)

## 2024-06-24 PROCEDURE — 85379 FIBRIN DEGRADATION QUANT: CPT

## 2024-06-24 PROCEDURE — 85025 COMPLETE CBC W/AUTO DIFF WBC: CPT

## 2024-06-24 PROCEDURE — 82947 ASSAY GLUCOSE BLOOD QUANT: CPT

## 2024-06-24 PROCEDURE — 96374 THER/PROPH/DIAG INJ IV PUSH: CPT

## 2024-06-24 PROCEDURE — 74176 CT ABD & PELVIS W/O CONTRAST: CPT

## 2024-06-24 PROCEDURE — 2580000003 HC RX 258: Performed by: EMERGENCY MEDICINE

## 2024-06-24 PROCEDURE — 6370000000 HC RX 637 (ALT 250 FOR IP): Performed by: EMERGENCY MEDICINE

## 2024-06-24 PROCEDURE — 6370000000 HC RX 637 (ALT 250 FOR IP): Performed by: INTERNAL MEDICINE

## 2024-06-24 PROCEDURE — 96375 TX/PRO/DX INJ NEW DRUG ADDON: CPT

## 2024-06-24 PROCEDURE — 6360000002 HC RX W HCPCS: Performed by: EMERGENCY MEDICINE

## 2024-06-24 PROCEDURE — 81001 URINALYSIS AUTO W/SCOPE: CPT

## 2024-06-24 PROCEDURE — 6360000002 HC RX W HCPCS: Performed by: INTERNAL MEDICINE

## 2024-06-24 PROCEDURE — 93005 ELECTROCARDIOGRAM TRACING: CPT | Performed by: EMERGENCY MEDICINE

## 2024-06-24 PROCEDURE — 80053 COMPREHEN METABOLIC PANEL: CPT

## 2024-06-24 PROCEDURE — A9540 TC99M MAA: HCPCS | Performed by: EMERGENCY MEDICINE

## 2024-06-24 PROCEDURE — 36415 COLL VENOUS BLD VENIPUNCTURE: CPT

## 2024-06-24 PROCEDURE — 83735 ASSAY OF MAGNESIUM: CPT

## 2024-06-24 PROCEDURE — 2580000003 HC RX 258: Performed by: INTERNAL MEDICINE

## 2024-06-24 PROCEDURE — A9539 TC99M PENTETATE: HCPCS | Performed by: EMERGENCY MEDICINE

## 2024-06-24 PROCEDURE — 71045 X-RAY EXAM CHEST 1 VIEW: CPT

## 2024-06-24 PROCEDURE — 99285 EMERGENCY DEPT VISIT HI MDM: CPT

## 2024-06-24 PROCEDURE — 3430000000 HC RX DIAGNOSTIC RADIOPHARMACEUTICAL: Performed by: EMERGENCY MEDICINE

## 2024-06-24 PROCEDURE — 85610 PROTHROMBIN TIME: CPT

## 2024-06-24 PROCEDURE — 99223 1ST HOSP IP/OBS HIGH 75: CPT | Performed by: INTERNAL MEDICINE

## 2024-06-24 PROCEDURE — 83690 ASSAY OF LIPASE: CPT

## 2024-06-24 PROCEDURE — 2060000000 HC ICU INTERMEDIATE R&B

## 2024-06-24 PROCEDURE — 6370000000 HC RX 637 (ALT 250 FOR IP): Performed by: NURSE PRACTITIONER

## 2024-06-24 PROCEDURE — 84484 ASSAY OF TROPONIN QUANT: CPT

## 2024-06-24 PROCEDURE — 78582 LUNG VENTILAT&PERFUS IMAGING: CPT

## 2024-06-24 RX ORDER — AMLODIPINE BESYLATE 10 MG/1
10 TABLET ORAL DAILY
Status: DISCONTINUED | OUTPATIENT
Start: 2024-06-24 | End: 2024-07-01 | Stop reason: HOSPADM

## 2024-06-24 RX ORDER — ACETAMINOPHEN 325 MG/1
650 TABLET ORAL EVERY 6 HOURS PRN
Status: DISCONTINUED | OUTPATIENT
Start: 2024-06-24 | End: 2024-07-01 | Stop reason: HOSPADM

## 2024-06-24 RX ORDER — HYDROCODONE BITARTRATE AND ACETAMINOPHEN 5; 325 MG/1; MG/1
1 TABLET ORAL ONCE
Status: COMPLETED | OUTPATIENT
Start: 2024-06-24 | End: 2024-06-24

## 2024-06-24 RX ORDER — ACETAMINOPHEN 650 MG/1
650 SUPPOSITORY RECTAL EVERY 6 HOURS PRN
Status: DISCONTINUED | OUTPATIENT
Start: 2024-06-24 | End: 2024-07-01 | Stop reason: HOSPADM

## 2024-06-24 RX ORDER — ATENOLOL 50 MG/1
50 TABLET ORAL DAILY
Status: DISCONTINUED | OUTPATIENT
Start: 2024-06-24 | End: 2024-07-01 | Stop reason: HOSPADM

## 2024-06-24 RX ORDER — SODIUM CHLORIDE 9 MG/ML
INJECTION, SOLUTION INTRAVENOUS PRN
Status: DISCONTINUED | OUTPATIENT
Start: 2024-06-24 | End: 2024-07-01 | Stop reason: HOSPADM

## 2024-06-24 RX ORDER — TRAZODONE HYDROCHLORIDE 50 MG/1
50 TABLET ORAL NIGHTLY
Status: DISCONTINUED | OUTPATIENT
Start: 2024-06-24 | End: 2024-07-01 | Stop reason: HOSPADM

## 2024-06-24 RX ORDER — PRAVASTATIN SODIUM 40 MG
40 TABLET ORAL NIGHTLY
Status: DISCONTINUED | OUTPATIENT
Start: 2024-06-24 | End: 2024-07-01 | Stop reason: HOSPADM

## 2024-06-24 RX ORDER — ASPIRIN 81 MG/1
81 TABLET ORAL DAILY
Status: DISCONTINUED | OUTPATIENT
Start: 2024-06-24 | End: 2024-07-01 | Stop reason: HOSPADM

## 2024-06-24 RX ORDER — MAGNESIUM SULFATE HEPTAHYDRATE 40 MG/ML
2000 INJECTION, SOLUTION INTRAVENOUS PRN
Status: DISCONTINUED | OUTPATIENT
Start: 2024-06-24 | End: 2024-06-30 | Stop reason: CLARIF

## 2024-06-24 RX ORDER — ONDANSETRON 4 MG/1
4 TABLET, ORALLY DISINTEGRATING ORAL EVERY 8 HOURS PRN
Status: DISCONTINUED | OUTPATIENT
Start: 2024-06-24 | End: 2024-07-01 | Stop reason: HOSPADM

## 2024-06-24 RX ORDER — SODIUM CHLORIDE 9 MG/ML
INJECTION, SOLUTION INTRAVENOUS CONTINUOUS
Status: DISCONTINUED | OUTPATIENT
Start: 2024-06-24 | End: 2024-06-25

## 2024-06-24 RX ORDER — HYDRALAZINE HYDROCHLORIDE 20 MG/ML
10 INJECTION INTRAMUSCULAR; INTRAVENOUS EVERY 6 HOURS PRN
Status: DISCONTINUED | OUTPATIENT
Start: 2024-06-24 | End: 2024-07-01 | Stop reason: HOSPADM

## 2024-06-24 RX ORDER — MORPHINE SULFATE 2 MG/ML
2 INJECTION, SOLUTION INTRAMUSCULAR; INTRAVENOUS
Status: DISCONTINUED | OUTPATIENT
Start: 2024-06-24 | End: 2024-07-01 | Stop reason: HOSPADM

## 2024-06-24 RX ORDER — SODIUM CHLORIDE 0.9 % (FLUSH) 0.9 %
5-40 SYRINGE (ML) INJECTION PRN
Status: DISCONTINUED | OUTPATIENT
Start: 2024-06-24 | End: 2024-07-01 | Stop reason: HOSPADM

## 2024-06-24 RX ORDER — DIPHENHYDRAMINE HYDROCHLORIDE 50 MG/ML
25 INJECTION INTRAMUSCULAR; INTRAVENOUS EVERY 6 HOURS PRN
Status: DISCONTINUED | OUTPATIENT
Start: 2024-06-24 | End: 2024-07-01 | Stop reason: HOSPADM

## 2024-06-24 RX ORDER — KIT FOR THE PREPARATION OF TECHNETIUM TC 99M PENTETATE 20 MG/1
40 INJECTION, POWDER, LYOPHILIZED, FOR SOLUTION INTRAVENOUS; RESPIRATORY (INHALATION)
Status: COMPLETED | OUTPATIENT
Start: 2024-06-24 | End: 2024-06-24

## 2024-06-24 RX ORDER — ENOXAPARIN SODIUM 100 MG/ML
40 INJECTION SUBCUTANEOUS DAILY
Status: DISCONTINUED | OUTPATIENT
Start: 2024-06-24 | End: 2024-07-01 | Stop reason: HOSPADM

## 2024-06-24 RX ORDER — ONDANSETRON 2 MG/ML
4 INJECTION INTRAMUSCULAR; INTRAVENOUS EVERY 6 HOURS PRN
Status: DISCONTINUED | OUTPATIENT
Start: 2024-06-24 | End: 2024-07-01 | Stop reason: HOSPADM

## 2024-06-24 RX ORDER — SODIUM CHLORIDE 0.9 % (FLUSH) 0.9 %
10 SYRINGE (ML) INJECTION PRN
Status: DISCONTINUED | OUTPATIENT
Start: 2024-06-24 | End: 2024-07-01 | Stop reason: HOSPADM

## 2024-06-24 RX ORDER — GABAPENTIN 300 MG/1
300 CAPSULE ORAL 3 TIMES DAILY
Status: DISCONTINUED | OUTPATIENT
Start: 2024-06-24 | End: 2024-07-01 | Stop reason: HOSPADM

## 2024-06-24 RX ORDER — BUPROPION HYDROCHLORIDE 300 MG/1
300 TABLET ORAL EVERY MORNING
Status: DISCONTINUED | OUTPATIENT
Start: 2024-06-25 | End: 2024-07-01 | Stop reason: HOSPADM

## 2024-06-24 RX ORDER — INSULIN LISPRO 100 [IU]/ML
0-4 INJECTION, SOLUTION INTRAVENOUS; SUBCUTANEOUS NIGHTLY
Status: DISCONTINUED | OUTPATIENT
Start: 2024-06-24 | End: 2024-07-01 | Stop reason: HOSPADM

## 2024-06-24 RX ORDER — 0.9 % SODIUM CHLORIDE 0.9 %
1000 INTRAVENOUS SOLUTION INTRAVENOUS ONCE
Status: COMPLETED | OUTPATIENT
Start: 2024-06-24 | End: 2024-06-24

## 2024-06-24 RX ORDER — INSULIN LISPRO 100 [IU]/ML
0-4 INJECTION, SOLUTION INTRAVENOUS; SUBCUTANEOUS
Status: DISCONTINUED | OUTPATIENT
Start: 2024-06-24 | End: 2024-07-01 | Stop reason: HOSPADM

## 2024-06-24 RX ORDER — DEXTROSE MONOHYDRATE 100 MG/ML
INJECTION, SOLUTION INTRAVENOUS CONTINUOUS PRN
Status: DISCONTINUED | OUTPATIENT
Start: 2024-06-24 | End: 2024-07-01 | Stop reason: HOSPADM

## 2024-06-24 RX ORDER — ATENOLOL 50 MG/1
50 TABLET ORAL DAILY
Status: DISCONTINUED | OUTPATIENT
Start: 2024-06-24 | End: 2024-06-25

## 2024-06-24 RX ORDER — AMLODIPINE BESYLATE 10 MG/1
10 TABLET ORAL DAILY
Status: DISCONTINUED | OUTPATIENT
Start: 2024-06-24 | End: 2024-06-25

## 2024-06-24 RX ORDER — SODIUM CHLORIDE 0.9 % (FLUSH) 0.9 %
5-40 SYRINGE (ML) INJECTION EVERY 12 HOURS SCHEDULED
Status: DISCONTINUED | OUTPATIENT
Start: 2024-06-24 | End: 2024-07-01 | Stop reason: HOSPADM

## 2024-06-24 RX ORDER — POLYETHYLENE GLYCOL 3350 17 G/17G
17 POWDER, FOR SOLUTION ORAL DAILY PRN
Status: DISCONTINUED | OUTPATIENT
Start: 2024-06-24 | End: 2024-07-01 | Stop reason: HOSPADM

## 2024-06-24 RX ORDER — POTASSIUM CHLORIDE 7.45 MG/ML
10 INJECTION INTRAVENOUS PRN
Status: DISCONTINUED | OUTPATIENT
Start: 2024-06-24 | End: 2024-06-30 | Stop reason: CLARIF

## 2024-06-24 RX ORDER — POTASSIUM CHLORIDE 20 MEQ/1
40 TABLET, EXTENDED RELEASE ORAL PRN
Status: DISCONTINUED | OUTPATIENT
Start: 2024-06-24 | End: 2024-06-30 | Stop reason: CLARIF

## 2024-06-24 RX ORDER — INSULIN GLARGINE 100 [IU]/ML
20 INJECTION, SOLUTION SUBCUTANEOUS NIGHTLY
Status: DISCONTINUED | OUTPATIENT
Start: 2024-06-24 | End: 2024-07-01 | Stop reason: HOSPADM

## 2024-06-24 RX ADMIN — KIT FOR THE PREPARATION OF TECHNETIUM TC 99M PENTETATE 49.5 MILLICURIE: 20 INJECTION, POWDER, LYOPHILIZED, FOR SOLUTION INTRAVENOUS; RESPIRATORY (INHALATION) at 13:59

## 2024-06-24 RX ADMIN — INSULIN GLARGINE 20 UNITS: 100 INJECTION, SOLUTION SUBCUTANEOUS at 21:14

## 2024-06-24 RX ADMIN — GABAPENTIN 300 MG: 300 CAPSULE ORAL at 21:14

## 2024-06-24 RX ADMIN — ASPIRIN 81 MG: 81 TABLET, COATED ORAL at 18:52

## 2024-06-24 RX ADMIN — Medication 9.8 MILLICURIE: at 14:21

## 2024-06-24 RX ADMIN — ENOXAPARIN SODIUM 40 MG: 100 INJECTION SUBCUTANEOUS at 18:48

## 2024-06-24 RX ADMIN — SODIUM CHLORIDE, PRESERVATIVE FREE 10 ML: 5 INJECTION INTRAVENOUS at 14:22

## 2024-06-24 RX ADMIN — TRAZODONE HYDROCHLORIDE 50 MG: 50 TABLET ORAL at 21:14

## 2024-06-24 RX ADMIN — SODIUM CHLORIDE, PRESERVATIVE FREE 10 ML: 5 INJECTION INTRAVENOUS at 18:49

## 2024-06-24 RX ADMIN — SODIUM CHLORIDE: 9 INJECTION, SOLUTION INTRAVENOUS at 23:29

## 2024-06-24 RX ADMIN — CEFTRIAXONE SODIUM 1000 MG: 1 INJECTION, POWDER, FOR SOLUTION INTRAMUSCULAR; INTRAVENOUS at 17:16

## 2024-06-24 RX ADMIN — SODIUM CHLORIDE: 9 INJECTION, SOLUTION INTRAVENOUS at 16:08

## 2024-06-24 RX ADMIN — ATENOLOL 50 MG: 25 TABLET ORAL at 16:24

## 2024-06-24 RX ADMIN — KIT FOR THE PREPARATION OF TECHNETIUM TC 99M PENTETATE 33.3 MILLICURIE: 20 INJECTION, POWDER, LYOPHILIZED, FOR SOLUTION INTRAVENOUS; RESPIRATORY (INHALATION) at 13:36

## 2024-06-24 RX ADMIN — AMLODIPINE BESYLATE 10 MG: 5 TABLET ORAL at 16:24

## 2024-06-24 RX ADMIN — HYDROCODONE BITARTRATE AND ACETAMINOPHEN 1 TABLET: 5; 325 TABLET ORAL at 21:14

## 2024-06-24 RX ADMIN — SODIUM CHLORIDE 1000 ML: 9 INJECTION, SOLUTION INTRAVENOUS at 12:14

## 2024-06-24 RX ADMIN — HYDRALAZINE HYDROCHLORIDE 10 MG: 20 INJECTION, SOLUTION INTRAMUSCULAR; INTRAVENOUS at 18:48

## 2024-06-24 RX ADMIN — PRAVASTATIN SODIUM 40 MG: 40 TABLET ORAL at 21:14

## 2024-06-24 ASSESSMENT — PAIN DESCRIPTION - PAIN TYPE
TYPE: ACUTE PAIN
TYPE: ACUTE PAIN

## 2024-06-24 ASSESSMENT — PAIN DESCRIPTION - FREQUENCY
FREQUENCY: INTERMITTENT
FREQUENCY: INTERMITTENT

## 2024-06-24 ASSESSMENT — PAIN SCALES - GENERAL
PAINLEVEL_OUTOF10: 8
PAINLEVEL_OUTOF10: 8
PAINLEVEL_OUTOF10: 10
PAINLEVEL_OUTOF10: 9

## 2024-06-24 ASSESSMENT — LIFESTYLE VARIABLES
HOW MANY STANDARD DRINKS CONTAINING ALCOHOL DO YOU HAVE ON A TYPICAL DAY: PATIENT DOES NOT DRINK
HOW OFTEN DO YOU HAVE A DRINK CONTAINING ALCOHOL: NEVER

## 2024-06-24 ASSESSMENT — PAIN DESCRIPTION - LOCATION
LOCATION: BACK;CHEST
LOCATION: BACK
LOCATION: BACK
LOCATION: BACK;CHEST

## 2024-06-24 ASSESSMENT — PAIN - FUNCTIONAL ASSESSMENT: PAIN_FUNCTIONAL_ASSESSMENT: 0-10

## 2024-06-24 ASSESSMENT — PAIN DESCRIPTION - DESCRIPTORS
DESCRIPTORS: OTHER (COMMENT)

## 2024-06-24 ASSESSMENT — PAIN DESCRIPTION - ORIENTATION
ORIENTATION: MID
ORIENTATION: MID

## 2024-06-24 ASSESSMENT — PAIN DESCRIPTION - ONSET: ONSET: PROGRESSIVE

## 2024-06-24 ASSESSMENT — HEART SCORE: ECG: NORMAL

## 2024-06-24 NOTE — ED NOTES
Report given to MALGORZATA Costello from [facility].   Report method by phone   The following was reviewed with receiving RN:   Current vital signs:  BP (!) 187/84   Pulse 91   Temp 98.3 °F (36.8 °C) (Oral)   Resp 16   Ht 1.651 m (5' 5\")   Wt 85.7 kg (189 lb)   SpO2 98%   BMI 31.45 kg/m²                MEWS Score: 3     Any medication or safety alerts were reviewed. Any pending diagnostics and notifications were also reviewed, as well as any safety concerns or issues, abnormal labs, abnormal imaging, and abnormal assessment findings. Questions were answered.

## 2024-06-24 NOTE — PROGRESS NOTES
Pt arrived to floor from ED to room 2124.  Vitals taken and telemetry applied.  No distress noted. See doc flowsheet for details. Call light within reach, and pt educated on its use. Bed in lowest position, and locked. Side rails up x 2. Denied further questions or needs at this time. Will continue to monitor.

## 2024-06-24 NOTE — H&P
[facility] Internal Medicine  Bakari Zacarias MD; Rory Rios MD; Pavel Bolden MD; MD Zulema Alonzo MD; Wiley Saxena MD; Izzy White MD      HISTORY AND PHYSICAL EXAMINATION            Date:   2024  Patient name:  Shauna Alexandra  MRN:   [id number]  Account:  [id number]  YOB: 1960  PCP:    Kody Licona APRN - CNP  Code Status:    Full Code    Chief Complaint:     Chief Complaint   Patient presents with    Chest Pain    Abdominal Pain    Insect Bite     X 2 days post outside urgent care visit no relief, complians of increased pain inhale, Pt also complains of eye discharge, and possible spider bite          History Obtained From:     Patient, EMR, nursing staff    HPI     This patient is a 64 y.o. Non- / non  femalewho has hypertension hyperlipidemia type 2 diabetes no known cardiac history presenting with shortness of breath and chest pain noted to have very high blood pressure and ALBERTO in ER.  Presents with  UA concerning for UTI      Review of Systems:     Positive for shortness of breath no cough  Positive for some chest pain denies palpitations  Positive for upper abdominal pain  Denies any new numbness tremors or weakness.    A 10 point review of systems was performed and and negative except as mentioned in HPI  Positive and Negative as described in HPI.      Past Medical History:     Past Medical History:   Diagnosis Date    Anemia     Chronic back pain     Depression     Hyperlipidemia     Hypertension     Type 2 diabetes mellitus with diabetic polyneuropathy, without long-term current use of insulin ([facility]) 11/10/2016    Type II or unspecified type diabetes mellitus without mention of complication, not stated as uncontrolled         Past Surgical History:     Past Surgical History:   Procedure Laterality Date     SECTION      EXTERNAL EAR SURGERY      EYE SURGERY      RECTAL SURGERY Right 2021    RECTAL PERIRECTAL  topically as needed.  Patient not taking: Reported on 6/24/2024 10/5/22   Kody Licona APRN - CNP   ciclopirox (PENLAC) 8 % solution Apply topically nightly. 10/5/22   Kody Licona APRN - CNP   Blood Pressure Monitoring (BLOOD PRESSURE CUFF) MISC 1 Dose by Does not apply route once for 1 dose 9/29/21 1/31/24  Kody Licona APRN - CNP   TRUEplus Lancets 30G MISC USE AS DIRECTED 4 TIMES A DAY 9/3/21   Kody Licona APRN - CNP   TRUE METRIX BLOOD GLUCOSE TEST strip USE TO TEST BLOOD SUGAR 4 TIMES A DAY 7/27/21   Kody Licona APRN - CNP        Allergies:     Ibuprofen    Social History:     Tobacco:    reports that she has been smoking cigarettes. She has a 19.5 pack-year smoking history. She has never used smokeless tobacco.  Alcohol:      reports no history of alcohol use.  Drug Use:  reports that she does not currently use drugs after having used the following drugs: Marijuana (Weed).    Family History:     History reviewed. No pertinent family history.        Physical Exam:   BP (!) 193/87   Pulse 91   Temp 98.3 °F (36.8 °C) (Oral)   Resp 16   Ht 1.651 m (5' 5\")   Wt 85.7 kg (189 lb)   SpO2 99%   BMI 31.45 kg/m²   No results for input(s): \"POCGLU\" in the last 72 hours.    General Appearance:  alert, well appearing, and in no acute distress  Mental status: oriented to person, place, and time with normal affect  Head:  normocephalic, atraumatic.  Eye: no icterus, redness, pupils equal and reactive, extraocular eye movements intact, conjunctiva clear  Ear: normal external ear, no discharge, hearing intact  Nose:  no drainage noted  Mouth: mucous membranes moist  Neck: supple, no carotid bruits, thyroid not palpable  Lungs: Bilateral equal air entry, clear to ausculation, no wheezing, rales or rhonchi, normal effort  Cardiovascular: normal rate, regular rhythm, no murmur, gallop, rub.  Abdomen: Soft, nontender, nondistended, normal bowel sounds, no hepatomegaly or  (TOPAMAX) 25 MG tablet Take 1 tablet by mouth at bedtime 30 tablet 5    traZODone (DESYREL) 50 MG tablet Take 1 tablet by mouth nightly 30 tablet 5    fluconazole (DIFLUCAN) 150 MG tablet Take 1 tablet by mouth once a week (Patient not taking: Reported on 6/24/2024) 1 tablet 0    Insulin Pen Needle ([employer] PEN NEEDLES) 31G X 6 MM MISC 1 each by Does not apply route daily 100 each 3    buPROPion (WELLBUTRIN XL) 300 MG extended release tablet Take 1 tablet by mouth every morning 30 tablet 5    aspirin (ASPIRIN LOW DOSE) 81 MG EC tablet TAKE ONE TABLET BY MOUTH ONE TIME A DAY 30 tablet 5    ARTIFICIAL TEARS 0.1-0.3 % SOLN ophthalmic solution  (Patient not taking: Reported on 6/24/2024)      moxifloxacin (VIGAMOX) 0.5 % ophthalmic solution  (Patient not taking: Reported on 6/24/2024)      Alcohol Swabs (ALCOHOL PREP) PADS USE WHEN TESTING BLOOD GLUCOSE AND WHEN INJECTING INSULIN DAILY 100 each 5    Soft Lens Products (B & L SENSITIVE EYES SALINE) 0.4 % SOLN 1 drop by Does not apply route in the morning and at bedtime 1 each 0    Transparent Dressings (TEGADERM FILM 4\"X4-1/2\") MISC 1 each by Does not apply route once a week 12 each 1    melatonin (RA MELATONIN) 3 MG TABS tablet Take 1 tablet by mouth nightly 30 tablet 5    Continuous Blood Gluc  (FREESTYLE SONAM 2 READER) TANIYA       butalbital-APAP-caffeine -40 MG CAPS per capsule TAKE ONE CAPSULE BY MOUTH EVERY 4 HOURS AS NEEDED FOR HEADACHE 20 capsule 2    Elastic Bandages & Supports (WRIST SPLINT) MISC Apply 1 each topically continuous Cock up wrist splint 2 each 0    ammonium lactate (LAC-HYDRIN) 12 % cream Apply topically as needed. (Patient not taking: Reported on 6/24/2024) 385 g 2    ciclopirox (PENLAC) 8 % solution Apply topically nightly. 6 mL 1    Blood Pressure Monitoring (BLOOD PRESSURE CUFF) MISC 1 Dose by Does not apply route once for 1 dose 1 each 0    TRUEplus Lancets 30G MISC USE AS DIRECTED 4 TIMES A  each 2    TRUE METRIX BLOOD

## 2024-06-24 NOTE — PROGRESS NOTES
Pharmacy Medication History Note      List of current medications patient is taking is complete.    Source of information: Patient, [employer] Scripts, [place]    Changes made to medication list:  Medications removed (include reason, ex. therapy complete or physician discontinued, noncompliance):  None    Medications flagged for provider review:  Ammonium lactate 12% cream  Artifical tears 0.1-0.3% solution  Fluconazole 150mg  Moxifloxacin 0.5% solution    Medications added/doses adjusted:  None    Other notes (ex. Recent course of antibiotics, Coumadin dosing):  Gabapentin 600mg qty 90 for 30 days on 4/25/24 per [place]  Cannabis use as stated by the patient      Current Home Medication List at Time of Admission:  Prior to Admission medications    Medication Sig   Continuous Blood Gluc Sensor (FREESTYLE SONAM 2 SENSOR) MISC 1 each by Does not apply route every 14 days   amLODIPine (NORVASC) 10 MG tablet Take 1 tablet by mouth daily   atenolol (TENORMIN) 50 MG tablet Take 1 tablet by mouth daily   hydroCHLOROthiazide 12.5 MG capsule Take 1 capsule by mouth every morning   lisinopril (PRINIVIL;ZESTRIL) 30 MG tablet Take one tablet by mouth daily   vitamin D (ERGOCALCIFEROL) 1.25 MG (27489 UT) CAPS capsule Take 1 capsule by mouth once a week   alogliptin (NESINA) 12.5 MG TABS tablet TAKE 2 TABLETS (25MG) BY MOUTH ONCE DAILY.   diphenhydrAMINE (BANOPHEN) 25 MG capsule TAKE 1 CAPSULE BY MOUTH TWICE DAILY AS NEEDED FOR ITCHING.   glipiZIDE (GLUCOTROL) 10 MG tablet TAKE 1 TABLET BY MOUTH 2 TIMES DAILY BEFORE MEALS   insulin glargine (LANTUS SOLOSTAR) 100 UNIT/ML injection pen Inject 20 Units into the skin nightly   gabapentin (NEURONTIN) 600 MG tablet TAKE 1 TABLET BY MOUTH 3 TIMES A DAY   nystatin (MYCOSTATIN) 030176 UNIT/GM powder Apply topically 4 times daily.   pravastatin (PRAVACHOL) 40 MG tablet Take one tablet by mouth daily   topiramate (TOPAMAX) 25 MG tablet Take 1 tablet by mouth at bedtime   traZODone (DESYREL) 50 MG

## 2024-06-24 NOTE — ED PROVIDER NOTES
chaperone present (Nurse Bailey).   Constitutional:       General: She is not in acute distress.     Appearance: Normal appearance. She is not toxic-appearing.   HENT:      Head: Normocephalic and atraumatic.      Nose: Nose normal.      Mouth/Throat:      Mouth: Mucous membranes are moist.      Pharynx: Oropharynx is clear.   Eyes:      Extraocular Movements: Extraocular movements intact.      Conjunctiva/sclera: Conjunctivae normal.      Pupils: Pupils are equal, round, and reactive to light.   Cardiovascular:      Rate and Rhythm: Normal rate and regular rhythm.      Pulses: Normal pulses.           Radial pulses are 2+ on the right side and 2+ on the left side.        Dorsalis pedis pulses are 2+ on the right side and 2+ on the left side.      Heart sounds: Normal heart sounds.   Pulmonary:      Effort: Pulmonary effort is normal.      Breath sounds: Normal breath sounds.   Abdominal:      General: Bowel sounds are normal. There is no distension.      Palpations: Abdomen is soft.      Tenderness: There is no abdominal tenderness.   Musculoskeletal:         General: Normal range of motion.      Cervical back: Normal range of motion. No spinous process tenderness or muscular tenderness.      Right lower leg: No edema.      Left lower leg: No edema.   Skin:     General: Skin is warm and dry.      Capillary Refill: Capillary refill takes less than 2 seconds.          Neurological:      General: No focal deficit present.      Mental Status: She is alert and oriented to person, place, and time.      Cranial Nerves: Cranial nerves 2-12 are intact.      Sensory: Sensation is intact.      Motor: Motor function is intact.   Psychiatric:         Mood and Affect: Mood normal.         Thought Content: Thought content does not include homicidal or suicidal ideation.         MEDICAL DECISION MAKING / ED COURSE:     64-year-old female presents for complaints of chest pain and back pain.  On initial exam patient no acute  limits   POC GLUCOSE FINGERSTICK - Abnormal; Notable for the following components:    POC Glucose 161 (*)     All other components within normal limits   POC GLUCOSE FINGERSTICK - Abnormal; Notable for the following components:    POC Glucose 148 (*)     All other components within normal limits   POC GLUCOSE FINGERSTICK - Abnormal; Notable for the following components:    POC Glucose 201 (*)     All other components within normal limits   LIPASE   MAGNESIUM   PROTIME-INR   TROPONIN   TROPONIN   SPECIMEN REJECTION   BASIC METABOLIC PANEL W/ REFLEX TO MG FOR LOW K   CBC WITH AUTO DIFFERENTIAL       Vitals Reviewed:    Vitals:    06/25/24 1130 06/25/24 1414 06/25/24 1515 06/25/24 2015   BP: (!) 157/66  (!) 139/57 (!) 149/71   Pulse: 84  86 87   Resp: 16 18 20 18   Temp: 98.3 °F (36.8 °C)  98.9 °F (37.2 °C) 99.3 °F (37.4 °C)   TempSrc: Axillary  Oral Oral   SpO2: 92%  93% 92%   Weight:       Height:         MEDICATIONS GIVEN TO PATIENT THIS ENCOUNTER:  Orders Placed This Encounter   Medications    sodium chloride 0.9 % bolus 1,000 mL    technetium 99m DTPA solution 40 millicurie    technetium albumin aggregated (MAA) solution 8 millicurie    sodium chloride flush 0.9 % injection 10 mL    sodium chloride flush 0.9 % injection 10 mL    technetium 99m DTPA solution 40 millicurie    0.9 % sodium chloride infusion    DISCONTD: amLODIPine (NORVASC) tablet 10 mg    DISCONTD: atenolol (TENORMIN) tablet 50 mg    cefTRIAXone (ROCEPHIN) 1,000 mg in sodium chloride 0.9 % 50 mL IVPB (mini-bag)     Order Specific Question:   Antimicrobial Indications     Answer:   Urinary Tract Infection    sodium chloride flush 0.9 % injection 5-40 mL    sodium chloride flush 0.9 % injection 5-40 mL    0.9 % sodium chloride infusion    OR Linked Order Group     potassium chloride (KLOR-CON M) extended release tablet 40 mEq     potassium bicarb-citric acid (EFFER-K) effervescent tablet 40 mEq     potassium chloride 10 mEq/100 mL IVPB (Peripheral  pain, unspecified type    3. Angina pectoris (HCC)    4. Shortness of breath          DISPOSITION/PLAN   DISPOSITION Admitted 06/24/2024 05:20:01 PM      OUTPATIENT FOLLOW UP THE PATIENT:  No follow-up provider specified.    DO Tadeo Cantrell Nathan R, DO  06/25/24 2124

## 2024-06-25 ENCOUNTER — APPOINTMENT (OUTPATIENT)
Age: 64
DRG: 199 | End: 2024-06-25
Attending: INTERNAL MEDICINE
Payer: MEDICAID

## 2024-06-25 ENCOUNTER — APPOINTMENT (OUTPATIENT)
Dept: GENERAL RADIOLOGY | Age: 64
DRG: 199 | End: 2024-06-25
Payer: MEDICAID

## 2024-06-25 LAB
ANION GAP SERPL CALCULATED.3IONS-SCNC: 13 MMOL/L (ref 9–17)
BASOPHILS # BLD: 0.1 K/UL (ref 0–0.2)
BASOPHILS NFR BLD: 1 % (ref 0–2)
BUN SERPL-MCNC: 32 MG/DL (ref 8–23)
CALCIUM SERPL-MCNC: 10 MG/DL (ref 8.6–10.4)
CHLORIDE SERPL-SCNC: 106 MMOL/L (ref 98–107)
CO2 SERPL-SCNC: 20 MMOL/L (ref 20–31)
CREAT SERPL-MCNC: 2 MG/DL (ref 0.5–0.9)
ECHO AO ROOT DIAM: 2.4 CM
ECHO AO ROOT INDEX: 1.19 CM/M2
ECHO AV AREA PEAK VELOCITY: 1.2 CM2
ECHO AV AREA VTI: 1.4 CM2
ECHO AV AREA/BSA PEAK VELOCITY: 0.6 CM2/M2
ECHO AV AREA/BSA VTI: 0.7 CM2/M2
ECHO AV MEAN GRADIENT: 9 MMHG
ECHO AV MEAN VELOCITY: 1.4 M/S
ECHO AV PEAK GRADIENT: 17 MMHG
ECHO AV PEAK VELOCITY: 2 M/S
ECHO AV VELOCITY RATIO: 0.5
ECHO AV VTI: 39.3 CM
ECHO BSA: 1.98 M2
ECHO EST RA PRESSURE: 3 MMHG
ECHO LA AREA 2C: 18.6 CM2
ECHO LA AREA 4C: 23.3 CM2
ECHO LA DIAMETER INDEX: 1.69 CM/M2
ECHO LA DIAMETER: 3.4 CM
ECHO LA MAJOR AXIS: 5.9 CM
ECHO LA MINOR AXIS: 4.8 CM
ECHO LA TO AORTIC ROOT RATIO: 1.42
ECHO LA VOL BP: 73 ML (ref 22–52)
ECHO LA VOL MOD A2C: 59 ML (ref 22–52)
ECHO LA VOL MOD A4C: 75 ML (ref 22–52)
ECHO LA VOL/BSA BIPLANE: 36 ML/M2 (ref 16–34)
ECHO LA VOLUME INDEX MOD A2C: 29 ML/M2 (ref 16–34)
ECHO LA VOLUME INDEX MOD A4C: 37 ML/M2 (ref 16–34)
ECHO LV E' LATERAL VELOCITY: 8 CM/S
ECHO LV E' SEPTAL VELOCITY: 7 CM/S
ECHO LV FRACTIONAL SHORTENING: 29 % (ref 28–44)
ECHO LV INTERNAL DIMENSION DIASTOLE INDEX: 2.04 CM/M2
ECHO LV INTERNAL DIMENSION DIASTOLIC: 4.1 CM (ref 3.9–5.3)
ECHO LV INTERNAL DIMENSION SYSTOLIC INDEX: 1.44 CM/M2
ECHO LV INTERNAL DIMENSION SYSTOLIC: 2.9 CM
ECHO LV IVSD: 1.4 CM (ref 0.6–0.9)
ECHO LV MASS 2D: 216.6 G (ref 67–162)
ECHO LV MASS INDEX 2D: 107.7 G/M2 (ref 43–95)
ECHO LV POSTERIOR WALL DIASTOLIC: 1.4 CM (ref 0.6–0.9)
ECHO LV RELATIVE WALL THICKNESS RATIO: 0.68
ECHO LVOT AREA: 2.5 CM2
ECHO LVOT AV VTI INDEX: 0.55
ECHO LVOT DIAM: 1.8 CM
ECHO LVOT MEAN GRADIENT: 2 MMHG
ECHO LVOT PEAK GRADIENT: 4 MMHG
ECHO LVOT PEAK VELOCITY: 1 M/S
ECHO LVOT STROKE VOLUME INDEX: 27.2 ML/M2
ECHO LVOT SV: 54.7 ML
ECHO LVOT VTI: 21.5 CM
ECHO MV A VELOCITY: 1.08 M/S
ECHO MV AREA VTI: 1.5 CM2
ECHO MV E DECELERATION TIME (DT): 193 MS
ECHO MV E VELOCITY: 0.95 M/S
ECHO MV E/A RATIO: 0.88
ECHO MV E/E' LATERAL: 11.88
ECHO MV E/E' RATIO (AVERAGED): 12.72
ECHO MV E/E' SEPTAL: 13.57
ECHO MV LVOT VTI INDEX: 1.73
ECHO MV MAX VELOCITY: 1.3 M/S
ECHO MV MEAN GRADIENT: 2 MMHG
ECHO MV MEAN VELOCITY: 0.7 M/S
ECHO MV PEAK GRADIENT: 7 MMHG
ECHO MV VTI: 37.1 CM
ECHO RA AREA 4C: 14.3 CM2
ECHO RA END SYSTOLIC VOLUME APICAL 4 CHAMBER INDEX BSA: 18 ML/M2
ECHO RA VOLUME: 36 ML
ECHO RIGHT VENTRICULAR SYSTOLIC PRESSURE (RVSP): 11 MMHG
ECHO RV TAPSE: 2.2 CM (ref 1.7–?)
ECHO TV REGURGITANT MAX VELOCITY: 1.43 M/S
ECHO TV REGURGITANT PEAK GRADIENT: 8 MMHG
EKG ATRIAL RATE: 88 BPM
EKG P AXIS: 66 DEGREES
EKG P-R INTERVAL: 134 MS
EKG Q-T INTERVAL: 378 MS
EKG QRS DURATION: 82 MS
EKG QTC CALCULATION (BAZETT): 457 MS
EKG T AXIS: 67 DEGREES
EKG VENTRICULAR RATE: 88 BPM
EOSINOPHIL # BLD: 0.6 K/UL (ref 0–0.4)
EOSINOPHILS RELATIVE PERCENT: 6 % (ref 0–4)
ERYTHROCYTE [DISTWIDTH] IN BLOOD BY AUTOMATED COUNT: 15.6 % (ref 11.5–14.9)
GFR, ESTIMATED: 28 ML/MIN/1.73M2
GLUCOSE BLD-MCNC: 106 MG/DL (ref 65–105)
GLUCOSE BLD-MCNC: 148 MG/DL (ref 65–105)
GLUCOSE BLD-MCNC: 161 MG/DL (ref 65–105)
GLUCOSE BLD-MCNC: 201 MG/DL (ref 65–105)
GLUCOSE BLD-MCNC: 229 MG/DL (ref 65–105)
GLUCOSE SERPL-MCNC: 114 MG/DL (ref 70–99)
HCT VFR BLD AUTO: 29 % (ref 36–46)
HGB BLD-MCNC: 9.4 G/DL (ref 12–16)
LYMPHOCYTES NFR BLD: 3.5 K/UL (ref 1–4.8)
LYMPHOCYTES RELATIVE PERCENT: 32 % (ref 24–44)
MCH RBC QN AUTO: 29.4 PG (ref 26–34)
MCHC RBC AUTO-ENTMCNC: 32.4 G/DL (ref 31–37)
MCV RBC AUTO: 90.8 FL (ref 80–100)
MONOCYTES NFR BLD: 0.5 K/UL (ref 0.1–1.3)
MONOCYTES NFR BLD: 5 % (ref 1–7)
NEUTROPHILS NFR BLD: 56 % (ref 36–66)
NEUTS SEG NFR BLD: 6.2 K/UL (ref 1.3–9.1)
PLATELET # BLD AUTO: 368 K/UL (ref 150–450)
PMV BLD AUTO: 7.5 FL (ref 6–12)
POTASSIUM SERPL-SCNC: 4.2 MMOL/L (ref 3.7–5.3)
RBC # BLD AUTO: 3.19 M/UL (ref 4–5.2)
SODIUM SERPL-SCNC: 139 MMOL/L (ref 135–144)
WBC OTHER # BLD: 11 K/UL (ref 3.5–11)

## 2024-06-25 PROCEDURE — 6370000000 HC RX 637 (ALT 250 FOR IP): Performed by: INTERNAL MEDICINE

## 2024-06-25 PROCEDURE — 72100 X-RAY EXAM L-S SPINE 2/3 VWS: CPT

## 2024-06-25 PROCEDURE — 2580000003 HC RX 258: Performed by: EMERGENCY MEDICINE

## 2024-06-25 PROCEDURE — 93306 TTE W/DOPPLER COMPLETE: CPT | Performed by: INTERNAL MEDICINE

## 2024-06-25 PROCEDURE — 99233 SBSQ HOSP IP/OBS HIGH 50: CPT | Performed by: INTERNAL MEDICINE

## 2024-06-25 PROCEDURE — 72072 X-RAY EXAM THORAC SPINE 3VWS: CPT

## 2024-06-25 PROCEDURE — 36415 COLL VENOUS BLD VENIPUNCTURE: CPT

## 2024-06-25 PROCEDURE — 6360000002 HC RX W HCPCS: Performed by: NURSE PRACTITIONER

## 2024-06-25 PROCEDURE — 2060000000 HC ICU INTERMEDIATE R&B

## 2024-06-25 PROCEDURE — 85025 COMPLETE CBC W/AUTO DIFF WBC: CPT

## 2024-06-25 PROCEDURE — 6360000002 HC RX W HCPCS: Performed by: INTERNAL MEDICINE

## 2024-06-25 PROCEDURE — 2580000003 HC RX 258: Performed by: INTERNAL MEDICINE

## 2024-06-25 PROCEDURE — 93306 TTE W/DOPPLER COMPLETE: CPT

## 2024-06-25 PROCEDURE — 80048 BASIC METABOLIC PNL TOTAL CA: CPT

## 2024-06-25 RX ORDER — TIZANIDINE 4 MG/1
4 TABLET ORAL 3 TIMES DAILY
Status: DISCONTINUED | OUTPATIENT
Start: 2024-06-25 | End: 2024-06-25

## 2024-06-25 RX ORDER — ERYTHROMYCIN 5 MG/G
OINTMENT OPHTHALMIC EVERY 8 HOURS SCHEDULED
Status: DISCONTINUED | OUTPATIENT
Start: 2024-06-25 | End: 2024-07-01 | Stop reason: HOSPADM

## 2024-06-25 RX ORDER — FUROSEMIDE 10 MG/ML
20 INJECTION INTRAMUSCULAR; INTRAVENOUS ONCE
Status: COMPLETED | OUTPATIENT
Start: 2024-06-25 | End: 2024-06-25

## 2024-06-25 RX ORDER — TIZANIDINE 2 MG/1
2 TABLET ORAL 3 TIMES DAILY
Status: DISCONTINUED | OUTPATIENT
Start: 2024-06-25 | End: 2024-07-01 | Stop reason: HOSPADM

## 2024-06-25 RX ADMIN — POLYETHYLENE GLYCOL 3350 17 G: 17 POWDER, FOR SOLUTION ORAL at 21:05

## 2024-06-25 RX ADMIN — INSULIN GLARGINE 20 UNITS: 100 INJECTION, SOLUTION SUBCUTANEOUS at 20:50

## 2024-06-25 RX ADMIN — ATENOLOL 50 MG: 50 TABLET ORAL at 09:57

## 2024-06-25 RX ADMIN — ERYTHROMYCIN: 5 OINTMENT OPHTHALMIC at 20:51

## 2024-06-25 RX ADMIN — PRAVASTATIN SODIUM 40 MG: 40 TABLET ORAL at 20:49

## 2024-06-25 RX ADMIN — BUPROPION HYDROCHLORIDE 300 MG: 300 TABLET, EXTENDED RELEASE ORAL at 09:57

## 2024-06-25 RX ADMIN — SODIUM CHLORIDE: 9 INJECTION, SOLUTION INTRAVENOUS at 13:34

## 2024-06-25 RX ADMIN — TIZANIDINE 2 MG: 2 TABLET ORAL at 15:32

## 2024-06-25 RX ADMIN — ERYTHROMYCIN: 5 OINTMENT OPHTHALMIC at 15:32

## 2024-06-25 RX ADMIN — ASPIRIN 81 MG: 81 TABLET, COATED ORAL at 09:57

## 2024-06-25 RX ADMIN — MORPHINE SULFATE 2 MG: 2 INJECTION, SOLUTION INTRAMUSCULAR; INTRAVENOUS at 04:04

## 2024-06-25 RX ADMIN — MORPHINE SULFATE 2 MG: 2 INJECTION, SOLUTION INTRAMUSCULAR; INTRAVENOUS at 14:14

## 2024-06-25 RX ADMIN — HYDRALAZINE HYDROCHLORIDE 10 MG: 20 INJECTION, SOLUTION INTRAMUSCULAR; INTRAVENOUS at 04:05

## 2024-06-25 RX ADMIN — GABAPENTIN 300 MG: 300 CAPSULE ORAL at 20:49

## 2024-06-25 RX ADMIN — FUROSEMIDE 20 MG: 10 INJECTION, SOLUTION INTRAMUSCULAR; INTRAVENOUS at 23:06

## 2024-06-25 RX ADMIN — GABAPENTIN 300 MG: 300 CAPSULE ORAL at 14:14

## 2024-06-25 RX ADMIN — TRAZODONE HYDROCHLORIDE 50 MG: 50 TABLET ORAL at 20:49

## 2024-06-25 RX ADMIN — CEFTRIAXONE SODIUM 1000 MG: 1 INJECTION, POWDER, FOR SOLUTION INTRAMUSCULAR; INTRAVENOUS at 10:09

## 2024-06-25 RX ADMIN — TIZANIDINE 2 MG: 2 TABLET ORAL at 20:49

## 2024-06-25 RX ADMIN — MORPHINE SULFATE 2 MG: 2 INJECTION, SOLUTION INTRAMUSCULAR; INTRAVENOUS at 09:52

## 2024-06-25 RX ADMIN — GABAPENTIN 300 MG: 300 CAPSULE ORAL at 09:57

## 2024-06-25 RX ADMIN — AMLODIPINE BESYLATE 10 MG: 10 TABLET ORAL at 09:57

## 2024-06-25 RX ADMIN — ONDANSETRON 4 MG: 4 TABLET, ORALLY DISINTEGRATING ORAL at 13:40

## 2024-06-25 ASSESSMENT — PAIN SCALES - WONG BAKER: WONGBAKER_NUMERICALRESPONSE: NO HURT

## 2024-06-25 ASSESSMENT — PAIN DESCRIPTION - ORIENTATION
ORIENTATION: RIGHT;LEFT
ORIENTATION: RIGHT;LEFT;MID
ORIENTATION: RIGHT;LEFT;MID
ORIENTATION: MID

## 2024-06-25 ASSESSMENT — PAIN DESCRIPTION - LOCATION
LOCATION: BACK

## 2024-06-25 ASSESSMENT — PAIN - FUNCTIONAL ASSESSMENT: PAIN_FUNCTIONAL_ASSESSMENT: ACTIVITIES ARE NOT PREVENTED

## 2024-06-25 ASSESSMENT — PAIN DESCRIPTION - DESCRIPTORS
DESCRIPTORS: ACHING;DISCOMFORT
DESCRIPTORS: STABBING;THROBBING;ACHING
DESCRIPTORS: CRAMPING;SHOOTING

## 2024-06-25 ASSESSMENT — PAIN SCALES - GENERAL
PAINLEVEL_OUTOF10: 8
PAINLEVEL_OUTOF10: 8
PAINLEVEL_OUTOF10: 6
PAINLEVEL_OUTOF10: 8

## 2024-06-25 NOTE — PROGRESS NOTES
[facility]   OCCUPATIONAL THERAPY MISSED TREATMENT NOTE   INPATIENT   Date: 24  Patient Name: Shauna Alexandra       Room:   MRN: [id number]   Account #: [id number]    : 1960  (64 y.o.)  Gender: female                 REASON FOR MISSED TREATMENT:    AM: Attempt @ 1002  - Patient declined  -    Writer introduced self and role of OT. Patient declined due to tiredness and pain requesting therapy to check back in PM. OT will continue to follow.       PM: Attempt @ 1429  -  Patient declined stating, \"not today\" requesting therapy to check back tomorrow. OT will continue to follow.    Electronically signed by REGINALDO Epperson on 24 at 10:51 AM EDT

## 2024-06-25 NOTE — PLAN OF CARE
Problem: Pain  Goal: Verbalizes/displays adequate comfort level or baseline comfort level  6/25/2024 1600 by Alfreda Arriaga RN  Outcome: Progressing  Flowsheets (Taken 6/25/2024 1600)  Verbalizes/displays adequate comfort level or baseline comfort level:   Encourage patient to monitor pain and request assistance   Assess pain using appropriate pain scale   Administer analgesics based on type and severity of pain and evaluate response  Note: Pt receiving pain medication Q3hr PRN     Problem: Skin/Tissue Integrity  Goal: Absence of new skin breakdown  Description: 1.  Monitor for areas of redness and/or skin breakdown  2.  Assess vascular access sites hourly  3.  Every 4-6 hours minimum:  Change oxygen saturation probe site  4.  Every 4-6 hours:  If on nasal continuous positive airway pressure, respiratory therapy assess nares and determine need for appliance change or resting period.  6/25/2024 1600 by Alfreda Arriaga RN  Outcome: Progressing  Note: Encouraging and assisting with turning and repositioning as needed     Problem: Chronic Conditions and Co-morbidities  Goal: Patient's chronic conditions and co-morbidity symptoms are monitored and maintained or improved  Outcome: Progressing  Flowsheets (Taken 6/25/2024 1600)  Care Plan - Patient's Chronic Conditions and Co-Morbidity Symptoms are Monitored and Maintained or Improved:   Monitor and assess patient's chronic conditions and comorbid symptoms for stability, deterioration, or improvement   Collaborate with multidisciplinary team to address chronic and comorbid conditions and prevent exacerbation or deterioration

## 2024-06-25 NOTE — FLOWSHEET NOTE
06/24/24 1953   Treatment Team Notification   Reason for Communication Patient/Family request   Name of Team Member Notified Lola Cardona   Treatment Team Role Advanced Practice Nurse   Method of Communication Secure Message   Response See orders   Notification Time 1954       RN updated Lola Cardona of pts 9/10 back pain and pt requesting pain medication. Lola Cardona to place own order for 1 time dose Norco. See MAR/orders.

## 2024-06-25 NOTE — PROGRESS NOTES
Spoke with Ingrid on floor, informed her due to patient having lung scan done Monday afternoon stress test cannot be done today. Must wait 48hrs for radiation to decay from chest cavity from lung scan to do stress. Plan for back ground check Wednesday with possible stress same day or Thursday depending. Please call [phone] with any questions. Electronically signed by Junaid Aguayo on 6/25/2024 at 6:54 AM

## 2024-06-25 NOTE — CARE COORDINATION
Case Management Assessment  Initial Evaluation    Date/Time of Evaluation: 6/25/2024 12:18 PM  Assessment Completed by: Eugenia Duke RN    If patient is discharged prior to next notation, then this note serves as note for discharge by case management.    Patient Name: Shauna Alxeandra                   YOB: 1960  Diagnosis: Shortness of breath [R06.02]  Angina pectoris (HCC) [I20.9]  ALBERTO (acute kidney injury) (HCC) [N17.9]  Chest pain, unspecified type [R07.9]                   Date / Time: 6/24/2024 11:48 AM    Patient Admission Status: Inpatient   Readmission Risk (Low < 19, Mod (19-27), High > 27): Readmission Risk Score: 16.1    Current PCP: Kody Licona APRN - CNP  PCP verified by CM? Yes    Chart Reviewed: Yes      History Provided by: Patient  Patient Orientation: Alert and Oriented    Patient Cognition: Alert    Hospitalization in the last 30 days (Readmission):  No    If yes, Readmission Assessment in CM Navigator will be completed.    Advance Directives:      Code Status: Full Code   Patient's Primary Decision Maker is: Legal Next of Kin      Discharge Planning:    Patient lives with: Alone Type of Home: Apartment  Primary Care Giver: Self  Patient Support Systems include: Children, Parent   Current Financial resources: Medicaid  Current community resources: Transportation, Psychiatric Treatment (medical cabs, [facility] (group therapy on Tues, outings on Tues-Thurs, they assist w/ supplies/rides))  Current services prior to admission: (S) Durable Medical Equipment, Other (Comment) (medical cabs, [facility] (group therapy on Tues, outings on Tues-Thurs, they assist w/ supplies/rides))            Current DME: Glucometer            Type of Home Care services:  None    ADLS  Prior functional level: Independent in ADLs/IADLs  Current functional level:      PT AM-PAC:   /24  OT AM-PAC:   /24    Family can provide assistance at DC: No  Would you like  Case Management to discuss the discharge plan with any other family members/significant others, and if so, who? No  Plans to Return to Present Housing: Yes  Other Identified Issues/Barriers to RETURNING to current housing: weakness  Potential Assistance needed at discharge: Durable Medical Equipment            Potential DME: (S) Walker (wants a rollator)  Patient expects to discharge to: Apartment  Plan for transportation at discharge: Cab    Financial    Payor: [facility] PL / Plan: [facility] PLAN [place] / Product Type: *No Product type* /     Does insurance require precert for SNF: Yes    Potential assistance Purchasing Medications: No  Meds-to-Beds request:  no      [facility] - [place], [place] - [address] - P [phone] - F [phone]  [address] [place] [address]  Phone: [phone] Fax: [phone]    [id number] - [place], [place] - [address] - P [phone] - F [phone]  [address]  [place] [address]  Phone: [phone] Fax: [phone]      Notes:    Factors facilitating achievement of predicted outcomes: Good insight into deficits    Barriers to discharge: Lower extremity weakness and Long standing deficits    Additional Case Management Notes: 6/25/2024 [facility]; from 4th floor apt alone; DME free style elis (wants rollator, need order/F2F) VNS none/declined; current w/ medical cabs, [facility] (group therapy on Tues, outings on Tues-Thurs, they assist w/ supplies/rides); VQ-low prob, stress, echo, IV rocephin-UTI, PT/OT, wants new PCP at [facility]; [place] 16%    The Plan for Transition of Care is related to the following treatment goals of Shortness of breath [R06.02]  Angina pectoris (HCC) [I20.9]  ALBERTO (acute kidney injury) (HCC) [N17.9]  Chest pain, unspecified type [R07.9]    IF APPLICABLE: The Patient and/or patient representative Shauna and her family were provided

## 2024-06-25 NOTE — PLAN OF CARE
Problem: Pain  Goal: Verbalizes/displays adequate comfort level or baseline comfort level  Outcome: Progressing  Note: Pt medicated with pain medication prn.  Assessed all pain characteristics including level, type, location, frequency, and onset.  Non-pharmacologic interventions offered to pt as well.  Pt states pain is tolerable at this time.        Problem: Safety - Adult  Goal: Free from fall injury  Outcome: Progressing  Note: Pt assessed as a fall risk this shift. Remains free from falls and accidental injury at this time. Fall precautions in place, including falling star sign and fall risk band on pt. Floor free from obstacles, and bed is locked and in lowest position. Adequate lighting provided.  Pt encouraged to call before getting OOB for any need.  Bed alarm activated. Will continue to monitor needs during hourly rounding, and reinforce education on use of call light.       Problem: Skin/Tissue Integrity  Goal: Absence of new skin breakdown  Description: 1.  Monitor for areas of redness and/or skin breakdown  2.  Assess vascular access sites hourly  3.  Every 4-6 hours minimum:  Change oxygen saturation probe site  4.  Every 4-6 hours:  If on nasal continuous positive airway pressure, respiratory therapy assess nares and determine need for appliance change or resting period.  Outcome: Progressing  Note: Skin assessment complete. Sensicare applied PRN. Turned and repositioned every two hours per self.  Area kept free from moisture. Proper nourishment and fluids encouraged, as appropriate. Will continue to monitor for additional needs and changes in skin breakdown.

## 2024-06-25 NOTE — PROGRESS NOTES
Rn received a call due to pt having a VQ scan yesterday pt would have to wait 48hrs for stress test. Stress was scheduled for this AM. MD aware, pt may resume diet.

## 2024-06-25 NOTE — PROGRESS NOTES
[facility] Internal Medicine  Bakari Zacarias MD; Rory Rios MD; Pavel Bolden MD; MD Zulema Alonzo MD; Wiley Saxena MD; Izzy White MD      HISTORY AND PHYSICAL EXAMINATION            Date:   2024  Patient name:  Shauna Alexandra  MRN:   [id number]  Account:  [id number]  YOB: 1960  PCP:    Kody Licona APRN - CNP  Code Status:    Full Code    Chief Complaint:     Chief Complaint   Patient presents with    Chest Pain    Abdominal Pain    Insect Bite     X 2 days post outside urgent care visit no relief, complians of increased pain inhale, Pt also complains of eye discharge, and possible spider bite          History Obtained From:     Patient, EMR, nursing staff    HPI     This patient is a 64 y.o. Non- / non  femalewho has hypertension hyperlipidemia type 2 diabetes no known cardiac history presenting with shortness of breath and chest pain noted to have very high blood pressure and ALBERTO in ER.  Presents with  UA concerning for UTI      Review of Systems:     Positive for shortness of breath no cough  Positive for some chest pain denies palpitations  Positive for upper abdominal pain  Denies any new numbness tremors or weakness.    A 10 point review of systems was performed and and negative except as mentioned in HPI  Positive and Negative as described in HPI.      Past Medical History:     Past Medical History:   Diagnosis Date    Anemia     Chronic back pain     Depression     Hyperlipidemia     Hypertension     Type 2 diabetes mellitus with diabetic polyneuropathy, without long-term current use of insulin ([facility]) 11/10/2016    Type II or unspecified type diabetes mellitus without mention of complication, not stated as uncontrolled         Past Surgical History:     Past Surgical History:   Procedure Laterality Date     SECTION      EXTERNAL EAR SURGERY      EYE SURGERY      RECTAL SURGERY Right 2021    RECTAL PERIRECTAL  iterative reconstruction, and/or weight based adjustment of the mA/kV was utilized to reduce the radiation dose to as low as reasonably achievable. COMPARISON: None. HISTORY: ORDERING SYSTEM PROVIDED HISTORY: pain TECHNOLOGIST PROVIDED HISTORY: pain Decision Support Exception - unselect if not a suspected or confirmed emergency medical condition->Emergency Medical Condition (MA) Reason for Exam: Pain.Pt. states bilateral rib, back pain, frequent urination. No known injury FINDINGS: Lower Chest: Subsegmental atelectasis is noted in the lung bases. Organs: Liver: Liver is normal. No focal lesions are seen. Spleen: Normal. Pancreas: Normal Gallbladder: Gallbladder is normal in wall thickness. There are multiple radioopaque gallbladder calculi.  CBD is normal in caliber.. Adrenal glands: Normal. No focal lesions are seen. Kidneys and ureters: Normal There is no hydronephrosis or calculi. Ureters are non-dilated. Abdominal aorta and branches: Normal in caliber. IVC and portal vein: Normal in caliber. GI/Bowel: Bowel loops are normal in wall-thickness and caliber. Scattered left-sided colonic diverticulosis is seen. No evidence of appendicitis. Peritoneum/Retroperitoneum: Normal PELVIS Focal thickening the uterine endometrium near the fundus measures 3.7 cm in size. Bones/Soft Tissues: Degenerative changes are seen in the lumbar spine. No lytic or blastic lesions are seen. No soft tissue swelling is seen.     1. Cholelithiasis without CT evidence of acute cholecystitis. 2. Scattered left-sided colonic diverticulosis without evidence of diverticulitis. 3. Focal thickening of the endometrium near the fundus measuring 3.7 cm in size.  Pelvic ultrasound may be considered for further evaluation of this finding. 4. Degenerative changes in the lumbar spine.     NM LUNG VENT/PERFUSION (VQ)    Result Date: 6/24/2024  EXAMINATION: NUCLEAR MEDICINE VENTILATION PERFUSION SCAN. 6/24/2024 TECHNIQUE: 33.3 millicuries of technetium 99m

## 2024-06-25 NOTE — PROGRESS NOTES
Physical Therapy        Physical Therapy Cancel Note      DATE: 2024    NAME: Shauna Alexandra  MRN: [id number]   : 1960      Patient not seen this date for Physical Therapy due to:    Patient Declined: pt reports being tired and painful and requests therapy to return in PM      Electronically signed by Madonna Manzanares PT on 2024 at 10:41 AM

## 2024-06-25 NOTE — FLOWSHEET NOTE
06/24/24 2130   Treatment Team Notification   Reason for Communication Patient/Family request;Review case   Name of Team Member Notified Renata Forte   Treatment Team Role Advanced Practice Nurse   Method of Communication Face to face   Response See orders   Notification Time 2130       RN updated Renata Forte of one time dose [place] from Lola Cardona, but that pt going NPO at midnight for AM stress test and pt complaints that PO meds make her itchy and will \"need some benadyrl for the itching.\" Renata Forte to place own order for IV pain meds and benadryl. See MAR/orders.

## 2024-06-26 ENCOUNTER — APPOINTMENT (OUTPATIENT)
Dept: MRI IMAGING | Age: 64
DRG: 199 | End: 2024-06-26
Payer: MEDICAID

## 2024-06-26 LAB
ANION GAP SERPL CALCULATED.3IONS-SCNC: 11 MMOL/L (ref 9–17)
BASOPHILS # BLD: 0.1 K/UL (ref 0–0.2)
BASOPHILS NFR BLD: 1 % (ref 0–2)
BUN SERPL-MCNC: 30 MG/DL (ref 8–23)
C3 SERPL-MCNC: 186 MG/DL (ref 90–180)
C4 SERPL-MCNC: 48 MG/DL (ref 10–40)
CALCIUM SERPL-MCNC: 9.7 MG/DL (ref 8.6–10.4)
CHLORIDE SERPL-SCNC: 107 MMOL/L (ref 98–107)
CO2 SERPL-SCNC: 18 MMOL/L (ref 20–31)
CREAT SERPL-MCNC: 2 MG/DL (ref 0.5–0.9)
CREAT UR-MCNC: 78 MG/DL (ref 28–217)
EOSINOPHIL # BLD: 0.3 K/UL (ref 0–0.4)
EOSINOPHILS RELATIVE PERCENT: 3 % (ref 0–4)
ERYTHROCYTE [DISTWIDTH] IN BLOOD BY AUTOMATED COUNT: 15.7 % (ref 11.5–14.9)
FREE KAPPA/LAMBDA RATIO: 1.29 (ref 0.22–1.74)
GFR, ESTIMATED: 28 ML/MIN/1.73M2
GLUCOSE BLD-MCNC: 125 MG/DL (ref 65–105)
GLUCOSE BLD-MCNC: 171 MG/DL (ref 65–105)
GLUCOSE BLD-MCNC: 173 MG/DL (ref 65–105)
GLUCOSE BLD-MCNC: 174 MG/DL (ref 65–105)
GLUCOSE SERPL-MCNC: 130 MG/DL (ref 70–99)
HCT VFR BLD AUTO: 25.9 % (ref 36–46)
HGB BLD-MCNC: 8.4 G/DL (ref 12–16)
KAPPA LC FREE SER-MCNC: 133 MG/L
LAMBDA LC FREE SERPL-MCNC: 103 MG/L (ref 4.2–27.7)
LYMPHOCYTES NFR BLD: 2.6 K/UL (ref 1–4.8)
LYMPHOCYTES RELATIVE PERCENT: 29 % (ref 24–44)
MCH RBC QN AUTO: 30.5 PG (ref 26–34)
MCHC RBC AUTO-ENTMCNC: 32.5 G/DL (ref 31–37)
MCV RBC AUTO: 93.7 FL (ref 80–100)
MONOCYTES NFR BLD: 0.5 K/UL (ref 0.1–1.3)
MONOCYTES NFR BLD: 6 % (ref 1–7)
NEUTROPHILS NFR BLD: 61 % (ref 36–66)
NEUTS SEG NFR BLD: 5.6 K/UL (ref 1.3–9.1)
PLATELET # BLD AUTO: 357 K/UL (ref 150–450)
PMV BLD AUTO: 7.6 FL (ref 6–12)
POTASSIUM SERPL-SCNC: 4.4 MMOL/L (ref 3.7–5.3)
RBC # BLD AUTO: 2.76 M/UL (ref 4–5.2)
SODIUM SERPL-SCNC: 136 MMOL/L (ref 135–144)
TOTAL PROTEIN, URINE: 122 MG/DL
URINE TOTAL PROTEIN CREATININE RATIO: 1.56 (ref 0–0.2)
WBC OTHER # BLD: 9.1 K/UL (ref 3.5–11)

## 2024-06-26 PROCEDURE — 2060000000 HC ICU INTERMEDIATE R&B

## 2024-06-26 PROCEDURE — 82947 ASSAY GLUCOSE BLOOD QUANT: CPT

## 2024-06-26 PROCEDURE — 82570 ASSAY OF URINE CREATININE: CPT

## 2024-06-26 PROCEDURE — 97535 SELF CARE MNGMENT TRAINING: CPT

## 2024-06-26 PROCEDURE — 6360000002 HC RX W HCPCS: Performed by: NURSE PRACTITIONER

## 2024-06-26 PROCEDURE — 84155 ASSAY OF PROTEIN SERUM: CPT

## 2024-06-26 PROCEDURE — 97166 OT EVAL MOD COMPLEX 45 MIN: CPT

## 2024-06-26 PROCEDURE — 97116 GAIT TRAINING THERAPY: CPT

## 2024-06-26 PROCEDURE — 97162 PT EVAL MOD COMPLEX 30 MIN: CPT

## 2024-06-26 PROCEDURE — 2580000003 HC RX 258: Performed by: INTERNAL MEDICINE

## 2024-06-26 PROCEDURE — 86038 ANTINUCLEAR ANTIBODIES: CPT

## 2024-06-26 PROCEDURE — 6360000002 HC RX W HCPCS: Performed by: INTERNAL MEDICINE

## 2024-06-26 PROCEDURE — 85025 COMPLETE CBC W/AUTO DIFF WBC: CPT

## 2024-06-26 PROCEDURE — 72146 MRI CHEST SPINE W/O DYE: CPT

## 2024-06-26 PROCEDURE — 72148 MRI LUMBAR SPINE W/O DYE: CPT

## 2024-06-26 PROCEDURE — 51798 US URINE CAPACITY MEASURE: CPT

## 2024-06-26 PROCEDURE — 97530 THERAPEUTIC ACTIVITIES: CPT

## 2024-06-26 PROCEDURE — 84165 PROTEIN E-PHORESIS SERUM: CPT

## 2024-06-26 PROCEDURE — 83521 IG LIGHT CHAINS FREE EACH: CPT

## 2024-06-26 PROCEDURE — 80048 BASIC METABOLIC PNL TOTAL CA: CPT

## 2024-06-26 PROCEDURE — 86225 DNA ANTIBODY NATIVE: CPT

## 2024-06-26 PROCEDURE — 87040 BLOOD CULTURE FOR BACTERIA: CPT

## 2024-06-26 PROCEDURE — 36415 COLL VENOUS BLD VENIPUNCTURE: CPT

## 2024-06-26 PROCEDURE — 84166 PROTEIN E-PHORESIS/URINE/CSF: CPT

## 2024-06-26 PROCEDURE — 6370000000 HC RX 637 (ALT 250 FOR IP): Performed by: INTERNAL MEDICINE

## 2024-06-26 PROCEDURE — 84156 ASSAY OF PROTEIN URINE: CPT

## 2024-06-26 PROCEDURE — 86021 WBC ANTIBODY IDENTIFICATION: CPT

## 2024-06-26 PROCEDURE — 86160 COMPLEMENT ANTIGEN: CPT

## 2024-06-26 PROCEDURE — 99233 SBSQ HOSP IP/OBS HIGH 50: CPT | Performed by: INTERNAL MEDICINE

## 2024-06-26 PROCEDURE — 87086 URINE CULTURE/COLONY COUNT: CPT

## 2024-06-26 RX ORDER — FUROSEMIDE 10 MG/ML
20 INJECTION INTRAMUSCULAR; INTRAVENOUS ONCE
Status: COMPLETED | OUTPATIENT
Start: 2024-06-26 | End: 2024-06-26

## 2024-06-26 RX ADMIN — GABAPENTIN 300 MG: 300 CAPSULE ORAL at 14:00

## 2024-06-26 RX ADMIN — BUPROPION HYDROCHLORIDE 300 MG: 300 TABLET, EXTENDED RELEASE ORAL at 09:04

## 2024-06-26 RX ADMIN — ERYTHROMYCIN: 5 OINTMENT OPHTHALMIC at 14:00

## 2024-06-26 RX ADMIN — TIZANIDINE 2 MG: 2 TABLET ORAL at 09:04

## 2024-06-26 RX ADMIN — ATENOLOL 50 MG: 50 TABLET ORAL at 09:05

## 2024-06-26 RX ADMIN — ASPIRIN 81 MG: 81 TABLET, COATED ORAL at 09:04

## 2024-06-26 RX ADMIN — AMLODIPINE BESYLATE 10 MG: 10 TABLET ORAL at 09:04

## 2024-06-26 RX ADMIN — GABAPENTIN 300 MG: 300 CAPSULE ORAL at 09:04

## 2024-06-26 RX ADMIN — MORPHINE SULFATE 2 MG: 2 INJECTION, SOLUTION INTRAMUSCULAR; INTRAVENOUS at 13:59

## 2024-06-26 RX ADMIN — ACETAMINOPHEN 650 MG: 325 TABLET ORAL at 09:03

## 2024-06-26 RX ADMIN — MORPHINE SULFATE 2 MG: 2 INJECTION, SOLUTION INTRAMUSCULAR; INTRAVENOUS at 02:15

## 2024-06-26 RX ADMIN — MORPHINE SULFATE 2 MG: 2 INJECTION, SOLUTION INTRAMUSCULAR; INTRAVENOUS at 08:58

## 2024-06-26 RX ADMIN — INSULIN GLARGINE 20 UNITS: 100 INJECTION, SOLUTION SUBCUTANEOUS at 20:23

## 2024-06-26 RX ADMIN — TRAZODONE HYDROCHLORIDE 50 MG: 50 TABLET ORAL at 23:08

## 2024-06-26 RX ADMIN — ERYTHROMYCIN: 5 OINTMENT OPHTHALMIC at 05:44

## 2024-06-26 RX ADMIN — FUROSEMIDE 20 MG: 10 INJECTION, SOLUTION INTRAMUSCULAR; INTRAVENOUS at 09:32

## 2024-06-26 RX ADMIN — TIZANIDINE 2 MG: 2 TABLET ORAL at 14:00

## 2024-06-26 RX ADMIN — TIZANIDINE 2 MG: 2 TABLET ORAL at 20:20

## 2024-06-26 RX ADMIN — PIPERACILLIN AND TAZOBACTAM 3375 MG: 3; .375 INJECTION, POWDER, LYOPHILIZED, FOR SOLUTION INTRAVENOUS at 19:13

## 2024-06-26 RX ADMIN — PRAVASTATIN SODIUM 40 MG: 40 TABLET ORAL at 20:23

## 2024-06-26 RX ADMIN — GABAPENTIN 300 MG: 300 CAPSULE ORAL at 20:23

## 2024-06-26 RX ADMIN — ERYTHROMYCIN: 5 OINTMENT OPHTHALMIC at 20:23

## 2024-06-26 RX ADMIN — PIPERACILLIN AND TAZOBACTAM 4500 MG: 4; .5 INJECTION, POWDER, LYOPHILIZED, FOR SOLUTION INTRAVENOUS at 12:57

## 2024-06-26 ASSESSMENT — PAIN DESCRIPTION - LOCATION
LOCATION: BACK
LOCATION: ABDOMEN

## 2024-06-26 ASSESSMENT — PAIN DESCRIPTION - DESCRIPTORS
DESCRIPTORS: ACHING;DISCOMFORT;SORE
DESCRIPTORS: ACHING;STABBING;THROBBING

## 2024-06-26 ASSESSMENT — PAIN DESCRIPTION - PAIN TYPE: TYPE: ACUTE PAIN

## 2024-06-26 ASSESSMENT — PAIN DESCRIPTION - ORIENTATION
ORIENTATION: LEFT
ORIENTATION: RIGHT

## 2024-06-26 ASSESSMENT — PAIN SCALES - GENERAL
PAINLEVEL_OUTOF10: 8
PAINLEVEL_OUTOF10: 7
PAINLEVEL_OUTOF10: 8
PAINLEVEL_OUTOF10: 0
PAINLEVEL_OUTOF10: 8

## 2024-06-26 ASSESSMENT — PAIN - FUNCTIONAL ASSESSMENT: PAIN_FUNCTIONAL_ASSESSMENT: ACTIVITIES ARE NOT PREVENTED

## 2024-06-26 NOTE — PROGRESS NOTES
Xray of the thoracic and lumbar spine sent to Lola Cardona NP via perfect serve.   RN spoke with NP via telephone. Patient c/o pain when taking a deep breath. States she has been having ongoing shortness of breath.   NP placed orders to cut IVF in half from 100 mL/hr to 50 mL/hr. See MAR    Mild crackles heard in the lung bases. RN updated NP on findings. Patient also states that the SOB has not improved since decreasing the IVF.  NP placed orders for 20 mg IV lasix WITH nephrology approval.     Dr. Salazar, nephrology, notified via perfect serve. Physician orders to dc the IVF and ok'd the one time dose of IV lasix. See orders/MAR.

## 2024-06-26 NOTE — PROGRESS NOTES
PT was on the phone,but hung up the phone for the prayer. Pt states \"I need prayers any time.\"     06/26/24 1633   Encounter Summary   Encounter Overview/Reason Spiritual/Emotional Needs   Service Provided For Patient   Referral/Consult From Rounding   Last Encounter  06/26/24   Complexity of Encounter Moderate   Spiritual/Emotional needs   Type Spiritual Support   Assessment/Intervention/Outcome   Assessment Calm;Coping;Peaceful;Hopeful   Intervention Active listening;Explored/Affirmed feelings, thoughts, concerns;Prayer (assurance of)/[place];Sustaining Presence/Ministry of presence   Outcome Acceptance;Engaged in conversation;Receptive

## 2024-06-26 NOTE — CARE COORDINATION
Writer faxed DME orders/Face sheet, for Pt's Requested Rollator to [facility]. Spoke to Anastasia. Informed, that pt. Will call when DC for Delivery, not ready for DC today.

## 2024-06-26 NOTE — CARE COORDINATION
ONGOING DISCHARGE PLAN:    Patient is alert and oriented x4.    Spoke with patient regarding discharge plan and patient confirms that plan is still to return to home alone.     Denies VNS at this time.     PT/OT on board, will follow for all rec/needs.     Pt. Is requesting a Rollator, Need DME orders.     IV Zosyn.    MRI  Lumbar/Spine ordered.     Sating 96% on 2LNC.     Will continue to follow for additional discharge needs.    If patient is discharged prior to next notation, then this note serves as note for discharge by case management.    Electronically signed by Galilea Evans RN on 6/26/2024 at 11:31 AM

## 2024-06-26 NOTE — CARE COORDINATION
[facility] Encounter Date/Time: 2024 1148    Hospital Account: [id number]    MRN: [id number]    Patient: Louie Bess    Contact Serial #: [id number]      ENCOUNTER          Patient Class: I Private Enc?  No Unit RM BD: TALHA PROG    Hospital Service: MED   Encounter DX: Shortness of breath [R06*   ADM Provider: Zulema Tran MD   Procedure:     ATT Provider: Zulema Tran MD   REF Provider:        Admission DX: Shortness of breath, Angina pectoris (HCC), ALBERTO (acute kidney injury) (HCC), Chest pain, unspecified type and DX codes: R06.02, I20.9, N17.9, R07.9      PATIENT                 Name: Louie Bess : 1960 (64 yrs)   Address: [address] [place] [address] [address] Sex: Female   City: [place] [address]         Marital Status: Single   Employer: DISABLED         Gnosticism: Rastafarian   Primary Care Provider: Kody Licona, *         Primary Phone: [phone]   EMERGENCY CONTACT   Contact Name Legal Guardian? Relationship to Patient Home Phone Work Phone   1. Stella Reynoso  2. *No Contact Specified* No    Parent    [phone]            GUARANTOR            Guarantor: Louie Bess     : 1960   Address: [address] [place] [address] [address] Sex: [place] [address]     Relation to Patient: Self       Home Phone: [phone]   Guarantor ID: [id number]       Work Phone:     Guarantor Employer: DISABLED         Status: NOT EMPLO*      COVERAGE        PRIMARY INSURANCE   Payor: [employer] C* Plan: [employer] COMMUN*   Payor Address: [place] [address]       Group Number: [id number] Insurance Type: INDEMNITY   Subscriber Name: LOUIE BESS Subscriber : 1960   Subscriber ID: [id number] Pat. Rel. to Sub: Self   SECONDARY INSURANCE   Payor:   Plan:     Payor Address:  ,           Group Number:   Insurance Type:     Subscriber Name:   Subscriber :     Subscriber ID:   Pat. Rel. to Sub:          CSN: [id number]

## 2024-06-26 NOTE — PLAN OF CARE
Problem: Pain  Goal: Verbalizes/displays adequate comfort level or baseline comfort level  6/26/2024 1524 by Alfreda Arriaga RN  Outcome: Progressing  Flowsheets (Taken 6/26/2024 1524)  Verbalizes/displays adequate comfort level or baseline comfort level:   Encourage patient to monitor pain and request assistance   Assess pain using appropriate pain scale  Note: IV Morphine on q3hr     Problem: Skin/Tissue Integrity  Goal: Absence of new skin breakdown  Description: 1.  Monitor for areas of redness and/or skin breakdown  2.  Assess vascular access sites hourly  3.  Every 4-6 hours minimum:  Change oxygen saturation probe site  4.  Every 4-6 hours:  If on nasal continuous positive airway pressure, respiratory therapy assess nares and determine need for appliance change or resting period.  Outcome: Progressing     Problem: Chronic Conditions and Co-morbidities  Goal: Patient's chronic conditions and co-morbidity symptoms are monitored and maintained or improved  6/26/2024 1524 by Alfreda Arriaga RN  Outcome: Progressing  Flowsheets (Taken 6/26/2024 1524)  Care Plan - Patient's Chronic Conditions and Co-Morbidity Symptoms are Monitored and Maintained or Improved:   Monitor and assess patient's chronic conditions and comorbid symptoms for stability, deterioration, or improvement   Collaborate with multidisciplinary team to address chronic and comorbid conditions and prevent exacerbation or deterioration

## 2024-06-26 NOTE — PROGRESS NOTES
[facility]   Occupational Therapy Evaluation  Date: 24  Patient Name: Shauna Alexandra       Room: 2124/2124-01  MRN: [id number]  Account: [id number]   : 1960  (64 y.o.) Gender: female     Discharge Recommendations:  Further Occupational Therapy is recommended upon facility discharge.    OT Equipment Recommendations  Equipment Needed: Yes  Mobility Devices:  (rollator)       Chief Complaint   Patient presents with    Chest Pain    Abdominal Pain    Insect Bite     X 2 days post outside urgent care visit no relief, complians of increased pain inhale, Pt also complains of eye discharge, and possible spider bite        Treatment Diagnosis: Impaired self-care status    Past Medical History:  has a past medical history of Anemia, Chronic back pain, Depression, Hyperlipidemia, Hypertension, Type 2 diabetes mellitus with diabetic polyneuropathy, without long-term current use of insulin (HCC), and Type II or unspecified type diabetes mellitus without mention of complication, not stated as uncontrolled.    Past Surgical History:   has a past surgical history that includes eye surgery; Thyroid surgery; External ear surgery;  section; and Rectal surgery (Right, 2021).    Restrictions  Restrictions/Precautions  Restrictions/Precautions: Other (comment), General Precautions (up w/ assistance)  Required Braces or Orthoses?: No  Implants present? :  (pt denies)  Position Activity Restriction  Other position/activity restrictions: up w/ assistance      Vitals  Vitals  SpO2: 96 %  O2 Device: Nasal cannula (2L via NC)     Subjective  Subjective: Pt agreeable to get to toilet for urine sample  Comments: OK for OT/PT eval per Carolee MCGARRY  Subjective  Pain: 7/10 back pain with movement      Social/Functional History  Social/Functional History  Lives With: Alone  Type of Home: Apartment (4th floor with elevator access)  Home Layout: One level  Home Access: Elevator, Level entry  Bathroom

## 2024-06-26 NOTE — PROGRESS NOTES
[facility] Internal Medicine  Bakari Zacarias MD; Rory Rios MD; Pavel Bolden MD; MD Zulema Alonzo MD; Wiley Saxena MD; Izzy White MD      HISTORY AND PHYSICAL EXAMINATION            Date:   2024  Patient name:  Shauna Alexandra  MRN:   [id number]  Account:  [id number]  YOB: 1960  PCP:    Kody Licona APRN - CNP  Code Status:    Full Code    Chief Complaint:     Chief Complaint   Patient presents with    Chest Pain    Abdominal Pain    Insect Bite     X 2 days post outside urgent care visit no relief, complians of increased pain inhale, Pt also complains of eye discharge, and possible spider bite          History Obtained From:     Patient, EMR, nursing staff    HPI     This patient is a 64 y.o. Non- / non  femalewho has hypertension hyperlipidemia type 2 diabetes no known cardiac history presenting with shortness of breath and chest pain noted to have very high blood pressure and ALBERTO in ER.  Presents with  UA concerning for UTI      Review of Systems:     Positive for shortness of breath no cough  Positive for some chest pain denies palpitations  Positive for upper abdominal pain  Denies any new numbness tremors or weakness.    A 10 point review of systems was performed and and negative except as mentioned in HPI  Positive and Negative as described in HPI.      Past Medical History:     Past Medical History:   Diagnosis Date    Anemia     Chronic back pain     Depression     Hyperlipidemia     Hypertension     Type 2 diabetes mellitus with diabetic polyneuropathy, without long-term current use of insulin ([facility]) 11/10/2016    Type II or unspecified type diabetes mellitus without mention of complication, not stated as uncontrolled         Past Surgical History:     Past Surgical History:   Procedure Laterality Date     SECTION      EXTERNAL EAR SURGERY      EYE SURGERY      RECTAL SURGERY Right 2021    RECTAL PERIRECTAL  extremities.  Denies any bowel/bladder problems. No saddle anesthesia.  Pain seems to exacerbated by slightest movement possible muscle spasm try tizanidine.    Also complaining of watery discharge from eyes-ordering erythromycin ointment    6/26  Creatinine still at 2 awaiting nephrology recommendations  Fever 100.5 this morning  Patient's urine output was low yesterday, Lasix was given and PureWick was placed overnight  Oxygen saturation downtrending patient currently on 2 L nasal cannula oxygen  Suspect pulmonary edema developing from fluid overload-IV fluids have been discontinued  Patient developed fever 100.5 this morning-UTI versus another source, no urine cultures yet ordering, also ordering blood cultures switching antibiotic to Zosyn.    Patient is localizing pain to her back x-ray of the spine could not be completed appropriately due to pain  Ordering MRI of thoracic and lumbar spine instructed patient and nursing to ask for pain medication in order to get the MRI completed.  Patient has been on morphine, scheduled Neurontin as well as tizanidine.  Patient reports there is no change to her pain, attempted to examine her back again today but patient states she can neither sit up in bed nor roll to the side which has been the case since admission.  Will reattempt after giving her morphine  Significant bibasilar lung crackles-will give additional dose of Lasix.  No leg weakness    Zulema Tran MD  6/26/2024  8:26 AM

## 2024-06-26 NOTE — PLAN OF CARE
Please have pt or POA who knows pt medical hx fill out online MRI screening form. Pt will need to be dressed in hospital clothes for this exam. Any questions please call [phone].  Thanks!

## 2024-06-26 NOTE — PROGRESS NOTES
Physical Therapy  Facility/Department: [facility] PROGRESSIVE CARE  Physical Therapy Initial Assessment    Name: Shauna Alexandra  : 1960  MRN: [id number]  Date of Service: 2024    Discharge Recommendations:  Patient would benefit from continued therapy after discharge, Therapy recommended at discharge   PT Equipment Recommendations  Equipment Needed: Yes  Other: Rollator      Patient Diagnosis(es): The primary encounter diagnosis was ALBERTO (acute kidney injury) ([facility]). Diagnoses of Chest pain, unspecified type, Angina pectoris ([facility]), and Shortness of breath were also pertinent to this visit.  Past Medical History:  has a past medical history of Anemia, Chronic back pain, Depression, Hyperlipidemia, Hypertension, Type 2 diabetes mellitus with diabetic polyneuropathy, without long-term current use of insulin (HCC), and Type II or unspecified type diabetes mellitus without mention of complication, not stated as uncontrolled.  Past Surgical History:  has a past surgical history that includes eye surgery; Thyroid surgery; External ear surgery;  section; and Rectal surgery (Right, 2021).    Assessment   Assessment: Pt with impaired mobility 2* back pain, decreased strength and endurance. Will benefit from continued therapy. Will also beenfit from use of rollaotr for stability and energy conservation.  Treatment Diagnosis: Impaired function  Specific Instructions for Next Treatment: Continue mobility with rollator, monitor spo2.  Therapy Prognosis: Fair  Decision Making: Medium Complexity  Requires PT Follow-Up: Yes  Activity Tolerance  Activity Tolerance: Patient limited by fatigue;Patient limited by pain;Patient limited by endurance     Plan   Physical Therapy Plan  General Plan: 5-7 times per week  Specific Instructions for Next Treatment: Continue mobility with rollator, monitor spo2.  Current Treatment Recommendations: Strengthening, Balance training, Functional mobility training, Transfer training,  understanding  Distance: 40 ft x2, rested on rollator seat.  Comments: spo2 92% after 40 ft, rest and ambulates again, spo2 88% when returns back to room.     Balance  Posture: Fair  Sitting - Static: Good  Sitting - Dynamic: Fair  Standing - Static: Fair;+  Standing - Dynamic: Fair  Comments: Standing with rolling walker/rollator.           OutComes Score                                                  AM-PAC - Mobility              Tinneti Score       Goals  Short Term Goals  Time Frame for Short Term Goals: 6 visits  Short Term Goal 1: Supine<>sit SBA  Short Term Goal 2: Transfers BSA  Short Term Goal 3: Gait with RW/Rollator distance of 100 ft SBA  Short Term Goal 4: Pt to tolerate activity for 30 minutes atleast to improe endurance and maintain sp02>89%  Patient Goals   Patient Goals : I would like to have walker with seat.       Education  Patient Education  Education Given To: Patient  Education Provided: Role of Therapy;Plan of Care;Precautions;Energy Conservation;Transfer Training;Fall Prevention Strategies  Education Method: Verbal;Demonstration  Education Outcome: Continued education needed;Verbalized understanding      Therapy Time   Individual Concurrent Group Co-treatment   Time In 0932         Time Out 1011         Minutes 39         Timed Code Treatment Minutes: 25 Minutes       Tyson Clemente, PT

## 2024-06-26 NOTE — PLAN OF CARE
Problem: Safety - Adult  Goal: Free from fall injury  Outcome: Progressing  Note: No falls noted this shift. Patient ambulates with x1 staff assistance without difficulty.  Bed kept in low position. Safe environment maintained. Bedside table & call light in reach. Uses call light appropriately when needing assistance.       Problem: Chronic Conditions and Co-morbidities  Goal: Patient's chronic conditions and co-morbidity symptoms are monitored and maintained or improved  6/26/2024 0502 by Beatrice Acevedo RN  Outcome: Progressing     Problem: Pain  Goal: Verbalizes/displays adequate comfort level or baseline comfort level  6/26/2024 0502 by Beatrice Acevedo RN  Outcome: Progressing  Note: Pt medicated with pain medication prn.  Assessed all pain characteristics including level, type, location, frequency, and onset.  Non-pharmacologic interventions offered to pt as well.  Pt states pain is tolerable at this time.       Problem: Discharge Planning  Goal: Discharge to home or other facility with appropriate resources  Outcome: Progressing

## 2024-06-26 NOTE — CONSULTS
[phone]   MPV 7.1 7.5 7.6      BMP:   Recent Labs     06/24/24  1216 06/25/24  0459 06/26/24  0448    139 136   K 4.2 4.2 4.4    106 107   CO2 23 20 18*   BUN 37* 32* 30*   CREATININE 2.0* 2.0* 2.0*   GLUCOSE 202* 114* 130*   CALCIUM 10.6* 10.0 9.7      Phosphorus:  No results for input(s): \"PHOS\" in the last 72 hours.  Magnesium:   Recent Labs     06/24/24  1216   MG 2.1     Albumin: No results for input(s): \"LABALBU\" in the last 72 hours.    IRON:  No results for input(s): \"IRON\" in the last 72 hours.  Iron Saturation:  Invalid input(s): \"PERCENTFE\"  TIBC:  No results for input(s): \"TIBC\" in the last 72 hours.  FERRITIN:  No results for input(s): \"FERRITIN\" in the last 72 hours.  SPEP: No results for input(s): \"SPEP\" in the last 72 hours. No results for input(s): \"PROT\", \"ALBCAL\", \"ALBPCT\", \"LABALPH\", \"A1PCT\", \"A2PCT\", \"LABBETA\", \"BETAPCT\", \"GAMGLOB\", \"GGPCT\", \"PATH\" in the last 72 hours.  UPEP: No results for input(s): \"TPU\" in the last 72 hours.   Urine Sodium:  Invalid input(s): \"ADELAIDE\"   Urine Potassium: No results for input(s): \"KUR\" in the last 72 hours.  Urine Chloride:  No results for input(s): \"CLU\" in the last 72 hours.  Urine Ph:  Invalid input(s): \"PO4U\"  Urine Osmolarity: No results for input(s): \"OSMOU\" in the last 72 hours.  Urine Creatinine:  No results for input(s): \"LABCREA\" in the last 72 hours.  Urine Eosinophils: Invalid input(s): \"EOSU\"  Urine Protein:  No results for input(s): \"TPU\" in the last 72 hours.  Urinalysis:    Recent Labs     06/24/24  1213   NITRU NEGATIVE   COLORU Yellow   PHUR 5.5   WBCUA 6 TO 9*   RBCUA 0 TO 2   BACTERIA MODERATE*   LEUKOCYTESUR NEGATIVE   UROBILINOGEN Normal   BILIRUBINUR NEGATIVE   GLUCOSEU TRACE*   KETUA NEGATIVE         Radiology:  Reviewed as available.    Assessment:   ALBERTO on CKD most likely secondary to prerenal azotemia versus ATN due to hemodynamic changes and use of ACE inhibitors and hydrochlorothiazide, baseline serum creatinine 1.5  mg/dL on 7/2023 serum creatinine on admission peaked to 2.0 mg/dL and has been stable now.  Other possibility of increase in creatinine is progression of chronic kidney disease    Hypertension uncontrolled-blood pressure has improved  CKD stage IIIb most likely secondary to diabetes and hypertensive chronic kidney disease  Proteinuria subnephrotic range UPC 1.6--C3-C4 normal LEONIDES ANCA pending, kappa lambda ratio 1.29 SPEP UPEP pending       Plan:  Follow-up serologies  Proteinuria workup  Continue amlodipine, atenolol  Hydrochlorothiazide and lisinopril on hold    Thank you for the consultation.      Electronically signed by Lj Lim MD on 6/26/2024 at 4:22 PM

## 2024-06-27 ENCOUNTER — HOSPITAL ENCOUNTER (OUTPATIENT)
Age: 64
Discharge: HOME | End: 2024-06-27

## 2024-06-27 ENCOUNTER — APPOINTMENT (OUTPATIENT)
Dept: NUCLEAR MEDICINE | Age: 64
DRG: 199 | End: 2024-06-27
Payer: MEDICAID

## 2024-06-27 LAB
ANION GAP SERPL CALCULATED.3IONS-SCNC: 11 MMOL/L (ref 9–17)
BASOPHILS # BLD: 0 K/UL (ref 0–0.2)
BASOPHILS NFR BLD: 1 % (ref 0–2)
BUN SERPL-MCNC: 34 MG/DL (ref 8–23)
CALCIUM SERPL-MCNC: 9.8 MG/DL (ref 8.6–10.4)
CHLORIDE SERPL-SCNC: 105 MMOL/L (ref 98–107)
CO2 SERPL-SCNC: 19 MMOL/L (ref 20–31)
CREAT SERPL-MCNC: 2.5 MG/DL (ref 0.5–0.9)
DATE, STOOL #1: ABNORMAL
EOSINOPHIL # BLD: 0.2 K/UL (ref 0–0.4)
EOSINOPHILS RELATIVE PERCENT: 3 % (ref 0–4)
ERYTHROCYTE [DISTWIDTH] IN BLOOD BY AUTOMATED COUNT: 15.1 % (ref 11.5–14.9)
GFR, ESTIMATED: 21 ML/MIN/1.73M2
GGT SERPL-CCNC: 25 U/L (ref 5–36)
GLUCOSE BLD-MCNC: 128 MG/DL (ref 65–105)
GLUCOSE BLD-MCNC: 136 MG/DL (ref 65–105)
GLUCOSE BLD-MCNC: 159 MG/DL (ref 65–105)
GLUCOSE BLD-MCNC: 161 MG/DL (ref 65–105)
GLUCOSE SERPL-MCNC: 163 MG/DL (ref 70–99)
HCT VFR BLD AUTO: 22.8 % (ref 36–46)
HEMOCCULT SP1 STL QL: POSITIVE
HGB BLD-MCNC: 7.4 G/DL (ref 12–16)
IRON SATN MFR SERPL: 6 % (ref 20–55)
IRON SERPL-MCNC: 12 UG/DL (ref 37–145)
LYMPHOCYTES NFR BLD: 1.7 K/UL (ref 1–4.8)
LYMPHOCYTES RELATIVE PERCENT: 24 % (ref 24–44)
MCH RBC QN AUTO: 29.2 PG (ref 26–34)
MCHC RBC AUTO-ENTMCNC: 32.3 G/DL (ref 31–37)
MCV RBC AUTO: 90.5 FL (ref 80–100)
MICROORGANISM SPEC CULT: NO GROWTH
MONOCYTES NFR BLD: 0.5 K/UL (ref 0.1–1.3)
MONOCYTES NFR BLD: 7 % (ref 1–7)
NEUTROPHILS NFR BLD: 65 % (ref 36–66)
NEUTS SEG NFR BLD: 4.8 K/UL (ref 1.3–9.1)
PLATELET # BLD AUTO: 302 K/UL (ref 150–450)
PMV BLD AUTO: 7.5 FL (ref 6–12)
POTASSIUM SERPL-SCNC: 4.4 MMOL/L (ref 3.7–5.3)
RBC # BLD AUTO: 2.52 M/UL (ref 4–5.2)
RETICS # AUTO: 0.02 M/UL (ref 0.02–0.1)
RETICS/RBC NFR AUTO: 0.9 % (ref 0.5–2)
SERVICE CMNT-IMP: NORMAL
SODIUM SERPL-SCNC: 135 MMOL/L (ref 135–144)
SPECIMEN DESCRIPTION: NORMAL
TIBC SERPL-MCNC: 201 UG/DL (ref 250–450)
TIME, STOOL #1: 2000
UNSATURATED IRON BINDING CAPACITY: 189 UG/DL (ref 112–347)
WBC OTHER # BLD: 7.3 K/UL (ref 3.5–11)

## 2024-06-27 PROCEDURE — 83516 IMMUNOASSAY NONANTIBODY: CPT

## 2024-06-27 PROCEDURE — 82088 ASSAY OF ALDOSTERONE: CPT

## 2024-06-27 PROCEDURE — 83540 ASSAY OF IRON: CPT

## 2024-06-27 PROCEDURE — 85045 AUTOMATED RETICULOCYTE COUNT: CPT

## 2024-06-27 PROCEDURE — 85025 COMPLETE CBC W/AUTO DIFF WBC: CPT

## 2024-06-27 PROCEDURE — 84080 ASSAY ALKALINE PHOSPHATASES: CPT

## 2024-06-27 PROCEDURE — 82977 ASSAY OF GGT: CPT

## 2024-06-27 PROCEDURE — 82272 OCCULT BLD FECES 1-3 TESTS: CPT

## 2024-06-27 PROCEDURE — 6370000000 HC RX 637 (ALT 250 FOR IP): Performed by: INTERNAL MEDICINE

## 2024-06-27 PROCEDURE — APPNB60 APP NON BILLABLE TIME 46-60 MINS: Performed by: NURSE PRACTITIONER

## 2024-06-27 PROCEDURE — 99233 SBSQ HOSP IP/OBS HIGH 50: CPT | Performed by: INTERNAL MEDICINE

## 2024-06-27 PROCEDURE — 36415 COLL VENOUS BLD VENIPUNCTURE: CPT

## 2024-06-27 PROCEDURE — 99254 IP/OBS CNSLTJ NEW/EST MOD 60: CPT | Performed by: INTERNAL MEDICINE

## 2024-06-27 PROCEDURE — APPSS60 APP SPLIT SHARED TIME 46-60 MINUTES

## 2024-06-27 PROCEDURE — 97530 THERAPEUTIC ACTIVITIES: CPT

## 2024-06-27 PROCEDURE — 99222 1ST HOSP IP/OBS MODERATE 55: CPT | Performed by: PSYCHIATRY & NEUROLOGY

## 2024-06-27 PROCEDURE — C9113 INJ PANTOPRAZOLE SODIUM, VIA: HCPCS | Performed by: INTERNAL MEDICINE

## 2024-06-27 PROCEDURE — 84244 ASSAY OF RENIN: CPT

## 2024-06-27 PROCEDURE — 83550 IRON BINDING TEST: CPT

## 2024-06-27 PROCEDURE — 84075 ASSAY ALKALINE PHOSPHATASE: CPT

## 2024-06-27 PROCEDURE — 82607 VITAMIN B-12: CPT

## 2024-06-27 PROCEDURE — 2580000003 HC RX 258: Performed by: INTERNAL MEDICINE

## 2024-06-27 PROCEDURE — 82784 ASSAY IGA/IGD/IGG/IGM EACH: CPT

## 2024-06-27 PROCEDURE — 6370000000 HC RX 637 (ALT 250 FOR IP): Performed by: NURSE PRACTITIONER

## 2024-06-27 PROCEDURE — 30233N1 TRANSFUSION OF NONAUTOLOGOUS RED BLOOD CELLS INTO PERIPHERAL VEIN, PERCUTANEOUS APPROACH: ICD-10-PCS | Performed by: INTERNAL MEDICINE

## 2024-06-27 PROCEDURE — 82746 ASSAY OF FOLIC ACID SERUM: CPT

## 2024-06-27 PROCEDURE — 2060000000 HC ICU INTERMEDIATE R&B

## 2024-06-27 PROCEDURE — 80048 BASIC METABOLIC PNL TOTAL CA: CPT

## 2024-06-27 PROCEDURE — 6360000002 HC RX W HCPCS: Performed by: NURSE PRACTITIONER

## 2024-06-27 PROCEDURE — 97535 SELF CARE MNGMENT TRAINING: CPT

## 2024-06-27 PROCEDURE — 6360000002 HC RX W HCPCS: Performed by: INTERNAL MEDICINE

## 2024-06-27 PROCEDURE — 82947 ASSAY GLUCOSE BLOOD QUANT: CPT

## 2024-06-27 RX ORDER — SENNOSIDES A AND B 8.6 MG/1
1 TABLET, FILM COATED ORAL DAILY
Status: DISCONTINUED | OUTPATIENT
Start: 2024-06-27 | End: 2024-07-01 | Stop reason: HOSPADM

## 2024-06-27 RX ORDER — SODIUM CHLORIDE 9 MG/ML
INJECTION, SOLUTION INTRAVENOUS CONTINUOUS
Status: DISCONTINUED | OUTPATIENT
Start: 2024-06-27 | End: 2024-07-01 | Stop reason: HOSPADM

## 2024-06-27 RX ORDER — INSULIN GLARGINE 100 [IU]/ML
16 INJECTION, SOLUTION SUBCUTANEOUS ONCE
Status: COMPLETED | OUTPATIENT
Start: 2024-06-27 | End: 2024-06-27

## 2024-06-27 RX ADMIN — TIZANIDINE 2 MG: 2 TABLET ORAL at 09:54

## 2024-06-27 RX ADMIN — BUPROPION HYDROCHLORIDE 300 MG: 300 TABLET, EXTENDED RELEASE ORAL at 09:54

## 2024-06-27 RX ADMIN — SODIUM CHLORIDE, PRESERVATIVE FREE 40 MG: 5 INJECTION INTRAVENOUS at 09:55

## 2024-06-27 RX ADMIN — PRAVASTATIN SODIUM 40 MG: 40 TABLET ORAL at 21:08

## 2024-06-27 RX ADMIN — ERYTHROMYCIN: 5 OINTMENT OPHTHALMIC at 13:43

## 2024-06-27 RX ADMIN — MORPHINE SULFATE 2 MG: 2 INJECTION, SOLUTION INTRAMUSCULAR; INTRAVENOUS at 21:24

## 2024-06-27 RX ADMIN — INSULIN GLARGINE 16 UNITS: 100 INJECTION, SOLUTION SUBCUTANEOUS at 21:25

## 2024-06-27 RX ADMIN — GABAPENTIN 300 MG: 300 CAPSULE ORAL at 13:43

## 2024-06-27 RX ADMIN — ACETAMINOPHEN 650 MG: 325 TABLET ORAL at 03:15

## 2024-06-27 RX ADMIN — PIPERACILLIN AND TAZOBACTAM 3375 MG: 3; .375 INJECTION, POWDER, LYOPHILIZED, FOR SOLUTION INTRAVENOUS at 03:22

## 2024-06-27 RX ADMIN — ASPIRIN 81 MG: 81 TABLET, COATED ORAL at 09:54

## 2024-06-27 RX ADMIN — TRAZODONE HYDROCHLORIDE 50 MG: 50 TABLET ORAL at 21:08

## 2024-06-27 RX ADMIN — GABAPENTIN 300 MG: 300 CAPSULE ORAL at 09:55

## 2024-06-27 RX ADMIN — ATENOLOL 50 MG: 50 TABLET ORAL at 09:55

## 2024-06-27 RX ADMIN — SODIUM CHLORIDE: 9 INJECTION, SOLUTION INTRAVENOUS at 21:07

## 2024-06-27 RX ADMIN — SENNOSIDES 8.6 MG: 8.6 TABLET, FILM COATED ORAL at 17:46

## 2024-06-27 RX ADMIN — ERYTHROMYCIN: 5 OINTMENT OPHTHALMIC at 06:22

## 2024-06-27 RX ADMIN — GABAPENTIN 300 MG: 300 CAPSULE ORAL at 21:08

## 2024-06-27 RX ADMIN — AMLODIPINE BESYLATE 10 MG: 10 TABLET ORAL at 09:55

## 2024-06-27 RX ADMIN — SODIUM CHLORIDE, PRESERVATIVE FREE 10 ML: 5 INJECTION INTRAVENOUS at 21:09

## 2024-06-27 RX ADMIN — TIZANIDINE 2 MG: 2 TABLET ORAL at 21:08

## 2024-06-27 RX ADMIN — TIZANIDINE 2 MG: 2 TABLET ORAL at 13:43

## 2024-06-27 RX ADMIN — PIPERACILLIN AND TAZOBACTAM 3375 MG: 3; .375 INJECTION, POWDER, LYOPHILIZED, FOR SOLUTION INTRAVENOUS at 11:21

## 2024-06-27 RX ADMIN — ERYTHROMYCIN: 5 OINTMENT OPHTHALMIC at 21:08

## 2024-06-27 RX ADMIN — POLYETHYLENE GLYCOL 3350 17 G: 17 POWDER, FOR SOLUTION ORAL at 10:05

## 2024-06-27 RX ADMIN — SODIUM CHLORIDE, PRESERVATIVE FREE 40 MG: 5 INJECTION INTRAVENOUS at 21:08

## 2024-06-27 ASSESSMENT — PAIN - FUNCTIONAL ASSESSMENT: PAIN_FUNCTIONAL_ASSESSMENT: PREVENTS OR INTERFERES SOME ACTIVE ACTIVITIES AND ADLS

## 2024-06-27 ASSESSMENT — PAIN DESCRIPTION - DESCRIPTORS: DESCRIPTORS: DISCOMFORT;SORE

## 2024-06-27 ASSESSMENT — PAIN SCALES - GENERAL
PAINLEVEL_OUTOF10: 8
PAINLEVEL_OUTOF10: 0
PAINLEVEL_OUTOF10: 0

## 2024-06-27 ASSESSMENT — PAIN DESCRIPTION - LOCATION: LOCATION: ABDOMEN

## 2024-06-27 ASSESSMENT — PAIN DESCRIPTION - ORIENTATION: ORIENTATION: RIGHT;LEFT

## 2024-06-27 NOTE — PROGRESS NOTES
NEPHROLOGY CONSULT     Patient :  Shauna Alexandra; 64 y.o. MRN# [id number]  Location:  2124/2124-01  Attending:  Zulema Tran MD  Admit Date:  6/24/2024   Hospital Day: 3      Reason for Consult: ALBERTO on CKD      Chief Complaint: Chest pain, abdominal pain    Subjective/interval history.  Patient seen and examined denies any new complaints still have some pleuritic chest pain mild cough no nausea vomiting diarrhea.  Serum creatinine has increased to 2.5 mg/dL  Blood pressure is better controlled  Urine output improved to 1 L yesterday and 24-hour    History of Present Illness:    This is a 64 y.o. female with past medical history of longstanding hypertension type 2 diabetes, CKD stage IIIb with baseline creatinine of 1.5 mg/dL noted in 7/2023.  Patient presented to the hospital with complaints of upper back pain chest pain and cough patient pain gets exacerbated by breathing or coughing, patient denied nausea vomiting diarrhea no difficulty urination.  Patient blood pressure on admission was 203/85 mm hg  Labs showed serum creatinine of 2.0 mg/dL and therefore nephrology consultation has been requested  UA shows 3+ protein  UPC 1.5 g, 6-9 WBC 0-2 RBCs  Chest x-ray on admission showed bibasilar atelectasis/scarring  Nuclear lung scan showed low probability for pulmonary embolus    Pt denies any history of  prolonged NSAID use.  Patient denies dysuria, gross hematuria, flank pain, nocturia, urgency, passing frothy urine or urinary incontinence.  There has been no recent exposure to IV contrast.   There is no history  of paraprotein disease.  Medication review shows use of lisinopril and hydrochlorothiazide    Past Medical History:        Diagnosis Date    Anemia     Chronic back pain     Depression     Hyperlipidemia     Hypertension     Type 2 diabetes mellitus with diabetic polyneuropathy, without long-term current use of insulin ([facility]) 11/10/2016    Type II or unspecified type diabetes mellitus without mention of  06/27/24  0453    136 135   K 4.2 4.4 4.4    107 105   CO2 20 18* 19*   BUN 32* 30* 34*   CREATININE 2.0* 2.0* 2.5*   GLUCOSE 114* 130* 163*   CALCIUM 10.0 9.7 9.8      Phosphorus:  No results for input(s): \"PHOS\" in the last 72 hours.  Magnesium:   No results for input(s): \"MG\" in the last 72 hours.    Albumin: No results for input(s): \"LABALBU\" in the last 72 hours.    IRON:  No results for input(s): \"IRON\" in the last 72 hours.  Iron Saturation:  Invalid input(s): \"PERCENTFE\"  TIBC:  No results for input(s): \"TIBC\" in the last 72 hours.  FERRITIN:  No results for input(s): \"FERRITIN\" in the last 72 hours.  SPEP: No results for input(s): \"SPEP\" in the last 72 hours.   Recent Labs     06/26/24  1305 06/26/24  1558   ALBCAL PENDING  --    ALBPCT PENDING  --    A1PCT PENDING  --    A2PCT PENDING  --    BETAPCT PENDING  --    GAMGLOB PENDING  --    GGPCT PENDING  --    PATH PENDING PENDING     UPEP:   Recent Labs     06/26/24  1558         Urine Sodium:  Invalid input(s): \"ADELAIDE\"   Urine Potassium: No results for input(s): \"KUR\" in the last 72 hours.  Urine Chloride:  No results for input(s): \"CLU\" in the last 72 hours.  Urine Ph:  Invalid input(s): \"PO4U\"  Urine Osmolarity: No results for input(s): \"OSMOU\" in the last 72 hours.  Urine Creatinine:  No results for input(s): \"LABCREA\" in the last 72 hours.  Urine Eosinophils: Invalid input(s): \"EOSU\"  Urine Protein:    Recent Labs     06/26/24  1558        Urinalysis:    No results for input(s): \"NITRU\", \"COLORU\", \"PHUR\", \"LABCAST\", \"WBCUA\", \"RBCUA\", \"MUCUS\", \"TRICHOMONAS\", \"YEAST\", \"BACTERIA\", \"CLARITYU\", \"SPECGRAV\", \"LEUKOCYTESUR\", \"UROBILINOGEN\", \"BILIRUBINUR\", \"BLOODU\", \"GLUCOSEU\", \"KETUA\", \"AMORPHOUS\" in the last 72 hours.        Radiology:  Reviewed as available.    Assessment:   ALBERTO on CKD most likely secondary to prerenal azotemia versus ATN due to hemodynamic changes and use of ACE inhibitors and hydrochlorothiazide, baseline serum  creatinine 1.5 mg/dL on 7/2023 serum creatinine on admission peaked to 2.0 mg/dL and has been stable now.  Other possibility of increase in creatinine is progression of chronic kidney disease    Hypertension uncontrolled-blood pressure has improved  CKD stage IIIb most likely secondary to diabetes and hypertensive chronic kidney disease  Proteinuria subnephrotic range UPC 1.6--C3-C4 normal LEONIDES ANCA pending, kappa lambda ratio 1.29 SPEP UPEP pending       Plan:  Follow-up serologies  Proteinuria workup  Continue amlodipine, atenolol  Hydrochlorothiazide and lisinopril on hold  Start IV fluid gentle hydration 50 MLS per hour  Check serum renin aldosterone, check renal artery Dopplers    Thank you for the consultation.      Electronically signed by Lj Lim MD on 6/27/2024 at 2:08 PM

## 2024-06-27 NOTE — CONSULTS
Gastroenterology Consult Note    Patient:   Shauna Alexandra   Admit date:  2024  Facility:   [facility]  Referring/PCP: Kody Licona, APRN - CNP  Date:     2024  Consultant:   Prashant Nicole, APRN - NP, Vamshi Cornejo MD    Subjective:     This 64 y.o. female was admitted 2024 with a diagnosis of \"Shortness of breath [R06.02]  Angina pectoris (HCC) [I20.9]  ALBERTO (acute kidney injury) ([facility]) [N17.9]  Chest pain, unspecified type [R07.9]\" and is seen in consultation regarding   Chief Complaint   Patient presents with    Chest Pain    Abdominal Pain    Insect Bite     X 2 days post outside urgent care visit no relief, complians of increased pain inhale, Pt also complains of eye discharge, and possible spider bite      64/M w/ pmhx of HTN, anemia, HLD, T2DM presents to ED for shortness of breath, chest pain.    GI consulted for anemia.  Hgb has been downtrending since admission  Initially 10.0 -->9.4 --> 8.4 --> 7.4  MCV normal.  Denies any overt GI bleeding  No abdominal pain, nausea, vomiting, diarrhea.  Reports some intermittent constipation.  Patient thinks she had colonoscopy last year but no reports available.  Baseline hgb around 9-10      Past Medical History:  Past Medical History:   Diagnosis Date    Anemia     Chronic back pain     Depression     Hyperlipidemia     Hypertension     Type 2 diabetes mellitus with diabetic polyneuropathy, without long-term current use of insulin ([facility]) 11/10/2016    Type II or unspecified type diabetes mellitus without mention of complication, not stated as uncontrolled        Past Surgical History:  Past Surgical History:   Procedure Laterality Date     SECTION      EXTERNAL EAR SURGERY      EYE SURGERY      RECTAL SURGERY Right 2021    RECTAL PERIRECTAL INCISION AND DRAINAGE performed by Mukul Valentine MD at [facility] OR    THYROID SURGERY         Social History:  Social History     Tobacco Use    Smoking status: Every Day

## 2024-06-27 NOTE — CARE COORDINATION
Chief Complaint   Patient presents with    Weight Management    Depression Scale  In the past 7 days:  I have been able to laugh and see the funny side of things[de-identified] Not quite so much now  I have looked forward with enjoyment to things: Rather less than I used to  I have blamed myself unnecessarily when things went wrong: Yes, some of the time  I have been anxious or worried for no good reason: Yes, sometimes  I have felt scared or panicky for no good reason: Yes, sometimes  Things have been getting on top of me: Yes, sometimes I haven't been coping as well as usual  I have been so unhappy that I have had difficulty sleeping: Yes, sometimes  I have felt sad or miserable: Not very often  I have been so unhappy that I have been crying:  Only occasionally  The thought of harming myself has occured to me: Never  Reyes Hun  Depression Scale (EPDS)  [place]  Depression Score: 15    Mother is going to follow up with her OB as well as her own pcp to discuss upping her meds ONGOING DISCHARGE PLAN:    Patient is alert and oriented x4.    Spoke with patient regarding discharge plan and patient confirms that plan is still to return to home.     Pt. Would like [facility]. Referrals sent, Following for acceptance, Pt. Has a Difficult Insurance.     HCS, following for Requested Rollator.     HGB today 7.4, New Consult for GI to see.     Stress Test harvey.     95% on 2.5 LNC. Pt. Does NOT wear at home, will follow if needed.     CR today 2.5 from 2.0, Nephro on board.     Will continue to follow for additional discharge needs.    If patient is discharged prior to next notation, then this note serves as note for discharge by case management.    Electronically signed by Galilea Evans RN on 6/27/2024 at 1:27 PM

## 2024-06-27 NOTE — PROGRESS NOTES
06/27/2024 @ 2:25pm. Spoke with MALGORZATA Oden, let her know that the patient needed to be NPO after midnight tonight for her renal artery duplex in the morning. Also let her know that we would bring her down and get the renal artery done as well as the stress test at one time.

## 2024-06-27 NOTE — CARE COORDINATION
Writer spoke to James, from [facility], & he will not know if they can accept until harvey morning.     Writer spoke to Sonia from [facility] & they can accept. She can be reached at [phone], if Pt. DC on weekend.
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
## 2024-06-27 NOTE — PLAN OF CARE
Problem: Safety - Adult  Goal: Free from fall injury  Outcome: Progressing  Note: No falls noted this shift. Patient ambulates with x1 staff assistance without difficulty.  Bed kept in low position. Safe environment maintained. Bedside table & call light in reach. Uses call light appropriately when needing assistance.       Problem: Chronic Conditions and Co-morbidities  Goal: Patient's chronic conditions and co-morbidity symptoms are monitored and maintained or improved  6/27/2024 0259 by Beatrice Acevedo RN  Outcome: Progressing     Problem: Pain  Goal: Verbalizes/displays adequate comfort level or baseline comfort level  6/27/2024 0259 by Beatrice Acevedo RN  Outcome: Progressing  Note: Pt medicated with pain medication prn.  Assessed all pain characteristics including level, type, location, frequency, and onset.  Non-pharmacologic interventions offered to pt as well.  Pt states pain is tolerable at this time.       Problem: Discharge Planning  Goal: Discharge to home or other facility with appropriate resources  Outcome: Progressing

## 2024-06-27 NOTE — PROGRESS NOTES
06/27/24 1452   Encounter Summary   Encounter Overview/Reason Spiritual/Emotional Needs   Service Provided For Patient   Referral/Consult From Nurse   Last Encounter  06/27/24   Complexity of Encounter Moderate   Begin Time 1350   End Time  1430   Total Time Calculated 40 min   Spiritual/Emotional needs   Type Spiritual Support   Assessment/Intervention/Outcome   Assessment Coping;Tearful;Sad;Passive;Despair   Intervention Active listening;Discussed illness injury and it’s impact;Discussed relationship with God;Explored/Affirmed feelings, thoughts, concerns;Life review/Legacy;Prayer (assurance of)/[place];Sustaining Presence/Ministry of presence   Outcome Acceptance;Coping;Engaged in conversation;Expressed Gratitude;Less anxious, Less agitated;Venting emotion;Receptive;Peace

## 2024-06-27 NOTE — PLAN OF CARE
Problem: Discharge Planning  Goal: Discharge to home or other facility with appropriate resources  6/27/2024 1639 by Alba Hall RN  Outcome: Progressing  Flowsheets (Taken 6/27/2024 1639)  Discharge to home or other facility with appropriate resources:   Identify barriers to discharge with patient and caregiver   Identify discharge learning needs (meds, wound care, etc)   Refer to discharge planning if patient needs post-hospital services based on physician order or complex needs related to functional status, cognitive ability or social support system   Arrange for needed discharge resources and transportation as appropriate     Problem: Pain  Goal: Verbalizes/displays adequate comfort level or baseline comfort level  6/27/2024 1639 by Alba Hall RN  Outcome: Progressing  Flowsheets (Taken 6/27/2024 1639)  Verbalizes/displays adequate comfort level or baseline comfort level:   Encourage patient to monitor pain and request assistance   Consider cultural and social influences on pain and pain management   Assess pain using appropriate pain scale     Problem: Safety - Adult  Goal: Free from fall injury  6/27/2024 1639 by Alba Hall RN  Outcome: Progressing  Flowsheets (Taken 6/27/2024 1639)  Free From Fall Injury: Instruct family/caregiver on patient safety     Problem: Skin/Tissue Integrity  Goal: Absence of new skin breakdown  Description: 1.  Monitor for areas of redness and/or skin breakdown  2.  Assess vascular access sites hourly  3.  Every 4-6 hours minimum:  Change oxygen saturation probe site  4.  Every 4-6 hours:  If on nasal continuous positive airway pressure, respiratory therapy assess nares and determine need for appliance change or resting period.  Outcome: Progressing  Note: Patient was assessed for skin break down and has scratches on back.      Problem: Chronic Conditions and Co-morbidities  Goal: Patient's chronic conditions and co-morbidity symptoms are monitored and  maintained or improved  6/27/2024 1639 by Alba Hall, RN  Outcome: Progressing  Flowsheets (Taken 6/27/2024 1639)  Care Plan - Patient's Chronic Conditions and Co-Morbidity Symptoms are Monitored and Maintained or Improved:   Monitor and assess patient's chronic conditions and comorbid symptoms for stability, deterioration, or improvement   Collaborate with multidisciplinary team to address chronic and comorbid conditions and prevent exacerbation or deterioration   Update acute care plan with appropriate goals if chronic or comorbid symptoms are exacerbated and prevent overall improvement and discharge  Note: The patient is struggling with the idea of her recent decline in health, emotional support was given to the patient.      Problem: ABCDS Injury Assessment  Goal: Absence of physical injury  Outcome: Progressing  Flowsheets (Taken 6/27/2024 1639)  Absence of Physical Injury: Implement safety measures based on patient assessment
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
## 2024-06-27 NOTE — PROGRESS NOTES
DATE: 2024    NAME: Shauna Alexandra  MRN: [id number]   : 1960    Patient not seen this date for Physical Therapy due to:      [] Cancel by RN or physician due to:    [] Hemodialysis    [] Critical Lab Value Level     [] Blood transfusion in progress    [] Acute or unstable cardiovascular status   _MAP < 55 or more than >115  _HR < 40 or > 130    [] Acute or unstable pulmonary status   -FiO2 > 60%   _RR < 5 or >40    _O2 sats < 85%    [] Strict Bedrest    [] Off Unit for surgery or procedure    [] Off Unit for testing       [] Pending imaging to R/O fracture    [] Refusal by Patient      [x] Other Patient unavailable c nursing services      [] PT being discontinued at this time. Patient independent. No further needs.     [] PT being discontinued at this time as the patient has been transferred to hospice care. No further needs.      Brittanie Hilton, PTA

## 2024-06-27 NOTE — PROGRESS NOTES
[facility]   INPATIENT OCCUPATIONAL THERAPY  PROGRESS NOTE  Date: 2024  Patient Name: Shauna Alexandra       Room: 4-01  MRN: [id number]    : 1960  (64 y.o.)  Gender: female   Referring Practitioner: Dr. Tran  Diagnosis: SOB, angina pectoris, ALBERTO    Discharge Recommendations:  Further Occupational Therapy is recommended upon facility discharge.    OT Equipment Recommendations  Equipment Needed: Yes  Mobility Devices: Walker  Walker: Rollator (4 Wheeled)    Restrictions/Precautions  Restrictions/Precautions  Restrictions/Precautions: Fall Risk;General Precautions  Required Braces or Orthoses?: No  Position Activity Restriction  Other position/activity restrictions: up w/ assistance    SpO2: 96 %  O2 Device: Nasal cannula (2L via NC)    Subjective  Subjective  Subjective: \"I've been trying to get a good cry in today, but every time I do someone comes knocking on my door\"  Comments: Pt emotional and frustrated when OT entered the room.  Pt shared that she is frustrated about new tests ordered and is eager to go home.  Spoke with RN about putting a sign on door to see RN before entering to deter any unnecessary interruptions.    Objective  Orientation  Overall Orientation Status: Within Functional Limits  Cognition  Overall Cognitive Status: WFL    Activities of Daily Living  ADL  Additional Comments: Pt emotional and frustrated and not willing to participate in any self-care tasks during this session.  OT offered to gather items for oral care since none were in the room.  Pt stated, \"that would be nice\" and stated she would complete tasks later when she was feeling better.  Nursing reports that pt is up to bathroom with SBA and completes all toileting tasks without physical A.  Pt voiced irritation that staff \"watch\" her when she is using the bathroom, OT spoke with nursing about allowing pt more privacy within the fall risk  requirements.    Transfers/Mobility  Transfers  Transfer Comments: Pt not willing to get out of bed at time of session but was agreeable to getting up to a recliner later if OT could locate one.  OT did bring one and set it up for patient.  Pt voiced gratitude and said maybe she would get up later this evening.    Patient Education  Patient Education  Education Given To: Patient, Staff  Education Provided: Role of Therapy, Plan of Care  Education Provided Comments: Facilitated communication between patient and nursing about current needs  Education Method: Verbal  Barriers to Learning: None  Education Outcome: Verbalized understanding    Goals  Short Term Goals  Time Frame for Short Term Goals: By discharge, pt will  Short Term Goal 1: demo ability to perform LB ADLs with MOD I with use of AE/compensatory techniques PRN in order to return to PLOF.  Short Term Goal 2: demo functional transfers/functional mobility with MOD I with use of LRAD with good safety in order to decrease fall risk during ADLs.  Short Term Goal 3: demo 10+ minutes of dynamic standing with unilateral/no UE support while engaging in functional tasks while maintainig o2 >90% in order to increase activity tolerance and safety with ADLs.  Short Term Goal 4: participate in 15+ minutes of therapeutic exercises/therapeutic activities in order to promote increased activity tolerance for ADLs/IADLs.  Short Term Goal 5: complete item retrieval/transport tasks with use of rollator with MOD I demoing good safety in order to increase IND with ADLs/IADLs.  Additional Goals?: Yes  Short Term Goal 6: demo/verbalize 2-3 EC/WS techniques to be implemented during functional tasks to promote increased safety and IND with ADLs/IADLs.  Occupational Therapy Plan  Times Per Week: 3-5  Current Treatment Recommendations: Strengthening, ROM, Balance training, Functional mobility training, Endurance training, Pain management, Safety education & training, Patient/Caregiver  toileting (which includes using toilet, bedpan, or urinal)?: A Little  How much help is needed for putting on and taking off regular upper body clothing?: A Little  How much help is needed for taking care of personal grooming?: A Little  How much help for eating meals?: None  AM-[facility] Inpatient Daily Activity Raw Score: 19  AM-[facility] Inpatient ADL T-Scale Score : 40.22  ADL Inpatient CMS 0-100% Score: 42.8  ADL Inpatient CMS G-Code Modifier : CK    OT Minutes  OT Individual Minutes  Time In: 1500  Time Out: 1525  Minutes: 25  Time Code Minutes   Timed Code Treatment Minutes: 25 Minutes    Electronically signed by REGINALDO Quinones on 6/27/24 at 3:47 PM EDT

## 2024-06-27 NOTE — PROGRESS NOTES
[facility] Internal Medicine  Bakari Zacarias MD; Rory Rios MD; Pavel Bolden MD; MD Zulema Alonzo MD; Wiley Saxena MD; Izzy White MD      HISTORY AND PHYSICAL EXAMINATION            Date:   2024  Patient name:  Shauna Alexandra  MRN:   [id number]  Account:  [id number]  YOB: 1960  PCP:    Kody Licona APRN - CNP  Code Status:    Full Code    Chief Complaint:     Chief Complaint   Patient presents with    Chest Pain    Abdominal Pain    Insect Bite     X 2 days post outside urgent care visit no relief, complians of increased pain inhale, Pt also complains of eye discharge, and possible spider bite          History Obtained From:     Patient, EMR, nursing staff    HPI     This patient is a 64 y.o. Non- / non  femalewho has hypertension hyperlipidemia type 2 diabetes no known cardiac history presenting with shortness of breath and chest pain noted to have very high blood pressure and ALBERTO in ER.  Presents with  UA concerning for UTI      Review of Systems:     Positive for shortness of breath no cough  Positive for some chest pain denies palpitations  Positive for upper abdominal pain  Denies any new numbness tremors or weakness.    A 10 point review of systems was performed and and negative except as mentioned in HPI  Positive and Negative as described in HPI.      Past Medical History:     Past Medical History:   Diagnosis Date    Anemia     Chronic back pain     Depression     Hyperlipidemia     Hypertension     Type 2 diabetes mellitus with diabetic polyneuropathy, without long-term current use of insulin ([facility]) 11/10/2016    Type II or unspecified type diabetes mellitus without mention of complication, not stated as uncontrolled         Past Surgical History:     Past Surgical History:   Procedure Laterality Date     SECTION      EXTERNAL EAR SURGERY      EYE SURGERY      RECTAL SURGERY Right 2021    RECTAL PERIRECTAL  nephrology recommendations  Fever 100.5 this morning  Patient's urine output was low yesterday, Lasix was given and PureWick was placed overnight  Oxygen saturation downtrending patient currently on 2 L nasal cannula oxygen  Suspect pulmonary edema developing from fluid overload-IV fluids have been discontinued  Patient developed fever 100.5 this morning-UTI versus another source, no urine cultures yet ordering, also ordering blood cultures switching antibiotic to Zosyn.    Patient is localizing pain to her back x-ray of the spine could not be completed appropriately due to pain  Ordering MRI of thoracic and lumbar spine instructed patient and nursing to ask for pain medication in order to get the MRI completed.  Patient has been on morphine, scheduled Neurontin as well as tizanidine.  Patient reports there is no change to her pain, attempted to examine her back again today but patient states she can neither sit up in bed nor roll to the side which has been the case since admission.  Will reattempt after giving her morphine  Significant bibasilar lung crackles-will give additional dose of Lasix.  No leg weakness    6/27  MRI thoracic and lumbar spine showed degenerative changes only  No further fever spikes  She is now on 2.5 L nasal cannula creatinine is climbing up to 2.5 today.  Appreciate nephrology recommendations.  Deferring fluid management to nephrology  Additionally has significant hemoglobin drop to 7.4 today, patient was 10 at presentation.  Ordered IV Protonix twice daily, stool Hemoccult.  GI consulted.  Patient denies any abdominal pain or vomiting.  She is not on any blood thinners.  Holding chemical DVT prophylaxis.  Stress test planned for tomorrow  Patient reports significant depression-requesting to speak with psychiatrist.-Consulting  Urine culture no growth.  Discontinuing Zosyn.    Zulema Tran MD  6/27/2024  12:54 PM

## 2024-06-28 ENCOUNTER — APPOINTMENT (OUTPATIENT)
Dept: NUCLEAR MEDICINE | Age: 64
DRG: 199 | End: 2024-06-28
Payer: MEDICAID

## 2024-06-28 ENCOUNTER — HOSPITAL ENCOUNTER (INPATIENT)
Age: 64
Discharge: HOME | DRG: 199 | End: 2024-06-30
Payer: MEDICAID

## 2024-06-28 ENCOUNTER — HOSPITAL ENCOUNTER (INPATIENT)
Dept: VASCULAR LAB | Age: 64
Discharge: HOME | DRG: 199 | End: 2024-06-30
Attending: INTERNAL MEDICINE
Payer: MEDICAID

## 2024-06-28 LAB
ALBUMIN PERCENT: 49 % (ref 45–65)
ALBUMIN SERPL-MCNC: 3 G/DL (ref 3.2–5.2)
ALPHA 2 PERCENT: 13 % (ref 6–13)
ALPHA1 GLOB SERPL ELPH-MCNC: 0.5 G/DL (ref 0.1–0.4)
ALPHA1 GLOB SERPL ELPH-MCNC: 8 % (ref 3–6)
ALPHA2 GLOB SERPL ELPH-MCNC: 0.8 G/DL (ref 0.5–0.9)
ANA SER QL IA: NEGATIVE
ANION GAP SERPL CALCULATED.3IONS-SCNC: 10 MMOL/L (ref 9–17)
B-GLOBULIN SERPL ELPH-MCNC: 0.9 G/DL (ref 0.5–1.1)
B-GLOBULIN SERPL ELPH-MCNC: 14 % (ref 11–19)
BASOPHILS # BLD: 0 K/UL (ref 0–0.2)
BASOPHILS NFR BLD: 1 % (ref 0–2)
BUN SERPL-MCNC: 37 MG/DL (ref 8–23)
CALCIUM SERPL-MCNC: 9.5 MG/DL (ref 8.6–10.4)
CHLORIDE SERPL-SCNC: 107 MMOL/L (ref 98–107)
CO2 SERPL-SCNC: 20 MMOL/L (ref 20–31)
CREAT SERPL-MCNC: 2.4 MG/DL (ref 0.5–0.9)
DSDNA IGG SER QL IA: 0.7 IU/ML
ECHO BSA: 1.86 M2
EOSINOPHIL # BLD: 0.3 K/UL (ref 0–0.4)
EOSINOPHILS RELATIVE PERCENT: 5 % (ref 0–4)
ERYTHROCYTE [DISTWIDTH] IN BLOOD BY AUTOMATED COUNT: 15.4 % (ref 11.5–14.9)
FOLATE SERPL-MCNC: 9.4 NG/ML (ref 4.8–24.2)
GAMMA GLOB SERPL ELPH-MCNC: 1 G/DL (ref 0.5–1.5)
GAMMA GLOBULIN %: 16 % (ref 9–20)
GFR, ESTIMATED: 22 ML/MIN/1.73M2
GLIADIN IGA SER IA-ACNC: 4 U/ML
GLIADIN IGG SER IA-ACNC: 2.7 U/ML
GLUCOSE BLD-MCNC: 107 MG/DL (ref 65–105)
GLUCOSE BLD-MCNC: 140 MG/DL (ref 65–105)
GLUCOSE BLD-MCNC: 83 MG/DL (ref 65–105)
GLUCOSE BLD-MCNC: 84 MG/DL (ref 65–105)
GLUCOSE SERPL-MCNC: 93 MG/DL (ref 70–99)
HCT VFR BLD AUTO: 22.8 % (ref 36–46)
HGB BLD-MCNC: 7.3 G/DL (ref 12–16)
IGA SERPL-MCNC: 259 MG/DL (ref 70–400)
LYMPHOCYTES NFR BLD: 2.2 K/UL (ref 1–4.8)
LYMPHOCYTES RELATIVE PERCENT: 31 % (ref 24–44)
MCH RBC QN AUTO: 29.6 PG (ref 26–34)
MCHC RBC AUTO-ENTMCNC: 32.2 G/DL (ref 31–37)
MCV RBC AUTO: 92.1 FL (ref 80–100)
MONOCYTES NFR BLD: 0.5 K/UL (ref 0.1–1.3)
MONOCYTES NFR BLD: 7 % (ref 1–7)
NEUTROPHILS NFR BLD: 56 % (ref 36–66)
NEUTS SEG NFR BLD: 4.1 K/UL (ref 1.3–9.1)
NUCLEAR IGG SER IA-RTO: 0.1 U/ML
P E INTERPRETATION, U: NORMAL
PATHOLOGIST: ABNORMAL
PATHOLOGIST: NORMAL
PLATELET # BLD AUTO: 310 K/UL (ref 150–450)
PMV BLD AUTO: 7.7 FL (ref 6–12)
POTASSIUM SERPL-SCNC: 4.4 MMOL/L (ref 3.7–5.3)
PROT PATTERN SERPL ELPH-IMP: ABNORMAL
PROT SERPL-MCNC: 6.1 G/DL (ref 6.6–8.7)
RBC # BLD AUTO: 2.47 M/UL (ref 4–5.2)
SODIUM SERPL-SCNC: 137 MMOL/L (ref 135–144)
SPECIMEN TYPE: NORMAL
TOTAL PROT. SUM,%: 100 % (ref 98–102)
TOTAL PROT. SUM: 6.2 G/DL (ref 6.3–8.2)
TTG IGA SER IA-ACNC: <0.1 U/ML
URINE TOTAL PROTEIN: 122 MG/DL
VAS AORTA DIST AP: 1.46 CM
VAS AORTA DIST TR: 1.36 CM
VAS AORTA MID AP: 1.59 CM
VAS AORTA MID PSV: 131 CM/S
VAS AORTA MID TRANS: 1.31 CM
VAS AORTA PROX AP: 2.06 CM
VAS AORTA PROX PSV: 127 CM/S
VAS AORTA PROX TR: 2.12 CM
VAS L RENAL ORIG RI: 0.88
VAS LEFT KIDNEY LENGTH: 10.65 CM
VAS LEFT RENAL DIST EDV: 7.1 CM/S
VAS LEFT RENAL DIST PSV: 62.6 CM/S
VAS LEFT RENAL DIST RAR: 0.48
VAS LEFT RENAL DIST RI: 0.89
VAS LEFT RENAL MID EDV: 7.7 CM/S
VAS LEFT RENAL MID PSV: 83.5 CM/S
VAS LEFT RENAL MID RAR: 0.64
VAS LEFT RENAL MID RI: 0.91
VAS LEFT RENAL ORIGIN EDV: 6.9 CM/S
VAS LEFT RENAL ORIGIN PSV: 58.5 CM/S
VAS LEFT RENAL ORIGIN RAR: 0.45
VAS LEFT RENAL PROX EDV: 5.4 CM/S
VAS LEFT RENAL PROX PSV: 60.1 CM/S
VAS LEFT RENAL PROX RAR: 0.46
VAS LEFT RENAL PROX RI: 0.91
VAS LEFT RENAL RAR: 0.64
VAS RIGHT KIDNEY LENGTH: 10.4 CM
VAS RIGHT RENAL DIST EDV: 8.1 CM/S
VAS RIGHT RENAL DIST PSV: 51.6 CM/S
VAS RIGHT RENAL DIST RAR: 0.39
VAS RIGHT RENAL DIST RI: 0.84
VAS RIGHT RENAL MID EDV: 11.1 CM/S
VAS RIGHT RENAL MID PSV: 98.3 CM/S
VAS RIGHT RENAL MID RAR: 0.75
VAS RIGHT RENAL MID RI: 0.89
VAS RIGHT RENAL ORIGIN EDV: 13.8 CM/S
VAS RIGHT RENAL ORIGIN PSV: 99.8 CM/S
VAS RIGHT RENAL ORIGIN RAR: 0.76
VAS RIGHT RENAL ORIGIN RI: 0.86
VAS RIGHT RENAL PROX EDV: 15.5 CM/S
VAS RIGHT RENAL PROX PSV: 110 CM/S
VAS RIGHT RENAL PROX RAR: 0.84
VAS RIGHT RENAL PROX RI: 0.86
VAS RIGHT RENAL RAR: 0.84
VIT B12 SERPL-MCNC: 304 PG/ML (ref 232–1245)
WBC OTHER # BLD: 7.1 K/UL (ref 3.5–11)

## 2024-06-28 PROCEDURE — 2060000000 HC ICU INTERMEDIATE R&B

## 2024-06-28 PROCEDURE — 93975 VASCULAR STUDY: CPT

## 2024-06-28 PROCEDURE — 99232 SBSQ HOSP IP/OBS MODERATE 35: CPT | Performed by: INTERNAL MEDICINE

## 2024-06-28 PROCEDURE — 6360000002 HC RX W HCPCS: Performed by: NURSE PRACTITIONER

## 2024-06-28 PROCEDURE — 85025 COMPLETE CBC W/AUTO DIFF WBC: CPT

## 2024-06-28 PROCEDURE — 78452 HT MUSCLE IMAGE SPECT MULT: CPT

## 2024-06-28 PROCEDURE — A9500 TC99M SESTAMIBI: HCPCS | Performed by: INTERNAL MEDICINE

## 2024-06-28 PROCEDURE — 80048 BASIC METABOLIC PNL TOTAL CA: CPT

## 2024-06-28 PROCEDURE — 93975 VASCULAR STUDY: CPT | Performed by: SURGERY

## 2024-06-28 PROCEDURE — APPSS30 APP SPLIT SHARED TIME 16-30 MINUTES: Performed by: NURSE PRACTITIONER

## 2024-06-28 PROCEDURE — C9113 INJ PANTOPRAZOLE SODIUM, VIA: HCPCS | Performed by: INTERNAL MEDICINE

## 2024-06-28 PROCEDURE — 93018 CV STRESS TEST I&R ONLY: CPT | Performed by: INTERNAL MEDICINE

## 2024-06-28 PROCEDURE — 3430000000 HC RX DIAGNOSTIC RADIOPHARMACEUTICAL: Performed by: INTERNAL MEDICINE

## 2024-06-28 PROCEDURE — 6360000002 HC RX W HCPCS: Performed by: INTERNAL MEDICINE

## 2024-06-28 PROCEDURE — 82947 ASSAY GLUCOSE BLOOD QUANT: CPT

## 2024-06-28 PROCEDURE — 2580000003 HC RX 258: Performed by: INTERNAL MEDICINE

## 2024-06-28 PROCEDURE — 93017 CV STRESS TEST TRACING ONLY: CPT

## 2024-06-28 PROCEDURE — 36415 COLL VENOUS BLD VENIPUNCTURE: CPT

## 2024-06-28 PROCEDURE — 99233 SBSQ HOSP IP/OBS HIGH 50: CPT | Performed by: INTERNAL MEDICINE

## 2024-06-28 PROCEDURE — 6370000000 HC RX 637 (ALT 250 FOR IP): Performed by: INTERNAL MEDICINE

## 2024-06-28 RX ORDER — NITROGLYCERIN 0.4 MG/1
0.4 TABLET SUBLINGUAL EVERY 5 MIN PRN
Status: ACTIVE | OUTPATIENT
Start: 2024-06-28 | End: 2024-06-28

## 2024-06-28 RX ORDER — SODIUM CHLORIDE 9 MG/ML
500 INJECTION, SOLUTION INTRAVENOUS CONTINUOUS PRN
Status: ACTIVE | OUTPATIENT
Start: 2024-06-28 | End: 2024-06-28

## 2024-06-28 RX ORDER — SODIUM CHLORIDE 0.9 % (FLUSH) 0.9 %
5-40 SYRINGE (ML) INJECTION PRN
Status: ACTIVE | OUTPATIENT
Start: 2024-06-28 | End: 2024-06-28

## 2024-06-28 RX ORDER — AMINOPHYLLINE 25 MG/ML
50 INJECTION, SOLUTION INTRAVENOUS PRN
Status: ACTIVE | OUTPATIENT
Start: 2024-06-28 | End: 2024-06-28

## 2024-06-28 RX ORDER — TETRAKIS(2-METHOXYISOBUTYLISOCYANIDE)COPPER(I) TETRAFLUOROBORATE 1 MG/ML
15 INJECTION, POWDER, LYOPHILIZED, FOR SOLUTION INTRAVENOUS
Status: COMPLETED | OUTPATIENT
Start: 2024-06-28 | End: 2024-06-28

## 2024-06-28 RX ORDER — TETRAKIS(2-METHOXYISOBUTYLISOCYANIDE)COPPER(I) TETRAFLUOROBORATE 1 MG/ML
35 INJECTION, POWDER, LYOPHILIZED, FOR SOLUTION INTRAVENOUS
Status: COMPLETED | OUTPATIENT
Start: 2024-06-28 | End: 2024-06-28

## 2024-06-28 RX ORDER — METOPROLOL TARTRATE 1 MG/ML
5 INJECTION, SOLUTION INTRAVENOUS EVERY 5 MIN PRN
Status: ACTIVE | OUTPATIENT
Start: 2024-06-28 | End: 2024-06-28

## 2024-06-28 RX ORDER — ATROPINE SULFATE 0.1 MG/ML
0.5 INJECTION INTRAVENOUS EVERY 5 MIN PRN
Status: ACTIVE | OUTPATIENT
Start: 2024-06-28 | End: 2024-06-28

## 2024-06-28 RX ORDER — SODIUM CHLORIDE 0.9 % (FLUSH) 0.9 %
10 SYRINGE (ML) INJECTION PRN
Status: DISCONTINUED | OUTPATIENT
Start: 2024-06-28 | End: 2024-07-01 | Stop reason: HOSPADM

## 2024-06-28 RX ORDER — ALBUTEROL SULFATE 90 UG/1
2 AEROSOL, METERED RESPIRATORY (INHALATION) PRN
Status: ACTIVE | OUTPATIENT
Start: 2024-06-28 | End: 2024-06-28

## 2024-06-28 RX ORDER — REGADENOSON 0.08 MG/ML
0.4 INJECTION, SOLUTION INTRAVENOUS
Status: COMPLETED | OUTPATIENT
Start: 2024-06-28 | End: 2024-06-28

## 2024-06-28 RX ADMIN — ERYTHROMYCIN: 5 OINTMENT OPHTHALMIC at 14:27

## 2024-06-28 RX ADMIN — TRAZODONE HYDROCHLORIDE 50 MG: 50 TABLET ORAL at 21:16

## 2024-06-28 RX ADMIN — SODIUM CHLORIDE: 9 INJECTION, SOLUTION INTRAVENOUS at 06:10

## 2024-06-28 RX ADMIN — BUPROPION HYDROCHLORIDE 300 MG: 300 TABLET, EXTENDED RELEASE ORAL at 10:13

## 2024-06-28 RX ADMIN — Medication 16 MILLICURIE: at 08:38

## 2024-06-28 RX ADMIN — TIZANIDINE 2 MG: 2 TABLET ORAL at 21:27

## 2024-06-28 RX ADMIN — SENNOSIDES 8.6 MG: 8.6 TABLET, FILM COATED ORAL at 10:12

## 2024-06-28 RX ADMIN — PRAVASTATIN SODIUM 40 MG: 40 TABLET ORAL at 21:16

## 2024-06-28 RX ADMIN — AMLODIPINE BESYLATE 10 MG: 10 TABLET ORAL at 10:12

## 2024-06-28 RX ADMIN — SODIUM CHLORIDE, PRESERVATIVE FREE 40 MG: 5 INJECTION INTRAVENOUS at 10:11

## 2024-06-28 RX ADMIN — MORPHINE SULFATE 2 MG: 2 INJECTION, SOLUTION INTRAMUSCULAR; INTRAVENOUS at 10:15

## 2024-06-28 RX ADMIN — SODIUM CHLORIDE, PRESERVATIVE FREE 10 ML: 5 INJECTION INTRAVENOUS at 12:56

## 2024-06-28 RX ADMIN — ASPIRIN 81 MG: 81 TABLET, COATED ORAL at 10:12

## 2024-06-28 RX ADMIN — GABAPENTIN 300 MG: 300 CAPSULE ORAL at 14:27

## 2024-06-28 RX ADMIN — MORPHINE SULFATE 2 MG: 2 INJECTION, SOLUTION INTRAMUSCULAR; INTRAVENOUS at 22:31

## 2024-06-28 RX ADMIN — TIZANIDINE 2 MG: 2 TABLET ORAL at 10:13

## 2024-06-28 RX ADMIN — SODIUM CHLORIDE, PRESERVATIVE FREE 10 ML: 5 INJECTION INTRAVENOUS at 21:16

## 2024-06-28 RX ADMIN — SODIUM CHLORIDE, PRESERVATIVE FREE 40 MG: 5 INJECTION INTRAVENOUS at 21:16

## 2024-06-28 RX ADMIN — MORPHINE SULFATE 2 MG: 2 INJECTION, SOLUTION INTRAMUSCULAR; INTRAVENOUS at 18:32

## 2024-06-28 RX ADMIN — Medication 37.8 MILLICURIE: at 12:56

## 2024-06-28 RX ADMIN — GABAPENTIN 300 MG: 300 CAPSULE ORAL at 10:12

## 2024-06-28 RX ADMIN — GABAPENTIN 300 MG: 300 CAPSULE ORAL at 21:16

## 2024-06-28 RX ADMIN — MORPHINE SULFATE 2 MG: 2 INJECTION, SOLUTION INTRAMUSCULAR; INTRAVENOUS at 06:13

## 2024-06-28 RX ADMIN — ERYTHROMYCIN: 5 OINTMENT OPHTHALMIC at 06:14

## 2024-06-28 RX ADMIN — REGADENOSON 0.4 MG: 0.08 INJECTION, SOLUTION INTRAVENOUS at 08:37

## 2024-06-28 RX ADMIN — TIZANIDINE 2 MG: 2 TABLET ORAL at 14:27

## 2024-06-28 RX ADMIN — ERYTHROMYCIN: 5 OINTMENT OPHTHALMIC at 21:16

## 2024-06-28 RX ADMIN — ATENOLOL 50 MG: 50 TABLET ORAL at 10:12

## 2024-06-28 ASSESSMENT — PAIN DESCRIPTION - PAIN TYPE
TYPE: ACUTE PAIN

## 2024-06-28 ASSESSMENT — PAIN SCALES - GENERAL
PAINLEVEL_OUTOF10: 6
PAINLEVEL_OUTOF10: 8
PAINLEVEL_OUTOF10: 5
PAINLEVEL_OUTOF10: 0
PAINLEVEL_OUTOF10: 0
PAINLEVEL_OUTOF10: 7
PAINLEVEL_OUTOF10: 7
PAINLEVEL_OUTOF10: 8
PAINLEVEL_OUTOF10: 0
PAINLEVEL_OUTOF10: 4
PAINLEVEL_OUTOF10: 7

## 2024-06-28 ASSESSMENT — PAIN DESCRIPTION - ONSET
ONSET: ON-GOING
ONSET: ON-GOING

## 2024-06-28 ASSESSMENT — PAIN DESCRIPTION - ORIENTATION
ORIENTATION: MID;LOWER
ORIENTATION: RIGHT;LEFT;LOWER
ORIENTATION: MID
ORIENTATION: MID;LOWER

## 2024-06-28 ASSESSMENT — PAIN DESCRIPTION - LOCATION
LOCATION: BACK
LOCATION: ABDOMEN;BACK
LOCATION: BACK

## 2024-06-28 ASSESSMENT — PAIN DESCRIPTION - FREQUENCY
FREQUENCY: CONTINUOUS
FREQUENCY: INTERMITTENT

## 2024-06-28 ASSESSMENT — PAIN - FUNCTIONAL ASSESSMENT
PAIN_FUNCTIONAL_ASSESSMENT: ACTIVITIES ARE NOT PREVENTED
PAIN_FUNCTIONAL_ASSESSMENT: ACTIVITIES ARE NOT PREVENTED
PAIN_FUNCTIONAL_ASSESSMENT: PREVENTS OR INTERFERES SOME ACTIVE ACTIVITIES AND ADLS
PAIN_FUNCTIONAL_ASSESSMENT: ACTIVITIES ARE NOT PREVENTED
PAIN_FUNCTIONAL_ASSESSMENT: PREVENTS OR INTERFERES SOME ACTIVE ACTIVITIES AND ADLS

## 2024-06-28 ASSESSMENT — PAIN DESCRIPTION - DESCRIPTORS
DESCRIPTORS: ACHING
DESCRIPTORS: SORE;DISCOMFORT;TENDER
DESCRIPTORS: ACHING;DISCOMFORT
DESCRIPTORS: ACHING
DESCRIPTORS: ACHING

## 2024-06-28 NOTE — PROGRESS NOTES
Writer attempted follow up visit; patient on the phone and asked writer to \"come back later\";     06/28/24 1630   Encounter Summary   Encounter Overview/Reason Spiritual/Emotional Needs   Service Provided For Patient   Referral/Consult From Patient;Nurse   Last Encounter  06/28/24   Complexity of Encounter Low   Spiritual/Emotional needs   Type Spiritual Support   Assessment/Intervention/Outcome   Assessment Unable to assess   Outcome Refused/Declined

## 2024-06-28 NOTE — PROGRESS NOTES
GI Progress notes    6/28/2024   1:39 PM    Name:  Shauna Alexandra  MRN:    [id number]     Acct:     [id number]   Room:  [address] [place] [address] Day: 4     Admit Date: 6/24/2024 11:48 AM  PCP: Kody Licona APRN - CNP    Subjective:     C/C:   Chief Complaint   Patient presents with    Chest Pain    Abdominal Pain    Insect Bite     X 2 days post outside urgent care visit no relief, complians of increased pain inhale, Pt also complains of eye discharge, and possible spider bite        Interval History: Status: not changed.     Patient seen and examined  No acute events overnight  Hgb 7.3  FOBT +      ROS:  Constitutional: negative for chills, fevers and sweats  Respiratory: negative for cough, dyspnea on exertion, hemoptysis, shortness of breath and wheezing  Cardiovascular: negative for chest pain, chest pressure/discomfort, dyspnea, lower extremity edema and palpitations  Gastrointestinal: negative for abdominal pain, constipation, diarrhea, nausea and vomiting  Neurological: negative for dizziness and headaches    Medications:     Allergies:   Allergies   Allergen Reactions    Ibuprofen Itching       Current Meds: sodium chloride flush 0.9 % injection 10 mL, PRN  pantoprazole (PROTONIX) 40 mg in sodium chloride (PF) 0.9 % 10 mL injection, Q12H  senna (SENOKOT) tablet 8.6 mg, Daily  0.9 % sodium chloride infusion, Continuous  perflutren lipid microspheres (DEFINITY) injection 1.5 mL, ONCE PRN  erythromycin (ROMYCIN) ophthalmic ointment, 3 times per day  tiZANidine (ZANAFLEX) tablet 2 mg, TID  sodium chloride flush 0.9 % injection 10 mL, PRN  sodium chloride flush 0.9 % injection 10 mL, PRN  sodium chloride flush 0.9 % injection 5-40 mL, 2 times per day  sodium chloride flush 0.9 % injection 5-40 mL, PRN  0.9 % sodium chloride infusion, PRN  potassium chloride (KLOR-CON M) extended release tablet 40 mEq, PRN   Or  potassium bicarb-citric acid (EFFER-K) effervescent tablet 40 mEq, PRN   Or  potassium chloride 10

## 2024-06-28 NOTE — PLAN OF CARE
Problem: Discharge Planning  Goal: Discharge to home or other facility with appropriate resources  Outcome: Progressing     Problem: Pain  Goal: Verbalizes/displays adequate comfort level or baseline comfort level  Outcome: Progressing  Note: Morphine given 2x during shift. Pain scale assessed     Problem: Safety - Adult  Goal: Free from fall injury  Outcome: Progressing     Problem: Skin/Tissue Integrity  Goal: Absence of new skin breakdown  Description: 1.  Monitor for areas of redness and/or skin breakdown  2.  Assess vascular access sites hourly  3.  Every 4-6 hours minimum:  Change oxygen saturation probe site  4.  Every 4-6 hours:  If on nasal continuous positive airway pressure, respiratory therapy assess nares and determine need for appliance change or resting period.  Outcome: Progressing     Problem: Chronic Conditions and Co-morbidities  Goal: Patient's chronic conditions and co-morbidity symptoms are monitored and maintained or improved  Outcome: Progressing     Problem: ABCDS Injury Assessment  Goal: Absence of physical injury  Outcome: Progressing     Problem: Nutrition Deficit:  Goal: Optimize nutritional status  Outcome: Progressing

## 2024-06-28 NOTE — PROGRESS NOTES
Spoke with Dr. Saxena about patients family testing positive for Covid, The patient is not showing symptoms so Dr. Saxena told us there is no need for a Covid test just droplet precautions. Patient family was on the unit 6/26.

## 2024-06-28 NOTE — PROGRESS NOTES
Writer responding to referral to see patient per her request;     06/28/24 7913   Encounter Summary   Encounter Overview/Reason Spiritual/Emotional Needs   Service Provided For Patient   Referral/Consult From Rounding   Complexity of Encounter Low   Spiritual/Emotional needs   Type Spiritual Support   Assessment/Intervention/Outcome   Assessment Unable to assess  (patient sleeping)   Intervention Prayer (assurance of)/[place]

## 2024-06-28 NOTE — DISCHARGE INSTR - COC
Continuity of Care Form    Patient Name: Shauna Alexandra   :  1960  MRN:  [id number]    Admit date:  2024  Discharge date:  ***    Code Status Order: Full Code   Advance Directives:     Admitting Physician:  Zulema Tran MD  PCP: Kody Licona APRN - CNP    Discharging Nurse: ***  Discharging Hospital Unit/Room#: 2124/2124-01  Discharging Unit Phone Number: ***    Emergency Contact:   Extended Emergency Contact Information  Primary Emergency Contact: Stella Reynoso  Address:  [address] of [facility]  Home Phone: [phone]  Work Phone: [phone]  Mobile Phone: [phone]  Relation: Parent  Hearing or visual needs: None  Other needs: None  Preferred language: English   needed? No    Past Surgical History:  Past Surgical History:   Procedure Laterality Date     SECTION      EXTERNAL EAR SURGERY      EYE SURGERY      RECTAL SURGERY Right 2021    RECTAL PERIRECTAL INCISION AND DRAINAGE performed by Mukul Valentine MD at [facility] OR    THYROID SURGERY         Immunization History:   Immunization History   Administered Date(s) Administered    COVID-19, PFIZER PURPLE top, DILUTE for use, (age 12 y+), 30mcg/0.3mL 2021, 10/06/2021    Influenza, FLUARIX, FLULAVAL, FLUZONE (age 6 mo+) AND AFLURIA, (age 3 y+), PF, 0.5mL 11/10/2016    Influenza, FLUCELVAX, (age 6 mo+), MDCK, PF, 0.5mL 10/05/2022, 2023    Influenza, Quadv, 6 mo and older, IM (Fluzone, Flulaval) 10/15/2018    Pneumococcal, PCV20, PREVNAR 20, (age 6w+), IM, 0.5mL 2023    Pneumococcal, PPSV23, PNEUMOVAX 23, (age 2y+), SC/IM, 0.5mL 11/10/2016    TDaP, ADACEL (age 10y-64y), BOOSTRIX (age 10y+), IM, 0.5mL 2017       Active Problems:  Patient Active Problem List   Diagnosis Code    Absolute anemia D64.9    Chronic bilateral low back pain without sciatica M54.50, G89.29    Essential hypertension I10    Diabetic polyneuropathy associated with type 2 diabetes

## 2024-06-28 NOTE — PLAN OF CARE
Problem: Discharge Planning  Goal: Discharge to home or other facility with appropriate resources  6/28/2024 0245 by Joanie Baca RN  Outcome: Progressing  6/27/2024 1639 by Alba Hall RN  Outcome: Progressing  Flowsheets (Taken 6/27/2024 1639)  Discharge to home or other facility with appropriate resources:   Identify barriers to discharge with patient and caregiver   Identify discharge learning needs (meds, wound care, etc)   Refer to discharge planning if patient needs post-hospital services based on physician order or complex needs related to functional status, cognitive ability or social support system   Arrange for needed discharge resources and transportation as appropriate     Problem: Pain  Goal: Verbalizes/displays adequate comfort level or baseline comfort level  6/28/2024 0245 by Joanie Baca RN  Outcome: Progressing  6/27/2024 1639 by Alba Hall RN  Outcome: Progressing  Flowsheets (Taken 6/27/2024 1639)  Verbalizes/displays adequate comfort level or baseline comfort level:   Encourage patient to monitor pain and request assistance   Consider cultural and social influences on pain and pain management   Assess pain using appropriate pain scale     Problem: Safety - Adult  Goal: Free from fall injury  6/28/2024 0245 by Joanie Baca RN  Outcome: Progressing  6/27/2024 1639 by Alba Hall RN  Outcome: Progressing  Flowsheets (Taken 6/27/2024 1639)  Free From Fall Injury: Instruct family/caregiver on patient safety     Problem: Skin/Tissue Integrity  Goal: Absence of new skin breakdown  Description: 1.  Monitor for areas of redness and/or skin breakdown  2.  Assess vascular access sites hourly  3.  Every 4-6 hours minimum:  Change oxygen saturation probe site  4.  Every 4-6 hours:  If on nasal continuous positive airway pressure, respiratory therapy assess nares and determine need for appliance change or resting period.  6/28/2024 0245 by Joanie Baca

## 2024-06-28 NOTE — PROGRESS NOTES
Nutrition Assessment     Type and Reason for Visit: Initial, Positive Nutrition Screen (Poor appetite, intake, wt loss.)    Nutrition Recommendations/Plan:   Continue current diet.  Continue Ensure Plus High Protein with meals.     Malnutrition Assessment:  Malnutrition Status: No malnutrition    Nutrition Assessment:  Pt admitted for ALBERTO, with PMH of DM2, CKD3. Pt reports eating all her food and willing to take the three Ensure Plus High Protein supplements daily. No significant weight change noted.    Estimated Daily Nutrient Needs:  Energy (kcal):  0348-5778 kcal/day based on 15-18 kcal/kg Weight Used for Energy Requirements: Current     Protein (g):  68-86 g/day based on 1.2-1.5 g/kg Weight Used for Protein Requirements: Ideal        Fluid (ml/day):  Per physician Method Used for Fluid Requirements: Other (Comment)    Nutrition Related Findings:   Pt reports bloating. BM 6/27. No edema. Missing teeth. Meds reviewed. Wound Type: None    Current Nutrition Therapies:    ADULT DIET; Regular  ADULT ORAL NUTRITION SUPPLEMENT; Breakfast, Lunch, Dinner; Standard High Calorie/High Protein Oral Supplement    Anthropometric Measures:  Height: 165.1 cm (5' 5\")  Current Body Wt: 93.9 kg (207 lb 0.2 oz)   BMI: 34.4    Nutrition Diagnosis:   No nutrition diagnosis at this time.    Nutrition Interventions:   Food and/or Nutrient Delivery: Continue Current Diet, Continue Oral Nutrition Supplement  Nutrition Education/Counseling: No recommendation at this time  Coordination of Nutrition Care: Continue to monitor while inpatient       Goals:  Previous Goal Met:  (New goal)  Goals: Meet at least 75% of estimated needs       Nutrition Monitoring and Evaluation:   Behavioral-Environmental Outcomes: None Identified  Food/Nutrient Intake Outcomes: Food and Nutrient Intake, Supplement Intake  Physical Signs/Symptoms Outcomes: Biochemical Data, GI Status, Fluid Status or Edema, Nutrition Focused Physical Findings, Skin,

## 2024-06-28 NOTE — PROGRESS NOTES
[facility] Internal Medicine  Bakari Zacarias MD; Rory Rios MD; Pavel Bolden MD; MD Zulema Alonzo MD; Wiley Saxena MD; Izzy White MD      Progress note            Date:   2024  Patient name:  Shauna Alexandra  MRN:   [id number]  Account:  [id number]  YOB: 1960  PCP:    Kody Licona APRN - CNP  Code Status:    Full Code    Chief Complaint:     Chief Complaint   Patient presents with    Chest Pain    Abdominal Pain    Insect Bite     X 2 days post outside urgent care visit no relief, complians of increased pain inhale, Pt also complains of eye discharge, and possible spider bite          History Obtained From:     Patient, EMR, nursing staff    HPI     This patient is a 64 y.o. Non- / non  femalewho has hypertension hyperlipidemia type 2 diabetes no known cardiac history presenting with shortness of breath and chest pain noted to have very high blood pressure and ALBERTO in ER.  Presents with  UA concerning for UTI      Review of Systems:     Positive for shortness of breath no cough  Positive for some chest pain denies palpitations  Positive for upper abdominal pain  Denies any new numbness tremors or weakness.    A 10 point review of systems was performed and and negative except as mentioned in HPI  Positive and Negative as described in HPI.      Past Medical History:     Past Medical History:   Diagnosis Date    Anemia     Chronic back pain     Depression     Hyperlipidemia     Hypertension     Type 2 diabetes mellitus with diabetic polyneuropathy, without long-term current use of insulin ([facility]) 11/10/2016    Type II or unspecified type diabetes mellitus without mention of complication, not stated as uncontrolled         Past Surgical History:     Past Surgical History:   Procedure Laterality Date     SECTION      EXTERNAL EAR SURGERY      EYE SURGERY      RECTAL SURGERY Right 2021    RECTAL PERIRECTAL INCISION AND DRAINAGE  hydronephrosis on CT abdomen, holding lisinopril HCTZ Topamax  Suspected UTI based on UA results-continue Rocephin  Nonspecific abdominal pain-lipase and liver enzymes normal  Chest pain shortness of breath troponins negative, EKG sinus rhythm D-dimer negative  Incidental finding of endometrial thickening on CT abdomen-pelvic ultrasound to be done as outpatient  Type 2 diabetes-Lantus sliding scale  Chronic pain-renally dosed Neurontin    DVT pplx-Lovenox    6/25  Patient condition unchanged, patient remains on continuous IV fluids increasing to 100 cc/h.  Echo shows no CHF or wall motion abnormality.  Creatinine still 2 baseline 1.5, consulting nephrology  Continues to complain primarily of pain across mid back with epigastric pain,  Tender across mid back, will get x-ray thoracic and lumbar spines.  Rule out vertebral lesions.  Denies radiation of pain to lower extremities, good power bilateral lower extremities.  Denies any bowel/bladder problems. No saddle anesthesia.  Pain seems to exacerbated by slightest movement possible muscle spasm try tizanidine.    Also complaining of watery discharge from eyes-ordering erythromycin ointment    6/26  Creatinine still at 2 awaiting nephrology recommendations  Fever 100.5 this morning  Patient's urine output was low yesterday, Lasix was given and PureWick was placed overnight  Oxygen saturation downtrending patient currently on 2 L nasal cannula oxygen  Suspect pulmonary edema developing from fluid overload-IV fluids have been discontinued  Patient developed fever 100.5 this morning-UTI versus another source, no urine cultures yet ordering, also ordering blood cultures switching antibiotic to Zosyn.    Patient is localizing pain to her back x-ray of the spine could not be completed appropriately due to pain  Ordering MRI of thoracic and lumbar spine instructed patient and nursing to ask for pain medication in order to get the MRI completed.  Patient has been on morphine,

## 2024-06-28 NOTE — CARE COORDINATION
ONGOING DISCHARGE PLAN:    Patient is alert and oriented x4.    Spoke with patient regarding discharge plan and patient confirms that plan is still to return to home.     Pt. Has been accepted for Home Care, by [facility]. Sonia, from [facility], can be reached at [phone], if Pt. DC on weekend.     [facility] following for requested rollator. Informed Claritza from [facility], that pt. Is still admitted to the Hospital.    Pt. Had Stress Test today.     HGB today 7.3, +OB, GI on board. ?EGD.     CR today, 2.4. Nephro following.     Pt. Is sating 96% on 2.5 LNC. Pt. Does NOT wear at home.     Pt informed writer that \"Her Mother visited her on Wednesday here in the Hospital & she has now tested + for COVID\". Writer did inform Nurse, Cecille, Droplet Precautions placed.     Will continue to follow for additional discharge needs.    If patient is discharged prior to next notation, then this note serves as note for discharge by case management.    Electronically signed by Galilea Evans RN on 6/28/2024 at 12:01 PM

## 2024-06-29 PROBLEM — R19.5 FECAL OCCULT BLOOD TEST POSITIVE: Status: ACTIVE | Noted: 2024-06-29

## 2024-06-29 PROBLEM — R74.8 ELEVATED ALKALINE PHOSPHATASE LEVEL: Status: ACTIVE | Noted: 2024-06-29

## 2024-06-29 LAB
A1AT SERPL-MCNC: 213 MG/DL (ref 90–200)
AFP SERPL-MCNC: 3.5 UG/L
ALBUMIN SERPL-MCNC: 3.1 G/DL (ref 3.5–5.2)
ALDOST SERPL-MCNC: 25.2 NG/DL
ALP SERPL-CCNC: 116 U/L (ref 35–104)
ALT SERPL-CCNC: 19 U/L (ref 5–33)
ANION GAP SERPL CALCULATED.3IONS-SCNC: 11 MMOL/L (ref 9–17)
AST SERPL-CCNC: 14 U/L
BASOPHILS # BLD: 0.08 K/UL (ref 0–0.2)
BASOPHILS NFR BLD: 1 % (ref 0–2)
BILIRUB SERPL-MCNC: <0.2 MG/DL (ref 0.3–1.2)
BUN SERPL-MCNC: 37 MG/DL (ref 8–23)
CALCIUM SERPL-MCNC: 9.5 MG/DL (ref 8.6–10.4)
CERULOPLASMIN SERPL-MCNC: 40 MG/DL (ref 16–45)
CHLORIDE SERPL-SCNC: 105 MMOL/L (ref 98–107)
CO2 SERPL-SCNC: 21 MMOL/L (ref 20–31)
CREAT SERPL-MCNC: 2.2 MG/DL (ref 0.5–0.9)
EOSINOPHIL # BLD: 0.53 K/UL (ref 0–0.4)
EOSINOPHILS RELATIVE PERCENT: 7 % (ref 0–4)
ERYTHROCYTE [DISTWIDTH] IN BLOOD BY AUTOMATED COUNT: 15.3 % (ref 11.5–14.9)
GFR, ESTIMATED: 25 ML/MIN/1.73M2
GLUCOSE BLD-MCNC: 111 MG/DL (ref 65–105)
GLUCOSE BLD-MCNC: 135 MG/DL (ref 65–105)
GLUCOSE BLD-MCNC: 175 MG/DL (ref 65–105)
GLUCOSE BLD-MCNC: 91 MG/DL (ref 65–105)
GLUCOSE SERPL-MCNC: 150 MG/DL (ref 70–99)
HAV IGM SERPL QL IA: NONREACTIVE
HBV CORE IGM SERPL QL IA: NONREACTIVE
HBV SURFACE AG SERPL QL IA: NONREACTIVE
HCT VFR BLD AUTO: 22.7 % (ref 36–46)
HCV AB SERPL QL IA: NONREACTIVE
HGB BLD-MCNC: 7.6 G/DL (ref 12–16)
LYMPHOCYTES NFR BLD: 1.88 K/UL (ref 1–4.8)
LYMPHOCYTES RELATIVE PERCENT: 25 % (ref 24–44)
MCH RBC QN AUTO: 30 PG (ref 26–34)
MCHC RBC AUTO-ENTMCNC: 33.3 G/DL (ref 31–37)
MCV RBC AUTO: 90.1 FL (ref 80–100)
MONOCYTES NFR BLD: 0.45 K/UL (ref 0.1–1.3)
MONOCYTES NFR BLD: 6 % (ref 1–7)
MORPHOLOGY: ABNORMAL
NEUTROPHILS NFR BLD: 61 % (ref 36–66)
NEUTS SEG NFR BLD: 4.56 K/UL (ref 1.3–9.1)
PLATELET # BLD AUTO: 355 K/UL (ref 150–450)
PMV BLD AUTO: 7.5 FL (ref 6–12)
POTASSIUM SERPL-SCNC: 4.4 MMOL/L (ref 3.7–5.3)
PROT SERPL-MCNC: 6.9 G/DL (ref 6.4–8.3)
RBC # BLD AUTO: 2.52 M/UL (ref 4–5.2)
SODIUM SERPL-SCNC: 137 MMOL/L (ref 135–144)
WBC OTHER # BLD: 7.5 K/UL (ref 3.5–11)

## 2024-06-29 PROCEDURE — C9113 INJ PANTOPRAZOLE SODIUM, VIA: HCPCS | Performed by: INTERNAL MEDICINE

## 2024-06-29 PROCEDURE — 80074 ACUTE HEPATITIS PANEL: CPT

## 2024-06-29 PROCEDURE — 99232 SBSQ HOSP IP/OBS MODERATE 35: CPT | Performed by: INTERNAL MEDICINE

## 2024-06-29 PROCEDURE — 83516 IMMUNOASSAY NONANTIBODY: CPT

## 2024-06-29 PROCEDURE — 82947 ASSAY GLUCOSE BLOOD QUANT: CPT

## 2024-06-29 PROCEDURE — 6370000000 HC RX 637 (ALT 250 FOR IP): Performed by: INTERNAL MEDICINE

## 2024-06-29 PROCEDURE — 2060000000 HC ICU INTERMEDIATE R&B

## 2024-06-29 PROCEDURE — 86376 MICROSOMAL ANTIBODY EACH: CPT

## 2024-06-29 PROCEDURE — 82105 ALPHA-FETOPROTEIN SERUM: CPT

## 2024-06-29 PROCEDURE — 99255 IP/OBS CONSLTJ NEW/EST HI 80: CPT | Performed by: INTERNAL MEDICINE

## 2024-06-29 PROCEDURE — 82103 ALPHA-1-ANTITRYPSIN TOTAL: CPT

## 2024-06-29 PROCEDURE — 2580000003 HC RX 258: Performed by: INTERNAL MEDICINE

## 2024-06-29 PROCEDURE — 86225 DNA ANTIBODY NATIVE: CPT

## 2024-06-29 PROCEDURE — 6360000002 HC RX W HCPCS: Performed by: INTERNAL MEDICINE

## 2024-06-29 PROCEDURE — 99231 SBSQ HOSP IP/OBS SF/LOW 25: CPT | Performed by: INTERNAL MEDICINE

## 2024-06-29 PROCEDURE — 86038 ANTINUCLEAR ANTIBODIES: CPT

## 2024-06-29 PROCEDURE — 80053 COMPREHEN METABOLIC PANEL: CPT

## 2024-06-29 PROCEDURE — 85025 COMPLETE CBC W/AUTO DIFF WBC: CPT

## 2024-06-29 PROCEDURE — 36415 COLL VENOUS BLD VENIPUNCTURE: CPT

## 2024-06-29 PROCEDURE — 82390 ASSAY OF CERULOPLASMIN: CPT

## 2024-06-29 PROCEDURE — APPSS30 APP SPLIT SHARED TIME 16-30 MINUTES: Performed by: NURSE PRACTITIONER

## 2024-06-29 PROCEDURE — 6360000002 HC RX W HCPCS: Performed by: NURSE PRACTITIONER

## 2024-06-29 RX ADMIN — SODIUM CHLORIDE, PRESERVATIVE FREE 40 MG: 5 INJECTION INTRAVENOUS at 21:29

## 2024-06-29 RX ADMIN — ATENOLOL 50 MG: 50 TABLET ORAL at 08:35

## 2024-06-29 RX ADMIN — ERYTHROMYCIN: 5 OINTMENT OPHTHALMIC at 05:53

## 2024-06-29 RX ADMIN — MORPHINE SULFATE 2 MG: 2 INJECTION, SOLUTION INTRAMUSCULAR; INTRAVENOUS at 17:08

## 2024-06-29 RX ADMIN — MORPHINE SULFATE 2 MG: 2 INJECTION, SOLUTION INTRAMUSCULAR; INTRAVENOUS at 09:05

## 2024-06-29 RX ADMIN — ERYTHROMYCIN: 5 OINTMENT OPHTHALMIC at 21:36

## 2024-06-29 RX ADMIN — GABAPENTIN 300 MG: 300 CAPSULE ORAL at 13:37

## 2024-06-29 RX ADMIN — SODIUM CHLORIDE, PRESERVATIVE FREE 40 MG: 5 INJECTION INTRAVENOUS at 08:36

## 2024-06-29 RX ADMIN — MORPHINE SULFATE 2 MG: 2 INJECTION, SOLUTION INTRAMUSCULAR; INTRAVENOUS at 05:52

## 2024-06-29 RX ADMIN — TIZANIDINE 2 MG: 2 TABLET ORAL at 13:37

## 2024-06-29 RX ADMIN — SODIUM CHLORIDE: 9 INJECTION, SOLUTION INTRAVENOUS at 02:58

## 2024-06-29 RX ADMIN — SODIUM CHLORIDE: 9 INJECTION, SOLUTION INTRAVENOUS at 19:54

## 2024-06-29 RX ADMIN — PRAVASTATIN SODIUM 40 MG: 40 TABLET ORAL at 21:32

## 2024-06-29 RX ADMIN — BUPROPION HYDROCHLORIDE 300 MG: 300 TABLET, EXTENDED RELEASE ORAL at 08:36

## 2024-06-29 RX ADMIN — TRAZODONE HYDROCHLORIDE 50 MG: 50 TABLET ORAL at 21:32

## 2024-06-29 RX ADMIN — SENNOSIDES 8.6 MG: 8.6 TABLET, FILM COATED ORAL at 08:36

## 2024-06-29 RX ADMIN — ERYTHROMYCIN: 5 OINTMENT OPHTHALMIC at 13:37

## 2024-06-29 RX ADMIN — GABAPENTIN 300 MG: 300 CAPSULE ORAL at 21:32

## 2024-06-29 RX ADMIN — GABAPENTIN 300 MG: 300 CAPSULE ORAL at 08:36

## 2024-06-29 RX ADMIN — MORPHINE SULFATE 2 MG: 2 INJECTION, SOLUTION INTRAMUSCULAR; INTRAVENOUS at 13:37

## 2024-06-29 RX ADMIN — AMLODIPINE BESYLATE 10 MG: 10 TABLET ORAL at 08:35

## 2024-06-29 RX ADMIN — ASPIRIN 81 MG: 81 TABLET, COATED ORAL at 08:36

## 2024-06-29 RX ADMIN — TIZANIDINE 2 MG: 2 TABLET ORAL at 21:32

## 2024-06-29 RX ADMIN — TIZANIDINE 2 MG: 2 TABLET ORAL at 08:36

## 2024-06-29 ASSESSMENT — PAIN SCALES - GENERAL
PAINLEVEL_OUTOF10: 7
PAINLEVEL_OUTOF10: 8
PAINLEVEL_OUTOF10: 9
PAINLEVEL_OUTOF10: 0
PAINLEVEL_OUTOF10: 7
PAINLEVEL_OUTOF10: 5
PAINLEVEL_OUTOF10: 0
PAINLEVEL_OUTOF10: 8
PAINLEVEL_OUTOF10: 9
PAINLEVEL_OUTOF10: 8
PAINLEVEL_OUTOF10: 8

## 2024-06-29 ASSESSMENT — PAIN DESCRIPTION - DESCRIPTORS
DESCRIPTORS: DISCOMFORT;STABBING
DESCRIPTORS: ACHING;DISCOMFORT
DESCRIPTORS: STABBING;DISCOMFORT
DESCRIPTORS: DISCOMFORT
DESCRIPTORS: ACHING
DESCRIPTORS: DISCOMFORT

## 2024-06-29 ASSESSMENT — PAIN DESCRIPTION - ORIENTATION: ORIENTATION: RIGHT;LOWER

## 2024-06-29 ASSESSMENT — PAIN DESCRIPTION - LOCATION
LOCATION: BACK

## 2024-06-29 ASSESSMENT — PAIN - FUNCTIONAL ASSESSMENT: PAIN_FUNCTIONAL_ASSESSMENT: PREVENTS OR INTERFERES SOME ACTIVE ACTIVITIES AND ADLS

## 2024-06-29 NOTE — PROGRESS NOTES
[facility]    Gastroenterology Daily Progress Note      Patient:   Shauna Alexandra   :    1960   Facility:   [facility]  Date:     2024  Consultant:   CONRADO Dupree - CNP, CNP      SUBJECTIVE  64 y.o. female admitted 2024 with Shortness of breath [R06.02]  Angina pectoris (HCC) [I20.9]  ALBERTO (acute kidney injury) (HCC) [N17.9]  Chest pain, unspecified type [R07.9] and seen for anemia with positive fobt. The pt was seen and examined. Hgb 7.6 iron 12 with 6% saturation. No c/o abdominal pain no bm overnight. Cardiac stress test was positive., does c/o shortness of breath and chest pain.        OBJECTIVE  Scheduled Meds:   pantoprazole (PROTONIX) 40 mg in sodium chloride (PF) 0.9 % 10 mL injection  40 mg IntraVENous Q12H    senna  1 tablet Oral Daily    erythromycin   Both Eyes 3 times per day    tiZANidine  2 mg Oral TID    sodium chloride flush  5-40 mL IntraVENous 2 times per day    [Held by provider] enoxaparin  40 mg SubCUTAneous Daily    amLODIPine  10 mg Oral Daily    aspirin  81 mg Oral Daily    atenolol  50 mg Oral Daily    buPROPion  300 mg Oral QAM    [Held by provider] insulin glargine  20 Units SubCUTAneous Nightly    pravastatin  40 mg Oral Nightly    traZODone  50 mg Oral Nightly    insulin lispro  0-4 Units SubCUTAneous TID WC    insulin lispro  0-4 Units SubCUTAneous Nightly    gabapentin  300 mg Oral TID       Vital Signs:  BP (!) 149/66   Pulse 78   Temp 98.7 °F (37.1 °C) (Oral)   Resp 16   Ht 1.651 m (5' 5\")   Wt 84.4 kg (186 lb 1.1 oz)   SpO2 96%   BMI 30.96 kg/m²    Review of Systems - History obtained from chart review and the patient  General ROS: negative  Respiratory ROS: positive for - shortness of breath  Cardiovascular ROS: positive for - chest pain  Gastrointestinal ROS: no abdominal pain, change in bowel habits, or black or bloody stools   Physical Exam:     General Appearance: alert and oriented to person, place and time,  well-developed and well-nourished, in no acute distress  Skin: warm and dry, no rash or erythema  Head: normocephalic and atraumatic  Eyes: pupils equal, round, and reactive to light, extraocular eye movements intact, conjunctivae normal  ENT: hearing grossly normal bilaterally  Neck: neck supple and non tender without mass, no thyromegaly or thyroid nodules, no cervical lymphadenopathy   Pulmonary/Chest: clear to auscultation bilaterally- no wheezes, rales or rhonchi, normal air movement, no respiratory distress  Cardiovascular: normal rate, regular rhythm, normal S1 and S2, no murmurs, rubs, clicks or gallops, distal pulses intact, no carotid bruits  Abdomen: soft, obese non-tender, non-distended, normal bowel sounds, no masses or organomegaly  Extremities: no cyanosis, clubbing or edema  Musculoskeletal: normal range of motion, no joint swelling, deformity or tenderness  Neurologic: no cranial nerve deficit and muscle strength normal    Lab and Imaging Review     CBC  Recent Labs     06/27/24  0453 06/28/24  0520 06/29/24  0526   WBC 7.3 7.1 7.5   HGB 7.4* 7.3* 7.6*   HCT 22.8* 22.8* 22.7*   MCV 90.5 92.1 90.1    310 355       BMP  Recent Labs     06/27/24  0453 06/28/24  0520 06/29/24  0526    137 137   K 4.4 4.4 4.4    107 105   CO2 19* 20 21   BUN 34* 37* 37*   CREATININE 2.5* 2.4* 2.2*   GLUCOSE 163* 93 150*   CALCIUM 9.8 9.5 9.5       LFTS  Recent Labs     06/29/24  0526   ALKPHOS 116*   ALT 19   AST 14   BILITOT <0.2*           ANEMIA STUDIES  Recent Labs     06/27/24  0453 06/27/24  1741   TIBC 201*  --    [id number]  --  304   FOLATE  --  9.4     Ct abd 6/24/24  FINDINGS:  Lower Chest: Subsegmental atelectasis is noted in the lung bases.     Organs:     Liver: Liver is normal. No focal lesions are seen.     Spleen: Normal.     Pancreas: Normal     Gallbladder: Gallbladder is normal in wall thickness. There are multiple  radioopaque gallbladder calculi.  CBD is normal in caliber..

## 2024-06-29 NOTE — PROGRESS NOTES
Writer followed up with pt per  Samanta's note; however, pt again did not feel like a visit at this time. Writer let pt know pt could ask nurse to page a  if she wanted a visit.    06/29/24 6424   Encounter Summary   Encounter Overview/Reason Spiritual/Emotional Needs   Service Provided For Patient   Referral/Consult From Other    Last Encounter  06/29/24   Complexity of Encounter Low   Spiritual/Emotional needs   Type Spiritual Support   Assessment/Intervention/Outcome   Assessment Unable to assess   Intervention Sustaining Presence/Ministry of presence;Active listening   Outcome Refused/Declined

## 2024-06-29 NOTE — PROGRESS NOTES
NEPHROLOGY CONSULT     Patient :  Shauna Alexandra; 64 y.o. MRN# [id number]  Location:  2124/2124-01  Attending:  Zulema Tran MD  Admit Date:  6/24/2024   Hospital Day: 5      Reason for Consult: ALBERTO on CKD      Chief Complaint: Chest pain, abdominal pain    Subjective/interval history.  Patient seen and examined, continues to have chest pain, denies nausea vomiting diarrhea.  Patient had nuclear stress test which was abnormal  Serum creatinine improved to 2.2 mg/dL  Blood pressure is better controlled      History of Present Illness:    This is a 64 y.o. female with past medical history of longstanding hypertension type 2 diabetes, CKD stage IIIb with baseline creatinine of 1.5 mg/dL noted in 7/2023.  Patient presented to the hospital with complaints of upper back pain chest pain and cough patient pain gets exacerbated by breathing or coughing, patient denied nausea vomiting diarrhea no difficulty urination.  Patient blood pressure on admission was 203/85 mm hg  Labs showed serum creatinine of 2.0 mg/dL and therefore nephrology consultation has been requested  UA shows 3+ protein  UPC 1.5 g, 6-9 WBC 0-2 RBCs  Chest x-ray on admission showed bibasilar atelectasis/scarring  Nuclear lung scan showed low probability for pulmonary embolus    Pt denies any history of  prolonged NSAID use.  Patient denies dysuria, gross hematuria, flank pain, nocturia, urgency, passing frothy urine or urinary incontinence.  There has been no recent exposure to IV contrast.   There is no history  of paraprotein disease.  Medication review shows use of lisinopril and hydrochlorothiazide    Past Medical History:        Diagnosis Date    Anemia     Chronic back pain     Depression     Hyperlipidemia     Hypertension     Type 2 diabetes mellitus with diabetic polyneuropathy, without long-term current use of insulin ([facility]) 11/10/2016    Type II or unspecified type diabetes mellitus without mention of complication, not stated as uncontrolled   WC  insulin lispro (HUMALOG,ADMELOG) injection vial 0-4 Units, Nightly  glucose chewable tablet 16 g, PRN  dextrose bolus 10% 125 mL, PRN   Or  dextrose bolus 10% 250 mL, PRN  glucagon injection 1 mg, PRN  dextrose 10 % infusion, Continuous PRN  gabapentin (NEURONTIN) capsule 300 mg, TID  hydrALAZINE (APRESOLINE) injection 10 mg, Q6H PRN  morphine injection 2 mg, Q3H PRN  diphenhydrAMINE (BENADRYL) injection 25 mg, Q6H PRN        Allergies:  Ibuprofen    Social History:   Social History     Socioeconomic History    Marital status: Single     Spouse name: Not on file    Number of children: Not on file    Years of education: Not on file    Highest education level: Not on file   Occupational History    Not on file   Tobacco Use    Smoking status: Every Day     Current packs/day: 0.50     Average packs/day: 0.5 packs/day for 39.0 years (19.5 ttl pk-yrs)     Types: Cigarettes    Smokeless tobacco: Never   Substance and Sexual Activity    Alcohol use: No     Alcohol/week: 0.0 standard drinks of alcohol     Comment: 1 x mon    Drug use: Not Currently     Types: Marijuana (Weed)    Sexual activity: Never   Other Topics Concern    Not on file   Social History Narrative    Not on file     Social Determinants of Health     Financial Resource Strain: Low Risk  (9/13/2023)    Overall Financial Resource Strain (CARDIA)     Difficulty of Paying Living Expenses: Not hard at all   Food Insecurity: No Food Insecurity (6/27/2024)    Hunger Vital Sign     Worried About Running Out of Food in the Last Year: Never true     Ran Out of Food in the Last Year: Never true   Transportation Needs: Unmet Transportation Needs (6/27/2024)    PRAPARE - Transportation     Lack of Transportation (Medical): Yes     Lack of Transportation (Non-Medical): No   Physical Activity: Not on file   Stress: Not on file   Social Connections: Moderately Isolated (6/29/2024)    Social Connections ([facility] HRSN)     If for any reason you need help with day-to-day  7.5      BMP:   Recent Labs     06/27/24  0453 06/28/24  0520 06/29/24  0526    137 137   K 4.4 4.4 4.4    107 105   CO2 19* 20 21   BUN 34* 37* 37*   CREATININE 2.5* 2.4* 2.2*   GLUCOSE 163* 93 150*   CALCIUM 9.8 9.5 9.5      Phosphorus:  No results for input(s): \"PHOS\" in the last 72 hours.  Magnesium:   No results for input(s): \"MG\" in the last 72 hours.    Albumin: No results for input(s): \"LABALBU\" in the last 72 hours.    IRON:    Recent Labs     06/27/24 0453   IRON 12*     Iron Saturation:  Invalid input(s): \"PERCENTFE\"  TIBC:    Recent Labs     06/27/24 0453   TIBC 201*     FERRITIN:  No results for input(s): \"FERRITIN\" in the last 72 hours.  SPEP: No results for input(s): \"SPEP\" in the last 72 hours.   Recent Labs     06/26/24  1305 06/26/24  1558   ALBCAL 3.0*  --    ALBPCT 49  --    A1PCT 8*  --    A2PCT 13  --    BETAPCT 14  --    GAMGLOB 1.0  --    GGPCT 16  --    PATH ELECTRONICALLY SIGNED. ALEX SUAREZ M.D. ELECTRONICALLY SIGNED. TRACY GÓMEZ M.D.     UPEP:   Recent Labs     06/26/24  1558         Urine Sodium:  Invalid input(s): \"ADELAIDE\"   Urine Potassium: No results for input(s): \"KUR\" in the last 72 hours.  Urine Chloride:  No results for input(s): \"CLU\" in the last 72 hours.  Urine Ph:  Invalid input(s): \"PO4U\"  Urine Osmolarity: No results for input(s): \"OSMOU\" in the last 72 hours.  Urine Creatinine:  No results for input(s): \"LABCREA\" in the last 72 hours.  Urine Eosinophils: Invalid input(s): \"EOSU\"  Urine Protein:    Recent Labs     06/26/24  1558        Urinalysis:    No results for input(s): \"NITRU\", \"COLORU\", \"PHUR\", \"LABCAST\", \"WBCUA\", \"RBCUA\", \"MUCUS\", \"TRICHOMONAS\", \"YEAST\", \"BACTERIA\", \"CLARITYU\", \"SPECGRAV\", \"LEUKOCYTESUR\", \"UROBILINOGEN\", \"BILIRUBINUR\", \"BLOODU\", \"GLUCOSEU\", \"KETUA\", \"AMORPHOUS\" in the last 72 hours.        Radiology:  Reviewed as available.    Assessment:   ALBERTO on CKD most likely secondary to prerenal azotemia versus ATN due to

## 2024-06-29 NOTE — CONSULTS
Cardiology Consultation         Date:   2024  Patient name: Shauna Alexandra  Date of admission:  2024 11:48 AM  MRN:   [id number]  YOB: 1960    Reason for Admission: hypertensive urgency     Chief Complaint: elevated blood pressure, chest pain, abdominal pain     History of present illness:     Patient with no pertinent cardiac hx admitted with c/o chest pressure, dyspnea, abdominal pain and elevated blood pressure. Patient reports that she ran out of her meds. Found to be in hypertensive urgency along with ALBERTO. She has been evaluated by GI and nephrology. Cr is 2.2 today. ECG showed LVH. Troponins normal. Stress test was ordered which is abnormal as below. Patient denies any chest pain or dyspnea today. No significant volume overload on examination.     Past Medical History:   has a past medical history of Anemia, Chronic back pain, Depression, Hyperlipidemia, Hypertension, Type 2 diabetes mellitus with diabetic polyneuropathy, without long-term current use of insulin (HCC), and Type II or unspecified type diabetes mellitus without mention of complication, not stated as uncontrolled.    Past Surgical History:   has a past surgical history that includes eye surgery; Thyroid surgery; External ear surgery;  section; and Rectal surgery (Right, 2021).     Home Medications:    Prior to Admission medications    Medication Sig Start Date End Date Taking? Authorizing Provider   Continuous Blood Gluc Sensor (FREESTYLE SONAM 2 SENSOR) MISC 1 each by Does not apply route every 14 days 24   Kody Licona APRN - CNP   amLODIPine (NORVASC) 10 MG tablet Take 1 tablet by mouth daily 24   Kody Licona APRN - CNP   atenolol (TENORMIN) 50 MG tablet Take 1 tablet by mouth daily 24   Kody Licona APRN - CNP   hydroCHLOROthiazide 12.5 MG capsule Take 1 capsule by mouth every morning 24   Kody Licona APRN - CNP   lisinopril    Abdomen:  No masses or tenderness  Bowel sounds present  Extremities:   No Cyanosis or Clubbing   Lower extremity edema: No edema    Skin: Warm and dry  Neurological:  Not done     DATA:    Diagnostics:      EKG :  Normal sinus rhythm, LVH       TTE 6/25/24    Left Ventricle: Normal left ventricular systolic function with a visually estimated EF of 55 - 60%. Left ventricle size is normal. Moderately increased wall thickness. Normal wall motion. Normal diastolic function.    Left Atrium: Left atrium is dilated.    Image quality is adequate.    Nuclear stress test 6/28/24  Stress Combined Conclusion: The study is positive for myocardial ischemia and cannot rule out a small degree of myocardial infarction. Findings suggest a high risk of cardiac events as supported by: stress-induced symptoms concerning for angina, multiple CV risk factors and concerning symptoms at presentation. Further cardiac evaluation for ischemic heart disease should be considered, especially in the setting of progressive or typical angina.    Stress Function: Left ventricular function post-stress is normal. Post-stress ejection fraction is 5%. The stress end diastolic cavity size is normal. Stress end diastolic volume: 79 mL. The stress end systolic cavity size is normal.    Perfusion Comments: Prone images were not obtained. LV perfusion is abnormal. There is evidence of inducible ischemia.    Perfusion Defect: There is a global left ventricular stress perfusion defect that is medium to large in size present in the inferior segment(s) that is reversible. Viability in the area is good. There is normal wall motion in the defect area. Perfusion defect was visually and quantitatively present. This defect was visualized during the stress phase of imaging. There is a moderate severity global left ventricular stress perfusion defect that is medium to large in size present in the inferolateral segment(s) that is partially reversible. Viability in the

## 2024-06-29 NOTE — PROGRESS NOTES
[facility] Internal Medicine  Bakari Zacarias MD; Rory Rios MD; Pavel Bolden MD; MD Zulema Alonzo MD; Wiley Saxena MD; Izzy White MD      Progress note            Date:   2024  Patient name:  Shauna Alexandra  MRN:   [id number]  Account:  [id number]  YOB: 1960  PCP:    Kody Licona APRN - CNP  Code Status:    Full Code    Chief Complaint:     Chief Complaint   Patient presents with    Chest Pain    Abdominal Pain    Insect Bite     X 2 days post outside urgent care visit no relief, complians of increased pain inhale, Pt also complains of eye discharge, and possible spider bite          History Obtained From:     Patient, EMR, nursing staff    HPI     This patient is a 64 y.o. Non- / non  femalewho has hypertension hyperlipidemia type 2 diabetes no known cardiac history presenting with shortness of breath and chest pain noted to have very high blood pressure and ALBERTO in ER.  Presents with  UA concerning for UTI      Review of Systems:     Positive for shortness of breath no cough  Positive for some chest pain denies palpitations  Positive for upper abdominal pain  Denies any new numbness tremors or weakness.    A 10 point review of systems was performed and and negative except as mentioned in HPI  Positive and Negative as described in HPI.      Past Medical History:     Past Medical History:   Diagnosis Date    Anemia     Chronic back pain     Depression     Hyperlipidemia     Hypertension     Type 2 diabetes mellitus with diabetic polyneuropathy, without long-term current use of insulin ([facility]) 11/10/2016    Type II or unspecified type diabetes mellitus without mention of complication, not stated as uncontrolled         Past Surgical History:     Past Surgical History:   Procedure Laterality Date     SECTION      EXTERNAL EAR SURGERY      EYE SURGERY      RECTAL SURGERY Right 2021    RECTAL PERIRECTAL INCISION AND DRAINAGE  There is mild heterogeneity of perfusion, largely matched or less conspicuous as compared to the ventilation pattern.     Low probability for pulmonary embolus.     XR CHEST PORTABLE    Result Date: 6/24/2024  EXAMINATION: ONE XRAY VIEW OF THE CHEST 6/24/2024 12:49 pm COMPARISON: None. HISTORY: ORDERING SYSTEM PROVIDED HISTORY: pain TECHNOLOGIST PROVIDED HISTORY: pain Reason for Exam: Chest pain, abdominal pain FINDINGS: Normal heart size and pulmonary vasculature.  No focal consolidations, pleural effusions, or pneumothorax.  Linear bibasilar atelectasis or scarring.     Bibasilar atelectasis/scarring.        Current Facility-Administered Medications   Medication Dose Route Frequency Provider Last Rate Last Admin    sodium chloride flush 0.9 % injection 10 mL  10 mL IntraVENous PRN Zulema Tran MD   10 mL at 06/28/24 1256    pantoprazole (PROTONIX) 40 mg in sodium chloride (PF) 0.9 % 10 mL injection  40 mg IntraVENous Q12H Zulema Tran MD   40 mg at 06/29/24 0836    senna (SENOKOT) tablet 8.6 mg  1 tablet Oral Daily Zulema Tran MD   8.6 mg at 06/29/24 0836    0.9 % sodium chloride infusion   IntraVENous Continuous Imam, Lj Herrera MD 50 mL/hr at 06/29/24 0258 New Bag at 06/29/24 0258    perflutren lipid microspheres (DEFINITY) injection 1.5 mL  1.5 mL IntraVENous ONCE PRN Zulema Tran MD        erythromycin (ROMYCIN) ophthalmic ointment   Both Eyes 3 times per day Zulema Tran MD   Given at 06/29/24 1337    tiZANidine (ZANAFLEX) tablet 2 mg  2 mg Oral TID Zulema Tran MD   2 mg at 06/29/24 1337    sodium chloride flush 0.9 % injection 10 mL  10 mL IntraVENous PRN Clay Piedra, DO   10 mL at 06/24/24 1422    sodium chloride flush 0.9 % injection 10 mL  10 mL IntraVENous PRN Clay Piedra R, DO   10 mL at 06/24/24 1422    sodium chloride flush 0.9 % injection 5-40 mL  5-40 mL IntraVENous 2 times per day Zulema Tran MD   10 mL at 06/28/24 2116    sodium chloride flush 0.9 %  localizing pain to her back x-ray of the spine could not be completed appropriately due to pain  Ordering MRI of thoracic and lumbar spine instructed patient and nursing to ask for pain medication in order to get the MRI completed.  Patient has been on morphine, scheduled Neurontin as well as tizanidine.  Patient reports there is no change to her pain, attempted to examine her back again today but patient states she can neither sit up in bed nor roll to the side which has been the case since admission.  Will reattempt after giving her morphine  Significant bibasilar lung crackles-will give additional dose of Lasix.  No leg weakness    6/29  MRI thoracic and lumbar spine showed degenerative changes only  No further fever spikes  She is now on 2.5 L nasal cannula creatinine is climbing up to 2.5 today.  Appreciate nephrology recommendations.  Deferring fluid management to nephrology  Additionally has significant hemoglobin drop to 7.4 today, patient was 10 at presentation.  Ordered IV Protonix twice daily, stool Hemoccult.  GI consulted.  Patient denies any abdominal pain or vomiting.  She is not on any blood thinners.  Holding chemical DVT prophylaxis.  Stress test done   Patient reports significant depression-requesting to speak with psychiatrist.-Consulted  Urine culture no growth.    Stress test positive,  cardiology on board  Possible cath when kidney functions are better    Wiley Saxena MD  6/29/2024  5:48 PM

## 2024-06-29 NOTE — PLAN OF CARE
Problem: Discharge Planning  Goal: Discharge to home or other facility with appropriate resources  Outcome: Progressing  Flowsheets (Taken 6/29/2024 1802)  Discharge to home or other facility with appropriate resources:   Identify barriers to discharge with patient and caregiver   Arrange for needed discharge resources and transportation as appropriate     Problem: Pain  Goal: Verbalizes/displays adequate comfort level or baseline comfort level  Outcome: Progressing  Flowsheets (Taken 6/29/2024 1802)  Verbalizes/displays adequate comfort level or baseline comfort level:   Encourage patient to monitor pain and request assistance   Assess pain using appropriate pain scale     Problem: Safety - Adult  Goal: Free from fall injury  Outcome: Progressing  Flowsheets (Taken 6/29/2024 1802)  Free From Fall Injury: Instruct family/caregiver on patient safety     Problem: Skin/Tissue Integrity  Goal: Absence of new skin breakdown  Description: 1.  Monitor for areas of redness and/or skin breakdown  2.  Assess vascular access sites hourly  3.  Every 4-6 hours minimum:  Change oxygen saturation probe site  4.  Every 4-6 hours:  If on nasal continuous positive airway pressure, respiratory therapy assess nares and determine need for appliance change or resting period.  Outcome: Progressing     Problem: Chronic Conditions and Co-morbidities  Goal: Patient's chronic conditions and co-morbidity symptoms are monitored and maintained or improved  Outcome: Progressing  Flowsheets (Taken 6/29/2024 1802)  Care Plan - Patient's Chronic Conditions and Co-Morbidity Symptoms are Monitored and Maintained or Improved:   Monitor and assess patient's chronic conditions and comorbid symptoms for stability, deterioration, or improvement   Collaborate with multidisciplinary team to address chronic and comorbid conditions and prevent exacerbation or deterioration

## 2024-06-29 NOTE — PLAN OF CARE
Problem: Safety - Adult  Goal: Free from fall injury  6/29/2024 0318 by Joanie Baca RN  Outcome: Progressing  6/28/2024 1830 by Kiana Conner RN  Outcome: Progressing     Problem: Discharge Planning  Goal: Discharge to home or other facility with appropriate resources  6/29/2024 0318 by Joanie Baca RN  Outcome: Progressing  6/28/2024 1830 by Kiana Conner RN  Outcome: Progressing     Problem: Pain  Goal: Verbalizes/displays adequate comfort level or baseline comfort level  6/29/2024 0318 by Joanie Baca RN  Outcome: Progressing  6/28/2024 1830 by Kiana Conner RN  Outcome: Progressing  Note: Morphine given 2x during shift. Pain scale assessed     Problem: Safety - Adult  Goal: Free from fall injury  6/29/2024 0318 by Joanie Baca RN  Outcome: Progressing  6/28/2024 1830 by Kiana Conner RN  Outcome: Progressing     Problem: Skin/Tissue Integrity  Goal: Absence of new skin breakdown  Description: 1.  Monitor for areas of redness and/or skin breakdown  2.  Assess vascular access sites hourly  3.  Every 4-6 hours minimum:  Change oxygen saturation probe site  4.  Every 4-6 hours:  If on nasal continuous positive airway pressure, respiratory therapy assess nares and determine need for appliance change or resting period.  6/29/2024 0318 by Joanie Baca RN  Outcome: Progressing  6/28/2024 1830 by Kiana Conner RN  Outcome: Progressing     Problem: Chronic Conditions and Co-morbidities  Goal: Patient's chronic conditions and co-morbidity symptoms are monitored and maintained or improved  6/29/2024 0318 by Joanie Baca RN  Outcome: Progressing  6/28/2024 1830 by Kiana Conner RN  Outcome: Progressing     Problem: ABCDS Injury Assessment  Goal: Absence of physical injury  6/29/2024 0318 by Joanie Baca RN  Outcome: Progressing  6/28/2024 1830 by Kiana Conner RN  Outcome: Progressing     Problem: Nutrition Deficit:  Goal: Optimize nutritional

## 2024-06-30 LAB
ALK PHOS BONE SPECIFIC: 50 U/L (ref 0–55)
ALK PHOS OTHER CALC: 0 U/L
ALK PHOSPHATASE: 102 U/L (ref 40–120)
ALKALINE PHOSPHATASE LIVER FRACTION: 52 U/L (ref 0–94)
GLUCOSE BLD-MCNC: 100 MG/DL (ref 65–105)
GLUCOSE BLD-MCNC: 101 MG/DL (ref 65–105)
GLUCOSE BLD-MCNC: 87 MG/DL (ref 65–105)
GLUCOSE BLD-MCNC: 90 MG/DL (ref 65–105)
HCT VFR BLD AUTO: 20.4 % (ref 36–46)
HCT VFR BLD AUTO: 24 % (ref 36–46)
HGB BLD-MCNC: 6.8 G/DL (ref 12–16)
HGB BLD-MCNC: 8 G/DL (ref 12–16)
LKM AB TITR SER IF: NORMAL {TITER}
RENIN PLAS-CCNC: 0.3 NG/ML/HR
SMOOTH MUSCLE ANTIBODY: 12 UNITS (ref 0–19)

## 2024-06-30 PROCEDURE — 85014 HEMATOCRIT: CPT

## 2024-06-30 PROCEDURE — 86920 COMPATIBILITY TEST SPIN: CPT

## 2024-06-30 PROCEDURE — P9016 RBC LEUKOCYTES REDUCED: HCPCS

## 2024-06-30 PROCEDURE — 6370000000 HC RX 637 (ALT 250 FOR IP): Performed by: INTERNAL MEDICINE

## 2024-06-30 PROCEDURE — 36430 TRANSFUSION BLD/BLD COMPNT: CPT

## 2024-06-30 PROCEDURE — 6360000002 HC RX W HCPCS: Performed by: NURSE PRACTITIONER

## 2024-06-30 PROCEDURE — 2580000003 HC RX 258: Performed by: INTERNAL MEDICINE

## 2024-06-30 PROCEDURE — 99231 SBSQ HOSP IP/OBS SF/LOW 25: CPT | Performed by: INTERNAL MEDICINE

## 2024-06-30 PROCEDURE — 6360000002 HC RX W HCPCS: Performed by: INTERNAL MEDICINE

## 2024-06-30 PROCEDURE — APPSS30 APP SPLIT SHARED TIME 16-30 MINUTES: Performed by: NURSE PRACTITIONER

## 2024-06-30 PROCEDURE — C9113 INJ PANTOPRAZOLE SODIUM, VIA: HCPCS | Performed by: INTERNAL MEDICINE

## 2024-06-30 PROCEDURE — 86900 BLOOD TYPING SEROLOGIC ABO: CPT

## 2024-06-30 PROCEDURE — 36415 COLL VENOUS BLD VENIPUNCTURE: CPT

## 2024-06-30 PROCEDURE — 82947 ASSAY GLUCOSE BLOOD QUANT: CPT

## 2024-06-30 PROCEDURE — 85018 HEMOGLOBIN: CPT

## 2024-06-30 PROCEDURE — 99233 SBSQ HOSP IP/OBS HIGH 50: CPT | Performed by: INTERNAL MEDICINE

## 2024-06-30 PROCEDURE — 2060000000 HC ICU INTERMEDIATE R&B

## 2024-06-30 PROCEDURE — 86850 RBC ANTIBODY SCREEN: CPT

## 2024-06-30 PROCEDURE — 86901 BLOOD TYPING SEROLOGIC RH(D): CPT

## 2024-06-30 RX ORDER — SODIUM CHLORIDE 9 MG/ML
INJECTION, SOLUTION INTRAVENOUS PRN
Status: DISCONTINUED | OUTPATIENT
Start: 2024-06-30 | End: 2024-07-01 | Stop reason: HOSPADM

## 2024-06-30 RX ADMIN — SODIUM CHLORIDE, PRESERVATIVE FREE 40 MG: 5 INJECTION INTRAVENOUS at 19:45

## 2024-06-30 RX ADMIN — GABAPENTIN 300 MG: 300 CAPSULE ORAL at 15:19

## 2024-06-30 RX ADMIN — TIZANIDINE 2 MG: 2 TABLET ORAL at 08:04

## 2024-06-30 RX ADMIN — AMLODIPINE BESYLATE 10 MG: 10 TABLET ORAL at 10:46

## 2024-06-30 RX ADMIN — MORPHINE SULFATE 2 MG: 2 INJECTION, SOLUTION INTRAMUSCULAR; INTRAVENOUS at 08:04

## 2024-06-30 RX ADMIN — PRAVASTATIN SODIUM 40 MG: 40 TABLET ORAL at 19:49

## 2024-06-30 RX ADMIN — BUPROPION HYDROCHLORIDE 300 MG: 300 TABLET, EXTENDED RELEASE ORAL at 08:04

## 2024-06-30 RX ADMIN — GABAPENTIN 300 MG: 300 CAPSULE ORAL at 08:12

## 2024-06-30 RX ADMIN — GABAPENTIN 300 MG: 300 CAPSULE ORAL at 19:49

## 2024-06-30 RX ADMIN — SODIUM CHLORIDE, PRESERVATIVE FREE 40 MG: 5 INJECTION INTRAVENOUS at 08:04

## 2024-06-30 RX ADMIN — ERYTHROMYCIN: 5 OINTMENT OPHTHALMIC at 20:00

## 2024-06-30 RX ADMIN — ATENOLOL 50 MG: 50 TABLET ORAL at 10:46

## 2024-06-30 RX ADMIN — TIZANIDINE 2 MG: 2 TABLET ORAL at 19:49

## 2024-06-30 RX ADMIN — TRAZODONE HYDROCHLORIDE 50 MG: 50 TABLET ORAL at 19:49

## 2024-06-30 RX ADMIN — MORPHINE SULFATE 2 MG: 2 INJECTION, SOLUTION INTRAMUSCULAR; INTRAVENOUS at 21:38

## 2024-06-30 RX ADMIN — ERYTHROMYCIN: 5 OINTMENT OPHTHALMIC at 05:28

## 2024-06-30 RX ADMIN — ONDANSETRON 4 MG: 2 INJECTION INTRAMUSCULAR; INTRAVENOUS at 02:21

## 2024-06-30 RX ADMIN — ERYTHROMYCIN: 5 OINTMENT OPHTHALMIC at 15:00

## 2024-06-30 RX ADMIN — MORPHINE SULFATE 2 MG: 2 INJECTION, SOLUTION INTRAMUSCULAR; INTRAVENOUS at 02:29

## 2024-06-30 RX ADMIN — SODIUM CHLORIDE: 9 INJECTION, SOLUTION INTRAVENOUS at 19:57

## 2024-06-30 RX ADMIN — MORPHINE SULFATE 2 MG: 2 INJECTION, SOLUTION INTRAMUSCULAR; INTRAVENOUS at 15:20

## 2024-06-30 RX ADMIN — SODIUM CHLORIDE, PRESERVATIVE FREE 10 ML: 5 INJECTION INTRAVENOUS at 08:04

## 2024-06-30 RX ADMIN — TIZANIDINE 2 MG: 2 TABLET ORAL at 15:19

## 2024-06-30 RX ADMIN — ASPIRIN 81 MG: 81 TABLET, COATED ORAL at 08:04

## 2024-06-30 ASSESSMENT — PAIN DESCRIPTION - LOCATION
LOCATION: BACK

## 2024-06-30 ASSESSMENT — PAIN SCALES - GENERAL
PAINLEVEL_OUTOF10: 8
PAINLEVEL_OUTOF10: 7
PAINLEVEL_OUTOF10: 6
PAINLEVEL_OUTOF10: 0
PAINLEVEL_OUTOF10: 0
PAINLEVEL_OUTOF10: 8
PAINLEVEL_OUTOF10: 10
PAINLEVEL_OUTOF10: 8

## 2024-06-30 ASSESSMENT — PAIN DESCRIPTION - DESCRIPTORS
DESCRIPTORS: ACHING
DESCRIPTORS: ACHING
DESCRIPTORS: STABBING

## 2024-06-30 NOTE — PROGRESS NOTES
[facility] Internal Medicine  Bakari Zacarias MD; Rory Rios MD; Pavel Bolden MD; MD Zulema Alonzo MD; Wiley Saxena MD; Izzy White MD      Progress note            Date:   2024  Patient name:  Shauna Alexandra  MRN:   [id number]  Account:  [id number]  YOB: 1960  PCP:    Kody Licona APRN - CNP  Code Status:    Full Code    Chief Complaint:     Chief Complaint   Patient presents with    Chest Pain    Abdominal Pain    Insect Bite     X 2 days post outside urgent care visit no relief, complians of increased pain inhale, Pt also complains of eye discharge, and possible spider bite          History Obtained From:     Patient, EMR, nursing staff    HPI     This patient is a 64 y.o. Non- / non  femalewho has hypertension hyperlipidemia type 2 diabetes no known cardiac history presenting with shortness of breath and chest pain noted to have very high blood pressure and ALBERTO in ER.  Presents with  UA concerning for UTI      Review of Systems:     Positive for shortness of breath no cough  Positive for some chest pain denies palpitations  Positive for upper abdominal pain  Denies any new numbness tremors or weakness.    A 10 point review of systems was performed and and negative except as mentioned in HPI  Positive and Negative as described in HPI.      Past Medical History:     Past Medical History:   Diagnosis Date    Anemia     Chronic back pain     Depression     Hyperlipidemia     Hypertension     Type 2 diabetes mellitus with diabetic polyneuropathy, without long-term current use of insulin (Formerly [facility]) 11/10/2016    Type II or unspecified type diabetes mellitus without mention of complication, not stated as uncontrolled         Past Surgical History:     Past Surgical History:   Procedure Laterality Date     SECTION      EXTERNAL EAR SURGERY      EYE SURGERY      RECTAL SURGERY Right 2021    RECTAL PERIRECTAL INCISION AND DRAINAGE  Kody Licona APRN - CNP   traZODone (DESYREL) 50 MG tablet Take 1 tablet by mouth nightly 1/31/24   Kody Licona APRN - CNP   fluconazole (DIFLUCAN) 150 MG tablet Take 1 tablet by mouth once a week  Patient not taking: Reported on 6/24/2024 1/31/24   Kody Licona APRN - CNP   Insulin Pen Needle ([employer] PEN NEEDLES) 31G X 6 MM MISC 1 each by Does not apply route daily 1/31/24   Kody Licona APRN - CNP   buPROPion (WELLBUTRIN XL) 300 MG extended release tablet Take 1 tablet by mouth every morning 1/31/24   Kody Licona APRN - CNP   aspirin (ASPIRIN LOW DOSE) 81 MG EC tablet TAKE ONE TABLET BY MOUTH ONE TIME A DAY 1/31/24   Kody Licona APRN - CNP   ARTIFICIAL TEARS 0.1-0.3 % SOLN ophthalmic solution  9/20/23   ProviderCarrillo MD   moxifloxacin (VIGAMOX) 0.5 % ophthalmic solution  9/22/23   ProviderCarrillo MD   Alcohol Swabs (ALCOHOL PREP) PADS USE WHEN TESTING BLOOD GLUCOSE AND WHEN INJECTING INSULIN DAILY 9/19/23   Kody Licona APRN - CNP   Soft Lens Products (B & L SENSITIVE EYES SALINE) 0.4 % SOLN 1 drop by Does not apply route in the morning and at bedtime 9/19/23   Kody Licona APRN - CNP   Transparent Dressings (TEGADERM FILM 4\"X4-1/2\") MISC 1 each by Does not apply route once a week 9/13/23   Kody Licona APRN - CNP   melatonin (RA MELATONIN) 3 MG TABS tablet Take 1 tablet by mouth nightly 10/6/23   Kody Licona APRN - CNP   Continuous Blood Gluc  (FREESTYLE SONAM 2 READER) TANIYA  7/19/23   ProviderCarrillo MD   butalbital-APAP-caffeine -40 MG CAPS per capsule TAKE ONE CAPSULE BY MOUTH EVERY 4 HOURS AS NEEDED FOR HEADACHE 5/15/23   Kody Licona APRN - CNP   Elastic Bandages & Supports (WRIST SPLINT) MISC Apply 1 each topically continuous Cock up wrist splint 1/11/23   Kody Licona APRN - CNP   ammonium lactate (LAC-HYDRIN) 12 % cream Apply topically as  and Type II or unspecified type diabetes mellitus without mention of complication, not stated as uncontrolled.     Plans:     64-year-old female presenting with shortness of breath upper abdominal pain and some chest pain.  VQ scan was done from ER and no PE.  CT Abdo pelvis without contrast unremarkable other than incidental endometrial wall thickening.  Patient admitted for hypertensive urgency and cardiac workup    Hypertensive emergency with elevated creatinine-resume home atenolol 50 daily, add as needed hydralazine  ALBERTO creatinine 2 presentation, baseline 1.5-fluids, no hydronephrosis on CT abdomen, holding lisinopril HCTZ Topamax  Suspected UTI based on UA results-continue Rocephin  Nonspecific abdominal pain-lipase and liver enzymes normal  Chest pain shortness of breath troponins negative, EKG sinus rhythm D-dimer negative  Incidental finding of endometrial thickening on CT abdomen-pelvic ultrasound to be done as outpatient  Type 2 diabetes-Lantus sliding scale  Chronic pain-renally dosed Neurontin    DVT pplx-Lovenox    6/25  Patient condition unchanged, patient remains on continuous IV fluids increasing to 100 cc/h.  Echo shows no CHF or wall motion abnormality.  Creatinine still 2 baseline 1.5, consulting nephrology  Continues to complain primarily of pain across mid back with epigastric pain,  Tender across mid back, will get x-ray thoracic and lumbar spines.  Rule out vertebral lesions.  Denies radiation of pain to lower extremities, good power bilateral lower extremities.  Denies any bowel/bladder problems. No saddle anesthesia.  Pain seems to exacerbated by slightest movement possible muscle spasm try tizanidine.    Also complaining of watery discharge from eyes-ordering erythromycin ointment    6/26  Creatinine still at 2 awaiting nephrology recommendations  Fever 100.5 this morning  Patient's urine output was low yesterday, Lasix was given and PureWick was placed overnight  Oxygen saturation

## 2024-06-30 NOTE — PROGRESS NOTES
[facility]    Gastroenterology Daily Progress Note      Patient:   Shauna Alexandra   :    1960   Facility:   [facility]  Date:     2024  Consultant:   CONRADO Dupree - CNP, CNP      SUBJECTIVE  64 y.o. female admitted 2024 with Shortness of breath [R06.02]  Angina pectoris (HCC) [I20.9]  ALBERTO (acute kidney injury) (HCC) [N17.9]  Chest pain, unspecified type [R07.9] and seen for anemia with positive fobt and elevated alk phos. The pt was seen and examined. Hgb not checked this am. States she had a \"black \" colored stool this morning. No c/o abdominal pain or nausea. .        OBJECTIVE  Scheduled Meds:   pantoprazole (PROTONIX) 40 mg in sodium chloride (PF) 0.9 % 10 mL injection  40 mg IntraVENous Q12H    senna  1 tablet Oral Daily    erythromycin   Both Eyes 3 times per day    tiZANidine  2 mg Oral TID    sodium chloride flush  5-40 mL IntraVENous 2 times per day    [Held by provider] enoxaparin  40 mg SubCUTAneous Daily    amLODIPine  10 mg Oral Daily    aspirin  81 mg Oral Daily    atenolol  50 mg Oral Daily    buPROPion  300 mg Oral QAM    [Held by provider] insulin glargine  20 Units SubCUTAneous Nightly    pravastatin  40 mg Oral Nightly    traZODone  50 mg Oral Nightly    insulin lispro  0-4 Units SubCUTAneous TID WC    insulin lispro  0-4 Units SubCUTAneous Nightly    gabapentin  300 mg Oral TID       Vital Signs:  /82   Pulse 75   Temp 98.7 °F (37.1 °C) (Oral)   Resp 16   Ht 1.651 m (5' 5\")   Wt 84.4 kg (186 lb 1.1 oz)   SpO2 96%   BMI 30.96 kg/m²    Review of Systems - History obtained from chart review and the patient  General ROS: negative  Respiratory ROS: no cough, shortness of breath, or wheezing  Cardiovascular ROS: no chest pain or dyspnea on exertion  Gastrointestinal ROS: positive for - melena   Physical Exam:     General Appearance: alert and oriented to person, place and time, well-developed and well-nourished, in no acute  formulated in collaboration with Dr. Waller  .    Electronically signed by: CONRADO Dupree CNP on 6/30/2024 at 8:58 AM       Attending Physician Statement  I have discussed the care of Shauna Alexandra and I have examined the patient myselft and taken ros and hpi , including pertinent history and exam findings,  with the author of this note . I have reviewed the key elements of all parts of the encounter with the nurse practitioner/resident.  I agree with the assessment, plan and orders as documented by the above health care provider     More than 50% of the time was spent taking care of this patient in addition to the nurse practitioner time.  That also included history taking follow-up physical examination and review of system.       Await the ultrasound of the gallbladder result  She would need EGD when cleared  PPI for now and H&H monitoring    Electronically signed by Dorota Waller MD

## 2024-06-30 NOTE — PLAN OF CARE
Problem: Discharge Planning  Goal: Discharge to home or other facility with appropriate resources  6/30/2024 1257 by Leah Hurd RN  Outcome: Progressing  6/30/2024 0136 by Rosaline Contreras  Outcome: Progressing     Problem: Pain  Goal: Verbalizes/displays adequate comfort level or baseline comfort level  6/30/2024 1257 by Leah Hurd RN  Outcome: Progressing  6/30/2024 0136 by Rosaline Contreras  Outcome: Progressing     Problem: Safety - Adult  Goal: Free from fall injury  6/30/2024 1257 by Leah Hurd RN  Outcome: Progressing  6/30/2024 0136 by Rosaline Contreras  Outcome: Progressing  Note: Call light is within reach, bed is in lowest position, and bed alarm is within reach. Pt uses call light appropriately.      Problem: Skin/Tissue Integrity  Goal: Absence of new skin breakdown  Description: 1.  Monitor for areas of redness and/or skin breakdown  2.  Assess vascular access sites hourly  3.  Every 4-6 hours minimum:  Change oxygen saturation probe site  4.  Every 4-6 hours:  If on nasal continuous positive airway pressure, respiratory therapy assess nares and determine need for appliance change or resting period.  6/30/2024 1257 by Leah Hurd RN  Outcome: Progressing  6/30/2024 0136 by Rosaline Contreras  Outcome: Progressing  Note: Pt encouraged to frequently turn     Problem: Chronic Conditions and Co-morbidities  Goal: Patient's chronic conditions and co-morbidity symptoms are monitored and maintained or improved  6/30/2024 1257 by Leah Hurd RN  Outcome: Progressing  6/30/2024 0136 by Rosaline Contreras  Outcome: Progressing     Problem: ABCDS Injury Assessment  Goal: Absence of physical injury  6/30/2024 1257 by Leah Hurd RN  Outcome: Progressing  6/30/2024 0136 by Rosaline Contreras  Outcome: Progressing     Problem: Nutrition Deficit:  Goal: Optimize nutritional status  6/30/2024 1257 by Leah Hurd RN  Outcome: Progressing  6/30/2024  0136 by Rosaline Contreras  Outcome: Progressing

## 2024-06-30 NOTE — PROGRESS NOTES
Dr. Saxena notified of Hgb 6.8. Patient is refusing blood products at this time, but states she wants to talk to a doctor. Dr. Saxena states he will talk with her.

## 2024-06-30 NOTE — PROGRESS NOTES
NEPHROLOGY CONSULT     Patient :  Shauna Alexandra; 64 y.o. MRN# [id number]  Location:  2124/2124-01  Attending:  Zulema Tran MD  Admit Date:  6/24/2024   Hospital Day: 6      Reason for Consult: ALBERTO on CKD      Chief Complaint: Chest pain, abdominal pain    Subjective/interval history.  Patient seen and examined, continues to have chest pain, denies nausea vomiting diarrhea.  Patient had nuclear stress test which was abnormal  Serum creatinine improved to 2.2 mg/dL  Blood pressure is better controlled      History of Present Illness:    This is a 64 y.o. female with past medical history of longstanding hypertension type 2 diabetes, CKD stage IIIb with baseline creatinine of 1.5 mg/dL noted in 7/2023.  Patient presented to the hospital with complaints of upper back pain chest pain and cough patient pain gets exacerbated by breathing or coughing, patient denied nausea vomiting diarrhea no difficulty urination.  Patient blood pressure on admission was 203/85 mm hg  Labs showed serum creatinine of 2.0 mg/dL and therefore nephrology consultation has been requested  UA shows 3+ protein  UPC 1.5 g, 6-9 WBC 0-2 RBCs  Chest x-ray on admission showed bibasilar atelectasis/scarring  Nuclear lung scan showed low probability for pulmonary embolus    Pt denies any history of  prolonged NSAID use.  Patient denies dysuria, gross hematuria, flank pain, nocturia, urgency, passing frothy urine or urinary incontinence.  There has been no recent exposure to IV contrast.   There is no history  of paraprotein disease.  Medication review shows use of lisinopril and hydrochlorothiazide    Past Medical History:        Diagnosis Date    Anemia     Chronic back pain     Depression     Hyperlipidemia     Hypertension     Type 2 diabetes mellitus with diabetic polyneuropathy, without long-term current use of insulin (Formerly [facility]) 11/10/2016    Type II or unspecified type diabetes mellitus without mention of complication, not stated as uncontrolled   baseline serum creatinine 1.5 mg/dL on 7/2023 serum creatinine on admission peaked to 2.0 mg/dL and has been stable now.  Other possibility of increase in creatinine is progression of chronic kidney disease, serum creatinine improved    Hypertension uncontrolled-blood pressure has improved  CKD stage IIIb most likely secondary to diabetes and hypertensive chronic kidney disease  Proteinuria subnephrotic range UPC 1.6--C3-C4 normal LEONIDES negative, kappa lambda ratio 1.29 SPEP UPEP no monoclonal protein       Plan:    Continue amlodipine, atenolol  Hydrochlorothiazide and lisinopril on hold  Continue IV fluid gentle hydration 50 MLS per hour  renal artery Dopplers negative    I discussed with the patient regarding possible need for cardiac cath also explained the benefit and risk of cardiac catheterization including contrast-induced nephropathy I calculated her risk for contrast-induced nephropathy which is approximately 14% and risk for requiring dialysis is 0.12%  I also explained to her that we can do IV fluids as prophylaxis before cardiac catheterization.  Patient understands and would like to proceed with cardiac cath    D/w Dr Driscoll, plan is for cardiac cath tomorrrow  Continue IVF for prophylaxis    Thank you for the consultation.      Electronically signed by Lj Lim MD on 6/30/2024 at 2:29 PM

## 2024-06-30 NOTE — CARE COORDINATION
DISCHARGE PLANNING NOTE:    Reviewed previous case management notes, and discharge plan is home with S [facility].    Hgb 6.8 - will receive 1u PRBC's today.    Following for home O2.    Will continue to follow for additional discharge needs.    Electronically signed by Vandana Medina RN on 6/30/2024 at 12:46 PM

## 2024-06-30 NOTE — PROGRESS NOTES
Cardiology Progress Note                     Date:   6/30/2024  Patient name: Shauna Alexandra  Date of admission:  6/24/2024 11:48 AM  MRN:   [id number]  YOB: 1960  PCP: Kody Licona APRN - CNP    Reason for Admission:  hypertensive urgency     Subjective:       There were no acute events overnight, remained hemodynamically stable, BP is better controlled, denies chest pain, still has dyspnea on exertion. Found to have low hb of 6.8, receiving PRBC transfusion. No active bleeding. Cr 2.2.       Scheduled Meds:   pantoprazole (PROTONIX) 40 mg in sodium chloride (PF) 0.9 % 10 mL injection  40 mg IntraVENous Q12H    senna  1 tablet Oral Daily    erythromycin   Both Eyes 3 times per day    tiZANidine  2 mg Oral TID    sodium chloride flush  5-40 mL IntraVENous 2 times per day    [Held by provider] enoxaparin  40 mg SubCUTAneous Daily    amLODIPine  10 mg Oral Daily    aspirin  81 mg Oral Daily    atenolol  50 mg Oral Daily    buPROPion  300 mg Oral QAM    [Held by provider] insulin glargine  20 Units SubCUTAneous Nightly    pravastatin  40 mg Oral Nightly    traZODone  50 mg Oral Nightly    insulin lispro  0-4 Units SubCUTAneous TID WC    insulin lispro  0-4 Units SubCUTAneous Nightly    gabapentin  300 mg Oral TID       Continuous Infusions:   sodium chloride      sodium chloride 50 mL/hr at 06/29/24 1954    sodium chloride      dextrose         Labs:     CBC:   Recent Labs     06/28/24  0520 06/29/24  0526 06/30/24  1005 06/30/24  1805   WBC 7.1 7.5  --   --    HGB 7.3* 7.6* 6.8* 8.0*    355  --   --      BMP:    Recent Labs     06/28/24  0520 06/29/24  0526    137   K 4.4 4.4    105   CO2 20 21   BUN 37* 37*   CREATININE 2.4* 2.2*   GLUCOSE 93 150*     Hepatic:   Recent Labs     06/29/24  0526   AST 14   ALT 19   BILITOT <0.2*   ALKPHOS 116*     Troponin: No results for input(s): \"TROPONINI\" in the last 72 hours.  BNP: No results for input(s): \"BNP\" in the last

## 2024-06-30 NOTE — CONSENT
Informed Consent for Blood Component Transfusion Note    I have discussed with the patient the rationale for blood component transfusion; its benefits in treating or preventing fatigue, organ damage, or death; and its risk which includes mild transfusion reactions, rare risk of blood borne infection, or more serious but rare reactions. I have discussed the alternatives to transfusion, including the risk and consequences of not receiving transfusion. The patient/ sons had an opportunity to ask questions and had agreed to proceed with transfusion of blood components.    Electronically signed by Wiley Saxena MD on 6/30/24 at 4:45 PM EDT

## 2024-06-30 NOTE — PLAN OF CARE
Problem: Discharge Planning  Goal: Discharge to home or other facility with appropriate resources  6/30/2024 0136 by Rosaline Contreras  Outcome: Progressing  6/29/2024 1802 by Elis Chaidez RN  Outcome: Progressing  Flowsheets (Taken 6/29/2024 1802)  Discharge to home or other facility with appropriate resources:   Identify barriers to discharge with patient and caregiver   Arrange for needed discharge resources and transportation as appropriate     Problem: Pain  Goal: Verbalizes/displays adequate comfort level or baseline comfort level  6/30/2024 0136 by Rosaline Contreras  Outcome: Progressing  6/29/2024 1802 by Elis Chaidez RN  Outcome: Progressing  Flowsheets (Taken 6/29/2024 1802)  Verbalizes/displays adequate comfort level or baseline comfort level:   Encourage patient to monitor pain and request assistance   Assess pain using appropriate pain scale     Problem: Safety - Adult  Goal: Free from fall injury  6/30/2024 0136 by Rosaline Contreras  Outcome: Progressing  Note: Call light is within reach, bed is in lowest position, and bed alarm is within reach. Pt uses call light appropriately.   6/29/2024 1802 by Elis Chaidez RN  Outcome: Progressing  Flowsheets (Taken 6/29/2024 1802)  Free From Fall Injury: Instruct family/caregiver on patient safety     Problem: Skin/Tissue Integrity  Goal: Absence of new skin breakdown  Description: 1.  Monitor for areas of redness and/or skin breakdown  2.  Assess vascular access sites hourly  3.  Every 4-6 hours minimum:  Change oxygen saturation probe site  4.  Every 4-6 hours:  If on nasal continuous positive airway pressure, respiratory therapy assess nares and determine need for appliance change or resting period.  6/30/2024 0136 by Rosaline Contreras  Outcome: Progressing  Note: Pt encouraged to frequently turn  6/29/2024 1802 by Elis Chaidez RN  Outcome: Progressing     Problem: Chronic Conditions and Co-morbidities  Goal: Patient's

## 2024-06-30 NOTE — PROGRESS NOTES
Dr. Saxena at bedside to talk to patient and her two sons in depth about benefits and risks of blood transfusion. Patient verbalizes understanding and is agreeable to blood transfusion.

## 2024-06-30 NOTE — PROGRESS NOTES
It has been fully reviewed with the patient and written informed consent has been obtained for blood transfusion   Electronically signed by Wiley Saxena MD on 6/30/2024 at 11:09 AM

## 2024-06-30 NOTE — PROGRESS NOTES
The potassium and magnesium sliding scale has been automatically discontinued per Pharmacy and Therapeutic Committee approved policy because the patient has decreased renal function (CrCl<30 ml/min).  The patient's current K/Mag levels are currently:    Recent Labs     06/28/24  0520 06/29/24  0526   K 4.4 4.4       Estimated Creatinine Clearance: 28 mL/min (A) (based on SCr of 2.2 mg/dL (H)).  For patients with decreased renal function (below 30ml/min) needing potassium/magnesium supplementation, please order individual bolus doses with appropriate monitoring.      Please contact the inpatient pharmacy at [phone] with any concerns.  Thank you.    Shoshana Silverman [facility]  6/30/2024  4:02 PM

## 2024-07-01 ENCOUNTER — HOSPITAL ENCOUNTER (INPATIENT)
Age: 64
LOS: 1 days | Discharge: HOME HEALTH | DRG: 199 | End: 2024-07-02
Attending: INTERNAL MEDICINE | Admitting: INTERNAL MEDICINE
Payer: MEDICAID

## 2024-07-01 ENCOUNTER — HOSPITAL ENCOUNTER (OUTPATIENT)
Age: 64
Setting detail: OBSERVATION
Discharge: ANOTHER HOSPITAL | End: 2024-07-01
Attending: INTERNAL MEDICINE | Admitting: INTERNAL MEDICINE
Payer: MEDICAID

## 2024-07-01 ENCOUNTER — APPOINTMENT (OUTPATIENT)
Dept: ULTRASOUND IMAGING | Age: 64
DRG: 199 | End: 2024-07-01
Payer: MEDICAID

## 2024-07-01 VITALS
HEIGHT: 65 IN | RESPIRATION RATE: 17 BRPM | DIASTOLIC BLOOD PRESSURE: 78 MMHG | WEIGHT: 186.07 LBS | HEART RATE: 64 BPM | OXYGEN SATURATION: 94 % | BODY MASS INDEX: 31 KG/M2 | SYSTOLIC BLOOD PRESSURE: 115 MMHG | TEMPERATURE: 97.7 F

## 2024-07-01 VITALS
RESPIRATION RATE: 16 BRPM | SYSTOLIC BLOOD PRESSURE: 154 MMHG | HEIGHT: 65 IN | WEIGHT: 186.07 LBS | DIASTOLIC BLOOD PRESSURE: 77 MMHG | HEART RATE: 81 BPM | BODY MASS INDEX: 31 KG/M2 | OXYGEN SATURATION: 93 % | TEMPERATURE: 98 F

## 2024-07-01 DIAGNOSIS — I21.4 NSTEMI (NON-ST ELEVATED MYOCARDIAL INFARCTION) (HCC): ICD-10-CM

## 2024-07-01 PROBLEM — Z98.890 S/P CARDIAC CATH: Status: ACTIVE | Noted: 2024-07-01

## 2024-07-01 PROBLEM — I16.0 HYPERTENSIVE URGENCY: Status: ACTIVE | Noted: 2024-07-01

## 2024-07-01 PROBLEM — R94.39 ABNORMAL CARDIOVASCULAR STRESS TEST: Status: ACTIVE | Noted: 2024-07-01

## 2024-07-01 LAB
ALBUMIN SERPL-MCNC: 3 G/DL (ref 3.5–5.2)
ALP SERPL-CCNC: 172 U/L (ref 35–104)
ALT SERPL-CCNC: 21 U/L (ref 5–33)
ANION GAP SERPL CALCULATED.3IONS-SCNC: 10 MMOL/L (ref 9–17)
AST SERPL-CCNC: 20 U/L
BASOPHILS # BLD: 0 K/UL (ref 0–0.2)
BASOPHILS NFR BLD: 0 % (ref 0–2)
BILIRUB SERPL-MCNC: 0.2 MG/DL (ref 0.3–1.2)
BUN SERPL-MCNC: 39 MG/DL (ref 8–23)
CALCIUM SERPL-MCNC: 9.9 MG/DL (ref 8.6–10.4)
CHLORIDE SERPL-SCNC: 108 MMOL/L (ref 98–107)
CO2 SERPL-SCNC: 19 MMOL/L (ref 20–31)
CREAT SERPL-MCNC: 2.3 MG/DL (ref 0.5–0.9)
ECHO BSA: 1.86 M2
EOSINOPHIL # BLD: 0.4 K/UL (ref 0–0.4)
EOSINOPHILS RELATIVE PERCENT: 6 % (ref 0–4)
ERYTHROCYTE [DISTWIDTH] IN BLOOD BY AUTOMATED COUNT: 15.3 % (ref 11.5–14.9)
GFR, ESTIMATED: 23 ML/MIN/1.73M2
GLUCOSE BLD-MCNC: 104 MG/DL (ref 65–105)
GLUCOSE BLD-MCNC: 94 MG/DL (ref 65–105)
GLUCOSE SERPL-MCNC: 116 MG/DL (ref 70–99)
HCT VFR BLD AUTO: 22.3 % (ref 36–46)
HCT VFR BLD AUTO: 24.8 % (ref 36–46)
HCT VFR BLD AUTO: 26.3 % (ref 36–46)
HGB BLD-MCNC: 7.5 G/DL (ref 12–16)
HGB BLD-MCNC: 8.3 G/DL (ref 12–16)
HGB BLD-MCNC: 8.4 G/DL (ref 12–16)
LYMPHOCYTES NFR BLD: 2 K/UL (ref 1–4.8)
LYMPHOCYTES RELATIVE PERCENT: 26 % (ref 24–44)
MCH RBC QN AUTO: 30.4 PG (ref 26–34)
MCHC RBC AUTO-ENTMCNC: 33.3 G/DL (ref 31–37)
MCV RBC AUTO: 91.2 FL (ref 80–100)
MICROORGANISM SPEC CULT: NORMAL
MICROORGANISM SPEC CULT: NORMAL
MONOCYTES NFR BLD: 0.3 K/UL (ref 0.1–1.3)
MONOCYTES NFR BLD: 4 % (ref 1–7)
NEUTROPHILS NFR BLD: 64 % (ref 36–66)
NEUTS SEG NFR BLD: 4.9 K/UL (ref 1.3–9.1)
NUC STRESS EJECTION FRACTION: 5 %
NUC STRESS LV EDV: 79 ML (ref 56–104)
PLATELET # BLD AUTO: 380 K/UL (ref 150–450)
PMV BLD AUTO: 6.9 FL (ref 6–12)
POTASSIUM SERPL-SCNC: 4.9 MMOL/L (ref 3.7–5.3)
PROT SERPL-MCNC: 7 G/DL (ref 6.4–8.3)
RBC # BLD AUTO: 2.72 M/UL (ref 4–5.2)
SERVICE CMNT-IMP: NORMAL
SERVICE CMNT-IMP: NORMAL
SODIUM SERPL-SCNC: 137 MMOL/L (ref 135–144)
SPECIMEN DESCRIPTION: NORMAL
SPECIMEN DESCRIPTION: NORMAL
STRESS BASELINE HR: 77 BPM
STRESS ESTIMATED WORKLOAD: 1 METS
STRESS PEAK DIAS BP: 57 MMHG
STRESS PEAK SYS BP: 147 MMHG
STRESS PERCENT HR ACHIEVED: 57 %
STRESS POST PEAK HR: 90 BPM
STRESS RATE PRESSURE PRODUCT: NORMAL BPM*MMHG
STRESS STAGE RECOVERY 1 BP: NORMAL MMHG
STRESS STAGE RECOVERY 1 DURATION: 1 MIN:SEC
STRESS STAGE RECOVERY 1 HR: 88 BPM
STRESS STAGE RECOVERY 2 BP: NORMAL MMHG
STRESS STAGE RECOVERY 2 DURATION: 7 MIN:SEC
STRESS STAGE RECOVERY 2 HR: 87 BPM
STRESS TARGET HR: 157 BPM
TID: 9
WBC OTHER # BLD: 7.6 K/UL (ref 3.5–11)

## 2024-07-01 PROCEDURE — 2580000003 HC RX 258: Performed by: INTERNAL MEDICINE

## 2024-07-01 PROCEDURE — 6370000000 HC RX 637 (ALT 250 FOR IP): Performed by: INTERNAL MEDICINE

## 2024-07-01 PROCEDURE — 99152 MOD SED SAME PHYS/QHP 5/>YRS: CPT | Performed by: INTERNAL MEDICINE

## 2024-07-01 PROCEDURE — 6360000002 HC RX W HCPCS: Performed by: NURSE PRACTITIONER

## 2024-07-01 PROCEDURE — G0378 HOSPITAL OBSERVATION PER HR: HCPCS

## 2024-07-01 PROCEDURE — APPSS30 APP SPLIT SHARED TIME 16-30 MINUTES: Performed by: NURSE PRACTITIONER

## 2024-07-01 PROCEDURE — 6360000002 HC RX W HCPCS: Performed by: INTERNAL MEDICINE

## 2024-07-01 PROCEDURE — C1894 INTRO/SHEATH, NON-LASER: HCPCS | Performed by: INTERNAL MEDICINE

## 2024-07-01 PROCEDURE — 36415 COLL VENOUS BLD VENIPUNCTURE: CPT

## 2024-07-01 PROCEDURE — 99239 HOSP IP/OBS DSCHRG MGMT >30: CPT | Performed by: INTERNAL MEDICINE

## 2024-07-01 PROCEDURE — 99232 SBSQ HOSP IP/OBS MODERATE 35: CPT | Performed by: INTERNAL MEDICINE

## 2024-07-01 PROCEDURE — 2060000000 HC ICU INTERMEDIATE R&B

## 2024-07-01 PROCEDURE — 99231 SBSQ HOSP IP/OBS SF/LOW 25: CPT | Performed by: INTERNAL MEDICINE

## 2024-07-01 PROCEDURE — 82947 ASSAY GLUCOSE BLOOD QUANT: CPT

## 2024-07-01 PROCEDURE — 76705 ECHO EXAM OF ABDOMEN: CPT

## 2024-07-01 PROCEDURE — B2111ZZ FLUOROSCOPY OF MULTIPLE CORONARY ARTERIES USING LOW OSMOLAR CONTRAST: ICD-10-PCS | Performed by: INTERNAL MEDICINE

## 2024-07-01 PROCEDURE — 85025 COMPLETE CBC W/AUTO DIFF WBC: CPT

## 2024-07-01 PROCEDURE — 4A023N7 MEASUREMENT OF CARDIAC SAMPLING AND PRESSURE, LEFT HEART, PERCUTANEOUS APPROACH: ICD-10-PCS | Performed by: INTERNAL MEDICINE

## 2024-07-01 PROCEDURE — 85018 HEMOGLOBIN: CPT

## 2024-07-01 PROCEDURE — 2500000003 HC RX 250 WO HCPCS: Performed by: INTERNAL MEDICINE

## 2024-07-01 PROCEDURE — 93458 L HRT ARTERY/VENTRICLE ANGIO: CPT | Performed by: INTERNAL MEDICINE

## 2024-07-01 PROCEDURE — 80053 COMPREHEN METABOLIC PANEL: CPT

## 2024-07-01 PROCEDURE — B2151ZZ FLUOROSCOPY OF LEFT HEART USING LOW OSMOLAR CONTRAST: ICD-10-PCS | Performed by: INTERNAL MEDICINE

## 2024-07-01 PROCEDURE — 85014 HEMATOCRIT: CPT

## 2024-07-01 PROCEDURE — 2709999900 HC NON-CHARGEABLE SUPPLY: Performed by: INTERNAL MEDICINE

## 2024-07-01 PROCEDURE — 7100000011 HC PHASE II RECOVERY - ADDTL 15 MIN: Performed by: INTERNAL MEDICINE

## 2024-07-01 PROCEDURE — 7100000010 HC PHASE II RECOVERY - FIRST 15 MIN: Performed by: INTERNAL MEDICINE

## 2024-07-01 PROCEDURE — 6360000004 HC RX CONTRAST MEDICATION: Performed by: INTERNAL MEDICINE

## 2024-07-01 RX ORDER — AMLODIPINE BESYLATE 10 MG/1
10 TABLET ORAL DAILY
Status: DISCONTINUED | OUTPATIENT
Start: 2024-07-02 | End: 2024-07-02 | Stop reason: HOSPADM

## 2024-07-01 RX ORDER — SODIUM CHLORIDE 0.9 % (FLUSH) 0.9 %
5-40 SYRINGE (ML) INJECTION PRN
Status: CANCELLED | OUTPATIENT
Start: 2024-07-01

## 2024-07-01 RX ORDER — SODIUM CHLORIDE 0.9 % (FLUSH) 0.9 %
10 SYRINGE (ML) INJECTION PRN
Status: CANCELLED | OUTPATIENT
Start: 2024-07-01

## 2024-07-01 RX ORDER — ENOXAPARIN SODIUM 100 MG/ML
30 INJECTION SUBCUTANEOUS DAILY
Status: DISCONTINUED | OUTPATIENT
Start: 2024-07-02 | End: 2024-07-02 | Stop reason: HOSPADM

## 2024-07-01 RX ORDER — ATENOLOL 50 MG/1
50 TABLET ORAL DAILY
Status: DISCONTINUED | OUTPATIENT
Start: 2024-07-02 | End: 2024-07-02 | Stop reason: HOSPADM

## 2024-07-01 RX ORDER — BUPROPION HYDROCHLORIDE 300 MG/1
300 TABLET ORAL EVERY MORNING
Status: DISCONTINUED | OUTPATIENT
Start: 2024-07-02 | End: 2024-07-02 | Stop reason: HOSPADM

## 2024-07-01 RX ORDER — SODIUM CHLORIDE 0.9 % (FLUSH) 0.9 %
5-40 SYRINGE (ML) INJECTION EVERY 12 HOURS SCHEDULED
Status: DISCONTINUED | OUTPATIENT
Start: 2024-07-01 | End: 2024-07-01 | Stop reason: HOSPADM

## 2024-07-01 RX ORDER — ACETAMINOPHEN 325 MG/1
650 TABLET ORAL EVERY 4 HOURS PRN
Status: DISCONTINUED | OUTPATIENT
Start: 2024-07-01 | End: 2024-07-01 | Stop reason: HOSPADM

## 2024-07-01 RX ORDER — ONDANSETRON 2 MG/ML
4 INJECTION INTRAMUSCULAR; INTRAVENOUS EVERY 6 HOURS PRN
Status: DISCONTINUED | OUTPATIENT
Start: 2024-07-01 | End: 2024-07-02 | Stop reason: HOSPADM

## 2024-07-01 RX ORDER — SODIUM CHLORIDE 9 MG/ML
INJECTION, SOLUTION INTRAVENOUS PRN
Status: DISCONTINUED | OUTPATIENT
Start: 2024-07-01 | End: 2024-07-01 | Stop reason: HOSPADM

## 2024-07-01 RX ORDER — TIZANIDINE 2 MG/1
2 TABLET ORAL 3 TIMES DAILY
Status: DISCONTINUED | OUTPATIENT
Start: 2024-07-01 | End: 2024-07-02 | Stop reason: HOSPADM

## 2024-07-01 RX ORDER — AMLODIPINE BESYLATE 10 MG/1
10 TABLET ORAL DAILY
Status: CANCELLED | OUTPATIENT
Start: 2024-07-02

## 2024-07-01 RX ORDER — SODIUM CHLORIDE 0.9 % (FLUSH) 0.9 %
5-40 SYRINGE (ML) INJECTION EVERY 12 HOURS SCHEDULED
Status: CANCELLED | OUTPATIENT
Start: 2024-07-01

## 2024-07-01 RX ORDER — PRAVASTATIN SODIUM 40 MG
40 TABLET ORAL NIGHTLY
Status: DISCONTINUED | OUTPATIENT
Start: 2024-07-01 | End: 2024-07-02 | Stop reason: HOSPADM

## 2024-07-01 RX ORDER — SODIUM CHLORIDE 0.9 % (FLUSH) 0.9 %
5-40 SYRINGE (ML) INJECTION EVERY 12 HOURS SCHEDULED
Status: DISCONTINUED | OUTPATIENT
Start: 2024-07-01 | End: 2024-07-02 | Stop reason: HOSPADM

## 2024-07-01 RX ORDER — SODIUM CHLORIDE 0.9 % (FLUSH) 0.9 %
5-40 SYRINGE (ML) INJECTION PRN
Status: DISCONTINUED | OUTPATIENT
Start: 2024-07-01 | End: 2024-07-02 | Stop reason: HOSPADM

## 2024-07-01 RX ORDER — SODIUM CHLORIDE 0.9 % (FLUSH) 0.9 %
5-40 SYRINGE (ML) INJECTION PRN
Status: DISCONTINUED | OUTPATIENT
Start: 2024-07-01 | End: 2024-07-01 | Stop reason: HOSPADM

## 2024-07-01 RX ORDER — TRAZODONE HYDROCHLORIDE 50 MG/1
50 TABLET ORAL NIGHTLY
Status: DISCONTINUED | OUTPATIENT
Start: 2024-07-01 | End: 2024-07-02 | Stop reason: HOSPADM

## 2024-07-01 RX ORDER — SODIUM CHLORIDE 9 MG/ML
INJECTION, SOLUTION INTRAVENOUS PRN
Status: DISCONTINUED | OUTPATIENT
Start: 2024-07-01 | End: 2024-07-02 | Stop reason: HOSPADM

## 2024-07-01 RX ORDER — ASPIRIN 81 MG/1
81 TABLET ORAL DAILY
Status: CANCELLED | OUTPATIENT
Start: 2024-07-02

## 2024-07-01 RX ORDER — SODIUM CHLORIDE 0.9 % (FLUSH) 0.9 %
10 SYRINGE (ML) INJECTION PRN
Status: DISCONTINUED | OUTPATIENT
Start: 2024-07-01 | End: 2024-07-02 | Stop reason: HOSPADM

## 2024-07-01 RX ORDER — SENNOSIDES A AND B 8.6 MG/1
1 TABLET, FILM COATED ORAL DAILY
Status: DISCONTINUED | OUTPATIENT
Start: 2024-07-02 | End: 2024-07-02 | Stop reason: HOSPADM

## 2024-07-01 RX ORDER — TIZANIDINE 2 MG/1
2 TABLET ORAL 3 TIMES DAILY
Status: CANCELLED | OUTPATIENT
Start: 2024-07-01

## 2024-07-01 RX ORDER — GABAPENTIN 300 MG/1
300 CAPSULE ORAL 3 TIMES DAILY
Status: CANCELLED | OUTPATIENT
Start: 2024-07-01

## 2024-07-01 RX ORDER — MORPHINE SULFATE 2 MG/ML
2 INJECTION, SOLUTION INTRAMUSCULAR; INTRAVENOUS
Status: DISCONTINUED | OUTPATIENT
Start: 2024-07-01 | End: 2024-07-02 | Stop reason: HOSPADM

## 2024-07-01 RX ORDER — ACETAMINOPHEN 650 MG/1
650 SUPPOSITORY RECTAL EVERY 6 HOURS PRN
Status: DISCONTINUED | OUTPATIENT
Start: 2024-07-01 | End: 2024-07-02 | Stop reason: HOSPADM

## 2024-07-01 RX ORDER — ACETAMINOPHEN 325 MG/1
650 TABLET ORAL EVERY 6 HOURS PRN
Status: CANCELLED | OUTPATIENT
Start: 2024-07-01

## 2024-07-01 RX ORDER — INSULIN GLARGINE 100 [IU]/ML
20 INJECTION, SOLUTION SUBCUTANEOUS NIGHTLY
Status: CANCELLED | OUTPATIENT
Start: 2024-07-01

## 2024-07-01 RX ORDER — ACETAMINOPHEN 325 MG/1
650 TABLET ORAL EVERY 6 HOURS PRN
Status: DISCONTINUED | OUTPATIENT
Start: 2024-07-01 | End: 2024-07-02 | Stop reason: HOSPADM

## 2024-07-01 RX ORDER — MIDAZOLAM HYDROCHLORIDE 1 MG/ML
INJECTION INTRAMUSCULAR; INTRAVENOUS PRN
Status: DISCONTINUED | OUTPATIENT
Start: 2024-07-01 | End: 2024-07-01 | Stop reason: HOSPADM

## 2024-07-01 RX ORDER — SODIUM CHLORIDE 9 MG/ML
INJECTION, SOLUTION INTRAVENOUS CONTINUOUS
Status: CANCELLED | OUTPATIENT
Start: 2024-07-01

## 2024-07-01 RX ORDER — HYDRALAZINE HYDROCHLORIDE 20 MG/ML
10 INJECTION INTRAMUSCULAR; INTRAVENOUS EVERY 6 HOURS PRN
Status: DISCONTINUED | OUTPATIENT
Start: 2024-07-01 | End: 2024-07-02 | Stop reason: HOSPADM

## 2024-07-01 RX ORDER — SODIUM CHLORIDE 9 MG/ML
INJECTION, SOLUTION INTRAVENOUS CONTINUOUS
Status: DISCONTINUED | OUTPATIENT
Start: 2024-07-01 | End: 2024-07-02 | Stop reason: HOSPADM

## 2024-07-01 RX ORDER — ONDANSETRON 4 MG/1
4 TABLET, ORALLY DISINTEGRATING ORAL EVERY 8 HOURS PRN
Status: DISCONTINUED | OUTPATIENT
Start: 2024-07-01 | End: 2024-07-02 | Stop reason: HOSPADM

## 2024-07-01 RX ORDER — ONDANSETRON 2 MG/ML
4 INJECTION INTRAMUSCULAR; INTRAVENOUS EVERY 6 HOURS PRN
Status: CANCELLED | OUTPATIENT
Start: 2024-07-01

## 2024-07-01 RX ORDER — GABAPENTIN 300 MG/1
300 CAPSULE ORAL 3 TIMES DAILY
Status: DISCONTINUED | OUTPATIENT
Start: 2024-07-01 | End: 2024-07-02 | Stop reason: HOSPADM

## 2024-07-01 RX ORDER — BUPROPION HYDROCHLORIDE 300 MG/1
300 TABLET ORAL EVERY MORNING
Status: CANCELLED | OUTPATIENT
Start: 2024-07-02

## 2024-07-01 RX ORDER — PRAVASTATIN SODIUM 40 MG
40 TABLET ORAL NIGHTLY
Status: CANCELLED | OUTPATIENT
Start: 2024-07-01

## 2024-07-01 RX ORDER — DIPHENHYDRAMINE HYDROCHLORIDE 50 MG/ML
25 INJECTION INTRAMUSCULAR; INTRAVENOUS EVERY 6 HOURS PRN
Status: CANCELLED | OUTPATIENT
Start: 2024-07-01

## 2024-07-01 RX ORDER — INSULIN LISPRO 100 [IU]/ML
0-4 INJECTION, SOLUTION INTRAVENOUS; SUBCUTANEOUS NIGHTLY
Status: DISCONTINUED | OUTPATIENT
Start: 2024-07-01 | End: 2024-07-02 | Stop reason: HOSPADM

## 2024-07-01 RX ORDER — DEXTROSE MONOHYDRATE 100 MG/ML
INJECTION, SOLUTION INTRAVENOUS CONTINUOUS PRN
Status: DISCONTINUED | OUTPATIENT
Start: 2024-07-01 | End: 2024-07-02 | Stop reason: HOSPADM

## 2024-07-01 RX ORDER — INSULIN LISPRO 100 [IU]/ML
0-4 INJECTION, SOLUTION INTRAVENOUS; SUBCUTANEOUS
Status: DISCONTINUED | OUTPATIENT
Start: 2024-07-02 | End: 2024-07-02 | Stop reason: HOSPADM

## 2024-07-01 RX ORDER — ATENOLOL 50 MG/1
50 TABLET ORAL DAILY
Status: CANCELLED | OUTPATIENT
Start: 2024-07-02

## 2024-07-01 RX ORDER — SENNOSIDES A AND B 8.6 MG/1
1 TABLET, FILM COATED ORAL DAILY
Status: CANCELLED | OUTPATIENT
Start: 2024-07-02

## 2024-07-01 RX ORDER — INSULIN LISPRO 100 [IU]/ML
0-4 INJECTION, SOLUTION INTRAVENOUS; SUBCUTANEOUS
Status: CANCELLED | OUTPATIENT
Start: 2024-07-01

## 2024-07-01 RX ORDER — INSULIN GLARGINE 100 [IU]/ML
20 INJECTION, SOLUTION SUBCUTANEOUS NIGHTLY
Status: DISCONTINUED | OUTPATIENT
Start: 2024-07-01 | End: 2024-07-02 | Stop reason: HOSPADM

## 2024-07-01 RX ORDER — MORPHINE SULFATE 2 MG/ML
2 INJECTION, SOLUTION INTRAMUSCULAR; INTRAVENOUS
Status: CANCELLED | OUTPATIENT
Start: 2024-07-01

## 2024-07-01 RX ORDER — SODIUM CHLORIDE 9 MG/ML
INJECTION, SOLUTION INTRAVENOUS PRN
Status: CANCELLED | OUTPATIENT
Start: 2024-07-01

## 2024-07-01 RX ORDER — ERYTHROMYCIN 5 MG/G
OINTMENT OPHTHALMIC EVERY 8 HOURS SCHEDULED
Status: DISCONTINUED | OUTPATIENT
Start: 2024-07-01 | End: 2024-07-02 | Stop reason: HOSPADM

## 2024-07-01 RX ORDER — ENOXAPARIN SODIUM 100 MG/ML
40 INJECTION SUBCUTANEOUS DAILY
Status: DISCONTINUED | OUTPATIENT
Start: 2024-07-02 | End: 2024-07-01 | Stop reason: DRUGHIGH

## 2024-07-01 RX ORDER — DEXTROSE MONOHYDRATE 100 MG/ML
INJECTION, SOLUTION INTRAVENOUS CONTINUOUS PRN
Status: CANCELLED | OUTPATIENT
Start: 2024-07-01

## 2024-07-01 RX ORDER — INSULIN LISPRO 100 [IU]/ML
0-4 INJECTION, SOLUTION INTRAVENOUS; SUBCUTANEOUS NIGHTLY
Status: CANCELLED | OUTPATIENT
Start: 2024-07-01

## 2024-07-01 RX ORDER — ERYTHROMYCIN 5 MG/G
OINTMENT OPHTHALMIC EVERY 8 HOURS SCHEDULED
Status: CANCELLED | OUTPATIENT
Start: 2024-07-01 | End: 2024-07-05

## 2024-07-01 RX ORDER — SODIUM CHLORIDE 9 MG/ML
INJECTION, SOLUTION INTRAVENOUS CONTINUOUS
Status: ACTIVE | OUTPATIENT
Start: 2024-07-01 | End: 2024-07-01

## 2024-07-01 RX ORDER — TRAZODONE HYDROCHLORIDE 50 MG/1
50 TABLET ORAL NIGHTLY
Status: CANCELLED | OUTPATIENT
Start: 2024-07-01

## 2024-07-01 RX ORDER — ASPIRIN 81 MG/1
81 TABLET ORAL DAILY
Status: DISCONTINUED | OUTPATIENT
Start: 2024-07-02 | End: 2024-07-02 | Stop reason: HOSPADM

## 2024-07-01 RX ORDER — ENOXAPARIN SODIUM 100 MG/ML
40 INJECTION SUBCUTANEOUS DAILY
Status: CANCELLED | OUTPATIENT
Start: 2024-07-02

## 2024-07-01 RX ORDER — ACETAMINOPHEN 650 MG/1
650 SUPPOSITORY RECTAL EVERY 6 HOURS PRN
Status: CANCELLED | OUTPATIENT
Start: 2024-07-01

## 2024-07-01 RX ORDER — POLYETHYLENE GLYCOL 3350 17 G/17G
17 POWDER, FOR SOLUTION ORAL DAILY PRN
Status: CANCELLED | OUTPATIENT
Start: 2024-07-01

## 2024-07-01 RX ORDER — POLYETHYLENE GLYCOL 3350 17 G/17G
17 POWDER, FOR SOLUTION ORAL DAILY PRN
Status: DISCONTINUED | OUTPATIENT
Start: 2024-07-01 | End: 2024-07-02 | Stop reason: HOSPADM

## 2024-07-01 RX ORDER — SODIUM CHLORIDE 9 MG/ML
INJECTION, SOLUTION INTRAVENOUS CONTINUOUS
Status: DISCONTINUED | OUTPATIENT
Start: 2024-07-01 | End: 2024-07-01 | Stop reason: HOSPADM

## 2024-07-01 RX ORDER — DIPHENHYDRAMINE HYDROCHLORIDE 50 MG/ML
25 INJECTION INTRAMUSCULAR; INTRAVENOUS EVERY 6 HOURS PRN
Status: DISCONTINUED | OUTPATIENT
Start: 2024-07-01 | End: 2024-07-02 | Stop reason: HOSPADM

## 2024-07-01 RX ORDER — LIDOCAINE HYDROCHLORIDE 10 MG/ML
INJECTION, SOLUTION INFILTRATION; PERINEURAL PRN
Status: DISCONTINUED | OUTPATIENT
Start: 2024-07-01 | End: 2024-07-01 | Stop reason: HOSPADM

## 2024-07-01 RX ORDER — HYDRALAZINE HYDROCHLORIDE 20 MG/ML
10 INJECTION INTRAMUSCULAR; INTRAVENOUS EVERY 6 HOURS PRN
Status: CANCELLED | OUTPATIENT
Start: 2024-07-01

## 2024-07-01 RX ORDER — ONDANSETRON 4 MG/1
4 TABLET, ORALLY DISINTEGRATING ORAL EVERY 8 HOURS PRN
Status: CANCELLED | OUTPATIENT
Start: 2024-07-01

## 2024-07-01 RX ADMIN — SODIUM CHLORIDE, PRESERVATIVE FREE 40 MG: 5 INJECTION INTRAVENOUS at 08:06

## 2024-07-01 RX ADMIN — MORPHINE SULFATE 2 MG: 2 INJECTION, SOLUTION INTRAMUSCULAR; INTRAVENOUS at 23:56

## 2024-07-01 RX ADMIN — ERYTHROMYCIN: 5 OINTMENT OPHTHALMIC at 05:01

## 2024-07-01 RX ADMIN — SODIUM CHLORIDE, PRESERVATIVE FREE 10 ML: 5 INJECTION INTRAVENOUS at 22:59

## 2024-07-01 RX ADMIN — PANTOPRAZOLE SODIUM 40 MG: 40 INJECTION, POWDER, FOR SOLUTION INTRAVENOUS at 22:59

## 2024-07-01 RX ADMIN — MORPHINE SULFATE 2 MG: 2 INJECTION, SOLUTION INTRAMUSCULAR; INTRAVENOUS at 12:20

## 2024-07-01 RX ADMIN — ASPIRIN 81 MG: 81 TABLET, COATED ORAL at 08:06

## 2024-07-01 RX ADMIN — SODIUM CHLORIDE: 9 INJECTION, SOLUTION INTRAVENOUS at 12:21

## 2024-07-01 RX ADMIN — MORPHINE SULFATE 2 MG: 2 INJECTION, SOLUTION INTRAMUSCULAR; INTRAVENOUS at 02:40

## 2024-07-01 RX ADMIN — TRAZODONE HYDROCHLORIDE 50 MG: 50 TABLET ORAL at 22:56

## 2024-07-01 RX ADMIN — GABAPENTIN 300 MG: 300 CAPSULE ORAL at 22:59

## 2024-07-01 RX ADMIN — AMLODIPINE BESYLATE 10 MG: 10 TABLET ORAL at 08:06

## 2024-07-01 RX ADMIN — INSULIN GLARGINE 20 UNITS: 100 INJECTION, SOLUTION SUBCUTANEOUS at 22:59

## 2024-07-01 RX ADMIN — ERYTHROMYCIN: 5 OINTMENT OPHTHALMIC at 12:20

## 2024-07-01 RX ADMIN — TIZANIDINE 2 MG: 2 TABLET ORAL at 22:56

## 2024-07-01 RX ADMIN — SODIUM CHLORIDE: 9 INJECTION, SOLUTION INTRAVENOUS at 14:15

## 2024-07-01 RX ADMIN — SODIUM CHLORIDE: 9 INJECTION, SOLUTION INTRAVENOUS at 23:07

## 2024-07-01 RX ADMIN — ATENOLOL 50 MG: 50 TABLET ORAL at 08:06

## 2024-07-01 RX ADMIN — MORPHINE SULFATE 2 MG: 2 INJECTION, SOLUTION INTRAMUSCULAR; INTRAVENOUS at 05:24

## 2024-07-01 RX ADMIN — ERYTHROMYCIN: 5 OINTMENT OPHTHALMIC at 23:00

## 2024-07-01 RX ADMIN — MORPHINE SULFATE 2 MG: 2 INJECTION, SOLUTION INTRAMUSCULAR; INTRAVENOUS at 08:31

## 2024-07-01 RX ADMIN — PRAVASTATIN SODIUM 40 MG: 40 TABLET ORAL at 22:59

## 2024-07-01 ASSESSMENT — PAIN SCALES - GENERAL
PAINLEVEL_OUTOF10: 0
PAINLEVEL_OUTOF10: 8
PAINLEVEL_OUTOF10: 3
PAINLEVEL_OUTOF10: 8
PAINLEVEL_OUTOF10: 7
PAINLEVEL_OUTOF10: 8
PAINLEVEL_OUTOF10: 8
PAINLEVEL_OUTOF10: 3
PAINLEVEL_OUTOF10: 0

## 2024-07-01 ASSESSMENT — PAIN DESCRIPTION - DESCRIPTORS
DESCRIPTORS: ACHING
DESCRIPTORS: ACHING
DESCRIPTORS: ACHING;DISCOMFORT;SORE
DESCRIPTORS: ACHING;DISCOMFORT

## 2024-07-01 ASSESSMENT — PAIN - FUNCTIONAL ASSESSMENT
PAIN_FUNCTIONAL_ASSESSMENT: ACTIVITIES ARE NOT PREVENTED

## 2024-07-01 ASSESSMENT — PAIN DESCRIPTION - LOCATION
LOCATION: BACK
LOCATION: BACK
LOCATION: BACK;LEG
LOCATION: BACK

## 2024-07-01 ASSESSMENT — PAIN SCALES - WONG BAKER
WONGBAKER_NUMERICALRESPONSE: HURTS A LITTLE BIT
WONGBAKER_NUMERICALRESPONSE: HURTS A LITTLE BIT

## 2024-07-01 ASSESSMENT — PAIN DESCRIPTION - ORIENTATION
ORIENTATION: LOWER;MID
ORIENTATION: MID

## 2024-07-01 ASSESSMENT — PAIN DESCRIPTION - PAIN TYPE: TYPE: CHRONIC PAIN

## 2024-07-01 NOTE — PLAN OF CARE
Problem: Discharge Planning  Goal: Discharge to home or other facility with appropriate resources  7/1/2024 0056 by Hallie Eric RN  Outcome: Progressing  6/30/2024 1257 by Leah Hurd RN  Outcome: Progressing     Problem: Pain  Goal: Verbalizes/displays adequate comfort level or baseline comfort level  7/1/2024 0056 by Hallie Eric RN  Outcome: Progressing  Note: Patient with back and chest pain.  Medicated with morphine with relief.  6/30/2024 1257 by Leah Hurd RN  Outcome: Progressing     Problem: Safety - Adult  Goal: Free from fall injury  7/1/2024 0056 by Hallie Eric RN  Outcome: Progressing  Note: Patient weak.  Alert and oriented.  Bed alarm on.  Assisted to bathroom with one assist.  6/30/2024 1257 by Leah Hurd RN  Outcome: Progressing     Problem: Skin/Tissue Integrity  Goal: Absence of new skin breakdown  Description: 1.  Monitor for areas of redness and/or skin breakdown  2.  Assess vascular access sites hourly  3.  Every 4-6 hours minimum:  Change oxygen saturation probe site  4.  Every 4-6 hours:  If on nasal continuous positive airway pressure, respiratory therapy assess nares and determine need for appliance change or resting period.  7/1/2024 0056 by Hallie Eric RN  Outcome: Progressing  Note: Skin intact. Able to turn and reposition self comfortably.  6/30/2024 1257 by Leah Hurd RN  Outcome: Progressing     Problem: Chronic Conditions and Co-morbidities  Goal: Patient's chronic conditions and co-morbidity symptoms are monitored and maintained or improved  7/1/2024 0056 by Hallie Eric RN  Outcome: Progressing  Note: Patient for cardiac cath today.  Received one unit of blood for low hemoglobin.  6/30/2024 1257 by Leah Hurd RN  Outcome: Progressing     Problem: ABCDS Injury Assessment  Goal: Absence of physical injury  7/1/2024 0056 by Hallie Eric RN  Outcome: Progressing  6/30/2024 1257 by Leah Hurd

## 2024-07-01 NOTE — PROGRESS NOTES
[facility] Internal Medicine  Bakari Zacarias MD; Rory Rios MD; Pavel Bolden MD; MD Zulema Alonzo MD; Wiley Saxena MD; Izzy White MD      Progress note            Date:   2024  Patient name:  Shauna Alexandra  MRN:   [id number]  Account:  [id number]  YOB: 1960  PCP:    Kody Licona APRN - CNP  Code Status:    Full Code    Chief Complaint:     Chief Complaint   Patient presents with    Chest Pain    Abdominal Pain    Insect Bite     X 2 days post outside urgent care visit no relief, complians of increased pain inhale, Pt also complains of eye discharge, and possible spider bite          History Obtained From:     Patient, EMR, nursing staff    HPI     This patient is a 64 y.o. Non- / non  femalewho has hypertension hyperlipidemia type 2 diabetes no known cardiac history presenting with shortness of breath and chest pain noted to have very high blood pressure and ALBERTO in ER.  Presents with  UA concerning for UTI      Review of Systems:     Positive for shortness of breath no cough  Positive for some chest pain denies palpitations  Positive for upper abdominal pain  Denies any new numbness tremors or weakness.    A 10 point review of systems was performed and and negative except as mentioned in HPI  Positive and Negative as described in HPI.      Past Medical History:     Past Medical History:   Diagnosis Date    Anemia     Chronic back pain     Depression     Hyperlipidemia     Hypertension     Type 2 diabetes mellitus with diabetic polyneuropathy, without long-term current use of insulin ([facility]) 11/10/2016    Type II or unspecified type diabetes mellitus without mention of complication, not stated as uncontrolled         Past Surgical History:     Past Surgical History:   Procedure Laterality Date     SECTION      EXTERNAL EAR SURGERY      EYE SURGERY      RECTAL SURGERY Right 2021    RECTAL PERIRECTAL INCISION AND DRAINAGE  Zulema Tran MD   Given at 07/01/24 0501    tiZANidine (ZANAFLEX) tablet 2 mg  2 mg Oral TID Zulema Tran MD   2 mg at 06/30/24 1949    sodium chloride flush 0.9 % injection 10 mL  10 mL IntraVENous PRN Clay Piedra R, DO   10 mL at 06/24/24 1422    sodium chloride flush 0.9 % injection 10 mL  10 mL IntraVENous PRN Tadeo Clay R, DO   10 mL at 06/24/24 1422    sodium chloride flush 0.9 % injection 5-40 mL  5-40 mL IntraVENous 2 times per day Zulema Tran MD   10 mL at 06/30/24 0804    sodium chloride flush 0.9 % injection 5-40 mL  5-40 mL IntraVENous PRN Zulema Tran MD   10 mL at 06/24/24 1849    0.9 % sodium chloride infusion   IntraVENous PRN Zulema Tran MD        [Held by provider] enoxaparin (LOVENOX) injection 40 mg  40 mg SubCUTAneous Daily Zulema Tran MD   40 mg at 06/24/24 1848    ondansetron (ZOFRAN-ODT) disintegrating tablet 4 mg  4 mg Oral Q8H PRN Zulema Tran MD   4 mg at 06/25/24 1340    Or    ondansetron (ZOFRAN) injection 4 mg  4 mg IntraVENous Q6H PRN Zulema Tran MD   4 mg at 06/30/24 0221    polyethylene glycol (GLYCOLAX) packet 17 g  17 g Oral Daily PRN Zulema Tran MD   17 g at 06/27/24 1005    acetaminophen (TYLENOL) tablet 650 mg  650 mg Oral Q6H PRN Zulema Tran MD   650 mg at 06/27/24 0315    Or    acetaminophen (TYLENOL) suppository 650 mg  650 mg Rectal Q6H PRN Zulema Tran MD        amLODIPine (NORVASC) tablet 10 mg  10 mg Oral Daily Zulema Tran MD   10 mg at 07/01/24 0806    aspirin EC tablet 81 mg  81 mg Oral Daily Zulema Tran MD   81 mg at 07/01/24 0806    atenolol (TENORMIN) tablet 50 mg  50 mg Oral Daily Zulema Tran MD   50 mg at 07/01/24 0806    buPROPion (WELLBUTRIN XL) extended release tablet 300 mg  300 mg Oral QAM Zulema Tran MD   300 mg at 06/30/24 0804    [Held by provider] insulin glargine (LANTUS) injection vial 20 Units  20 Units SubCUTAneous Nightly Zulema Tran MD   20 Units at 06/26/24 2023     with shortness of breath upper abdominal pain and some chest pain.  VQ scan was done from ER and no PE.  CT Abdo pelvis without contrast unremarkable other than incidental endometrial wall thickening.  Patient admitted for hypertensive urgency and cardiac workup    Hypertensive emergency with elevated creatinine-resume home atenolol 50 daily, add as needed hydralazine  ALBERTO creatinine 2 presentation, baseline 1.5-fluids, no hydronephrosis on CT abdomen, holding lisinopril HCTZ Topamax  Suspected UTI based on UA results-continue Rocephin  Nonspecific abdominal pain-lipase and liver enzymes normal  Chest pain shortness of breath troponins negative, EKG sinus rhythm D-dimer negative  Incidental finding of endometrial thickening on CT abdomen-pelvic ultrasound to be done as outpatient  Type 2 diabetes-Lantus sliding scale  Chronic pain-renally dosed Neurontin    DVT pplx-Lovenox    6/25  Patient condition unchanged, patient remains on continuous IV fluids increasing to 100 cc/h.  Echo shows no CHF or wall motion abnormality.  Creatinine still 2 baseline 1.5, consulting nephrology  Continues to complain primarily of pain across mid back with epigastric pain,  Tender across mid back, will get x-ray thoracic and lumbar spines.  Rule out vertebral lesions.  Denies radiation of pain to lower extremities, good power bilateral lower extremities.  Denies any bowel/bladder problems. No saddle anesthesia.  Pain seems to exacerbated by slightest movement possible muscle spasm try tizanidine.    Also complaining of watery discharge from eyes-ordering erythromycin ointment    6/26  Creatinine still at 2 awaiting nephrology recommendations  Fever 100.5 this morning  Patient's urine output was low yesterday, Lasix was given and PureWick was placed overnight  Oxygen saturation downtrending patient currently on 2 L nasal cannula oxygen  Suspect pulmonary edema developing from fluid overload-IV fluids have been discontinued  Patient

## 2024-07-01 NOTE — CARE COORDINATION
ONGOING DISCHARGE PLAN:    Patient is alert and oriented x4.    Spoke with patient regarding discharge plan and patient confirms that plan is still to return to home alone,W/ VNS- [facility].    CR today 2.3, HGB 8.3.     Sating 95% on 2LNC, Pt. Does NOT wear at home, will follow.     Plan is for possible Cardiac Cath today. Cardio on board.     Rollator already ordered from [facility].     Denies other needs.     Will continue to follow for additional discharge needs.    If patient is discharged prior to next notation, then this note serves as note for discharge by case management.    Electronically signed by Galilea Evans RN on 7/1/2024 at 9:50 AM

## 2024-07-01 NOTE — BRIEF OP NOTE
Brief Postoperative Note      Patient: Shauna Alexandra  YOB: 1960  MRN: [id number]    Date of Procedure: 7/1/2024    Pre-Op Diagnosis Codes:     * NSTEMI (non-ST elevated myocardial infarction) ([facility]) [I21.4]    Post-Op Diagnosis: Same       Procedure(s):  Left heart cath / coronary angiography    Surgeon(s):  Kriss Driscoll MD    Anesthesia: IV Sedation    Estimated Blood Loss (mL): Minimal    Complications: None     Findings: Mild nonobstructive coronary artery disease.  LV gram not done due to chronic renal insufficiency.  LVEDP mildly elevated.    Plan:   Postop IV hydration as per nephrology recommendations  Total contrast used only 30 cc  Optimize medical management with aspirin, statin, beta-blocker and CCB  No objection to discharge home from cardiology stand    Electronically signed by Kriss Driscoll MD on 7/1/2024 at 3:06 PM

## 2024-07-01 NOTE — PROGRESS NOTES
Access called [phone] to start the return process to [facility].  This writer spoke with Angie and gave information.  Await call back on [facility] for

## 2024-07-01 NOTE — PROGRESS NOTES
This writer called [employer] and spoke to Mary Kay regarding an ETA for .  Currently ETA 9PM, access calling around and will update with any changes

## 2024-07-01 NOTE — PROGRESS NOTES
[facility]    Gastroenterology Daily Progress Note      Patient:   Shauna Alexandra   :    1960   Facility:   [facility]  Date:     2024  Consultant:   CONRADO Dupree - CNP, CNP      SUBJECTIVE  64 y.o. female admitted 2024 with Shortness of breath [R06.02]  Angina pectoris (HCC) [I20.9]  ALBERTO (acute kidney injury) (HCC) [N17.9]  Chest pain, unspecified type [R07.9] and seen for anemia with positive fobt, elevated alk phos. The pt was seen and examined hgb up to 8.3 reports two \"dark\" stools overnight. No abdominal pain or nausea. Liver us shows cholelithiasis. . .        OBJECTIVE  Scheduled Meds:   pantoprazole (PROTONIX) 40 mg in sodium chloride (PF) 0.9 % 10 mL injection  40 mg IntraVENous Q12H    senna  1 tablet Oral Daily    erythromycin   Both Eyes 3 times per day    tiZANidine  2 mg Oral TID    sodium chloride flush  5-40 mL IntraVENous 2 times per day    [Held by provider] enoxaparin  40 mg SubCUTAneous Daily    amLODIPine  10 mg Oral Daily    aspirin  81 mg Oral Daily    atenolol  50 mg Oral Daily    buPROPion  300 mg Oral QAM    [Held by provider] insulin glargine  20 Units SubCUTAneous Nightly    pravastatin  40 mg Oral Nightly    traZODone  50 mg Oral Nightly    insulin lispro  0-4 Units SubCUTAneous TID WC    insulin lispro  0-4 Units SubCUTAneous Nightly    gabapentin  300 mg Oral TID       Vital Signs:  /77   Pulse 72   Temp 98.3 °F (36.8 °C) (Oral)   Resp 16   Ht 1.651 m (5' 5\")   Wt 84.4 kg (186 lb 1.1 oz)   SpO2 97%   BMI 30.96 kg/m²    Review of Systems - History obtained from chart review and the patient  General ROS: negative  Respiratory ROS: no cough, shortness of breath, or wheezing  Cardiovascular ROS: no chest pain or dyspnea on exertion  Gastrointestinal ROS: positive for - melena   Physical Exam:     General Appearance: alert and oriented to person, place and time, well-developed and well-nourished, in no acute distress  Skin:

## 2024-07-01 NOTE — PROGRESS NOTES
Physician Progress Note      PATIENT:               LOUIE BESS  [facility] #:                  [id number]  :                       1960  ADMIT DATE:       2024 11:48 AM  DISCH DATE:  RESPONDING  PROVIDER #:        Zulema Lazcano MD          QUERY TEXT:    Patient admitted with HTN emergency.  Noted documentation of Acute Kidney   Injury in progress notes.  In order to support the diagnosis of ALBERTO, please   include additional clinical indicators in your documentation.? Or please   document if the diagnosis of ALBERTO has been ruled out after further study.    The medical record reflects the following:  Risk Factors: HTN emergency  Clinical Indicators: presented with HTN emergency, noted to have ALBERTO   documented in notes with Cr 2.0 and baseline noted to be 1.5  Treatment: Labs, imaging, monitoring, IVF, holding HCTZ and Topamax    Defined by Kidney Disease Improving Global Outcomes (KDIGO) clinical practice   guideline for acute kidney injury:  -Increase in SCr by greater than or equal to 0.3 mg/dl within 48 hours; or  -Increase or decrease in SCr to greater than or equal to 1.5 times baseline,   which is known or presumed to have occurred within the prior 7 days; or  -Urine volume < 0.5ml/kg/h for 6 hours.  Options provided:  -- Acute kidney injury evidenced by, Please document evidence as well as a   numerical baseline creatinine, if known.  -- Acute kidney injury ruled out after study  -- Other - I will add my own diagnosis  -- Disagree - Not applicable / Not valid  -- Disagree - Clinically unable to determine / Unknown  -- Refer to Clinical Documentation Reviewer    PROVIDER RESPONSE TEXT:    This patient has acute kidney injury as evidenced by increasing creatinine   fron 2 to 2.5 on . By definition ALBERTO developed on .    Query created by: Tayla Su on 2024 11:35 AM      QUERY TEXT:    Patient admitted with HTN emergency. Noted documentation of UTI in progress   notes. In

## 2024-07-01 NOTE — PROGRESS NOTES
Writer spoke with Denisse at [facility]. Transport was set up for an ETA of 1pm to go to [facility].

## 2024-07-02 VITALS
TEMPERATURE: 97.5 F | DIASTOLIC BLOOD PRESSURE: 75 MMHG | RESPIRATION RATE: 18 BRPM | OXYGEN SATURATION: 97 % | SYSTOLIC BLOOD PRESSURE: 147 MMHG | HEART RATE: 69 BPM

## 2024-07-02 PROBLEM — I16.1 HYPERTENSIVE EMERGENCY: Status: ACTIVE | Noted: 2024-07-02

## 2024-07-02 LAB
ABO/RH: NORMAL
ALBUMIN SERPL-MCNC: 2.9 G/DL (ref 3.5–5.2)
ALP SERPL-CCNC: 134 U/L (ref 35–104)
ALT SERPL-CCNC: 18 U/L (ref 5–33)
ANA SER QL IA: NEGATIVE
ANION GAP SERPL CALCULATED.3IONS-SCNC: 9 MMOL/L (ref 9–17)
ANTIBODY SCREEN: NEGATIVE
ARM BAND NUMBER: NORMAL
AST SERPL-CCNC: 14 U/L
BASOPHILS # BLD: 0 K/UL (ref 0–0.2)
BASOPHILS NFR BLD: 0 % (ref 0–2)
BILIRUB SERPL-MCNC: 0.2 MG/DL (ref 0.3–1.2)
BLOOD BANK BLOOD PRODUCT EXPIRATION DATE: NORMAL
BLOOD BANK DISPENSE STATUS: NORMAL
BLOOD BANK DISPENSE STATUS: NORMAL
BLOOD BANK ISBT PRODUCT BLOOD TYPE: 6200
BLOOD BANK PRODUCT CODE: NORMAL
BLOOD BANK SAMPLE EXPIRATION: NORMAL
BLOOD BANK UNIT TYPE AND RH: NORMAL
BPU ID: NORMAL
BPU ID: NORMAL
BUN SERPL-MCNC: 34 MG/DL (ref 8–23)
CALCIUM SERPL-MCNC: 9.7 MG/DL (ref 8.6–10.4)
CHLORIDE SERPL-SCNC: 109 MMOL/L (ref 98–107)
CO2 SERPL-SCNC: 20 MMOL/L (ref 20–31)
COMPONENT: NORMAL
COMPONENT: NORMAL
CREAT SERPL-MCNC: 1.9 MG/DL (ref 0.5–0.9)
CROSSMATCH RESULT: NORMAL
CROSSMATCH RESULT: NORMAL
DSDNA IGG SER QL IA: 1.1 IU/ML
ECHO BSA: 1.97 M2
EOSINOPHIL # BLD: 0.7 K/UL (ref 0–0.4)
EOSINOPHILS RELATIVE PERCENT: 9 % (ref 0–4)
ERYTHROCYTE [DISTWIDTH] IN BLOOD BY AUTOMATED COUNT: 14.9 % (ref 11.5–14.9)
GFR, ESTIMATED: 29 ML/MIN/1.73M2
GLUCOSE BLD-MCNC: 89 MG/DL (ref 65–105)
GLUCOSE BLD-MCNC: 91 MG/DL (ref 65–105)
GLUCOSE SERPL-MCNC: 91 MG/DL (ref 70–99)
HCT VFR BLD AUTO: 24.4 % (ref 36–46)
HGB BLD-MCNC: 8 G/DL (ref 12–16)
LYMPHOCYTES NFR BLD: 1.8 K/UL (ref 1–4.8)
LYMPHOCYTES RELATIVE PERCENT: 24 % (ref 24–44)
MCH RBC QN AUTO: 30.3 PG (ref 26–34)
MCHC RBC AUTO-ENTMCNC: 33 G/DL (ref 31–37)
MCV RBC AUTO: 91.8 FL (ref 80–100)
MITOCHONDRIA M2 IGG SER-ACNC: 1.8 U/ML (ref 0–4)
MONOCYTES NFR BLD: 0.3 K/UL (ref 0.1–1.3)
MONOCYTES NFR BLD: 5 % (ref 1–7)
NEUTROPHIL AB SER QL FC: NEGATIVE
NEUTROPHILS NFR BLD: 62 % (ref 36–66)
NEUTS SEG NFR BLD: 4.6 K/UL (ref 1.3–9.1)
NUCLEAR IGG SER IA-RTO: 0.1 U/ML
PLATELET # BLD AUTO: 415 K/UL (ref 150–450)
PMV BLD AUTO: 6.9 FL (ref 6–12)
POTASSIUM SERPL-SCNC: 4.4 MMOL/L (ref 3.7–5.3)
PROT SERPL-MCNC: 6.5 G/DL (ref 6.4–8.3)
RBC # BLD AUTO: 2.65 M/UL (ref 4–5.2)
SODIUM SERPL-SCNC: 138 MMOL/L (ref 135–144)
TRANSFUSION STATUS: NORMAL
TRANSFUSION STATUS: NORMAL
UNIT DIVISION: 0
UNIT DIVISION: 0
UNIT ISSUE DATE/TIME: NORMAL
WBC OTHER # BLD: 7.4 K/UL (ref 3.5–11)

## 2024-07-02 PROCEDURE — 36415 COLL VENOUS BLD VENIPUNCTURE: CPT

## 2024-07-02 PROCEDURE — 85025 COMPLETE CBC W/AUTO DIFF WBC: CPT

## 2024-07-02 PROCEDURE — 2580000003 HC RX 258: Performed by: INTERNAL MEDICINE

## 2024-07-02 PROCEDURE — 82947 ASSAY GLUCOSE BLOOD QUANT: CPT

## 2024-07-02 PROCEDURE — 6370000000 HC RX 637 (ALT 250 FOR IP): Performed by: INTERNAL MEDICINE

## 2024-07-02 PROCEDURE — 99233 SBSQ HOSP IP/OBS HIGH 50: CPT | Performed by: INTERNAL MEDICINE

## 2024-07-02 PROCEDURE — 6360000002 HC RX W HCPCS: Performed by: INTERNAL MEDICINE

## 2024-07-02 PROCEDURE — 80053 COMPREHEN METABOLIC PANEL: CPT

## 2024-07-02 RX ORDER — GABAPENTIN 300 MG/1
300 CAPSULE ORAL 3 TIMES DAILY
Qty: 90 CAPSULE | Refills: 0 | Status: SHIPPED | OUTPATIENT
Start: 2024-07-02 | End: 2024-08-01

## 2024-07-02 RX ORDER — ISOSORBIDE MONONITRATE 60 MG/1
60 TABLET, EXTENDED RELEASE ORAL DAILY
Status: DISCONTINUED | OUTPATIENT
Start: 2024-07-02 | End: 2024-07-02 | Stop reason: HOSPADM

## 2024-07-02 RX ORDER — ISOSORBIDE MONONITRATE 60 MG/1
60 TABLET, EXTENDED RELEASE ORAL DAILY
Qty: 30 TABLET | Refills: 3 | Status: SHIPPED | OUTPATIENT
Start: 2024-07-03 | End: 2024-07-02

## 2024-07-02 RX ORDER — ISOSORBIDE MONONITRATE 60 MG/1
60 TABLET, EXTENDED RELEASE ORAL DAILY
Qty: 30 TABLET | Refills: 3 | Status: SHIPPED | OUTPATIENT
Start: 2024-07-03

## 2024-07-02 RX ORDER — GABAPENTIN 300 MG/1
300 CAPSULE ORAL 3 TIMES DAILY
Qty: 90 CAPSULE | Refills: 0 | Status: SHIPPED | OUTPATIENT
Start: 2024-07-02 | End: 2024-07-02

## 2024-07-02 RX ADMIN — HYDRALAZINE HYDROCHLORIDE 10 MG: 20 INJECTION INTRAMUSCULAR; INTRAVENOUS at 00:21

## 2024-07-02 RX ADMIN — ERYTHROMYCIN: 5 OINTMENT OPHTHALMIC at 06:43

## 2024-07-02 RX ADMIN — SENNOSIDES 8.6 MG: 8.6 TABLET, FILM COATED ORAL at 08:40

## 2024-07-02 RX ADMIN — GABAPENTIN 300 MG: 300 CAPSULE ORAL at 08:40

## 2024-07-02 RX ADMIN — TIZANIDINE 2 MG: 2 TABLET ORAL at 08:40

## 2024-07-02 RX ADMIN — BUPROPION HYDROCHLORIDE 300 MG: 300 TABLET, EXTENDED RELEASE ORAL at 08:40

## 2024-07-02 RX ADMIN — AMLODIPINE BESYLATE 10 MG: 10 TABLET ORAL at 08:40

## 2024-07-02 RX ADMIN — ISOSORBIDE MONONITRATE 60 MG: 60 TABLET, EXTENDED RELEASE ORAL at 09:42

## 2024-07-02 RX ADMIN — ASPIRIN 81 MG: 81 TABLET, COATED ORAL at 08:39

## 2024-07-02 RX ADMIN — ATENOLOL 50 MG: 50 TABLET ORAL at 08:40

## 2024-07-02 RX ADMIN — PANTOPRAZOLE SODIUM 40 MG: 40 INJECTION, POWDER, FOR SOLUTION INTRAVENOUS at 09:42

## 2024-07-02 ASSESSMENT — PAIN SCALES - GENERAL: PAINLEVEL_OUTOF10: 0

## 2024-07-02 NOTE — PROGRESS NOTES
Patient was evaluated today for the diagnosis of CHF.  I entered a DME order for home oxygen at 2 lpm because the diagnosis and testing require the patient to have supplemental oxygen.  Condition will improve or be benefited by oxygen use.  The patient is  able to perform good mobility in a home setting and therefore does require the use of a portable oxygen system.  The need for this equipment was discussed with the patient and she understands and is in agreement.

## 2024-07-02 NOTE — PROGRESS NOTES
Discharge instructions reviewed with patient. Patient verbalized understanding and had no further questions. Patient and oxygen wheeled down to car. Patient understands no smoking near oxygen. Dr. Martin cope with d/c.

## 2024-07-02 NOTE — PROGRESS NOTES
Patient received from [facility] via EMS. Patient ambulated to bathroom and back to bed.   No distress noted.

## 2024-07-02 NOTE — CARE COORDINATION
Zulema Tran MD  Physician  Internal Medicine     H&P      Addendum     Date of Service: 6/24/2024  5:24 PM   Related encounter: ED to Hosp-Admission (Discharged) from 6/24/2024 in [facility] Progressive Care     Expand All Collapse All         [facility] Internal Medicine  Bakari Zacarias MD; Rory Rios MD; Pavel Bolden MD; MD Zulema Alonzo MD; Wiley Saxena MD; Izzy White MD        HISTORY AND PHYSICAL EXAMINATION            Date:                            6/24/2024  Patient name:              Shauna Alexandra  MRN:                           [id number]  Account:                      [id number]  YOB: 1960  PCP:                            Kody Licona APRN - CNP  Code Status:               Full Code     Chief Complaint:           Chief Complaint   Patient presents with    Chest Pain    Abdominal Pain    Insect Bite       X 2 days post outside urgent care visit no relief, complians of increased pain inhale, Pt also complains of eye discharge, and possible spider bite             History Obtained From:      Patient, EMR, nursing staff     HPI      This patient is a 64 y.o. Non- / non  femalewho has hypertension hyperlipidemia type 2 diabetes no known cardiac history presenting with shortness of breath and chest pain noted to have very high blood pressure and ALBERTO in ER.  Presents with  UA concerning for UTI        Review of Systems:      Positive for shortness of breath no cough  Positive for some chest pain denies palpitations  Positive for upper abdominal pain  Denies any new numbness tremors or weakness.     A 10 point review of systems was performed and and negative except as mentioned in HPI  Positive and Negative as described in HPI.        Past Medical History:      Past Medical History        Past Medical History:   Diagnosis Date    Anemia      Chronic back pain      Depression      Hyperlipidemia      Hypertension      Type 2  HISTORY: r/o pe Decision Support Exception - unselect if not a suspected or confirmed emergency medical condition->Emergency Medical Condition (MA) Reason for Exam: r/o pe Additional signs and symptoms: SOB x few days, rib and back pain, smoked since she was 12 or 13. no hx of lung dx, no hx of blood clots, D-Dimer 6/24/24: 1.38 FINDINGS: VENTILATION: Heterogeneous distribution of radiopharmaceutical is seen bilaterally. Aggregation of radiopharmaceutical is seen centrally, likely due to airway turbulence. PERFUSION: There is mild heterogeneity of perfusion, largely matched or less conspicuous as compared to the ventilation pattern.      Low probability for pulmonary embolus.      XR CHEST PORTABLE     Result Date: 6/24/2024  EXAMINATION: ONE XRAY VIEW OF THE CHEST 6/24/2024 12:49 pm COMPARISON: None. HISTORY: ORDERING SYSTEM PROVIDED HISTORY: pain TECHNOLOGIST PROVIDED HISTORY: pain Reason for Exam: Chest pain, abdominal pain FINDINGS: Normal heart size and pulmonary vasculature.  No focal consolidations, pleural effusions, or pneumothorax.  Linear bibasilar atelectasis or scarring.      Bibasilar atelectasis/scarring.          Current Facility-Administered Medications             Current Facility-Administered Medications   Medication Dose Route Frequency Provider Last Rate Last Admin    sodium chloride flush 0.9 % injection 10 mL  10 mL IntraVENous PRN Clay Piedra R, DO   10 mL at 06/24/24 1422    sodium chloride flush 0.9 % injection 10 mL  10 mL IntraVENous PRN Clay Piedra R, DO   10 mL at 06/24/24 1422    0.9 % sodium chloride infusion   IntraVENous Continuous Clay Piedra DO 75 mL/hr at 06/24/24 1608 New Bag at 06/24/24 1608    amLODIPine (NORVASC) tablet 10 mg  10 mg Oral Daily Clay Piedra R, DO   10 mg at 06/24/24 1624    atenolol (TENORMIN) tablet 50 mg  50 mg Oral Daily Clay Piedra R, DO   50 mg at 06/24/24 1624    cefTRIAXone (ROCEPHIN) 1,000 mg in sodium chloride 0.9 % 50 mL

## 2024-07-02 NOTE — PROGRESS NOTES
CLINICAL PHARMACY NOTE: MEDS TO BEDS    Total # of Prescriptions Filled: 1   The following medications were delivered to the patient:  Gabapentin 300mg    Additional Documentation: 7/2/24 kbg 11:29am delivered to pt in room Nurse Alfreda locked up in pt room 7/2/24 Nurse Taiwo harrington [id number] complete and Imdur transferred to [id number] - [place], [place] - [address] - P [phone]

## 2024-07-02 NOTE — CARE COORDINATION
Writer faxed Olesya/DC med list to [facility]. Left Sonia NOLAN, in regards to DC today & to call for start of care.     Writer is waiting for delivery of Oxygen from [facility]. Left AN, to check the status.

## 2024-07-02 NOTE — PLAN OF CARE
Problem: Chronic Conditions and Co-morbidities  Goal: Patient's chronic conditions and co-morbidity symptoms are monitored and maintained or improved  Outcome: Progressing  Flowsheets (Taken 7/1/2024 2000)  Care Plan - Patient's Chronic Conditions and Co-Morbidity Symptoms are Monitored and Maintained or Improved: Monitor and assess patient's chronic conditions and comorbid symptoms for stability, deterioration, or improvement     Problem: Pain  Goal: Verbalizes/displays adequate comfort level or baseline comfort level  Outcome: Progressing   NO NEW SIGNS OR SYMPTOMS OF PAIN NOTED, PAIN RATING <3 ON SCALE OF 0-10 THIS SHIFT. PAIN CONTROLLED WITH MEDICATION AND REPOSITIONING.  Problem: Safety - Adult  Goal: Free from fall injury  Outcome: Progressing   NO FALLS OR INJURIES THIS SHIFT, BED IN LOWEST POSITION, BRAKES ON, BED ALARM ON, CALL LIGHT IN REACH, SIDE RAILS UP X2.  Problem: Discharge Planning  Goal: Discharge to home or other facility with appropriate resources  Outcome: Progressing  Flowsheets (Taken 7/1/2024 2000)  Discharge to home or other facility with appropriate resources: Identify barriers to discharge with patient and caregiver

## 2024-07-02 NOTE — PROGRESS NOTES
Face to face encounter today indicate the requirement of DME order of  Oxygen for the diagnosis of the  Chronic respiratory failure Indication and side effects of treatment had been addressed with pt , pt agreeable to the current plan of using the DME

## 2024-07-02 NOTE — CARE COORDINATION
ONGOING DISCHARGE PLAN:    Patient is alert and oriented x4.    Spoke with patient regarding discharge plan and patient confirms that plan is still to DC to home today w/ VNS, [facility].     Pt. Qualified for Home Oxygen. Writer faxed Face sheet, Home O2 Brian, F2F notes, DME orders for Oxygen to [facility]. Spoke to Mary Kay & informed that pt. Will be DC today.     Informed Pt. To call [facility], as soon as she gets home to get delivery of the rest of her Oxygen equipment.     Writer left Message for Nick, from [facility] in regards to DC today     Pt had Lt Heart Cath yesterday at [facility] Cardio on board.     Pt. Reminded, to call [facility], for delivery of her Requested Rollator.     Writer faxed Olesya/DC med list to [facility].     Will continue to follow for additional discharge needs.    If patient is discharged prior to next notation, then this note serves as note for discharge by case management.    Electronically signed by Galilea Evans RN on 7/2/2024 at 11:10 AM     ADD: Per [facility] request, writer refaxed O2 information, for they only received the Insurance information.

## 2024-07-02 NOTE — CARE COORDINATION
Writer faxed H& P, Progress notes, from Primary/ Nephro to [facility], for [facility], states \"they need more information for Pt's Oxygen\".

## 2024-07-02 NOTE — PROGRESS NOTES
Pharmacy Note     Enoxaparin Dose Adjustment    Shauna Alexandra is a 64 y.o. female. Pharmacist assessment of enoxaparin dose for VTE prophylaxis.    Recent Labs     06/29/24  0526 07/01/24  0510   BUN 37* 39*       Recent Labs     06/29/24  0526 07/01/24  0510   CREATININE 2.2* 2.3*       Estimated Creatinine Clearance: 27 mL/min (A) (based on SCr of 2.3 mg/dL (H)).  Estimated CrCl using Ideal Body Weight: 22 mL/min (based on IBW 56.9 kg)    Height:   Ht Readings from Last 1 Encounters:   07/01/24 1.65 m (5' 4.96\")     Weight:  Wt Readings from Last 1 Encounters:   07/01/24 84.4 kg (186 lb 1.1 oz)       The following enoxaparin dose has been adjusted based upon renal function and/or patient weight per P&T Guidelines:    Patient creatinine clearance 15-29.9 mL/min (current CrCl = 27 mL/min) and weighs 84.4 kgThe recommended DVT prophylaxis dose is Lovenox 30 mg daily.     Dose has been adjusted per protocol.     Thank you,  Mack Goins RPH BCPS  7/1/2024   10:46 PM

## 2024-07-02 NOTE — PROGRESS NOTES
NEPHROLOGY PROGRESS NOTE    Patient :  Shauna Alexandra; 64 y.o. MRN# [id number]  Location:  2124/2124-01  Attending:  Wiley Saxena MD  Admit Date:  7/1/2024   Hospital Day: 1    Reason for consultation: Management of acute kidney injury and chronic kidney disease stage IIIb.    Consulting physician: Wiley Saxena MD.    Interval history: Patient was seen and examined today and she is currently on oxygen 2 L/min via nasal cannula.  She does not usually use home oxygen.  She underwent cardiac catheterization via right radial approach at [facility] yesterday [7/1/2024] with finding of mild nonobstructive coronary artery disease.  Precardiac catheterization creatinine was 2.3 but serum creatinine is improved to 1.9 mg/dL today.  She is nonoliguric.    History of Present Illness:    This is a 64 y.o. female with past medical history of longstanding hypertension type 2 diabetes, CKD stage IIIb with baseline creatinine of 1.5 mg/dL noted in 7/2023.  Patient presented to the hospital with complaints of upper back pain chest pain and cough patient pain gets exacerbated by breathing or coughing, patient denied nausea vomiting diarrhea no difficulty urination.  Patient blood pressure on admission was 203/85 mm hg  Labs showed serum creatinine of 2.0 mg/dL and therefore nephrology consultation has been requested  UA shows 3+ protein  UPC 1.5 g, 6-9 WBC 0-2 RBCs  Chest x-ray on admission showed bibasilar atelectasis/scarring  Nuclear lung scan showed low probability for pulmonary embolus    Current Medications:    sodium chloride flush 0.9 % injection 10 mL, PRN  sodium chloride flush 0.9 % injection 10 mL, PRN  sodium chloride flush 0.9 % injection 5-40 mL, 2 times per day  sodium chloride flush 0.9 % injection 5-40 mL, PRN  0.9 % sodium chloride infusion, PRN  ondansetron (ZOFRAN-ODT) disintegrating tablet 4 mg, Q8H PRN   Or  ondansetron (ZOFRAN) injection 4 mg, Q6H PRN  polyethylene glycol (GLYCOLAX) packet 17  Alert and cooperative, and appears to be in no acute distress.  HEENT: normocephalic and atraumatic; mild bilateral exophthalmos.  THROAT:  Oral cavity and pharynx normal. Moist  NECK:  Supple with no jugular venous distention.  CARDIAC: Normal S1 and S2. No S3, S4 or murmurs. Rhythm is regular.  LUNGS:  diminished breath sounds.  Fine Rales posteriorly, no accessory muscle use no wheezing  ABDOMEN:  soft with normal bowel sounds.  EXTREMITIES: Trace edema. Peripheral pulses intact.   NEURO:   Awake and alert; no focal neurologic deficits    Labs:   CBC:  Recent Labs     07/01/24  0510 07/01/24  1235 07/02/24  0508   WBC 7.6  --  7.4   RBC 2.72*  --  2.65*   HGB 8.3* 8.4* 8.0*   HCT 24.8* 26.3* 24.4*   MCV 91.2  --  91.8   MCH 30.4  --  30.3   MCHC 33.3  --  33.0   RDW 15.3*  --  14.9     --  415   MPV 6.9  --  6.9      BMP:   Recent Labs     07/01/24  0510 07/02/24  0508    138   K 4.9 4.4   * 109*   CO2 19* 20   BUN 39* 34*   CREATININE 2.3* 1.9*   GLUCOSE 116* 91   CALCIUM 9.9 9.7     Assessment/plan:    1.  Acute kidney injury with underlying chronic kidney disease stage IIIb - there has been no evidence of contrast nephropathy thus far.  Will continue IV fluid at 50 mill per final 24 hours.    2.  Systemic hypertension - blood pressure control is adequate.  Continue current medications.    3.  Subnephrotic range proteinuria [random urine protein/creatinine ratio 1.6 g/g] - serologic studies have been generally negative with normal plasma free light chain ratio, negative LEONIDES normal complements and no monoclonal gammopathy.  There are no indications for kidney biopsy.    4.  Anemia of chronic kidney disease - VASILE per protocol     Prognosis is guarded.    Electronically signed by Graciela Salazar MD on 7/2/2024 at 7:52 AM

## 2024-07-02 NOTE — CARE COORDINATION
Case Management Assessment  Initial Evaluation    Date/Time of Evaluation: 7/2/2024 9:37 AM  Assessment Completed by: Galilea Evans RN    If patient is discharged prior to next notation, then this note serves as note for discharge by case management.    Patient Name: Shauna Alexandra                   YOB: 1960  Diagnosis: NSTEMI (non-ST elevated myocardial infarction) (HCC) [I21.4]                   Date / Time: 7/1/2024 10:19 PM    Patient Admission Status: Inpatient   Readmission Risk (Low < 19, Mod (19-27), High > 27): Readmission Risk Score: 22.2    Current PCP: Kody Licona APRN - CNP  PCP verified by CM? Yes    Chart Reviewed: Yes      History Provided by: Patient  Patient Orientation: Alert and Oriented    Patient Cognition: Alert    Hospitalization in the last 30 days (Readmission):  No    If yes, Readmission Assessment in CM Navigator will be completed.    Advance Directives:      Code Status: Full Code   Patient's Primary Decision Maker is:        Discharge Planning:    Patient lives with: Alone Type of Home: Apartment  Primary Care Giver: Self  Patient Support Systems include: Family Members, Parent   Current Financial resources: Medicaid  Current community resources: Transportation, Psychiatric Treatment ([facility], Follows at [facility] (Group therapy on Tues-Thurs, they assist w/ Rides.))  Current services prior to admission:              Current DME: Glucometer            Type of Home Care services:  None    ADLS  Prior functional level: Independent in ADLs/IADLs  Current functional level: Independent in ADLs/IADLs    PT AM-PAC:   /24  OT AM-PAC:   /24    Family can provide assistance at DC: No  Would you like Case Management to discuss the discharge plan with any other family members/significant others, and if so, who? No  Plans to Return to Present Housing: Yes  Other Identified Issues/Barriers to RETURNING to current housing: None  Potential Assistance

## 2024-07-02 NOTE — CARE COORDINATION
Continuity of Care Form    Patient Name: Shauna Alexandra   :  1960  MRN:  [id number]    Admit date:  2024  Discharge date:  24    Code Status Order: Full Code   Advance Directives:     Admitting Physician:  Wiley Saxena MD  PCP: Kody Licona APRN - CNP    Discharging Nurse:   Discharging Hospital Unit/Room#: 2124/2124-01  Discharging Unit Phone Number:     Emergency Contact:   Extended Emergency Contact Information  Primary Emergency Contact: Stella Reynoso  Address:  [address] of [facility]  Home Phone: [phone]  Work Phone: [phone]  Mobile Phone: [phone]  Relation: Parent  Hearing or visual needs: None  Other needs: None  Preferred language: English   needed? No    Past Surgical History:  Past Surgical History:   Procedure Laterality Date     SECTION      EXTERNAL EAR SURGERY      EYE SURGERY      RECTAL SURGERY Right 2021    RECTAL PERIRECTAL INCISION AND DRAINAGE performed by Mukul Valentine MD at [facility] OR    THYROID SURGERY         Immunization History:   Immunization History   Administered Date(s) Administered    COVID-19, PFIZER PURPLE top, DILUTE for use, (age 12 y+), 30mcg/0.3mL 2021, 10/06/2021    Influenza, FLUARIX, FLULAVAL, FLUZONE (age 6 mo+) AND AFLURIA, (age 3 y+), PF, 0.5mL 11/10/2016    Influenza, FLUCELVAX, (age 6 mo+), MDCK, PF, 0.5mL 10/05/2022, 2023    Influenza, Quadv, 6 mo and older, IM (Fluzone, Flulaval) 10/15/2018    Pneumococcal, PCV20, PREVNAR 20, (age 6w+), IM, 0.5mL 2023    Pneumococcal, PPSV23, PNEUMOVAX 23, (age 2y+), SC/IM, 0.5mL 11/10/2016    TDaP, ADACEL (age 10y-64y), BOOSTRIX (age 10y+), IM, 0.5mL 2017       Active Problems:  Patient Active Problem List   Diagnosis Code    Absolute anemia D64.9    Chronic bilateral low back pain without sciatica M54.50, G89.29    Essential hypertension I10    Diabetic polyneuropathy associated with type 2 diabetes  Melatonin  Take 1 tablet by mouth nightly   nystatin 551512 UNIT/GM powder  Commonly known as: MYCOSTATIN  Apply topically 4 times daily.   pravastatin 40 MG tablet  Commonly known as: PRAVACHOL  Take one tablet by mouth daily   Tegaderm Film 4\"x4-1/2\" Misc  1 each by Does not apply route once a week   traZODone 50 MG tablet  Commonly known as: DESYREL  Take 1 tablet by mouth nightly   True Metrix Blood Glucose Test strip  Generic drug: blood glucose test strips  USE TO TEST BLOOD SUGAR 4 TIMES A DAY   TRUEplus Lancets 30G Misc  USE AS DIRECTED 4 TIMES A DAY   vitamin D 1.25 MG (31218 UT) Caps capsule  Commonly known as: ERGOCALCIFEROL  Take 1 capsule by mouth once a week   Wrist Splint Misc  Apply 1 each topically continuous Cock up wrist splint     STOP taking these medications    STOP taking these medications  ammonium lactate 12 % cream  Commonly known as: Lac-Hydrin   artificial tears 0.1-0.3 % Soln opthalmic solution  Generic drug: dextran 70-hypromellose   diphenhydrAMINE 25 MG capsule  Commonly known as: Banophen   fluconazole 150 MG tablet  Commonly known as: Diflucan   gabapentin 600 MG tablet  Commonly known as: NEURONTIN  Replaced by: gabapentin 300 MG capsule   hydroCHLOROthiazide 12.5 MG capsule   lisinopril 30 MG tablet  Commonly known as: PRINIVIL;ZESTRIL   moxifloxacin 0.5 % ophthalmic solution  Commonly known as: VIGAMOX   topiramate 25 MG tablet  Commonly known as: TOPAMAX   Where to Get Your Medications      These medications were sent to [id number] - [address] -  [phone] - F [phone]  [address] [place] [address]  Phone: [phone]       gabapentin 300 MG capsule        isosorbide mononitrate 60 MG extended release tablet      Printing Report    Report Name Print   Discharge Meds

## 2024-07-02 NOTE — CARE COORDINATION
Galilea Evans, RN    Case Management     Progress Notes      Signed     Date of Service: 7/2/2024 12:32 PM     Signed         Patient was evaluated today for the diagnosis of CHF.  I entered a DME order for home oxygen at 2 lpm because the diagnosis and testing require the patient to have supplemental oxygen.  Condition will improve or be benefited by oxygen use.  The patient is  able to perform good mobility in a home setting and therefore does require the use of a portable oxygen system.  The need for this equipment was discussed with the patient and she understands and is in agreement.             Cosigned by: Wiley Saxena MD at 7/2/2024  1:17 PM     Revision History

## 2024-07-02 NOTE — DISCHARGE INSTR - COC
Continuity of Care Form    Patient Name: Shauna Alexandra   :  1960  MRN:  [id number]    Admit date:  2024  Discharge date:  24    Code Status Order: Full Code   Advance Directives:     Admitting Physician:  Wiley Saxena MD  PCP: Kody Licona APRN - CNP    Discharging Nurse:   Discharging Hospital Unit/Room#: 2124/2124-01  Discharging Unit Phone Number:     Emergency Contact:   Extended Emergency Contact Information  Primary Emergency Contact: Stella Reynoso  Address:  [address] of [facility]  Home Phone: [phone]  Work Phone: [phone]  Mobile Phone: [phone]  Relation: Parent  Hearing or visual needs: None  Other needs: None  Preferred language: English   needed? No    Past Surgical History:  Past Surgical History:   Procedure Laterality Date     SECTION      EXTERNAL EAR SURGERY      EYE SURGERY      RECTAL SURGERY Right 2021    RECTAL PERIRECTAL INCISION AND DRAINAGE performed by Mukul Valentine MD at [facility] OR    THYROID SURGERY         Immunization History:   Immunization History   Administered Date(s) Administered    COVID-19, PFIZER PURPLE top, DILUTE for use, (age 12 y+), 30mcg/0.3mL 2021, 10/06/2021    Influenza, FLUARIX, FLULAVAL, FLUZONE (age 6 mo+) AND AFLURIA, (age 3 y+), PF, 0.5mL 11/10/2016    Influenza, FLUCELVAX, (age 6 mo+), MDCK, PF, 0.5mL 10/05/2022, 2023    Influenza, Quadv, 6 mo and older, IM (Fluzone, Flulaval) 10/15/2018    Pneumococcal, PCV20, PREVNAR 20, (age 6w+), IM, 0.5mL 2023    Pneumococcal, PPSV23, PNEUMOVAX 23, (age 2y+), SC/IM, 0.5mL 11/10/2016    TDaP, ADACEL (age 10y-64y), BOOSTRIX (age 10y+), IM, 0.5mL 2017       Active Problems:  Patient Active Problem List   Diagnosis Code    Absolute anemia D64.9    Chronic bilateral low back pain without sciatica M54.50, G89.29    Essential hypertension I10    Diabetic polyneuropathy associated with type 2 diabetes

## 2024-07-02 NOTE — PROGRESS NOTES
Home Oxygen Evaluation    Room air SpO2 at Rest = 86%    SpO2 on prescribed O2 level at    2LPM  at rest = 94%     with exercise/exertion = 95%. Pt will need 2lpm .    Nocturnal Oximetry with patient on room air recommended if the resting SpO2 is 89% to 95%. (Requires additional order)

## 2024-07-02 NOTE — CARE COORDINATION
Collection Time: 06/30/24  4:54 PM   Result Value Ref Range     POC Glucose 90 65 - 105 mg/dL   Hemoglobin and Hematocrit     Collection Time: 06/30/24  6:05 PM   Result Value Ref Range     Hemoglobin 8.0 (L) 12.0 - 16.0 g/dL     Hematocrit 24.0 (L) 36 - 46 %   POC Glucose Fingerstick     Collection Time: 06/30/24  7:45 PM   Result Value Ref Range     POC Glucose 87 65 - 105 mg/dL   Hemoglobin and Hematocrit     Collection Time: 07/01/24  1:08 AM   Result Value Ref Range     Hemoglobin 7.5 (L) 12.0 - 16.0 g/dL     Hematocrit 22.3 (L) 36 - 46 %   CBC with Auto Differential     Collection Time: 07/01/24  5:10 AM   Result Value Ref Range     WBC 7.6 3.5 - 11.0 k/uL     RBC 2.72 (L) 4.0 - 5.2 m/uL     Hemoglobin 8.3 (L) 12.0 - 16.0 g/dL     Hematocrit 24.8 (L) 36 - 46 %     MCV 91.2 80 - 100 fL     MCH 30.4 26 - 34 pg     MCHC 33.3 31 - 37 g/dL     RDW 15.3 (H) 11.5 - 14.9 %     Platelets 380 150 - 450 k/uL     MPV 6.9 6.0 - 12.0 fL     Neutrophils % 64 36 - 66 %     Lymphocytes % 26 24 - 44 %     Monocytes % 4 1 - 7 %     Eosinophils % 6 (H) 0 - 4 %     Basophils % 0 0 - 2 %     Neutrophils Absolute 4.90 1.3 - 9.1 k/uL     Lymphocytes Absolute 2.00 1.0 - 4.8 k/uL     Monocytes Absolute 0.30 0.1 - 1.3 k/uL     Eosinophils Absolute 0.40 0.0 - 0.4 k/uL     Basophils Absolute 0.00 0.0 - 0.2 k/uL   Comprehensive Metabolic Panel     Collection Time: 07/01/24  5:10 AM   Result Value Ref Range     Sodium 137 135 - 144 mmol/L     Potassium 4.9 3.7 - 5.3 mmol/L     Chloride 108 (H) 98 - 107 mmol/L     CO2 19 (L) 20 - 31 mmol/L     Anion Gap 10 9 - 17 mmol/L     Glucose 116 (H) 70 - 99 mg/dL     BUN 39 (H) 8 - 23 mg/dL     Creatinine 2.3 (H) 0.5 - 0.9 mg/dL     Est, Glom Filt Rate 23 (L) >60 mL/min/1.73m2     Calcium 9.9 8.6 - 10.4 mg/dL     Total Protein 7.0 6.4 - 8.3 g/dL     Albumin 3.0 (L) 3.5 - 5.2 g/dL     Total Bilirubin 0.2 (L) 0.3 - 1.2 mg/dL     Alkaline Phosphatase 172 (H) 35 - 104 U/L     ALT 21 5 - 33 U/L     AST 20  CONRADO Yanez - NP   2 mg at 07/01/24 0831    diphenhydrAMINE (BENADRYL) injection 25 mg  25 mg IntraVENous Q6H PRN Renata Forte APRN - NP                Impressions :      1. Principal Problem:    ALBERTO (acute kidney injury) (HCC)  Active Problems:    Abnormal cardiovascular stress test    Hypertensive urgency    Absolute anemia    Elevated alkaline phosphatase level    Fecal occult blood test positive  Resolved Problems:    * No resolved hospital problems. *           2.   Past Medical History in prose (no negatives)    has a past medical history of Anemia, Chronic back pain, Depression, Hyperlipidemia, Hypertension, Type 2 diabetes mellitus with diabetic polyneuropathy, without long-term current use of insulin (HCC) (11/10/2016), and Type II or unspecified type diabetes mellitus without mention of complication, not stated as uncontrolled.         Plans:      64-year-old female presenting with shortness of breath upper abdominal pain and some chest pain.  VQ scan was done from ER and no PE.  CT Abdo pelvis without contrast unremarkable other than incidental endometrial wall thickening.  Patient admitted for hypertensive urgency and cardiac workup     Hypertensive emergency with elevated creatinine-resume home atenolol 50 daily, add as needed hydralazine  ALBERTO creatinine 2 presentation, baseline 1.5-fluids, no hydronephrosis on CT abdomen, holding lisinopril HCTZ Topamax  Suspected UTI based on UA results-continue Rocephin  Nonspecific abdominal pain-lipase and liver enzymes normal  Chest pain shortness of breath troponins negative, EKG sinus rhythm D-dimer negative  Incidental finding of endometrial thickening on CT abdomen-pelvic ultrasound to be done as outpatient  Type 2 diabetes-Lantus sliding scale  Chronic pain-renally dosed Neurontin     DVT pplx-Lovenox     6/25  Patient condition unchanged, patient remains on continuous IV fluids increasing to 100 cc/h.  Echo shows no CHF or wall motion   Deferring fluid management to nephrology  Severe anemia, was 6.8 on June 30,, today 8.3   IV Protonix twice daily, stool Hemoccult positive, GI consulted.  Patient denies any abdominal pain or vomiting.  She is not on any blood thinners.  Holding chemical DVT prophylaxis.  SCDs now  Patient reports significant depression-requesting to speak with psychiatrist.-Consulted  Urine culture no growth.    Stress test positive,  cardiology on board  Cath today,  Creatinine 2.3 today,  Nephrology cleared her for the cath with some fluids,  High risk,  Check creatinine tomorrow morning,           Wiley Saxena MD  7/1/2024  11:03 AM

## 2024-07-02 NOTE — CARE COORDINATION
Graciela Salazar MD  Physician  Nephrology     Progress Notes      Signed     Date of Service: 7/2/2024  7:52 AM     Signed       Expand All Collapse All       NEPHROLOGY PROGRESS NOTE     Patient :           Shauna Alexandra; 64 y.o. MRN# [id number]  Location:         2124/2124-01  Attending:        Wiley Saxena MD  Admit Date:     7/1/2024   Hospital Day: 1     Reason for consultation: Management of acute kidney injury and chronic kidney disease stage IIIb.     Consulting physician: Wiley Saxena MD.     Interval history: Patient was seen and examined today and she is currently on oxygen 2 L/min via nasal cannula.  She does not usually use home oxygen.  She underwent cardiac catheterization via right radial approach at [facility] yesterday [7/1/2024] with finding of mild nonobstructive coronary artery disease.  Precardiac catheterization creatinine was 2.3 but serum creatinine is improved to 1.9 mg/dL today.  She is nonoliguric.     History of Present Illness:    This is a 64 y.o. female with past medical history of longstanding hypertension type 2 diabetes, CKD stage IIIb with baseline creatinine of 1.5 mg/dL noted in 7/2023.  Patient presented to the hospital with complaints of upper back pain chest pain and cough patient pain gets exacerbated by breathing or coughing, patient denied nausea vomiting diarrhea no difficulty urination.  Patient blood pressure on admission was 203/85 mm hg  Labs showed serum creatinine of 2.0 mg/dL and therefore nephrology consultation has been requested  UA shows 3+ protein  UPC 1.5 g, 6-9 WBC 0-2 RBCs  Chest x-ray on admission showed bibasilar atelectasis/scarring  Nuclear lung scan showed low probability for pulmonary embolus     Current Medications:      Current Meds Link used for Sign Out Report   sodium chloride flush 0.9 % injection 10 mL, PRN  sodium chloride flush 0.9 % injection 10 mL, PRN  sodium chloride flush 0.9 % injection 5-40 mL, 2 times per  151/71  24HR BLOOD PRESSURE RANGE:  Systolic (24hrs), Av , Min:115 , Max:180   ; Diastolic (24hrs), Av, Min:64, Max:98     24HR INTAKE/OUTPUT:  No intake or output data in the 24 hours ending 24 0752     Physical Exam:  GENERAL APPEARANCE: Alert and cooperative, and appears to be in no acute distress.  HEENT: normocephalic and atraumatic; mild bilateral exophthalmos.  THROAT:  Oral cavity and pharynx normal. Moist  NECK:  Supple with no jugular venous distention.  CARDIAC: Normal S1 and S2. No S3, S4 or murmurs. Rhythm is regular.  LUNGS:  diminished breath sounds.  Fine Rales posteriorly, no accessory muscle use no wheezing  ABDOMEN:  soft with normal bowel sounds.  EXTREMITIES: Trace edema. Peripheral pulses intact.   NEURO:   Awake and alert; no focal neurologic deficits     Labs:   CBC:        Recent Labs     24  0510 24  1235 24  0508   WBC 7.6  --  7.4   RBC 2.72*  --  2.65*   HGB 8.3* 8.4* 8.0*   HCT 24.8* 26.3* 24.4*   MCV 91.2  --  91.8   MCH 30.4  --  30.3   MCHC 33.3  --  33.0   RDW 15.3*  --  14.9     --  415   MPV 6.9  --  6.9       BMP:        Recent Labs     24  0510 24  0508    138   K 4.9 4.4   * 109*   CO2 19* 20   BUN 39* 34*   CREATININE 2.3* 1.9*   GLUCOSE 116* 91   CALCIUM 9.9 9.7      Assessment/plan:     1.  Acute kidney injury with underlying chronic kidney disease stage IIIb - there has been no evidence of contrast nephropathy thus far.  Will continue IV fluid at 50 mill per final 24 hours.     2.  Systemic hypertension - blood pressure control is adequate.  Continue current medications.     3.  Subnephrotic range proteinuria [random urine protein/creatinine ratio 1.6 g/g] - serologic studies have been generally negative with normal plasma free light chain ratio, negative LEONIDES normal complements and no monoclonal gammopathy.  There are no indications for kidney biopsy.     4.  Anemia of chronic kidney disease - VASILE per

## 2024-07-02 NOTE — CARE COORDINATION
Graciela Salazar MD  Physician  Nephrology     Progress Notes      Signed     Date of Service: 7/2/2024  7:52 AM     Signed       Expand All Collapse All       NEPHROLOGY PROGRESS NOTE     Patient :           Shauna Alexandra; 64 y.o. MRN# [id number]  Location:         2124/2124-01  Attending:        Wiley Saxena MD  Admit Date:     7/1/2024   Hospital Day: 1     Reason for consultation: Management of acute kidney injury and chronic kidney disease stage IIIb.     Consulting physician: Wiley Saxena MD.     Interval history: Patient was seen and examined today and she is currently on oxygen 2 L/min via nasal cannula.  She does not usually use home oxygen.  She underwent cardiac catheterization via right radial approach at [facility] yesterday [7/1/2024] with finding of mild nonobstructive coronary artery disease.  Precardiac catheterization creatinine was 2.3 but serum creatinine is improved to 1.9 mg/dL today.  She is nonoliguric.     History of Present Illness:    This is a 64 y.o. female with past medical history of longstanding hypertension type 2 diabetes, CKD stage IIIb with baseline creatinine of 1.5 mg/dL noted in 7/2023.  Patient presented to the hospital with complaints of upper back pain chest pain and cough patient pain gets exacerbated by breathing or coughing, patient denied nausea vomiting diarrhea no difficulty urination.  Patient blood pressure on admission was 203/85 mm hg  Labs showed serum creatinine of 2.0 mg/dL and therefore nephrology consultation has been requested  UA shows 3+ protein  UPC 1.5 g, 6-9 WBC 0-2 RBCs  Chest x-ray on admission showed bibasilar atelectasis/scarring  Nuclear lung scan showed low probability for pulmonary embolus     Current Medications:      Current Meds Link used for Sign Out Report   sodium chloride flush 0.9 % injection 10 mL, PRN  sodium chloride flush 0.9 % injection 10 mL, PRN  sodium chloride flush 0.9 % injection 5-40 mL, 2 times per  day  sodium chloride flush 0.9 % injection 5-40 mL, PRN  0.9 % sodium chloride infusion, PRN  ondansetron (ZOFRAN-ODT) disintegrating tablet 4 mg, Q8H PRN   Or  ondansetron (ZOFRAN) injection 4 mg, Q6H PRN  polyethylene glycol (GLYCOLAX) packet 17 g, Daily PRN  acetaminophen (TYLENOL) tablet 650 mg, Q6H PRN   Or  acetaminophen (TYLENOL) suppository 650 mg, Q6H PRN  amLODIPine (NORVASC) tablet 10 mg, Daily  aspirin EC tablet 81 mg, Daily  atenolol (TENORMIN) tablet 50 mg, Daily  buPROPion (WELLBUTRIN XL) extended release tablet 300 mg, QAM  insulin glargine (LANTUS) injection vial 20 Units, Nightly  pravastatin (PRAVACHOL) tablet 40 mg, Nightly  traZODone (DESYREL) tablet 50 mg, Nightly  insulin lispro (HUMALOG,ADMELOG) injection vial 0-4 Units, TID WC  insulin lispro (HUMALOG,ADMELOG) injection vial 0-4 Units, Nightly  glucose chewable tablet 16 g, PRN  dextrose bolus 10% 125 mL, PRN   Or  dextrose bolus 10% 250 mL, PRN  glucagon injection 1 mg, PRN  dextrose 10 % infusion, Continuous PRN  gabapentin (NEURONTIN) capsule 300 mg, TID  hydrALAZINE (APRESOLINE) injection 10 mg, Q6H PRN  morphine injection 2 mg, Q3H PRN  diphenhydrAMINE (BENADRYL) injection 25 mg, Q6H PRN  perflutren lipid microspheres (DEFINITY) injection 1.5 mL, ONCE PRN  erythromycin (ROMYCIN) ophthalmic ointment, 3 times per day  tiZANidine (ZANAFLEX) tablet 2 mg, TID  pantoprazole (PROTONIX) 40 mg in sodium chloride (PF) 0.9 % 10 mL injection, Q12H  senna (SENOKOT) tablet 8.6 mg, Daily  0.9 % sodium chloride infusion, Continuous  sodium chloride flush 0.9 % injection 10 mL, PRN  0.9 % sodium chloride infusion, PRN  enoxaparin Sodium (LOVENOX) injection 30 mg, Daily         Objective:  CURRENT TEMPERATURE:  Temp: 99 °F (37.2 °C)  MAXIMUM TEMPERATURE OVER 24HRS:  Temp (24hrs), Av.1 °F (36.7 °C), Min:97.3 °F (36.3 °C), Max:99 °F (37.2 °C)     CURRENT RESPIRATORY RATE:  Respirations: 16  CURRENT PULSE:  Pulse: 79  CURRENT BLOOD PRESSURE:  BP: (!)

## 2024-07-02 NOTE — PROGRESS NOTES
Cardiology Progress Note                     Date:   7/2/2024  Patient name: Shauna Alexandra  Date of admission:  7/1/2024 10:19 PM  MRN:   [id number]  YOB: 1960  PCP: Kody Licona APRN - CNP    Reason for Admission:  hypertensive urgency     Subjective:       Patient is status post left heart catheterization which showed mild nonobstructive CAD, stable overnight with no acute events, denies any chest pain or dyspnea this morning, blood pressure suboptimally controlled, creatinine 1.9 this morning.  Right radial artery access site soft without any hematoma.  Hemoglobin 8 this morning      Scheduled Meds:   sodium chloride flush  5-40 mL IntraVENous 2 times per day    amLODIPine  10 mg Oral Daily    aspirin  81 mg Oral Daily    atenolol  50 mg Oral Daily    buPROPion  300 mg Oral QAM    insulin glargine  20 Units SubCUTAneous Nightly    pravastatin  40 mg Oral Nightly    traZODone  50 mg Oral Nightly    insulin lispro  0-4 Units SubCUTAneous TID WC    insulin lispro  0-4 Units SubCUTAneous Nightly    gabapentin  300 mg Oral TID    erythromycin   Both Eyes 3 times per day    tiZANidine  2 mg Oral TID    pantoprazole (PROTONIX) 40 mg in sodium chloride (PF) 0.9 % 10 mL injection  40 mg IntraVENous Q12H    senna  1 tablet Oral Daily    enoxaparin  30 mg SubCUTAneous Daily       Continuous Infusions:   sodium chloride      dextrose      sodium chloride 50 mL/hr at 07/01/24 2307    sodium chloride         Labs:     CBC:   Recent Labs     07/01/24  0510 07/01/24  1235 07/02/24  0508   WBC 7.6  --  7.4   HGB 8.3* 8.4* 8.0*     --  415     BMP:    Recent Labs     07/01/24  0510 07/02/24  0508    138   K 4.9 4.4   * 109*   CO2 19* 20   BUN 39* 34*   CREATININE 2.3* 1.9*   GLUCOSE 116* 91     Hepatic:   Recent Labs     07/01/24  0510 07/02/24  0508   AST 20 14   ALT 21 18   BILITOT 0.2* 0.2*   ALKPHOS 172* 134*     Troponin: No results for input(s): \"TROPONINI\" in the last

## 2024-07-02 NOTE — CARE COORDINATION
[facility] Encounter Date/Time: 2024 2219    Hospital Account: [id number]    MRN: [id number]    Patient: Louie Bess    Contact Serial #: [id number]      ENCOUNTER          Patient Class: I Private Enc?  No Unit RM BD: TALHA PROG    Hospital Service: MED   Encounter DX: NSTEMI (non-ST elevated *   ADM Provider: Wiley Saxena MD   Procedure:     ATT Provider: Wiley Saxena MD   REF Provider:        Admission DX: NSTEMI (non-ST elevated myocardial infarction) (HCC), Hypertensive emergency and DX codes: I21.4, I16.1      PATIENT                 Name: Louie Bess : 1960 (64 yrs)   Address: [address] [place] [address] 4* Sex: Female   City: [place] [address]         Marital Status: Single   Employer: DISABLED         Baptism: Mandaen   Primary Care Provider: Kody Licona, *         Primary Phone: [phone]   EMERGENCY CONTACT   Contact Name Legal Guardian? Relationship to Patient Home Phone Work Phone   1. Stella Reynoso  2. *No Contact Specified* No    Parent    [phone]            GUARANTOR            Guarantor: Louie Bess     : 1960   Address: [address] [place] [address] [address] Sex: [place] [address]     Relation to Patient: Self       Home Phone: [phone]   Guarantor ID: [id number]       Work Phone:     Guarantor Employer: DISABLED         Status: NOT EMPLO*      COVERAGE        PRIMARY INSURANCE   Payor: [employer] C* Plan: [employer] COMMUN*   Payor Address: [place] [address]       Group Number: [id number] Insurance Type: INDEMNITY   Subscriber Name: LOUIE BESS Subscriber : 1960   Subscriber ID: [id number] Pat. Rel. to Sub: Self   SECONDARY INSURANCE   Payor:   Plan:     Payor Address:  ,           Group Number:   Insurance Type:     Subscriber Name:   Subscriber :     Subscriber ID:   Pat. Rel. to Sub:          CSN: [id number]        Hyun Streeter RCP  Respiratory

## 2024-07-03 NOTE — H&P
Cardiac Catheterization Lab          Date:   7/3/2024  Patient name: Shauna Alexandra  Date of admission:  2024  2:01 PM  MRN:   [id number]  YOB: 1960    Reason for Admission: Elective Coronary angiography with possible PCI     CHIEF COMPLAINT:  64 y.o. y/o female presented with chest pain and abnormal stress test     [X] Please see my consult note form [facility] detailed H and P in the chart    [X]  No changes in the information noted from the time of evaluation    Past Medical History:   has a past medical history of Anemia, Chronic back pain, Depression, Hyperlipidemia, Hypertension, Type 2 diabetes mellitus with diabetic polyneuropathy, without long-term current use of insulin (HCC), and Type II or unspecified type diabetes mellitus without mention of complication, not stated as uncontrolled.    Past Surgical History:   has a past surgical history that includes eye surgery; Thyroid surgery; External ear surgery;  section; Rectal surgery (Right, 2021); and Cardiac procedure (N/A, 2024).     Home Medications:    Prior to Admission medications    Medication Sig Start Date End Date Taking? Authorizing Provider   gabapentin (NEURONTIN) 300 MG capsule Take 1 capsule by mouth 3 times daily for 30 days. 24  Wiley Saxena MD   isosorbide mononitrate (IMDUR) 60 MG extended release tablet Take 1 tablet by mouth daily 7/3/24   Wiley Saxena MD   Continuous Blood Gluc Sensor (FREESTYLE SONAM 2 SENSOR) MISC 1 each by Does not apply route every 14 days 24   Kody Licona APRN - CNP   amLODIPine (NORVASC) 10 MG tablet Take 1 tablet by mouth daily 24   Kody Licona APRN - CNP   atenolol (TENORMIN) 50 MG tablet Take 1 tablet by mouth daily 24   Kody Licona APRN - CNP   vitamin D (ERGOCALCIFEROL) 1.25 MG (91898 UT) CAPS  -40 MG CAPS per capsule TAKE ONE CAPSULE BY MOUTH EVERY 4 HOURS AS NEEDED FOR HEADACHE 5/15/23   Kody Licona APRN - CNP   Elastic Bandages & Supports (WRIST SPLINT) MISC Apply 1 each topically continuous Cock up wrist splint 1/11/23   Kody Licona APRN - CNP   ciclopirox (PENLAC) 8 % solution Apply topically nightly. 10/5/22   Kody Licona APRN - CNP   Blood Pressure Monitoring (BLOOD PRESSURE CUFF) MISC 1 Dose by Does not apply route once for 1 dose 9/29/21 1/31/24  Kody Licona APRN - CNP   TRUEplus Lancets 30G MISC USE AS DIRECTED 4 TIMES A DAY 9/3/21   Kody Licona APRN - CNP   TRUE METRIX BLOOD GLUCOSE TEST strip USE TO TEST BLOOD SUGAR 4 TIMES A DAY 7/27/21   Kody Licona APRN - CNP       Allergies:  Ibuprofen    Social History:   reports that she has been smoking cigarettes. She has a 19.5 pack-year smoking history. She has never used smokeless tobacco. She reports that she does not currently use drugs after having used the following drugs: Marijuana (Weed). She reports that she does not drink alcohol.     Family History:   Negative for early CAD    REVIEW OF SYSTEMS:    As above or as detailed in office note     PHYSICAL EXAM:     BP (!) 154/77   Pulse 81   Temp 98 °F (36.7 °C) (Oral)   Resp 16   Ht 1.65 m (5' 4.96\")   Wt 84.4 kg (186 lb 1.1 oz)   SpO2 93%   BMI 31.00 kg/m²      Constitutional and General Appearance: alert, cooperative, in no distress   HEENT: PERRL, no cervical lymphadenopathy. Normal oral mucosa  Respiratory:  No increased work of breathing. No use of accessory resp muscles  On auscultation: clear to auscultation bilaterally, no wheezing, no rales.   Cardiovascular:  Jugular venous pulsation Normal  Thre is no carotid bruit   Regular rate and rhythm with normal S1 and S2.   Murmurs: None   Peripheral pulses are symmetrical and full   Abdomen:  No masses or tenderness  Bowel sounds present  Extremities:   No Cyanosis

## 2024-07-22 NOTE — DISCHARGE SUMMARY
solution  Commonly known as: PENLAC  Apply topically nightly.     diphenhydrAMINE 25 MG capsule  Commonly known as: Banophen  TAKE 1 CAPSULE BY MOUTH TWICE DAILY AS NEEDED FOR ITCHING.     fluconazole 150 MG tablet  Commonly known as: Diflucan  Take 1 tablet by mouth once a week     gabapentin 600 MG tablet  Commonly known as: NEURONTIN  TAKE 1 TABLET BY MOUTH 3 TIMES A DAY     glipiZIDE 10 MG tablet  Commonly known as: GLUCOTROL  TAKE 1 TABLET BY MOUTH 2 TIMES DAILY BEFORE MEALS     hydroCHLOROthiazide 12.5 MG capsule  Take 1 capsule by mouth every morning     Lantus SoloStar 100 UNIT/ML injection pen  Generic drug: insulin glargine  Inject 20 Units into the skin nightly     lisinopril 30 MG tablet  Commonly known as: PRINIVIL;ZESTRIL  Take one tablet by mouth daily     melatonin 3 MG Tabs tablet  Commonly known as: RA Melatonin  Take 1 tablet by mouth nightly     moxifloxacin 0.5 % ophthalmic solution  Commonly known as: VIGAMOX     nystatin 137276 UNIT/GM powder  Commonly known as: MYCOSTATIN  Apply topically 4 times daily.     pravastatin 40 MG tablet  Commonly known as: PRAVACHOL  Take one tablet by mouth daily     topiramate 25 MG tablet  Commonly known as: TOPAMAX  Take 1 tablet by mouth at bedtime     traZODone 50 MG tablet  Commonly known as: DESYREL  Take 1 tablet by mouth nightly     True Metrix Blood Glucose Test strip  Generic drug: blood glucose test strips  USE TO TEST BLOOD SUGAR 4 TIMES A DAY     vitamin D 1.25 MG (05710 UT) Caps capsule  Commonly known as: ERGOCALCIFEROL  Take 1 capsule by mouth once a week              No discharge procedures on file.    Time Spent on discharge is  34 mins in patient examination, evaluation, counseling as well as medication reconciliation, prescriptions for required medications, discharge plan and follow up.    Electronically signed by   Wiley Saxena MD  7/22/2024  6:45 PM      Thank you Kody Seay, APRN - CNP for the opportunity to be involved

## 2024-07-24 ENCOUNTER — OFFICE VISIT (OUTPATIENT)
Dept: PRIMARY CARE CLINIC | Age: 64
End: 2024-07-24
Payer: MEDICAID

## 2024-07-24 ENCOUNTER — HOSPITAL ENCOUNTER (OUTPATIENT)
Age: 64
Setting detail: SPECIMEN
Discharge: HOME | End: 2024-07-24

## 2024-07-24 VITALS
SYSTOLIC BLOOD PRESSURE: 134 MMHG | OXYGEN SATURATION: 99 % | HEART RATE: 82 BPM | BODY MASS INDEX: 30.99 KG/M2 | WEIGHT: 186 LBS | DIASTOLIC BLOOD PRESSURE: 70 MMHG

## 2024-07-24 DIAGNOSIS — R80.1 PERSISTENT PROTEINURIA: ICD-10-CM

## 2024-07-24 DIAGNOSIS — E55.9 VITAMIN D DEFICIENCY: ICD-10-CM

## 2024-07-24 DIAGNOSIS — R19.5 OCCULT BLOOD IN STOOLS: Primary | ICD-10-CM

## 2024-07-24 DIAGNOSIS — E78.00 PURE HYPERCHOLESTEROLEMIA: ICD-10-CM

## 2024-07-24 DIAGNOSIS — N89.8 VAGINA ITCHING: ICD-10-CM

## 2024-07-24 DIAGNOSIS — Z13.21 ENCOUNTER FOR VITAMIN DEFICIENCY SCREENING: ICD-10-CM

## 2024-07-24 DIAGNOSIS — Z13.29 SCREENING FOR THYROID DISORDER: ICD-10-CM

## 2024-07-24 DIAGNOSIS — Z09 HOSPITAL DISCHARGE FOLLOW-UP: ICD-10-CM

## 2024-07-24 DIAGNOSIS — G44.209 ACUTE NON INTRACTABLE TENSION-TYPE HEADACHE: ICD-10-CM

## 2024-07-24 DIAGNOSIS — I10 ESSENTIAL HYPERTENSION: ICD-10-CM

## 2024-07-24 DIAGNOSIS — E11.42 TYPE 2 DIABETES MELLITUS WITH DIABETIC POLYNEUROPATHY, WITHOUT LONG-TERM CURRENT USE OF INSULIN (HCC): ICD-10-CM

## 2024-07-24 DIAGNOSIS — G47.9 SLEEP DISTURBANCE: ICD-10-CM

## 2024-07-24 DIAGNOSIS — E11.42 DIABETIC POLYNEUROPATHY ASSOCIATED WITH TYPE 2 DIABETES MELLITUS (HCC): ICD-10-CM

## 2024-07-24 DIAGNOSIS — F32.1 EPISODE OF MODERATE MAJOR DEPRESSION (HCC): ICD-10-CM

## 2024-07-24 PROCEDURE — G8417 CALC BMI ABV UP PARAM F/U: HCPCS | Performed by: NURSE PRACTITIONER

## 2024-07-24 PROCEDURE — 3051F HG A1C>EQUAL 7.0%<8.0%: CPT | Performed by: NURSE PRACTITIONER

## 2024-07-24 PROCEDURE — 2022F DILAT RTA XM EVC RTNOPTHY: CPT | Performed by: NURSE PRACTITIONER

## 2024-07-24 PROCEDURE — 3075F SYST BP GE 130 - 139MM HG: CPT | Performed by: NURSE PRACTITIONER

## 2024-07-24 PROCEDURE — 3017F COLORECTAL CA SCREEN DOC REV: CPT | Performed by: NURSE PRACTITIONER

## 2024-07-24 PROCEDURE — G8427 DOCREV CUR MEDS BY ELIG CLIN: HCPCS | Performed by: NURSE PRACTITIONER

## 2024-07-24 PROCEDURE — 4004F PT TOBACCO SCREEN RCVD TLK: CPT | Performed by: NURSE PRACTITIONER

## 2024-07-24 PROCEDURE — 99215 OFFICE O/P EST HI 40 MIN: CPT | Performed by: NURSE PRACTITIONER

## 2024-07-24 PROCEDURE — 1111F DSCHRG MED/CURRENT MED MERGE: CPT | Performed by: NURSE PRACTITIONER

## 2024-07-24 PROCEDURE — 3078F DIAST BP <80 MM HG: CPT | Performed by: NURSE PRACTITIONER

## 2024-07-24 RX ORDER — ASPIRIN 81 MG/1
TABLET ORAL
Qty: 30 TABLET | Refills: 5 | Status: SHIPPED | OUTPATIENT
Start: 2024-07-24

## 2024-07-24 RX ORDER — PEN NEEDLE, DIABETIC 31 G X1/4"
1 NEEDLE, DISPOSABLE MISCELLANEOUS DAILY
Qty: 100 EACH | Refills: 3 | Status: SHIPPED | OUTPATIENT
Start: 2024-07-24

## 2024-07-24 RX ORDER — PRAVASTATIN SODIUM 40 MG
TABLET ORAL
Qty: 30 TABLET | Refills: 5 | Status: SHIPPED | OUTPATIENT
Start: 2024-07-24

## 2024-07-24 RX ORDER — TRAZODONE HYDROCHLORIDE 50 MG/1
50 TABLET ORAL NIGHTLY
Qty: 30 TABLET | Refills: 5 | Status: SHIPPED | OUTPATIENT
Start: 2024-07-24

## 2024-07-24 RX ORDER — BUPROPION HYDROCHLORIDE 300 MG/1
300 TABLET ORAL EVERY MORNING
Qty: 30 TABLET | Refills: 5 | Status: SHIPPED | OUTPATIENT
Start: 2024-07-24

## 2024-07-24 RX ORDER — INSULIN GLARGINE 100 [IU]/ML
20 INJECTION, SOLUTION SUBCUTANEOUS NIGHTLY
Qty: 5 ADJUSTABLE DOSE PRE-FILLED PEN SYRINGE | Refills: 5 | Status: SHIPPED | OUTPATIENT
Start: 2024-07-24

## 2024-07-24 RX ORDER — LANOLIN ALCOHOL/MO/W.PET/CERES
3 CREAM (GRAM) TOPICAL NIGHTLY
Qty: 30 TABLET | Refills: 5 | Status: SHIPPED | OUTPATIENT
Start: 2024-07-24

## 2024-07-24 RX ORDER — BUTALBITAL, ACETAMINOPHEN AND CAFFEINE 300; 40; 50 MG/1; MG/1; MG/1
CAPSULE ORAL
Qty: 20 CAPSULE | Refills: 2 | Status: SHIPPED | OUTPATIENT
Start: 2024-07-24

## 2024-07-24 RX ORDER — AMLODIPINE BESYLATE 10 MG/1
10 TABLET ORAL DAILY
Qty: 30 TABLET | Refills: 5 | Status: SHIPPED | OUTPATIENT
Start: 2024-07-24

## 2024-07-24 RX ORDER — ALOGLIPTIN 12.5 MG/1
TABLET, FILM COATED ORAL
Qty: 60 TABLET | Refills: 5 | Status: SHIPPED | OUTPATIENT
Start: 2024-07-24

## 2024-07-24 RX ORDER — GLIPIZIDE 10 MG/1
TABLET ORAL
Qty: 60 TABLET | Refills: 5 | Status: SHIPPED | OUTPATIENT
Start: 2024-07-24

## 2024-07-24 RX ORDER — NYSTATIN 100000 [USP'U]/G
POWDER TOPICAL
Qty: 15 G | Refills: 11 | Status: SHIPPED | OUTPATIENT
Start: 2024-07-24

## 2024-07-24 RX ORDER — ATENOLOL 50 MG/1
TABLET ORAL
Qty: 30 TABLET | Refills: 5 | Status: SHIPPED | OUTPATIENT
Start: 2024-07-24

## 2024-07-24 RX ORDER — ERGOCALCIFEROL 1.25 MG/1
50000 CAPSULE ORAL WEEKLY
Qty: 4 CAPSULE | Refills: 5 | Status: SHIPPED | OUTPATIENT
Start: 2024-07-24

## 2024-07-24 RX ORDER — PEN NEEDLE, DIABETIC 31 GX5/16"
NEEDLE, DISPOSABLE MISCELLANEOUS
Qty: 100 EACH | Refills: 5 | Status: SHIPPED | OUTPATIENT
Start: 2024-07-24

## 2024-07-24 NOTE — PROGRESS NOTES
Patient is present for f/u from [facility]   Patient was in the hospital 6/24-7/1    Patient states she is supposed to be on oxygen but does not understand why  States she is not short of breath     Patient states she is not sure what cardiologist she is supposed to f/u with    Patient is not sure what medications she is on/supposed to be taking  Did not bring meds with her today      
Alcohol Swabs (ALCOHOL PREP) PADS USE WHEN TESTING BLOOD GLUCOSE AND WHEN INJECTING INSULIN DAILY 100 each 5    Soft Lens Products (B & L SENSITIVE EYES SALINE) 0.4 % SOLN 1 drop by Does not apply route in the morning and at bedtime 1 each 0    Transparent Dressings (TEGADERM FILM 4\"X4-1/2\") MISC 1 each by Does not apply route once a week 12 each 1    melatonin (RA MELATONIN) 3 MG TABS tablet Take 1 tablet by mouth nightly 30 tablet 5    Continuous Blood Gluc  (FREESTYLE SONAM 2 READER) TANIYA       butalbital-APAP-caffeine -40 MG CAPS per capsule TAKE ONE CAPSULE BY MOUTH EVERY 4 HOURS AS NEEDED FOR HEADACHE 20 capsule 2    Elastic Bandages & Supports (WRIST SPLINT) MISC Apply 1 each topically continuous Cock up wrist splint 2 each 0    ciclopirox (PENLAC) 8 % solution Apply topically nightly. 6 mL 1    TRUEplus Lancets 30G MISC USE AS DIRECTED 4 TIMES A  each 2    TRUE METRIX BLOOD GLUCOSE TEST strip USE TO TEST BLOOD SUGAR 4 TIMES A  each 2        Medications patient taking as of now reconciled against medications ordered at time of hospital discharge: Yes    A comprehensive review of systems was negative except for what was noted in the HPI.    Objective:    /70 (Site: Left Upper Arm, Position: Sitting, Cuff Size: Large Adult)   Pulse 82   Wt 84.4 kg (186 lb)   SpO2 99%   BMI 30.99 kg/m²   General Appearance: alert and oriented to person, place and time, well developed and well- nourished, in no acute distress  Skin: warm and dry, no rash or erythema  Head: normocephalic and atraumatic  Eyes: pupils equal, round, and reactive to light, extraocular eye movements intact, conjunctivae normal  ENT: tympanic membrane, external ear and ear canal normal bilaterally, nose without deformity, nasal mucosa and turbinates normal without polyps  Neck: supple and non-tender without mass, no thyromegaly or thyroid nodules, no cervical lymphadenopathy  Pulmonary/Chest: clear to auscultation

## 2024-07-25 ENCOUNTER — TELEPHONE (OUTPATIENT)
Age: 64
End: 2024-07-25

## 2024-07-25 LAB
CANDIDA SPECIES: NEGATIVE
GARDNERELLA VAGINALIS: POSITIVE
SOURCE: ABNORMAL
TRICHOMONAS: NEGATIVE

## 2024-07-25 RX ORDER — METRONIDAZOLE 7.5 MG/G
1 GEL VAGINAL DAILY
Status: CANCELLED | OUTPATIENT
Start: 2024-07-25 | End: 2024-07-30

## 2024-07-31 ENCOUNTER — CARE COORDINATION (OUTPATIENT)
Dept: CARE COORDINATION | Age: 64
End: 2024-07-31

## 2024-07-31 NOTE — CARE COORDINATION
Ambulatory Care Coordination Note     7/31/2024 11:29 AM     Patient outreach attempt by this AC today to offer care management services. [facility] was unable to reach the patient by telephone today; left voice message requesting a return phone call to this ACM.     ACM: Agata Xavier, RN     Care Summary Note: PCP referred for care management.    PCP/Specialist follow up:   Future Appointments         Provider Specialty Dept Phone    8/12/2024 1:00 PM [facility] Radiology [phone]    9/4/2024 1:30 PM Kody Licona, APRN - CNP Primary Care [phone]    10/31/2024 3:00 PM Kriss Driscoll MD Cardiology [phone]            Follow Up:   Plan for next AC outreach in approximately 1 week to complete:  - outreach attempt to offer care management services.

## 2024-08-05 ENCOUNTER — TELEPHONE (OUTPATIENT)
Dept: GASTROENTEROLOGY | Age: 64
End: 2024-08-05

## 2024-08-05 NOTE — TELEPHONE ENCOUNTER
Occult blood in stools mew patient  attempt 1 : called and l/v  attempt 2 : sent new patient letter

## 2024-08-07 ENCOUNTER — CARE COORDINATION (OUTPATIENT)
Dept: CARE COORDINATION | Age: 64
End: 2024-08-07

## 2024-08-07 NOTE — CARE COORDINATION
Ambulatory Care Coordination Note     8/7/2024 11:15 AM     Patient outreach attempt by this [facility] today to offer care management services. AC was unable to reach the patient by telephone today; left voice message requesting a return phone call to this ACM.  letter mailed requesting patient to contact this AC.      ACM: Agata Xavier, RN     Care Summary Note: Second attempt to contact. Reminded her on voicemail message to call GI office to schedule appt, gave her the office phone number.    PCP/Specialist follow up:   Future Appointments         Provider Specialty Dept Phone    8/12/2024 1:00 PM [facility] Radiology [phone]    9/4/2024 1:30 PM Kody Licona, APRN - CNP Primary Care [phone]    10/31/2024 3:00 PM Kriss Driscoll MD Cardiology [phone]            Follow Up:   Plan for next [facility] outreach in approximately 1 week to complete:  - outreach attempt to offer care management services.

## 2024-08-15 ENCOUNTER — CARE COORDINATION (OUTPATIENT)
Dept: CARE COORDINATION | Age: 64
End: 2024-08-15

## 2024-08-15 NOTE — CARE COORDINATION
Ambulatory Care Coordination Note     8/15/2024 10:10 AM     patient outreach attempt by this AC today to offer care management services. ACM was unable to reach the patient by telephone today; left voice message requesting a return phone call to this ACM.     Patient closed (unable to reach patient) from the High Risk Care Management program on 8/15/2024. This is third phone call attempt to contact, she has not returned calls or responded to the letter mailed to her.    Future Appointments   Date Time Provider Department Center   9/4/2024  1:30 PM Kody Licona, APRN - CNP [facility]   10/31/2024  3:00 PM Kriss Driscoll MD [facility]

## 2024-08-19 RX ORDER — DIPHENHYDRAMINE HCL 25 MG
CAPSULE ORAL
Qty: 30 ML | Refills: 1 | Status: SHIPPED | OUTPATIENT
Start: 2024-08-19

## 2024-08-19 NOTE — TELEPHONE ENCOUNTER
Last visit: 07/24/24  Last Med refill: 09/20/23  PATIENT STATES  YES SHE IS USING THE EYE DROPS  Does patient have enough medication for 72 hours: No:     Next Visit Date:  Future Appointments   Date Time Provider Department Center   9/4/2024  1:30 PM Kody Licona, APRN - CNP [facility] DEP   10/31/2024  3:00 PM Kriss Driscoll MD [facility]       Health Maintenance   Topic Date Due    Diabetic retinal exam  Never done    Cervical cancer screen  Never done    Shingles vaccine (1 of 2) Never done    Diabetic foot exam  03/30/2018    Respiratory Syncytial Virus (RSV) Pregnant or age 60 yrs+ (1 - 1-dose 60+ series) Never done    COVID-19 Vaccine (3 - 2023-24 season) 09/01/2023    Breast cancer screen  01/21/2024    Diabetic Alb to Cr ratio (uACR) test  07/19/2024    Lipids  07/19/2024    Flu vaccine (1) 08/01/2024    A1C test (Diabetic or Prediabetic)  01/31/2025    Colorectal Cancer Screen  06/27/2025    GFR test (Diabetes, CKD 3-4, OR last GFR 15-59)  07/02/2025    Depression Monitoring  07/24/2025    DTaP/Tdap/Td vaccine (2 - Td or Tdap) 03/30/2027    Pneumococcal 0-64 years Vaccine  Completed    Hepatitis C screen  Completed    HIV screen  Completed    Hepatitis A vaccine  Aged Out    Hepatitis B vaccine  Aged Out    Hib vaccine  Aged Out    Polio vaccine  Aged Out    Meningococcal (ACWY) vaccine  Aged Out       Hemoglobin A1C (%)   Date Value   01/31/2024 7.1   07/19/2023 8.6   01/11/2023 8.2             ( goal A1C is < 7)   No components found for: \"LABMICR\"  No components found for: \"LDLCHOLESTEROL\", \"LDLCALC\"    (goal LDL is <100)   AST (U/L)   Date Value   07/02/2024 14     ALT (U/L)   Date Value   07/02/2024 18     BUN (mg/dL)   Date Value   07/02/2024 34 (H)     BP Readings from Last 3 Encounters:   07/24/24 134/70   07/02/24 (!) 147/75   07/01/24 (!) 154/77          (goal 120/80)    All Future Testing planned in [facility]  Lab Frequency Next Occurrence   Vascular duplex upper

## 2024-10-28 ENCOUNTER — TELEPHONE (OUTPATIENT)
Age: 64
End: 2024-10-28

## 2024-10-28 NOTE — TELEPHONE ENCOUNTER
Patient called asking why we keep calling. Did look in her chart, to see patient does have up coming appointment on 10/31 in [place] to see Dr. Driscoll. Patient stated she does not have transportation to [place]. She did state she can get a ride to [place] easier then [place]. Did put patient in first available on 1/17/25. Did let patient know if there is any cancellations we will call to move her appointment up.

## 2024-11-18 RX ORDER — GLUCOSAMINE HCL/CHONDROITIN SU 500-400 MG
CAPSULE ORAL
Qty: 100 EACH | Refills: 11 | Status: SHIPPED | OUTPATIENT
Start: 2024-11-18

## 2024-12-09 DIAGNOSIS — G44.209 ACUTE NON INTRACTABLE TENSION-TYPE HEADACHE: ICD-10-CM

## 2024-12-09 RX ORDER — BUTALBITAL, ACETAMINOPHEN AND CAFFEINE 300; 40; 50 MG/1; MG/1; MG/1
CAPSULE ORAL
Qty: 20 CAPSULE | Refills: 2 | Status: CANCELLED | OUTPATIENT
Start: 2024-12-09

## 2024-12-31 RX ORDER — GABAPENTIN 300 MG/1
300 CAPSULE ORAL 3 TIMES DAILY
Qty: 90 CAPSULE | Refills: 0 | OUTPATIENT
Start: 2024-12-31

## 2025-02-26 ENCOUNTER — OFFICE VISIT (OUTPATIENT)
Dept: PRIMARY CARE CLINIC | Age: 65
End: 2025-02-26

## 2025-02-26 ENCOUNTER — HOSPITAL ENCOUNTER (OUTPATIENT)
Age: 65
Discharge: HOME | End: 2025-02-26
Payer: MEDICAID

## 2025-02-26 ENCOUNTER — HOSPITAL ENCOUNTER (OUTPATIENT)
Age: 65
Setting detail: SPECIMEN
Discharge: HOME | End: 2025-02-26

## 2025-02-26 VITALS
HEART RATE: 92 BPM | BODY MASS INDEX: 31.5 KG/M2 | TEMPERATURE: 97.6 F | WEIGHT: 196 LBS | SYSTOLIC BLOOD PRESSURE: 190 MMHG | HEIGHT: 66 IN | OXYGEN SATURATION: 100 % | DIASTOLIC BLOOD PRESSURE: 89 MMHG

## 2025-02-26 DIAGNOSIS — E11.42 DIABETIC POLYNEUROPATHY ASSOCIATED WITH TYPE 2 DIABETES MELLITUS (HCC): ICD-10-CM

## 2025-02-26 DIAGNOSIS — R35.1 EXCESSIVE URINATION AT NIGHT: ICD-10-CM

## 2025-02-26 DIAGNOSIS — M79.641 RIGHT HAND PAIN: ICD-10-CM

## 2025-02-26 DIAGNOSIS — N18.4 STAGE 4 CHRONIC KIDNEY DISEASE (HCC): Primary | ICD-10-CM

## 2025-02-26 DIAGNOSIS — E11.42 TYPE 2 DIABETES MELLITUS WITH DIABETIC POLYNEUROPATHY, WITHOUT LONG-TERM CURRENT USE OF INSULIN (HCC): ICD-10-CM

## 2025-02-26 DIAGNOSIS — I10 PRIMARY HYPERTENSION: ICD-10-CM

## 2025-02-26 DIAGNOSIS — R80.1 PERSISTENT PROTEINURIA: ICD-10-CM

## 2025-02-26 DIAGNOSIS — E78.00 PURE HYPERCHOLESTEROLEMIA: ICD-10-CM

## 2025-02-26 DIAGNOSIS — Z13.21 ENCOUNTER FOR VITAMIN DEFICIENCY SCREENING: ICD-10-CM

## 2025-02-26 DIAGNOSIS — I10 ESSENTIAL HYPERTENSION: Primary | ICD-10-CM

## 2025-02-26 DIAGNOSIS — E55.9 VITAMIN D DEFICIENCY: ICD-10-CM

## 2025-02-26 DIAGNOSIS — Z12.31 ENCOUNTER FOR SCREENING MAMMOGRAM FOR BREAST CANCER: ICD-10-CM

## 2025-02-26 DIAGNOSIS — R94.39 ABNORMAL CARDIOVASCULAR STRESS TEST: ICD-10-CM

## 2025-02-26 LAB
25(OH)D3 SERPL-MCNC: 19.6 NG/ML (ref 30–100)
ALBUMIN SERPL-MCNC: 3.9 G/DL (ref 3.5–5.2)
ALBUMIN/GLOB SERPL: 1.2 {RATIO} (ref 1–2.5)
ALP SERPL-CCNC: 130 U/L (ref 35–104)
ALT SERPL-CCNC: 27 U/L (ref 10–35)
ANION GAP SERPL CALCULATED.3IONS-SCNC: 10 MMOL/L (ref 9–16)
AST SERPL-CCNC: 27 U/L (ref 10–35)
BILIRUB SERPL-MCNC: <0.2 MG/DL (ref 0–1.2)
BUN SERPL-MCNC: 32 MG/DL (ref 8–23)
CALCIUM SERPL-MCNC: 9.7 MG/DL (ref 8.6–10.4)
CHLORIDE SERPL-SCNC: 108 MMOL/L (ref 98–107)
CHOLEST SERPL-MCNC: 229 MG/DL (ref 0–199)
CHOLESTEROL/HDL RATIO: 3.8
CO2 SERPL-SCNC: 23 MMOL/L (ref 20–31)
CREAT SERPL-MCNC: 2.1 MG/DL (ref 0.6–0.9)
CREAT UR-MCNC: 57.8 MG/DL (ref 28–217)
ERYTHROCYTE [DISTWIDTH] IN BLOOD BY AUTOMATED COUNT: 17.2 % (ref 11.8–14.4)
GFR, ESTIMATED: 26 ML/MIN/1.73M2
GLUCOSE SERPL-MCNC: 114 MG/DL (ref 74–99)
HCT VFR BLD AUTO: 28.7 % (ref 36.3–47.1)
HDLC SERPL-MCNC: 60 MG/DL
HGB BLD-MCNC: 8.9 G/DL (ref 11.9–15.1)
LDLC SERPL CALC-MCNC: 145 MG/DL (ref 0–100)
MCH RBC QN AUTO: 28.6 PG (ref 25.2–33.5)
MCHC RBC AUTO-ENTMCNC: 31 G/DL (ref 28.4–34.8)
MCV RBC AUTO: 92.3 FL (ref 82.6–102.9)
MICROALBUMIN UR-MCNC: 1735 MG/L (ref 0–20)
MICROALBUMIN/CREAT UR-RTO: 3002 MCG/MG CREAT (ref 0–25)
NRBC BLD-RTO: 0 PER 100 WBC
PLATELET # BLD AUTO: 324 K/UL (ref 138–453)
PMV BLD AUTO: 9.9 FL (ref 8.1–13.5)
POTASSIUM SERPL-SCNC: 4.7 MMOL/L (ref 3.7–5.3)
PROT SERPL-MCNC: 7.1 G/DL (ref 6.6–8.7)
RBC # BLD AUTO: 3.11 M/UL (ref 3.95–5.11)
SODIUM SERPL-SCNC: 141 MMOL/L (ref 136–145)
TRIGL SERPL-MCNC: 122 MG/DL
TSH SERPL DL<=0.05 MIU/L-ACNC: 0.88 UIU/ML (ref 0.27–4.2)
VLDLC SERPL CALC-MCNC: 24 MG/DL (ref 1–30)
WBC OTHER # BLD: 9.3 K/UL (ref 3.5–11.3)

## 2025-02-26 PROCEDURE — 82570 ASSAY OF URINE CREATININE: CPT

## 2025-02-26 PROCEDURE — 80061 LIPID PANEL: CPT

## 2025-02-26 PROCEDURE — 85027 COMPLETE CBC AUTOMATED: CPT

## 2025-02-26 PROCEDURE — 82043 UR ALBUMIN QUANTITATIVE: CPT

## 2025-02-26 PROCEDURE — 80053 COMPREHEN METABOLIC PANEL: CPT

## 2025-02-26 PROCEDURE — 84443 ASSAY THYROID STIM HORMONE: CPT

## 2025-02-26 PROCEDURE — 36415 COLL VENOUS BLD VENIPUNCTURE: CPT

## 2025-02-26 PROCEDURE — 82306 VITAMIN D 25 HYDROXY: CPT

## 2025-02-26 RX ORDER — AMLODIPINE BESYLATE 10 MG/1
5 TABLET ORAL DAILY
Qty: 90 TABLET | Refills: 1 | Status: SHIPPED | OUTPATIENT
Start: 2025-02-26

## 2025-02-26 RX ORDER — PRAVASTATIN SODIUM 40 MG
TABLET ORAL
Qty: 90 TABLET | Refills: 1 | Status: SHIPPED | OUTPATIENT
Start: 2025-02-26

## 2025-02-26 RX ORDER — ASPIRIN 81 MG/1
TABLET ORAL
Qty: 30 TABLET | Refills: 0 | Status: SHIPPED | OUTPATIENT
Start: 2025-02-26

## 2025-02-26 RX ORDER — PRAVASTATIN SODIUM 40 MG
TABLET ORAL
Qty: 30 TABLET | Refills: 0 | Status: SHIPPED | OUTPATIENT
Start: 2025-02-26 | End: 2025-02-26 | Stop reason: SDUPTHER

## 2025-02-26 RX ORDER — AMLODIPINE BESYLATE 10 MG/1
5 TABLET ORAL DAILY
Qty: 30 TABLET | Refills: 0 | Status: SHIPPED | OUTPATIENT
Start: 2025-02-26 | End: 2025-02-26 | Stop reason: SDUPTHER

## 2025-02-26 RX ORDER — ERGOCALCIFEROL 1.25 MG/1
50000 CAPSULE, LIQUID FILLED ORAL WEEKLY
Qty: 4 CAPSULE | Refills: 5 | Status: SHIPPED | OUTPATIENT
Start: 2025-02-26

## 2025-02-26 RX ORDER — ATENOLOL 50 MG/1
TABLET ORAL
Qty: 90 TABLET | Refills: 1 | Status: SHIPPED | OUTPATIENT
Start: 2025-02-26

## 2025-02-26 RX ORDER — DIPHENHYDRAMINE HCL 25 MG
25 CAPSULE ORAL EVERY 6 HOURS PRN
Qty: 30 CAPSULE | Refills: 0 | Status: SHIPPED | OUTPATIENT
Start: 2025-02-26 | End: 2025-02-26 | Stop reason: SDUPTHER

## 2025-02-26 RX ORDER — DIPHENHYDRAMINE HCL 25 MG
25 CAPSULE ORAL EVERY 6 HOURS PRN
Qty: 30 CAPSULE | Refills: 0 | Status: SHIPPED | OUTPATIENT
Start: 2025-02-26 | End: 2025-03-08

## 2025-02-26 RX ORDER — GLUCOSAMINE HCL/CHONDROITIN SU 500-400 MG
CAPSULE ORAL
Qty: 100 EACH | Refills: 11 | Status: SHIPPED | OUTPATIENT
Start: 2025-02-26

## 2025-02-26 RX ORDER — ATENOLOL 50 MG/1
TABLET ORAL
Qty: 30 TABLET | Refills: 0 | Status: SHIPPED | OUTPATIENT
Start: 2025-02-26 | End: 2025-02-26 | Stop reason: SDUPTHER

## 2025-02-26 SDOH — ECONOMIC STABILITY: FOOD INSECURITY: WITHIN THE PAST 12 MONTHS, THE FOOD YOU BOUGHT JUST DIDN'T LAST AND YOU DIDN'T HAVE MONEY TO GET MORE.: NEVER TRUE

## 2025-02-26 SDOH — ECONOMIC STABILITY: FOOD INSECURITY: WITHIN THE PAST 12 MONTHS, YOU WORRIED THAT YOUR FOOD WOULD RUN OUT BEFORE YOU GOT MONEY TO BUY MORE.: NEVER TRUE

## 2025-02-26 ASSESSMENT — PATIENT HEALTH QUESTIONNAIRE - PHQ9
2. FEELING DOWN, DEPRESSED OR HOPELESS: NOT AT ALL
3. TROUBLE FALLING OR STAYING ASLEEP: NOT AT ALL
9. THOUGHTS THAT YOU WOULD BE BETTER OFF DEAD, OR OF HURTING YOURSELF: NOT AT ALL
4. FEELING TIRED OR HAVING LITTLE ENERGY: NOT AT ALL
SUM OF ALL RESPONSES TO PHQ QUESTIONS 1-9: 0
5. POOR APPETITE OR OVEREATING: NOT AT ALL
SUM OF ALL RESPONSES TO PHQ QUESTIONS 1-9: 0
1. LITTLE INTEREST OR PLEASURE IN DOING THINGS: NOT AT ALL
8. MOVING OR SPEAKING SO SLOWLY THAT OTHER PEOPLE COULD HAVE NOTICED. OR THE OPPOSITE, BEING SO FIGETY OR RESTLESS THAT YOU HAVE BEEN MOVING AROUND A LOT MORE THAN USUAL: NOT AT ALL
10. IF YOU CHECKED OFF ANY PROBLEMS, HOW DIFFICULT HAVE THESE PROBLEMS MADE IT FOR YOU TO DO YOUR WORK, TAKE CARE OF THINGS AT HOME, OR GET ALONG WITH OTHER PEOPLE: NOT DIFFICULT AT ALL
7. TROUBLE CONCENTRATING ON THINGS, SUCH AS READING THE NEWSPAPER OR WATCHING TELEVISION: NOT AT ALL
6. FEELING BAD ABOUT YOURSELF - OR THAT YOU ARE A FAILURE OR HAVE LET YOURSELF OR YOUR FAMILY DOWN: NOT AT ALL

## 2025-02-26 NOTE — RESULT ENCOUNTER NOTE
Vitamin d is a little low, supplement called to pharmacy, will recheck in 3 months.  Cholesterol levels elevated, med restarted today. Needs to see nephrologist, I gave her this referral last summer.

## 2025-02-26 NOTE — PROGRESS NOTES
capsule Take 1 capsule by mouth once a week 4 capsule 5    Transparent Dressings (TEGADERM FILM 4\"X4-1/2\") MISC 1 each by Does not apply route once a week 12 each 1    isosorbide mononitrate (IMDUR) 60 MG extended release tablet Take 1 tablet by mouth daily 30 tablet 3    Soft Lens Products (B & L SENSITIVE EYES SALINE) 0.4 % SOLN 1 drop by Does not apply route in the morning and at bedtime 1 each 0    Elastic Bandages & Supports (WRIST SPLINT) MISC Apply 1 each topically continuous Cock up wrist splint 2 each 0    gabapentin (NEURONTIN) 300 MG capsule Take 1 capsule by mouth 3 times daily for 30 days. 90 capsule 0    Blood Pressure Monitoring (BLOOD PRESSURE CUFF) MISC 1 Dose by Does not apply route once for 1 dose 1 each 0     No current facility-administered medications for this visit.       Allergies   Allergen Reactions    Ibuprofen Itching       Health Maintenance   Topic Date Due    Diabetic retinal exam  Never done    Cervical cancer screen  Never done    Shingles vaccine (1 of 2) Never done    Diabetic foot exam  03/30/2018    Respiratory Syncytial Virus (RSV) Pregnant or age 60 yrs+ (1 - Risk 60-74 years 1-dose series) Never done    Breast cancer screen  01/21/2024    Diabetic Alb to Cr ratio (uACR) test  07/19/2024    Lipids  07/19/2024    Flu vaccine (1) 08/01/2024    COVID-19 Vaccine (3 - 2024-25 season) 09/01/2024    A1C test (Diabetic or Prediabetic)  01/31/2025    Colorectal Cancer Screen  06/27/2025    GFR test (Diabetes, CKD 3-4, OR last GFR 15-59)  07/02/2025    Depression Monitoring  02/26/2026    DTaP/Tdap/Td vaccine (2 - Td or Tdap) 03/30/2027    Pneumococcal 50+ years Vaccine  Completed    Hepatitis C screen  Completed    HIV screen  Completed    Hepatitis A vaccine  Aged Out    Hepatitis B vaccine  Aged Out    Hib vaccine  Aged Out    Polio vaccine  Aged Out    Meningococcal (ACWY) vaccine  Aged Out    Pneumococcal 0-49 years Vaccine  Discontinued       Subjective:      Review of Systems

## 2025-02-27 LAB
MICROORGANISM SPEC CULT: NO GROWTH
SERVICE CMNT-IMP: NORMAL
SPECIMEN DESCRIPTION: NORMAL

## 2025-03-05 ENCOUNTER — TELEPHONE (OUTPATIENT)
Dept: PRIMARY CARE CLINIC | Age: 65
End: 2025-03-05

## 2025-03-05 RX ORDER — GABAPENTIN 300 MG/1
300 CAPSULE ORAL 3 TIMES DAILY
Qty: 90 CAPSULE | Refills: 2 | Status: SHIPPED | OUTPATIENT
Start: 2025-03-05 | End: 2025-06-03

## 2025-03-05 NOTE — TELEPHONE ENCOUNTER
Pt called in and stated she needs a refill of gabapentin and would like a brace for her right hand. Please advise

## 2025-04-01 RX ORDER — GABAPENTIN 300 MG/1
300 CAPSULE ORAL 3 TIMES DAILY
Qty: 84 CAPSULE | Refills: 0 | Status: SHIPPED | OUTPATIENT
Start: 2025-04-01 | End: 2025-06-30

## 2025-04-04 NOTE — PROGRESS NOTES
Patient instructed to remove shoes and socks and instructed to sit in exam chair.  Current PCP is Kody Licona APRN - CNP and date of last visit was 2/26/2025.   Do you have a follow up visit scheduled?  Yes  If yes, the date is 4/16/2025

## 2025-04-09 ENCOUNTER — OFFICE VISIT (OUTPATIENT)
Dept: PODIATRY | Age: 65
End: 2025-04-09
Payer: MEDICAID

## 2025-04-09 VITALS
HEIGHT: 66 IN | HEART RATE: 68 BPM | BODY MASS INDEX: 31.5 KG/M2 | WEIGHT: 196 LBS | SYSTOLIC BLOOD PRESSURE: 160 MMHG | DIASTOLIC BLOOD PRESSURE: 74 MMHG

## 2025-04-09 DIAGNOSIS — B35.1 PAIN DUE TO ONYCHOMYCOSIS OF NAIL: ICD-10-CM

## 2025-04-09 DIAGNOSIS — M79.609 PAIN DUE TO ONYCHOMYCOSIS OF NAIL: ICD-10-CM

## 2025-04-09 DIAGNOSIS — B35.1 ONYCHOMYCOSIS: Primary | ICD-10-CM

## 2025-04-09 DIAGNOSIS — E11.42 DIABETIC POLYNEUROPATHY ASSOCIATED WITH TYPE 2 DIABETES MELLITUS (HCC): ICD-10-CM

## 2025-04-09 PROCEDURE — 11721 DEBRIDE NAIL 6 OR MORE: CPT

## 2025-04-15 RX ORDER — DIPHENHYDRAMINE HYDROCHLORIDE 25 MG/1
CAPSULE ORAL
Qty: 30 CAPSULE | Refills: 5 | Status: SHIPPED | OUTPATIENT
Start: 2025-04-15

## 2025-04-17 NOTE — PROGRESS NOTES
[facility]  [address]   [address] [place] [address]  Tel: [phone]   Fax: [phone]    Subjective     CC: Diabetic foot exam and painful and elongated toe nails    Interval history:  Patient returns to clinic today for diabetic foot examination and painful toenails.  Patient states that the length of her nails has been impeding her ability to perform her daily activities.  Last hemoglobin A1C 6.7 in February 2025.    HPI:  Shauna Alexandra is a 64 y.o. year old female who presents to clinic today for diabetic foot examination and also complaining of painful and elongated toenails.  The patient is a diabetic, and they're last HgbA1c was 7.7% in (2/1/22). The patient states their digits are painful when the toenails are elongated, causing rubbing in shoe gear. The patient admits to tingling and numbness in the lower extremities. Patient denies any rest pain or claudication symptoms.The patient denies any history of acute trauma. Patient also complains of dry flaky skin bilateral. She is interested in obtaining a pair of diabetic shoes. The patient denies any other pedal complaints.     Primary care physician is Kody Licona APRN - CNP.    ROS:    Constitutional: Denies nausea, vomiting, fever, chills.  Neurologic: Admits to numbness, tingling, and burning in the feet.    Vascular: Denies symptoms of lower extremity claudication.    Skin: Denies open wounds.  Elongated nails 1-10  Otherwise negative except as noted in the HPI.     PMH:  Past Medical History:   Diagnosis Date    Anemia     Chronic back pain     Depression     Hyperlipidemia     Hypertension     Type 2 diabetes mellitus with diabetic polyneuropathy, without long-term current use of insulin ([facility]) 11/10/2016    Type II or unspecified type diabetes mellitus without mention of complication, not stated as uncontrolled        Surgical History:   Past Surgical History:   Procedure Laterality Date    CARDIAC PROCEDURE

## 2025-05-12 RX ORDER — GABAPENTIN 300 MG/1
300 CAPSULE ORAL 3 TIMES DAILY
Qty: 84 CAPSULE | Refills: 0 | OUTPATIENT
Start: 2025-05-12 | End: 2025-08-10

## 2025-07-25 DIAGNOSIS — E78.00 PURE HYPERCHOLESTEROLEMIA: ICD-10-CM

## 2025-07-25 DIAGNOSIS — E55.9 VITAMIN D DEFICIENCY: ICD-10-CM

## 2025-07-25 DIAGNOSIS — I10 ESSENTIAL HYPERTENSION: ICD-10-CM

## 2025-07-25 RX ORDER — ATENOLOL 50 MG/1
50 TABLET ORAL DAILY
Qty: 90 TABLET | Refills: 0 | Status: SHIPPED | OUTPATIENT
Start: 2025-07-25

## 2025-07-25 RX ORDER — PRAVASTATIN SODIUM 40 MG
40 TABLET ORAL DAILY
Qty: 90 TABLET | Refills: 0 | Status: SHIPPED | OUTPATIENT
Start: 2025-07-25

## 2025-07-25 RX ORDER — ERGOCALCIFEROL 1.25 MG/1
CAPSULE, LIQUID FILLED ORAL
Qty: 4 CAPSULE | Refills: 0 | Status: SHIPPED | OUTPATIENT
Start: 2025-07-25

## 2025-08-20 DIAGNOSIS — E55.9 VITAMIN D DEFICIENCY: ICD-10-CM

## 2025-08-20 RX ORDER — ERGOCALCIFEROL 1.25 MG/1
CAPSULE, LIQUID FILLED ORAL
Qty: 4 CAPSULE | Refills: 1 | Status: SHIPPED | OUTPATIENT
Start: 2025-08-20

## (undated) DEVICE — COUNTER NDL 10 COUNT HLD 20 FOAM BLK SGL MAG

## (undated) DEVICE — GAUZE,SPONGE,FLUFF,6"X6.75",STRL,5/TRAY: Brand: MEDLINE

## (undated) DEVICE — SWAB CULT CLR BLU PLAS RAYON LIQ AMIES AERB ANAERB FRIC CAP

## (undated) DEVICE — STRAP ARMBRD W1.5XL32IN FOAM STR YET SFT W/ HK AND LOOP

## (undated) DEVICE — SYRINGE IRRIG 60ML SFT PLIABLE BLB EZ TO GRP 1 HND USE W/

## (undated) DEVICE — BAND COMPR L24CM REG CLR PLAS HEMSTAT EXT HK AND LOOP RETEN

## (undated) DEVICE — INTENDED FOR TISSUE SEPARATION, AND OTHER PROCEDURES THAT REQUIRE A SHARP SURGICAL BLADE TO PUNCTURE OR CUT.: Brand: BARD-PARKER ® CARBON RIB-BACK BLADES

## (undated) DEVICE — PENCIL ES L3M BTTN SWCH HOLSTER W/ BLDE ELECTRD EDGE

## (undated) DEVICE — ANGIOGRAPHIC CATHETER: Brand: EXPO™

## (undated) DEVICE — SURGICAL PROCEDURE TRAY CRD CATH SVMMC

## (undated) DEVICE — ST CHARLES PERI-GYN PACK: Brand: MEDLINE INDUSTRIES, INC.

## (undated) DEVICE — SINGLE PORT MANIFOLD: Brand: NEPTUNE 2

## (undated) DEVICE — SYRINGE 20ML LL S/C 50

## (undated) DEVICE — YANKAUER,POOLE TIP,STERILE,50/CS: Brand: MEDLINE

## (undated) DEVICE — GLIDESHEATH SLENDER STAINLESS STEEL KIT: Brand: GLIDESHEATH SLENDER

## (undated) DEVICE — CATHETER 5FR JR4 CORDIS 100CM

## (undated) DEVICE — GAUZE,PACKING STRIP,IODOFORM,1/2"X5YD,ST: Brand: CURAD

## (undated) DEVICE — GLOVE ORTHO 7 1/2   MSG9475

## (undated) DEVICE — MERCY HEALTH ST CHARLES: Brand: MEDLINE INDUSTRIES, INC.

## (undated) DEVICE — Device

## (undated) DEVICE — PAD,ABDOMINAL,8"X7.5",ST,LF,20/BX: Brand: MEDLINE INDUSTRIES, INC.

## (undated) DEVICE — SOLUTION IV IRRIG POUR BRL 0.9% SODIUM CHL 2F7124